# Patient Record
Sex: MALE | Race: WHITE | NOT HISPANIC OR LATINO | Employment: OTHER | URBAN - METROPOLITAN AREA
[De-identification: names, ages, dates, MRNs, and addresses within clinical notes are randomized per-mention and may not be internally consistent; named-entity substitution may affect disease eponyms.]

---

## 2017-01-01 ENCOUNTER — APPOINTMENT (OUTPATIENT)
Dept: RADIOLOGY | Facility: MEDICAL CENTER | Age: 82
DRG: 242 | End: 2017-01-01
Attending: INTERNAL MEDICINE
Payer: MEDICARE

## 2017-01-01 ENCOUNTER — APPOINTMENT (OUTPATIENT)
Dept: RADIOLOGY | Facility: MEDICAL CENTER | Age: 82
DRG: 208 | End: 2017-01-01
Attending: INTERNAL MEDICINE
Payer: MEDICARE

## 2017-01-01 ENCOUNTER — HOSPITAL ENCOUNTER (INPATIENT)
Facility: REHABILITATION | Age: 82
LOS: 13 days | DRG: 054 | End: 2017-08-21
Attending: PHYSICAL MEDICINE & REHABILITATION | Admitting: PHYSICAL MEDICINE & REHABILITATION
Payer: MEDICARE

## 2017-01-01 ENCOUNTER — HOSPITAL ENCOUNTER (OUTPATIENT)
Dept: RADIOLOGY | Facility: MEDICAL CENTER | Age: 82
End: 2017-04-05

## 2017-01-01 ENCOUNTER — APPOINTMENT (OUTPATIENT)
Dept: RADIOLOGY | Facility: MEDICAL CENTER | Age: 82
DRG: 025 | End: 2017-01-01
Attending: NEUROLOGICAL SURGERY
Payer: MEDICARE

## 2017-01-01 ENCOUNTER — RESOLUTE PROFESSIONAL BILLING HOSPITAL PROF FEE (OUTPATIENT)
Dept: PHYSICAL MEDICINE AND REHAB | Facility: REHABILITATION | Age: 82
End: 2017-01-01

## 2017-01-01 ENCOUNTER — APPOINTMENT (OUTPATIENT)
Dept: RADIOLOGY | Facility: MEDICAL CENTER | Age: 82
DRG: 242 | End: 2017-01-01
Attending: EMERGENCY MEDICINE
Payer: MEDICARE

## 2017-01-01 ENCOUNTER — PATIENT OUTREACH (OUTPATIENT)
Dept: OTHER | Facility: MEDICAL CENTER | Age: 82
End: 2017-01-01

## 2017-01-01 ENCOUNTER — APPOINTMENT (OUTPATIENT)
Dept: RADIOLOGY | Facility: REHABILITATION | Age: 82
DRG: 054 | End: 2017-01-01
Attending: PHYSICAL MEDICINE & REHABILITATION
Payer: MEDICARE

## 2017-01-01 ENCOUNTER — HOSPITAL ENCOUNTER (OUTPATIENT)
Dept: RADIOLOGY | Facility: MEDICAL CENTER | Age: 82
End: 2017-08-02

## 2017-01-01 ENCOUNTER — HOSPITAL ENCOUNTER (OUTPATIENT)
Dept: RADIATION ONCOLOGY | Facility: MEDICAL CENTER | Age: 82
End: 2017-05-31
Attending: RADIOLOGY
Payer: COMMERCIAL

## 2017-01-01 ENCOUNTER — HOSPITAL ENCOUNTER (INPATIENT)
Facility: MEDICAL CENTER | Age: 82
LOS: 5 days | DRG: 242 | End: 2017-08-08
Attending: EMERGENCY MEDICINE | Admitting: INTERNAL MEDICINE
Payer: MEDICARE

## 2017-01-01 ENCOUNTER — NON-PROVIDER VISIT (OUTPATIENT)
Dept: CARDIOLOGY | Facility: MEDICAL CENTER | Age: 82
End: 2017-01-01

## 2017-01-01 ENCOUNTER — PATIENT OUTREACH (OUTPATIENT)
Dept: HEALTH INFORMATION MANAGEMENT | Facility: OTHER | Age: 82
End: 2017-01-01

## 2017-01-01 ENCOUNTER — HOSPITAL ENCOUNTER (OUTPATIENT)
Dept: RADIATION ONCOLOGY | Facility: MEDICAL CENTER | Age: 82
End: 2017-05-10

## 2017-01-01 ENCOUNTER — HOSPITAL ENCOUNTER (INPATIENT)
Facility: REHABILITATION | Age: 82
End: 2017-01-01
Attending: PHYSICAL MEDICINE & REHABILITATION | Admitting: PHYSICAL MEDICINE & REHABILITATION
Payer: COMMERCIAL

## 2017-01-01 ENCOUNTER — HOSPITAL ENCOUNTER (INPATIENT)
Facility: MEDICAL CENTER | Age: 82
LOS: 3 days | DRG: 208 | End: 2017-08-24
Attending: EMERGENCY MEDICINE | Admitting: INTERNAL MEDICINE
Payer: MEDICARE

## 2017-01-01 ENCOUNTER — RESOLUTE PROFESSIONAL BILLING HOSPITAL PROF FEE (OUTPATIENT)
Dept: HOSPITALIST | Facility: MEDICAL CENTER | Age: 82
End: 2017-01-01
Payer: COMMERCIAL

## 2017-01-01 ENCOUNTER — APPOINTMENT (OUTPATIENT)
Dept: RADIOLOGY | Facility: MEDICAL CENTER | Age: 82
DRG: 208 | End: 2017-01-01
Attending: EMERGENCY MEDICINE
Payer: MEDICARE

## 2017-01-01 ENCOUNTER — HOSPITAL ENCOUNTER (OUTPATIENT)
Dept: RADIATION ONCOLOGY | Facility: MEDICAL CENTER | Age: 82
End: 2017-06-30
Attending: RADIOLOGY
Payer: COMMERCIAL

## 2017-01-01 ENCOUNTER — TELEPHONE (OUTPATIENT)
Dept: CARDIOLOGY | Facility: MEDICAL CENTER | Age: 82
End: 2017-01-01

## 2017-01-01 ENCOUNTER — TELEPHONE (OUTPATIENT)
Dept: HEMATOLOGY ONCOLOGY | Facility: MEDICAL CENTER | Age: 82
End: 2017-01-01

## 2017-01-01 ENCOUNTER — HOSPITAL ENCOUNTER (INPATIENT)
Facility: MEDICAL CENTER | Age: 82
LOS: 6 days | DRG: 025 | End: 2017-04-11
Attending: EMERGENCY MEDICINE | Admitting: HOSPITALIST
Payer: MEDICARE

## 2017-01-01 VITALS
HEART RATE: 93 BPM | WEIGHT: 147.8 LBS | HEIGHT: 65 IN | TEMPERATURE: 97.8 F | SYSTOLIC BLOOD PRESSURE: 125 MMHG | DIASTOLIC BLOOD PRESSURE: 79 MMHG | BODY MASS INDEX: 24.62 KG/M2 | RESPIRATION RATE: 15 BRPM | OXYGEN SATURATION: 97 %

## 2017-01-01 VITALS
OXYGEN SATURATION: 94 % | BODY MASS INDEX: 26.41 KG/M2 | DIASTOLIC BLOOD PRESSURE: 72 MMHG | WEIGHT: 158.51 LBS | HEART RATE: 66 BPM | RESPIRATION RATE: 18 BRPM | HEIGHT: 65 IN | SYSTOLIC BLOOD PRESSURE: 140 MMHG | TEMPERATURE: 98.4 F

## 2017-01-01 VITALS
TEMPERATURE: 98.3 F | DIASTOLIC BLOOD PRESSURE: 83 MMHG | RESPIRATION RATE: 15 BRPM | BODY MASS INDEX: 25.34 KG/M2 | OXYGEN SATURATION: 95 % | WEIGHT: 152.12 LBS | SYSTOLIC BLOOD PRESSURE: 151 MMHG | HEART RATE: 70 BPM | HEIGHT: 65 IN

## 2017-01-01 VITALS
OXYGEN SATURATION: 81 % | DIASTOLIC BLOOD PRESSURE: 1 MMHG | WEIGHT: 156.31 LBS | HEIGHT: 63 IN | SYSTOLIC BLOOD PRESSURE: 15 MMHG | TEMPERATURE: 97.5 F | BODY MASS INDEX: 27.7 KG/M2 | RESPIRATION RATE: 30 BRPM

## 2017-01-01 VITALS
WEIGHT: 151.01 LBS | SYSTOLIC BLOOD PRESSURE: 140 MMHG | RESPIRATION RATE: 26 BRPM | BODY MASS INDEX: 25.16 KG/M2 | OXYGEN SATURATION: 90 % | HEART RATE: 105 BPM | TEMPERATURE: 99.6 F | DIASTOLIC BLOOD PRESSURE: 56 MMHG | HEIGHT: 65 IN

## 2017-01-01 DIAGNOSIS — I44.2 AV BLOCK, 3RD DEGREE (HCC): ICD-10-CM

## 2017-01-01 DIAGNOSIS — G93.89 BRAIN MASS: ICD-10-CM

## 2017-01-01 DIAGNOSIS — G93.6 VASOGENIC CEREBRAL EDEMA (HCC): ICD-10-CM

## 2017-01-01 DIAGNOSIS — R07.9 CHEST PAIN, UNSPECIFIED TYPE: ICD-10-CM

## 2017-01-01 DIAGNOSIS — Z95.0 CARDIAC PACEMAKER IN SITU: ICD-10-CM

## 2017-01-01 DIAGNOSIS — I26.99 PULMONARY EMBOLISM, OTHER: ICD-10-CM

## 2017-01-01 DIAGNOSIS — R00.1 BRADYCARDIA: ICD-10-CM

## 2017-01-01 DIAGNOSIS — I44.30 HEART BLOCK, AV: ICD-10-CM

## 2017-01-01 DIAGNOSIS — G93.6 VASOGENIC BRAIN EDEMA (HCC): ICD-10-CM

## 2017-01-01 DIAGNOSIS — R42 DIZZY: ICD-10-CM

## 2017-01-01 DIAGNOSIS — J18.9 PNEUMONIA DUE TO INFECTIOUS ORGANISM, UNSPECIFIED LATERALITY, UNSPECIFIED PART OF LUNG: ICD-10-CM

## 2017-01-01 LAB
ABO GROUP BLD: NORMAL
ALBUMIN SERPL BCP-MCNC: 2 G/DL (ref 3.2–4.9)
ALBUMIN SERPL BCP-MCNC: 2.1 G/DL (ref 3.2–4.9)
ALBUMIN SERPL BCP-MCNC: 2.3 G/DL (ref 3.2–4.9)
ALBUMIN SERPL BCP-MCNC: 2.3 G/DL (ref 3.2–4.9)
ALBUMIN SERPL BCP-MCNC: 2.4 G/DL (ref 3.2–4.9)
ALBUMIN SERPL BCP-MCNC: 2.7 G/DL (ref 3.2–4.9)
ALBUMIN SERPL BCP-MCNC: 3.3 G/DL (ref 3.2–4.9)
ALBUMIN SERPL BCP-MCNC: 3.6 G/DL (ref 3.2–4.9)
ALBUMIN SERPL BCP-MCNC: 4.7 G/DL (ref 3.2–4.9)
ALBUMIN/GLOB SERPL: 0.7 G/DL
ALBUMIN/GLOB SERPL: 0.7 G/DL
ALBUMIN/GLOB SERPL: 0.8 G/DL
ALBUMIN/GLOB SERPL: 0.9 G/DL
ALBUMIN/GLOB SERPL: 1.1 G/DL
ALBUMIN/GLOB SERPL: 1.3 G/DL
ALBUMIN/GLOB SERPL: 1.7 G/DL
ALP SERPL-CCNC: 117 U/L (ref 30–99)
ALP SERPL-CCNC: 128 U/L (ref 30–99)
ALP SERPL-CCNC: 59 U/L (ref 30–99)
ALP SERPL-CCNC: 65 U/L (ref 30–99)
ALP SERPL-CCNC: 71 U/L (ref 30–99)
ALP SERPL-CCNC: 73 U/L (ref 30–99)
ALP SERPL-CCNC: 88 U/L (ref 30–99)
ALP SERPL-CCNC: 90 U/L (ref 30–99)
ALP SERPL-CCNC: 91 U/L (ref 30–99)
ALT SERPL-CCNC: 159 U/L (ref 2–50)
ALT SERPL-CCNC: 233 U/L (ref 2–50)
ALT SERPL-CCNC: 27 U/L (ref 2–50)
ALT SERPL-CCNC: 29 U/L (ref 2–50)
ALT SERPL-CCNC: 30 U/L (ref 2–50)
ALT SERPL-CCNC: 326 U/L (ref 2–50)
ALT SERPL-CCNC: 333 U/L (ref 2–50)
ALT SERPL-CCNC: 38 U/L (ref 2–50)
ALT SERPL-CCNC: 9 U/L (ref 2–50)
AMORPH CRY #/AREA URNS HPF: PRESENT /HPF
AMORPH CRY #/AREA URNS HPF: PRESENT /HPF
ANION GAP SERPL CALC-SCNC: 10 MMOL/L (ref 0–11.9)
ANION GAP SERPL CALC-SCNC: 10 MMOL/L (ref 0–11.9)
ANION GAP SERPL CALC-SCNC: 12 MMOL/L (ref 0–11.9)
ANION GAP SERPL CALC-SCNC: 12 MMOL/L (ref 0–11.9)
ANION GAP SERPL CALC-SCNC: 13 MMOL/L (ref 0–11.9)
ANION GAP SERPL CALC-SCNC: 13 MMOL/L (ref 0–11.9)
ANION GAP SERPL CALC-SCNC: 15 MMOL/L (ref 0–11.9)
ANION GAP SERPL CALC-SCNC: 22 MMOL/L (ref 0–11.9)
ANION GAP SERPL CALC-SCNC: 6 MMOL/L (ref 0–11.9)
ANION GAP SERPL CALC-SCNC: 7 MMOL/L (ref 0–11.9)
ANION GAP SERPL CALC-SCNC: 8 MMOL/L (ref 0–11.9)
ANION GAP SERPL CALC-SCNC: 9 MMOL/L (ref 0–11.9)
ANISOCYTOSIS BLD QL SMEAR: ABNORMAL
APPEARANCE UR: ABNORMAL
APPEARANCE UR: CLEAR
APPEARANCE UR: CLEAR
APTT PPP: 136.6 SEC (ref 24.7–36)
APTT PPP: 144.1 SEC (ref 24.7–36)
APTT PPP: 49.6 SEC (ref 24.7–36)
APTT PPP: 51.2 SEC (ref 24.7–36)
APTT PPP: 72.9 SEC (ref 24.7–36)
APTT PPP: >240 SEC (ref 24.7–36)
AST SERPL-CCNC: 16 U/L (ref 12–45)
AST SERPL-CCNC: 17 U/L (ref 12–45)
AST SERPL-CCNC: 17 U/L (ref 12–45)
AST SERPL-CCNC: 18 U/L (ref 12–45)
AST SERPL-CCNC: 21 U/L (ref 12–45)
AST SERPL-CCNC: 240 U/L (ref 12–45)
AST SERPL-CCNC: 373 U/L (ref 12–45)
AST SERPL-CCNC: 74 U/L (ref 12–45)
AST SERPL-CCNC: 89 U/L (ref 12–45)
BACTERIA #/AREA URNS HPF: ABNORMAL /HPF
BACTERIA BLD CULT: NORMAL
BACTERIA SPEC RESP CULT: NORMAL
BACTERIA SPEC RESP CULT: NORMAL
BACTERIA UR CULT: NORMAL
BACTERIA UR CULT: NORMAL
BARCODED ABORH UBTYP: 5100
BARCODED ABORH UBTYP: 6200
BARCODED ABORH UBTYP: 9500
BARCODED PRD CODE UBPRD: NORMAL
BARCODED UNIT NUM UBUNT: NORMAL
BASE EXCESS BLDA CALC-SCNC: -10 MMOL/L (ref -4–3)
BASE EXCESS BLDA CALC-SCNC: -8 MMOL/L (ref -4–3)
BASE EXCESS BLDA CALC-SCNC: -8 MMOL/L (ref -4–3)
BASE EXCESS BLDA CALC-SCNC: -9 MMOL/L (ref -4–3)
BASOPHILS # BLD AUTO: 0 % (ref 0–1.8)
BASOPHILS # BLD AUTO: 0.2 % (ref 0–1.8)
BASOPHILS # BLD AUTO: 0.2 % (ref 0–1.8)
BASOPHILS # BLD AUTO: 0.9 % (ref 0–1.8)
BASOPHILS # BLD AUTO: 2.6 % (ref 0–1.8)
BASOPHILS # BLD: 0 K/UL (ref 0–0.12)
BASOPHILS # BLD: 0.01 K/UL (ref 0–0.12)
BASOPHILS # BLD: 0.02 K/UL (ref 0–0.12)
BASOPHILS # BLD: 0.02 K/UL (ref 0–0.12)
BASOPHILS # BLD: 0.18 K/UL (ref 0–0.12)
BILIRUB SERPL-MCNC: 0.6 MG/DL (ref 0.1–1.5)
BILIRUB SERPL-MCNC: 0.6 MG/DL (ref 0.1–1.5)
BILIRUB SERPL-MCNC: 0.8 MG/DL (ref 0.1–1.5)
BILIRUB SERPL-MCNC: 0.8 MG/DL (ref 0.1–1.5)
BILIRUB SERPL-MCNC: 1 MG/DL (ref 0.1–1.5)
BILIRUB SERPL-MCNC: 1 MG/DL (ref 0.1–1.5)
BILIRUB SERPL-MCNC: 1.2 MG/DL (ref 0.1–1.5)
BILIRUB SERPL-MCNC: 3.4 MG/DL (ref 0.1–1.5)
BILIRUB SERPL-MCNC: 4.2 MG/DL (ref 0.1–1.5)
BILIRUB UR QL STRIP.AUTO: NEGATIVE
BLD GP AB SCN SERPL QL: NORMAL
BLOOD CULTURE HOLD CXBCH: NORMAL
BLOOD CULTURE HOLD CXBCH: NORMAL
BNP SERPL-MCNC: 110 PG/ML (ref 0–100)
BODY TEMPERATURE: ABNORMAL DEGREES
BUN SERPL-MCNC: 15 MG/DL (ref 8–22)
BUN SERPL-MCNC: 17 MG/DL (ref 8–22)
BUN SERPL-MCNC: 18 MG/DL (ref 8–22)
BUN SERPL-MCNC: 20 MG/DL (ref 8–22)
BUN SERPL-MCNC: 21 MG/DL (ref 8–22)
BUN SERPL-MCNC: 21 MG/DL (ref 8–22)
BUN SERPL-MCNC: 26 MG/DL (ref 8–22)
BUN SERPL-MCNC: 31 MG/DL (ref 8–22)
BUN SERPL-MCNC: 35 MG/DL (ref 8–22)
BUN SERPL-MCNC: 39 MG/DL (ref 8–22)
BUN SERPL-MCNC: 43 MG/DL (ref 8–22)
BUN SERPL-MCNC: 44 MG/DL (ref 8–22)
BUN SERPL-MCNC: 47 MG/DL (ref 8–22)
BUN SERPL-MCNC: 53 MG/DL (ref 8–22)
BURR CELLS BLD QL SMEAR: NORMAL
CALCIUM SERPL-MCNC: 6.8 MG/DL (ref 8.5–10.5)
CALCIUM SERPL-MCNC: 7.2 MG/DL (ref 8.5–10.5)
CALCIUM SERPL-MCNC: 7.8 MG/DL (ref 8.5–10.5)
CALCIUM SERPL-MCNC: 7.8 MG/DL (ref 8.5–10.5)
CALCIUM SERPL-MCNC: 8 MG/DL (ref 8.5–10.5)
CALCIUM SERPL-MCNC: 8 MG/DL (ref 8.5–10.5)
CALCIUM SERPL-MCNC: 8.1 MG/DL (ref 8.5–10.5)
CALCIUM SERPL-MCNC: 8.2 MG/DL (ref 8.5–10.5)
CALCIUM SERPL-MCNC: 8.6 MG/DL (ref 8.5–10.5)
CALCIUM SERPL-MCNC: 8.9 MG/DL (ref 8.5–10.5)
CALCIUM SERPL-MCNC: 8.9 MG/DL (ref 8.5–10.5)
CALCIUM SERPL-MCNC: 9.4 MG/DL (ref 8.5–10.5)
CFT BLD TEG: 12.6 MIN (ref 5–10)
CFT P HPASE BLD TEG: 7.2 MIN (ref 5–10)
CHLORIDE SERPL-SCNC: 102 MMOL/L (ref 96–112)
CHLORIDE SERPL-SCNC: 104 MMOL/L (ref 96–112)
CHLORIDE SERPL-SCNC: 105 MMOL/L (ref 96–112)
CHLORIDE SERPL-SCNC: 107 MMOL/L (ref 96–112)
CHLORIDE SERPL-SCNC: 107 MMOL/L (ref 96–112)
CHLORIDE SERPL-SCNC: 109 MMOL/L (ref 96–112)
CHLORIDE SERPL-SCNC: 110 MMOL/L (ref 96–112)
CHLORIDE SERPL-SCNC: 111 MMOL/L (ref 96–112)
CHLORIDE SERPL-SCNC: 114 MMOL/L (ref 96–112)
CHLORIDE SERPL-SCNC: 99 MMOL/L (ref 96–112)
CHOLEST SERPL-MCNC: 139 MG/DL (ref 100–199)
CLOT ANGLE BLD TEG: 50.5 DEGREES (ref 53–72)
CLOT ANGLE P HPASE BLD TEG: 59.4 DEGREES (ref 53–72)
CLOT INIT P HPASE BLD TEG: 2.2 MIN (ref 1–3)
CLOT LYSIS 30M P MA LENFR BLD TEG: 0 % (ref 0–8)
CLOT LYSIS 30M P MA LENFR BLD TEG: 0 % (ref 0–8)
CO2 BLDA-SCNC: 17 MMOL/L (ref 20–33)
CO2 BLDA-SCNC: 20 MMOL/L (ref 20–33)
CO2 BLDA-SCNC: 21 MMOL/L (ref 20–33)
CO2 BLDA-SCNC: 23 MMOL/L (ref 20–33)
CO2 SERPL-SCNC: 15 MMOL/L (ref 20–33)
CO2 SERPL-SCNC: 16 MMOL/L (ref 20–33)
CO2 SERPL-SCNC: 16 MMOL/L (ref 20–33)
CO2 SERPL-SCNC: 18 MMOL/L (ref 20–33)
CO2 SERPL-SCNC: 20 MMOL/L (ref 20–33)
CO2 SERPL-SCNC: 20 MMOL/L (ref 20–33)
CO2 SERPL-SCNC: 21 MMOL/L (ref 20–33)
CO2 SERPL-SCNC: 22 MMOL/L (ref 20–33)
CO2 SERPL-SCNC: 22 MMOL/L (ref 20–33)
CO2 SERPL-SCNC: 23 MMOL/L (ref 20–33)
CO2 SERPL-SCNC: 27 MMOL/L (ref 20–33)
COLOR UR: ABNORMAL
COLOR UR: YELLOW
COLOR UR: YELLOW
COMPONENT CT 8504CT: NORMAL
COMPONENT FT 8504FT: NORMAL
COMPONENT P 8504P: NORMAL
COMPONENT P 8504P: NORMAL
COMPONENT R 8504R: NORMAL
CREAT SERPL-MCNC: 0.58 MG/DL (ref 0.5–1.4)
CREAT SERPL-MCNC: 0.64 MG/DL (ref 0.5–1.4)
CREAT SERPL-MCNC: 0.66 MG/DL (ref 0.5–1.4)
CREAT SERPL-MCNC: 0.67 MG/DL (ref 0.5–1.4)
CREAT SERPL-MCNC: 0.68 MG/DL (ref 0.5–1.4)
CREAT SERPL-MCNC: 0.79 MG/DL (ref 0.5–1.4)
CREAT SERPL-MCNC: 0.81 MG/DL (ref 0.5–1.4)
CREAT SERPL-MCNC: 1.05 MG/DL (ref 0.5–1.4)
CREAT SERPL-MCNC: 1.14 MG/DL (ref 0.5–1.4)
CREAT SERPL-MCNC: 1.17 MG/DL (ref 0.5–1.4)
CREAT SERPL-MCNC: 1.61 MG/DL (ref 0.5–1.4)
CREAT SERPL-MCNC: 1.96 MG/DL (ref 0.5–1.4)
CREAT SERPL-MCNC: 2.08 MG/DL (ref 0.5–1.4)
CREAT SERPL-MCNC: 2.37 MG/DL (ref 0.5–1.4)
CT.EXTRINSIC BLD ROTEM: 3 MIN (ref 1–3)
DACRYOCYTES BLD QL SMEAR: NORMAL
EKG IMPRESSION: NORMAL
EOSINOPHIL # BLD AUTO: 0 K/UL (ref 0–0.51)
EOSINOPHIL # BLD AUTO: 0.01 K/UL (ref 0–0.51)
EOSINOPHIL # BLD AUTO: 0.03 K/UL (ref 0–0.51)
EOSINOPHIL # BLD AUTO: 0.04 K/UL (ref 0–0.51)
EOSINOPHIL # BLD AUTO: 0.04 K/UL (ref 0–0.51)
EOSINOPHIL # BLD AUTO: 0.05 K/UL (ref 0–0.51)
EOSINOPHIL # BLD AUTO: 0.06 K/UL (ref 0–0.51)
EOSINOPHIL NFR BLD: 0 % (ref 0–6.9)
EOSINOPHIL NFR BLD: 0.9 % (ref 0–6.9)
EOSINOPHIL NFR BLD: 0.9 % (ref 0–6.9)
EOSINOPHIL NFR BLD: 1.8 % (ref 0–6.9)
EOSINOPHIL NFR BLD: 1.8 % (ref 0–6.9)
EOSINOPHIL NFR BLD: 3.8 % (ref 0–6.9)
EOSINOPHIL NFR BLD: 4 % (ref 0–6.9)
EPI CELLS #/AREA URNS HPF: ABNORMAL /HPF
ERYTHROCYTE [DISTWIDTH] IN BLOOD BY AUTOMATED COUNT: 51.3 FL (ref 35.9–50)
ERYTHROCYTE [DISTWIDTH] IN BLOOD BY AUTOMATED COUNT: 52.7 FL (ref 35.9–50)
ERYTHROCYTE [DISTWIDTH] IN BLOOD BY AUTOMATED COUNT: 66.3 FL (ref 35.9–50)
ERYTHROCYTE [DISTWIDTH] IN BLOOD BY AUTOMATED COUNT: 66.9 FL (ref 35.9–50)
ERYTHROCYTE [DISTWIDTH] IN BLOOD BY AUTOMATED COUNT: 67.1 FL (ref 35.9–50)
ERYTHROCYTE [DISTWIDTH] IN BLOOD BY AUTOMATED COUNT: 67.7 FL (ref 35.9–50)
ERYTHROCYTE [DISTWIDTH] IN BLOOD BY AUTOMATED COUNT: 68.6 FL (ref 35.9–50)
ERYTHROCYTE [DISTWIDTH] IN BLOOD BY AUTOMATED COUNT: 69.1 FL (ref 35.9–50)
ERYTHROCYTE [DISTWIDTH] IN BLOOD BY AUTOMATED COUNT: 69.2 FL (ref 35.9–50)
ERYTHROCYTE [DISTWIDTH] IN BLOOD BY AUTOMATED COUNT: 70.1 FL (ref 35.9–50)
ERYTHROCYTE [DISTWIDTH] IN BLOOD BY AUTOMATED COUNT: 70.4 FL (ref 35.9–50)
ERYTHROCYTE [DISTWIDTH] IN BLOOD BY AUTOMATED COUNT: 71 FL (ref 35.9–50)
ERYTHROCYTE [DISTWIDTH] IN BLOOD BY AUTOMATED COUNT: 71.2 FL (ref 35.9–50)
ERYTHROCYTE [DISTWIDTH] IN BLOOD BY AUTOMATED COUNT: 71.4 FL (ref 35.9–50)
ERYTHROCYTE [DISTWIDTH] IN BLOOD BY AUTOMATED COUNT: 71.7 FL (ref 35.9–50)
ERYTHROCYTE [DISTWIDTH] IN BLOOD BY AUTOMATED COUNT: 71.8 FL (ref 35.9–50)
ERYTHROCYTE [DISTWIDTH] IN BLOOD BY AUTOMATED COUNT: 73.4 FL (ref 35.9–50)
ERYTHROCYTE [DISTWIDTH] IN BLOOD BY AUTOMATED COUNT: 75.4 FL (ref 35.9–50)
ERYTHROCYTE [DISTWIDTH] IN BLOOD BY AUTOMATED COUNT: 75.4 FL (ref 35.9–50)
ERYTHROCYTE [DISTWIDTH] IN BLOOD BY AUTOMATED COUNT: 75.6 FL (ref 35.9–50)
ERYTHROCYTE [DISTWIDTH] IN BLOOD BY AUTOMATED COUNT: 78.8 FL (ref 35.9–50)
ERYTHROCYTE [DISTWIDTH] IN BLOOD BY AUTOMATED COUNT: 82.9 FL (ref 35.9–50)
EST. AVERAGE GLUCOSE BLD GHB EST-MCNC: 108 MG/DL
FERRITIN SERPL-MCNC: 874.4 NG/ML (ref 22–322)
GFR SERPL CREATININE-BSD FRML MDRD: 27 ML/MIN/1.73 M 2
GFR SERPL CREATININE-BSD FRML MDRD: 31 ML/MIN/1.73 M 2
GFR SERPL CREATININE-BSD FRML MDRD: 33 ML/MIN/1.73 M 2
GFR SERPL CREATININE-BSD FRML MDRD: 41 ML/MIN/1.73 M 2
GFR SERPL CREATININE-BSD FRML MDRD: >60 ML/MIN/1.73 M 2
GIANT PLATELETS BLD QL SMEAR: NORMAL
GIANT PLATELETS BLD QL SMEAR: NORMAL
GLOBULIN SER CALC-MCNC: 2.4 G/DL (ref 1.9–3.5)
GLOBULIN SER CALC-MCNC: 2.6 G/DL (ref 1.9–3.5)
GLOBULIN SER CALC-MCNC: 2.7 G/DL (ref 1.9–3.5)
GLOBULIN SER CALC-MCNC: 2.7 G/DL (ref 1.9–3.5)
GLOBULIN SER CALC-MCNC: 2.8 G/DL (ref 1.9–3.5)
GLOBULIN SER CALC-MCNC: 2.9 G/DL (ref 1.9–3.5)
GLOBULIN SER CALC-MCNC: 2.9 G/DL (ref 1.9–3.5)
GLOBULIN SER CALC-MCNC: 3.1 G/DL (ref 1.9–3.5)
GLOBULIN SER CALC-MCNC: 3.3 G/DL (ref 1.9–3.5)
GLUCOSE SERPL-MCNC: 109 MG/DL (ref 65–99)
GLUCOSE SERPL-MCNC: 134 MG/DL (ref 65–99)
GLUCOSE SERPL-MCNC: 135 MG/DL (ref 65–99)
GLUCOSE SERPL-MCNC: 140 MG/DL (ref 65–99)
GLUCOSE SERPL-MCNC: 144 MG/DL (ref 65–99)
GLUCOSE SERPL-MCNC: 144 MG/DL (ref 65–99)
GLUCOSE SERPL-MCNC: 148 MG/DL (ref 65–99)
GLUCOSE SERPL-MCNC: 152 MG/DL (ref 65–99)
GLUCOSE SERPL-MCNC: 188 MG/DL (ref 65–99)
GLUCOSE SERPL-MCNC: 219 MG/DL (ref 65–99)
GLUCOSE SERPL-MCNC: 242 MG/DL (ref 65–99)
GLUCOSE SERPL-MCNC: 246 MG/DL (ref 65–99)
GLUCOSE SERPL-MCNC: 265 MG/DL (ref 65–99)
GLUCOSE SERPL-MCNC: 74 MG/DL (ref 65–99)
GLUCOSE UR STRIP.AUTO-MCNC: 500 MG/DL
GLUCOSE UR STRIP.AUTO-MCNC: NEGATIVE MG/DL
GLUCOSE UR STRIP.AUTO-MCNC: NEGATIVE MG/DL
GRAM STN SPEC: NORMAL
GRAN CASTS #/AREA URNS LPF: >10 /LPF
HAV IGM SERPL QL IA: NEGATIVE
HBA1C MFR BLD: 5.4 % (ref 0–5.6)
HBV CORE IGM SER QL: NEGATIVE
HBV SURFACE AG SER QL: NEGATIVE
HCO3 BLDA-SCNC: 16.4 MMOL/L (ref 17–25)
HCO3 BLDA-SCNC: 18 MMOL/L (ref 17–25)
HCO3 BLDA-SCNC: 19.2 MMOL/L (ref 17–25)
HCO3 BLDA-SCNC: 21.3 MMOL/L (ref 17–25)
HCT VFR BLD AUTO: 11.6 % (ref 42–52)
HCT VFR BLD AUTO: 17.7 % (ref 42–52)
HCT VFR BLD AUTO: 22.7 % (ref 42–52)
HCT VFR BLD AUTO: 24.1 % (ref 42–52)
HCT VFR BLD AUTO: 25.2 % (ref 42–52)
HCT VFR BLD AUTO: 25.7 % (ref 42–52)
HCT VFR BLD AUTO: 26.1 % (ref 42–52)
HCT VFR BLD AUTO: 27.5 % (ref 42–52)
HCT VFR BLD AUTO: 27.7 % (ref 42–52)
HCT VFR BLD AUTO: 27.7 % (ref 42–52)
HCT VFR BLD AUTO: 27.9 % (ref 42–52)
HCT VFR BLD AUTO: 28.1 % (ref 42–52)
HCT VFR BLD AUTO: 28.2 % (ref 42–52)
HCT VFR BLD AUTO: 28.3 % (ref 42–52)
HCT VFR BLD AUTO: 29.6 % (ref 42–52)
HCT VFR BLD AUTO: 30.8 % (ref 42–52)
HCT VFR BLD AUTO: 31.7 % (ref 42–52)
HCT VFR BLD AUTO: 33.7 % (ref 42–52)
HCT VFR BLD AUTO: 33.8 % (ref 42–52)
HCT VFR BLD AUTO: 36.6 % (ref 42–52)
HCT VFR BLD AUTO: 39 % (ref 42–52)
HCT VFR BLD AUTO: 40 % (ref 42–52)
HCV AB SER QL: NEGATIVE
HDLC SERPL-MCNC: 24 MG/DL
HEMOCCULT STL QL: NEGATIVE
HGB BLD-MCNC: 10.1 G/DL (ref 14–18)
HGB BLD-MCNC: 11.2 G/DL (ref 14–18)
HGB BLD-MCNC: 11.3 G/DL (ref 14–18)
HGB BLD-MCNC: 12.4 G/DL (ref 14–18)
HGB BLD-MCNC: 12.5 G/DL (ref 14–18)
HGB BLD-MCNC: 13.1 G/DL (ref 14–18)
HGB BLD-MCNC: 3.7 G/DL (ref 14–18)
HGB BLD-MCNC: 5.5 G/DL (ref 14–18)
HGB BLD-MCNC: 6.9 G/DL (ref 14–18)
HGB BLD-MCNC: 7.7 G/DL (ref 14–18)
HGB BLD-MCNC: 8 G/DL (ref 14–18)
HGB BLD-MCNC: 8.2 G/DL (ref 14–18)
HGB BLD-MCNC: 8.2 G/DL (ref 14–18)
HGB BLD-MCNC: 8.5 G/DL (ref 14–18)
HGB BLD-MCNC: 8.7 G/DL (ref 14–18)
HGB BLD-MCNC: 8.9 G/DL (ref 14–18)
HGB BLD-MCNC: 9 G/DL (ref 14–18)
HGB BLD-MCNC: 9.6 G/DL (ref 14–18)
HGB BLD-MCNC: 9.7 G/DL (ref 14–18)
HGB RETIC QN AUTO: 35.6 PG/CELL (ref 29–35)
IMM GRANULOCYTES # BLD AUTO: 0.05 K/UL (ref 0–0.11)
IMM GRANULOCYTES # BLD AUTO: 0.1 K/UL (ref 0–0.11)
IMM GRANULOCYTES # BLD AUTO: 0.23 K/UL (ref 0–0.11)
IMM GRANULOCYTES NFR BLD AUTO: 0.9 % (ref 0–0.9)
IMM GRANULOCYTES NFR BLD AUTO: 1.6 % (ref 0–0.9)
IMM GRANULOCYTES NFR BLD AUTO: 4.7 % (ref 0–0.9)
IMM RETICS NFR: 18.7 % (ref 9.3–17.4)
IRON SATN MFR SERPL: 28 % (ref 15–55)
IRON SATN MFR SERPL: 83 % (ref 15–55)
IRON SERPL-MCNC: 187 UG/DL (ref 50–180)
IRON SERPL-MCNC: 48 UG/DL (ref 50–180)
KETONES UR STRIP.AUTO-MCNC: 20 MG/DL
KETONES UR STRIP.AUTO-MCNC: ABNORMAL MG/DL
KETONES UR STRIP.AUTO-MCNC: NEGATIVE MG/DL
LACTATE BLD-SCNC: 1 MMOL/L (ref 0.5–2)
LACTATE BLD-SCNC: 1.8 MMOL/L (ref 0.5–2)
LACTATE BLD-SCNC: 1.8 MMOL/L (ref 0.5–2)
LACTATE BLD-SCNC: 11.9 MMOL/L (ref 0.5–2)
LACTATE BLD-SCNC: 2.4 MMOL/L (ref 0.5–2)
LACTATE BLD-SCNC: 2.9 MMOL/L (ref 0.5–2)
LACTATE BLD-SCNC: 3 MMOL/L (ref 0.5–2)
LACTATE BLD-SCNC: 4.8 MMOL/L (ref 0.5–2)
LDLC SERPL CALC-MCNC: 94 MG/DL
LEUKOCYTE ESTERASE UR QL STRIP.AUTO: NEGATIVE
LG PLATELETS BLD QL SMEAR: NORMAL
LV EJECT FRACT  99904: 60
LV EJECT FRACT MOD 2C 99903: 64.09
LV EJECT FRACT MOD 4C 99902: 56.34
LV EJECT FRACT MOD BP 99901: 60.21
LYMPHOCYTES # BLD AUTO: 0.05 K/UL (ref 1–4.8)
LYMPHOCYTES # BLD AUTO: 0.07 K/UL (ref 1–4.8)
LYMPHOCYTES # BLD AUTO: 0.08 K/UL (ref 1–4.8)
LYMPHOCYTES # BLD AUTO: 0.1 K/UL (ref 1–4.8)
LYMPHOCYTES # BLD AUTO: 0.12 K/UL (ref 1–4.8)
LYMPHOCYTES # BLD AUTO: 0.16 K/UL (ref 1–4.8)
LYMPHOCYTES # BLD AUTO: 0.17 K/UL (ref 1–4.8)
LYMPHOCYTES # BLD AUTO: 0.17 K/UL (ref 1–4.8)
LYMPHOCYTES # BLD AUTO: 0.57 K/UL (ref 1–4.8)
LYMPHOCYTES # BLD AUTO: 0.58 K/UL (ref 1–4.8)
LYMPHOCYTES # BLD AUTO: 0.59 K/UL (ref 1–4.8)
LYMPHOCYTES # BLD AUTO: 0.88 K/UL (ref 1–4.8)
LYMPHOCYTES # BLD AUTO: 0.93 K/UL (ref 1–4.8)
LYMPHOCYTES # BLD AUTO: 1.16 K/UL (ref 1–4.8)
LYMPHOCYTES NFR BLD: 1.9 % (ref 22–41)
LYMPHOCYTES NFR BLD: 10.5 % (ref 22–41)
LYMPHOCYTES NFR BLD: 11 % (ref 22–41)
LYMPHOCYTES NFR BLD: 11.6 % (ref 22–41)
LYMPHOCYTES NFR BLD: 12 % (ref 22–41)
LYMPHOCYTES NFR BLD: 17.1 % (ref 22–41)
LYMPHOCYTES NFR BLD: 19.4 % (ref 22–41)
LYMPHOCYTES NFR BLD: 22.7 % (ref 22–41)
LYMPHOCYTES NFR BLD: 3.5 % (ref 22–41)
LYMPHOCYTES NFR BLD: 3.5 % (ref 22–41)
LYMPHOCYTES NFR BLD: 3.6 % (ref 22–41)
LYMPHOCYTES NFR BLD: 4 % (ref 22–41)
LYMPHOCYTES NFR BLD: 5.5 % (ref 22–41)
LYMPHOCYTES NFR BLD: 55 % (ref 22–41)
MACROCYTES BLD QL SMEAR: ABNORMAL
MACROCYTES BLD QL SMEAR: ABNORMAL
MAGNESIUM SERPL-MCNC: 1.9 MG/DL (ref 1.5–2.5)
MAGNESIUM SERPL-MCNC: 2 MG/DL (ref 1.5–2.5)
MAGNESIUM SERPL-MCNC: 2 MG/DL (ref 1.5–2.5)
MAGNESIUM SERPL-MCNC: 2.2 MG/DL (ref 1.5–2.5)
MAGNESIUM SERPL-MCNC: 2.4 MG/DL (ref 1.5–2.5)
MANUAL DIFF BLD: ABNORMAL
MANUAL DIFF BLD: NORMAL
MCF BLD TEG: 60.8 MM (ref 50–70)
MCF P HPASE BLD TEG: 53 MM (ref 50–70)
MCH RBC QN AUTO: 25.6 PG (ref 27–33)
MCH RBC QN AUTO: 25.7 PG (ref 27–33)
MCH RBC QN AUTO: 27.2 PG (ref 27–33)
MCH RBC QN AUTO: 27.3 PG (ref 27–33)
MCH RBC QN AUTO: 27.4 PG (ref 27–33)
MCH RBC QN AUTO: 27.4 PG (ref 27–33)
MCH RBC QN AUTO: 28 PG (ref 27–33)
MCH RBC QN AUTO: 29.1 PG (ref 27–33)
MCH RBC QN AUTO: 29.1 PG (ref 27–33)
MCH RBC QN AUTO: 29.2 PG (ref 27–33)
MCH RBC QN AUTO: 29.2 PG (ref 27–33)
MCH RBC QN AUTO: 29.3 PG (ref 27–33)
MCH RBC QN AUTO: 29.8 PG (ref 27–33)
MCH RBC QN AUTO: 30.1 PG (ref 27–33)
MCH RBC QN AUTO: 30.4 PG (ref 27–33)
MCHC RBC AUTO-ENTMCNC: 30.4 G/DL (ref 33.7–35.3)
MCHC RBC AUTO-ENTMCNC: 30.5 G/DL (ref 33.7–35.3)
MCHC RBC AUTO-ENTMCNC: 31.1 G/DL (ref 33.7–35.3)
MCHC RBC AUTO-ENTMCNC: 31.4 G/DL (ref 33.7–35.3)
MCHC RBC AUTO-ENTMCNC: 31.4 G/DL (ref 33.7–35.3)
MCHC RBC AUTO-ENTMCNC: 31.6 G/DL (ref 33.7–35.3)
MCHC RBC AUTO-ENTMCNC: 31.7 G/DL (ref 33.7–35.3)
MCHC RBC AUTO-ENTMCNC: 31.8 G/DL (ref 33.7–35.3)
MCHC RBC AUTO-ENTMCNC: 31.9 G/DL (ref 33.7–35.3)
MCHC RBC AUTO-ENTMCNC: 32 G/DL (ref 33.7–35.3)
MCHC RBC AUTO-ENTMCNC: 32 G/DL (ref 33.7–35.3)
MCHC RBC AUTO-ENTMCNC: 32.1 G/DL (ref 33.7–35.3)
MCHC RBC AUTO-ENTMCNC: 32.5 G/DL (ref 33.7–35.3)
MCHC RBC AUTO-ENTMCNC: 32.7 G/DL (ref 33.7–35.3)
MCHC RBC AUTO-ENTMCNC: 32.8 G/DL (ref 33.7–35.3)
MCHC RBC AUTO-ENTMCNC: 33.1 G/DL (ref 33.7–35.3)
MCHC RBC AUTO-ENTMCNC: 33.5 G/DL (ref 33.7–35.3)
MCHC RBC AUTO-ENTMCNC: 33.9 G/DL (ref 33.7–35.3)
MCV RBC AUTO: 79.8 FL (ref 81.4–97.8)
MCV RBC AUTO: 80.8 FL (ref 81.4–97.8)
MCV RBC AUTO: 80.9 FL (ref 81.4–97.8)
MCV RBC AUTO: 81.6 FL (ref 81.4–97.8)
MCV RBC AUTO: 82.4 FL (ref 81.4–97.8)
MCV RBC AUTO: 85.5 FL (ref 81.4–97.8)
MCV RBC AUTO: 85.5 FL (ref 81.4–97.8)
MCV RBC AUTO: 91.6 FL (ref 81.4–97.8)
MCV RBC AUTO: 92 FL (ref 81.4–97.8)
MCV RBC AUTO: 92.3 FL (ref 81.4–97.8)
MCV RBC AUTO: 92.6 FL (ref 81.4–97.8)
MCV RBC AUTO: 93 FL (ref 81.4–97.8)
MCV RBC AUTO: 93.4 FL (ref 81.4–97.8)
MCV RBC AUTO: 93.5 FL (ref 81.4–97.8)
MCV RBC AUTO: 93.7 FL (ref 81.4–97.8)
MCV RBC AUTO: 94.1 FL (ref 81.4–97.8)
MCV RBC AUTO: 94.3 FL (ref 81.4–97.8)
MCV RBC AUTO: 96.2 FL (ref 81.4–97.8)
METAMYELOCYTES NFR BLD MANUAL: 0.9 %
METAMYELOCYTES NFR BLD MANUAL: 0.9 %
METAMYELOCYTES NFR BLD MANUAL: 1.7 %
METAMYELOCYTES NFR BLD MANUAL: 1.7 %
METAMYELOCYTES NFR BLD MANUAL: 11.8 %
METAMYELOCYTES NFR BLD MANUAL: 12 %
METAMYELOCYTES NFR BLD MANUAL: 12.8 %
METAMYELOCYTES NFR BLD MANUAL: 4 %
MICRO URNS: ABNORMAL
MICROCYTES BLD QL SMEAR: ABNORMAL
MONOCYTES # BLD AUTO: 0 K/UL (ref 0–0.85)
MONOCYTES # BLD AUTO: 0 K/UL (ref 0–0.85)
MONOCYTES # BLD AUTO: 0.02 K/UL (ref 0–0.85)
MONOCYTES # BLD AUTO: 0.02 K/UL (ref 0–0.85)
MONOCYTES # BLD AUTO: 0.04 K/UL (ref 0–0.85)
MONOCYTES # BLD AUTO: 0.04 K/UL (ref 0–0.85)
MONOCYTES # BLD AUTO: 0.05 K/UL (ref 0–0.85)
MONOCYTES # BLD AUTO: 0.09 K/UL (ref 0–0.85)
MONOCYTES # BLD AUTO: 0.12 K/UL (ref 0–0.85)
MONOCYTES # BLD AUTO: 0.12 K/UL (ref 0–0.85)
MONOCYTES # BLD AUTO: 0.13 K/UL (ref 0–0.85)
MONOCYTES # BLD AUTO: 0.19 K/UL (ref 0–0.85)
MONOCYTES # BLD AUTO: 0.28 K/UL (ref 0–0.85)
MONOCYTES # BLD AUTO: 0.35 K/UL (ref 0–0.85)
MONOCYTES NFR BLD AUTO: 0 % (ref 0–13.4)
MONOCYTES NFR BLD AUTO: 0 % (ref 0–13.4)
MONOCYTES NFR BLD AUTO: 0.9 % (ref 0–13.4)
MONOCYTES NFR BLD AUTO: 1.1 % (ref 0–13.4)
MONOCYTES NFR BLD AUTO: 1.8 % (ref 0–13.4)
MONOCYTES NFR BLD AUTO: 17.5 % (ref 0–13.4)
MONOCYTES NFR BLD AUTO: 2.6 % (ref 0–13.4)
MONOCYTES NFR BLD AUTO: 2.8 % (ref 0–13.4)
MONOCYTES NFR BLD AUTO: 3.9 % (ref 0–13.4)
MONOCYTES NFR BLD AUTO: 3.9 % (ref 0–13.4)
MONOCYTES NFR BLD AUTO: 4 % (ref 0–13.4)
MONOCYTES NFR BLD AUTO: 5.1 % (ref 0–13.4)
MORPHOLOGY BLD-IMP: NORMAL
MUCOUS THREADS #/AREA URNS HPF: ABNORMAL /HPF
MYELOCYTES NFR BLD MANUAL: 0.9 %
MYELOCYTES NFR BLD MANUAL: 0.9 %
MYELOCYTES NFR BLD MANUAL: 8.3 %
NEUTROPHILS # BLD AUTO: 0.38 K/UL (ref 1.82–7.42)
NEUTROPHILS # BLD AUTO: 0.94 K/UL (ref 1.82–7.42)
NEUTROPHILS # BLD AUTO: 0.95 K/UL (ref 1.82–7.42)
NEUTROPHILS # BLD AUTO: 0.95 K/UL (ref 1.82–7.42)
NEUTROPHILS # BLD AUTO: 1.06 K/UL (ref 1.82–7.42)
NEUTROPHILS # BLD AUTO: 1.86 K/UL (ref 1.82–7.42)
NEUTROPHILS # BLD AUTO: 2.28 K/UL (ref 1.82–7.42)
NEUTROPHILS # BLD AUTO: 2.29 K/UL (ref 1.82–7.42)
NEUTROPHILS # BLD AUTO: 2.57 K/UL (ref 1.82–7.42)
NEUTROPHILS # BLD AUTO: 3.92 K/UL (ref 1.82–7.42)
NEUTROPHILS # BLD AUTO: 4.52 K/UL (ref 1.82–7.42)
NEUTROPHILS # BLD AUTO: 4.73 K/UL (ref 1.82–7.42)
NEUTROPHILS # BLD AUTO: 5 K/UL (ref 1.82–7.42)
NEUTROPHILS # BLD AUTO: 9.79 K/UL (ref 1.82–7.42)
NEUTROPHILS NFR BLD: 16.2 % (ref 44–72)
NEUTROPHILS NFR BLD: 46.8 % (ref 44–72)
NEUTROPHILS NFR BLD: 48 % (ref 44–72)
NEUTROPHILS NFR BLD: 55.5 % (ref 44–72)
NEUTROPHILS NFR BLD: 71.8 % (ref 44–72)
NEUTROPHILS NFR BLD: 72 % (ref 44–72)
NEUTROPHILS NFR BLD: 75.1 % (ref 44–72)
NEUTROPHILS NFR BLD: 79.6 % (ref 44–72)
NEUTROPHILS NFR BLD: 80.7 % (ref 44–72)
NEUTROPHILS NFR BLD: 84.4 % (ref 44–72)
NEUTROPHILS NFR BLD: 86.6 % (ref 44–72)
NEUTROPHILS NFR BLD: 89 % (ref 44–72)
NEUTROPHILS NFR BLD: 92.3 % (ref 44–72)
NEUTROPHILS NFR BLD: 95.6 % (ref 44–72)
NEUTS BAND NFR BLD MANUAL: 1.7 % (ref 0–10)
NEUTS BAND NFR BLD MANUAL: 16 % (ref 0–10)
NEUTS BAND NFR BLD MANUAL: 16.5 % (ref 0–10)
NEUTS BAND NFR BLD MANUAL: 24 % (ref 0–10)
NEUTS BAND NFR BLD MANUAL: 5.5 % (ref 0–10)
NEUTS BAND NFR BLD MANUAL: 6.1 % (ref 0–10)
NEUTS BAND NFR BLD MANUAL: 6.2 % (ref 0–10)
NEUTS BAND NFR BLD MANUAL: 7.3 % (ref 0–10)
NEUTS BAND NFR BLD MANUAL: 7.5 % (ref 0–10)
NEUTS BAND NFR BLD MANUAL: 7.8 % (ref 0–10)
NEUTS HYPERSEG BLD QL SMEAR: NORMAL
NITRITE UR QL STRIP.AUTO: NEGATIVE
NRBC # BLD AUTO: 0 K/UL
NRBC # BLD AUTO: 0.02 K/UL
NRBC # BLD AUTO: 0.07 K/UL
NRBC # BLD AUTO: 0.08 K/UL
NRBC # BLD AUTO: 0.33 K/UL
NRBC BLD AUTO-RTO: 0 /100 WBC
NRBC BLD AUTO-RTO: 0.4 /100 WBC
NRBC BLD AUTO-RTO: 1.6 /100 WBC
NRBC BLD AUTO-RTO: 1.6 /100 WBC
NRBC BLD AUTO-RTO: 1.7 /100 WBC
NRBC BLD AUTO-RTO: 1.9 /100 WBC
NRBC BLD AUTO-RTO: 20.6 /100 WBC
NRBC BLD AUTO-RTO: 5.5 /100 WBC
O2/TOTAL GAS SETTING VFR VENT: 100 %
O2/TOTAL GAS SETTING VFR VENT: 100 %
O2/TOTAL GAS SETTING VFR VENT: 80 %
O2/TOTAL GAS SETTING VFR VENT: 80 %
OVALOCYTES BLD QL SMEAR: NORMAL
PA AA BLD-ACNC: 90.4 %
PA ADP BLD-ACNC: 0 %
PCO2 BLDA: 29.6 MMHG (ref 26–37)
PCO2 BLDA: 51.6 MMHG (ref 26–37)
PCO2 BLDA: 51.8 MMHG (ref 26–37)
PCO2 BLDA: 62.4 MMHG (ref 26–37)
PCO2 TEMP ADJ BLDA: 29 MMHG (ref 26–37)
PCO2 TEMP ADJ BLDA: 53.6 MMHG (ref 26–37)
PCO2 TEMP ADJ BLDA: 64.6 MMHG (ref 26–37)
PH BLDA: 7.14 [PH] (ref 7.4–7.5)
PH BLDA: 7.15 [PH] (ref 7.4–7.5)
PH BLDA: 7.18 [PH] (ref 7.4–7.5)
PH BLDA: 7.35 [PH] (ref 7.4–7.5)
PH TEMP ADJ BLDA: 7.13 [PH] (ref 7.4–7.5)
PH TEMP ADJ BLDA: 7.17 [PH] (ref 7.4–7.5)
PH TEMP ADJ BLDA: 7.36 [PH] (ref 7.4–7.5)
PH UR STRIP.AUTO: 5 [PH]
PH UR STRIP.AUTO: 6 [PH]
PH UR STRIP.AUTO: 6 [PH]
PHOSPHATE SERPL-MCNC: 4.7 MG/DL (ref 2.5–4.5)
PHOSPHATE SERPL-MCNC: 6.2 MG/DL (ref 2.5–4.5)
PLATELET # BLD AUTO: 106 K/UL (ref 164–446)
PLATELET # BLD AUTO: 110 K/UL (ref 164–446)
PLATELET # BLD AUTO: 120 K/UL (ref 164–446)
PLATELET # BLD AUTO: 121 K/UL (ref 164–446)
PLATELET # BLD AUTO: 123 K/UL (ref 164–446)
PLATELET # BLD AUTO: 123 K/UL (ref 164–446)
PLATELET # BLD AUTO: 13 K/UL (ref 164–446)
PLATELET # BLD AUTO: 144 K/UL (ref 164–446)
PLATELET # BLD AUTO: 166 K/UL (ref 164–446)
PLATELET # BLD AUTO: 172 K/UL (ref 164–446)
PLATELET # BLD AUTO: 18 K/UL (ref 164–446)
PLATELET # BLD AUTO: 203 K/UL (ref 164–446)
PLATELET # BLD AUTO: 25 K/UL (ref 164–446)
PLATELET # BLD AUTO: 27 K/UL (ref 164–446)
PLATELET # BLD AUTO: 33 K/UL (ref 164–446)
PLATELET # BLD AUTO: 36 K/UL (ref 164–446)
PLATELET # BLD AUTO: 39 K/UL (ref 164–446)
PLATELET # BLD AUTO: 42 K/UL (ref 164–446)
PLATELET # BLD AUTO: 47 K/UL (ref 164–446)
PLATELET # BLD AUTO: 51 K/UL (ref 164–446)
PLATELET # BLD AUTO: 6 K/UL (ref 164–446)
PLATELET # BLD AUTO: 61 K/UL (ref 164–446)
PLATELET BLD QL SMEAR: NORMAL
PLATELETS.RETICULATED NFR BLD AUTO: 10.2 K/UL (ref 0.6–13.1)
PLATELETS.RETICULATED NFR BLD AUTO: 10.5 K/UL (ref 0.6–13.1)
PLATELETS.RETICULATED NFR BLD AUTO: 10.8 K/UL (ref 0.6–13.1)
PLATELETS.RETICULATED NFR BLD AUTO: 11.1 K/UL (ref 0.6–13.1)
PLATELETS.RETICULATED NFR BLD AUTO: 12.9 K/UL (ref 0.6–13.1)
PLATELETS.RETICULATED NFR BLD AUTO: 15.3 K/UL (ref 0.6–13.1)
PLATELETS.RETICULATED NFR BLD AUTO: 7.8 K/UL (ref 0.6–13.1)
PLATELETS.RETICULATED NFR BLD AUTO: 8.4 K/UL (ref 0.6–13.1)
PLATELETS.RETICULATED NFR BLD AUTO: 9.8 K/UL (ref 0.6–13.1)
PMV BLD AUTO: 10 FL (ref 9–12.9)
PMV BLD AUTO: 10.2 FL (ref 9–12.9)
PMV BLD AUTO: 10.7 FL (ref 9–12.9)
PMV BLD AUTO: 10.8 FL (ref 9–12.9)
PMV BLD AUTO: 10.9 FL (ref 9–12.9)
PMV BLD AUTO: 11.1 FL (ref 9–12.9)
PMV BLD AUTO: 11.4 FL (ref 9–12.9)
PMV BLD AUTO: 12.1 FL (ref 9–12.9)
PMV BLD AUTO: 12.4 FL (ref 9–12.9)
PMV BLD AUTO: 12.8 FL (ref 9–12.9)
PO2 BLDA: 44 MMHG (ref 64–87)
PO2 BLDA: 50 MMHG (ref 64–87)
PO2 BLDA: 61 MMHG (ref 64–87)
PO2 BLDA: 86 MMHG (ref 64–87)
PO2 TEMP ADJ BLDA: 47 MMHG (ref 64–87)
PO2 TEMP ADJ BLDA: 53 MMHG (ref 64–87)
PO2 TEMP ADJ BLDA: 59 MMHG (ref 64–87)
POIKILOCYTOSIS BLD QL SMEAR: NORMAL
POLYCHROMASIA BLD QL SMEAR: NORMAL
POLYCHROMASIA BLD QL SMEAR: NORMAL
POTASSIUM SERPL-SCNC: 3.9 MMOL/L (ref 3.6–5.5)
POTASSIUM SERPL-SCNC: 4 MMOL/L (ref 3.6–5.5)
POTASSIUM SERPL-SCNC: 4.1 MMOL/L (ref 3.6–5.5)
POTASSIUM SERPL-SCNC: 4.2 MMOL/L (ref 3.6–5.5)
POTASSIUM SERPL-SCNC: 4.3 MMOL/L (ref 3.6–5.5)
POTASSIUM SERPL-SCNC: 4.4 MMOL/L (ref 3.6–5.5)
POTASSIUM SERPL-SCNC: 4.5 MMOL/L (ref 3.6–5.5)
POTASSIUM SERPL-SCNC: 4.6 MMOL/L (ref 3.6–5.5)
POTASSIUM SERPL-SCNC: 6.1 MMOL/L (ref 3.6–5.5)
PREALB SERPL-MCNC: 32 MG/DL (ref 18–38)
PROCALCITONIN SERPL-MCNC: 2.69 NG/ML
PROCALCITONIN SERPL-MCNC: <0.05 NG/ML
PRODUCT TYPE UPROD: NORMAL
PROMYELOCYTES NFR BLD MANUAL: 0.9 %
PROT SERPL-MCNC: 4.5 G/DL (ref 6–8.2)
PROT SERPL-MCNC: 4.7 G/DL (ref 6–8.2)
PROT SERPL-MCNC: 5.1 G/DL (ref 6–8.2)
PROT SERPL-MCNC: 5.2 G/DL (ref 6–8.2)
PROT SERPL-MCNC: 5.4 G/DL (ref 6–8.2)
PROT SERPL-MCNC: 5.6 G/DL (ref 6–8.2)
PROT SERPL-MCNC: 5.9 G/DL (ref 6–8.2)
PROT SERPL-MCNC: 6.9 G/DL (ref 6–8.2)
PROT SERPL-MCNC: 7.5 G/DL (ref 6–8.2)
PROT UR QL STRIP: 100 MG/DL
PROT UR QL STRIP: 30 MG/DL
PROT UR QL STRIP: NEGATIVE MG/DL
RBC # BLD AUTO: 1.23 M/UL (ref 4.7–6.1)
RBC # BLD AUTO: 1.88 M/UL (ref 4.7–6.1)
RBC # BLD AUTO: 2.36 M/UL (ref 4.7–6.1)
RBC # BLD AUTO: 2.58 M/UL (ref 4.7–6.1)
RBC # BLD AUTO: 2.73 M/UL (ref 4.7–6.1)
RBC # BLD AUTO: 2.75 M/UL (ref 4.7–6.1)
RBC # BLD AUTO: 2.82 M/UL (ref 4.7–6.1)
RBC # BLD AUTO: 2.9 M/UL (ref 4.7–6.1)
RBC # BLD AUTO: 2.93 M/UL (ref 4.7–6.1)
RBC # BLD AUTO: 2.99 M/UL (ref 4.7–6.1)
RBC # BLD AUTO: 3.02 M/UL (ref 4.7–6.1)
RBC # BLD AUTO: 3.02 M/UL (ref 4.7–6.1)
RBC # BLD AUTO: 3.38 M/UL (ref 4.7–6.1)
RBC # BLD AUTO: 3.46 M/UL (ref 4.7–6.1)
RBC # BLD AUTO: 3.77 M/UL (ref 4.7–6.1)
RBC # BLD AUTO: 4.1 M/UL (ref 4.7–6.1)
RBC # BLD AUTO: 4.13 M/UL (ref 4.7–6.1)
RBC # BLD AUTO: 4.53 M/UL (ref 4.7–6.1)
RBC # BLD AUTO: 4.68 M/UL (ref 4.7–6.1)
RBC # BLD AUTO: 4.89 M/UL (ref 4.7–6.1)
RBC BLD AUTO: PRESENT
RBC UR QL AUTO: NEGATIVE
RETICS # AUTO: 0.02 M/UL (ref 0.04–0.06)
RETICS/RBC NFR: 0.6 % (ref 0.8–2.1)
RH BLD: NORMAL
SAO2 % BLDA: 68 % (ref 93–99)
SAO2 % BLDA: 72 % (ref 93–99)
SAO2 % BLDA: 90 % (ref 93–99)
SAO2 % BLDA: 93 % (ref 93–99)
SCHISTOCYTES BLD QL SMEAR: NORMAL
SIGNIFICANT IND 70042: NORMAL
SITE SITE: NORMAL
SODIUM SERPL-SCNC: 134 MMOL/L (ref 135–145)
SODIUM SERPL-SCNC: 134 MMOL/L (ref 135–145)
SODIUM SERPL-SCNC: 135 MMOL/L (ref 135–145)
SODIUM SERPL-SCNC: 136 MMOL/L (ref 135–145)
SODIUM SERPL-SCNC: 137 MMOL/L (ref 135–145)
SODIUM SERPL-SCNC: 137 MMOL/L (ref 135–145)
SODIUM SERPL-SCNC: 138 MMOL/L (ref 135–145)
SODIUM SERPL-SCNC: 139 MMOL/L (ref 135–145)
SODIUM SERPL-SCNC: 140 MMOL/L (ref 135–145)
SODIUM SERPL-SCNC: 143 MMOL/L (ref 135–145)
SOURCE SOURCE: NORMAL
SP GR UR STRIP.AUTO: 1.01
SP GR UR STRIP.AUTO: 1.01
SP GR UR STRIP.AUTO: 1.03
SPECIMEN DRAWN FROM PATIENT: ABNORMAL
T4 FREE SERPL-MCNC: 0.66 NG/DL (ref 0.53–1.43)
TEG ALGORITHM TGALG: ABNORMAL
TEST NAME 95000: ABNORMAL
TIBC SERPL-MCNC: 174 UG/DL (ref 250–450)
TIBC SERPL-MCNC: 224 UG/DL (ref 250–450)
TOXIC GRANULES BLD QL SMEAR: NORMAL
TOXIC GRANULES BLD QL SMEAR: SLIGHT
TRIGL SERPL-MCNC: 107 MG/DL (ref 0–149)
TRIGL SERPL-MCNC: 162 MG/DL (ref 0–149)
TROPONIN I SERPL-MCNC: 0.02 NG/ML (ref 0–0.04)
TROPONIN I SERPL-MCNC: 0.02 NG/ML (ref 0–0.04)
TROPONIN I SERPL-MCNC: 0.03 NG/ML (ref 0–0.04)
TROPONIN I SERPL-MCNC: 0.3 NG/ML (ref 0–0.04)
TROPONIN I SERPL-MCNC: 0.44 NG/ML (ref 0–0.04)
TROPONIN I SERPL-MCNC: 0.49 NG/ML (ref 0–0.04)
TROPONIN I SERPL-MCNC: 1.74 NG/ML (ref 0–0.04)
TSH SERPL DL<=0.005 MIU/L-ACNC: 0.09 UIU/ML (ref 0.3–3.7)
TSH SERPL DL<=0.005 MIU/L-ACNC: 0.36 UIU/ML (ref 0.3–3.7)
UNIT STATUS USTAT: NORMAL
URATE CRY #/AREA URNS HPF: POSITIVE /HPF
UROBILINOGEN UR STRIP.AUTO-MCNC: NORMAL MG/DL
UROBILINOGEN UR STRIP.AUTO-MCNC: NORMAL MG/DL
WBC # BLD AUTO: 1.1 K/UL (ref 4.8–10.8)
WBC # BLD AUTO: 1.1 K/UL (ref 4.8–10.8)
WBC # BLD AUTO: 1.2 K/UL (ref 4.8–10.8)
WBC # BLD AUTO: 1.2 K/UL (ref 4.8–10.8)
WBC # BLD AUTO: 1.3 K/UL (ref 4.8–10.8)
WBC # BLD AUTO: 1.5 K/UL (ref 4.8–10.8)
WBC # BLD AUTO: 1.6 K/UL (ref 4.8–10.8)
WBC # BLD AUTO: 10.6 K/UL (ref 4.8–10.8)
WBC # BLD AUTO: 12.9 K/UL (ref 4.8–10.8)
WBC # BLD AUTO: 2 K/UL (ref 4.8–10.8)
WBC # BLD AUTO: 2.4 K/UL (ref 4.8–10.8)
WBC # BLD AUTO: 3 K/UL (ref 4.8–10.8)
WBC # BLD AUTO: 3.2 K/UL (ref 4.8–10.8)
WBC # BLD AUTO: 3.8 K/UL (ref 4.8–10.8)
WBC # BLD AUTO: 3.9 K/UL (ref 4.8–10.8)
WBC # BLD AUTO: 4.1 K/UL (ref 4.8–10.8)
WBC # BLD AUTO: 4.1 K/UL (ref 4.8–10.8)
WBC # BLD AUTO: 4.4 K/UL (ref 4.8–10.8)
WBC # BLD AUTO: 4.9 K/UL (ref 4.8–10.8)
WBC # BLD AUTO: 4.9 K/UL (ref 4.8–10.8)
WBC # BLD AUTO: 5 K/UL (ref 4.8–10.8)
WBC # BLD AUTO: 6.8 K/UL (ref 4.8–10.8)
WBC #/AREA URNS HPF: ABNORMAL /HPF

## 2017-01-01 PROCEDURE — 700101 HCHG RX REV CODE 250

## 2017-01-01 PROCEDURE — 92950 HEART/LUNG RESUSCITATION CPR: CPT

## 2017-01-01 PROCEDURE — 94760 N-INVAS EAR/PLS OXIMETRY 1: CPT

## 2017-01-01 PROCEDURE — 700102 HCHG RX REV CODE 250 W/ 637 OVERRIDE(OP): Performed by: INTERNAL MEDICINE

## 2017-01-01 PROCEDURE — 97110 THERAPEUTIC EXERCISES: CPT

## 2017-01-01 PROCEDURE — 86923 COMPATIBILITY TEST ELECTRIC: CPT

## 2017-01-01 PROCEDURE — 97535 SELF CARE MNGMENT TRAINING: CPT

## 2017-01-01 PROCEDURE — 700102 HCHG RX REV CODE 250 W/ 637 OVERRIDE(OP): Performed by: NURSE PRACTITIONER

## 2017-01-01 PROCEDURE — 93005 ELECTROCARDIOGRAM TRACING: CPT | Performed by: EMERGENCY MEDICINE

## 2017-01-01 PROCEDURE — 770010 HCHG ROOM/CARE - REHAB SEMI PRIVAT*

## 2017-01-01 PROCEDURE — 97530 THERAPEUTIC ACTIVITIES: CPT

## 2017-01-01 PROCEDURE — 90945 DIALYSIS ONE EVALUATION: CPT

## 2017-01-01 PROCEDURE — 83605 ASSAY OF LACTIC ACID: CPT

## 2017-01-01 PROCEDURE — 99233 SBSQ HOSP IP/OBS HIGH 50: CPT | Performed by: INTERNAL MEDICINE

## 2017-01-01 PROCEDURE — 700102 HCHG RX REV CODE 250 W/ 637 OVERRIDE(OP): Performed by: PHYSICAL MEDICINE & REHABILITATION

## 2017-01-01 PROCEDURE — C1785 PMKR, DUAL, RATE-RESP: HCPCS

## 2017-01-01 PROCEDURE — 84484 ASSAY OF TROPONIN QUANT: CPT

## 2017-01-01 PROCEDURE — 93971 EXTREMITY STUDY: CPT

## 2017-01-01 PROCEDURE — A9270 NON-COVERED ITEM OR SERVICE: HCPCS | Performed by: PHYSICAL MEDICINE & REHABILITATION

## 2017-01-01 PROCEDURE — 700102 HCHG RX REV CODE 250 W/ 637 OVERRIDE(OP): Performed by: HOSPITALIST

## 2017-01-01 PROCEDURE — 700111 HCHG RX REV CODE 636 W/ 250 OVERRIDE (IP)

## 2017-01-01 PROCEDURE — 84100 ASSAY OF PHOSPHORUS: CPT

## 2017-01-01 PROCEDURE — 97166 OT EVAL MOD COMPLEX 45 MIN: CPT

## 2017-01-01 PROCEDURE — A9270 NON-COVERED ITEM OR SERVICE: HCPCS | Performed by: INTERNAL MEDICINE

## 2017-01-01 PROCEDURE — 700105 HCHG RX REV CODE 258: Performed by: INTERNAL MEDICINE

## 2017-01-01 PROCEDURE — 304561 HCHG STAT O2

## 2017-01-01 PROCEDURE — 97532 HCHG COGNITIVE SKILL DEV. 15 MIN: CPT

## 2017-01-01 PROCEDURE — 30233K1 TRANSFUSION OF NONAUTOLOGOUS FROZEN PLASMA INTO PERIPHERAL VEIN, PERCUTANEOUS APPROACH: ICD-10-PCS | Performed by: INTERNAL MEDICINE

## 2017-01-01 PROCEDURE — 500122 HCHG BOVIE, BLADE: Performed by: NEUROLOGICAL SURGERY

## 2017-01-01 PROCEDURE — 36415 COLL VENOUS BLD VENIPUNCTURE: CPT

## 2017-01-01 PROCEDURE — 99232 SBSQ HOSP IP/OBS MODERATE 35: CPT | Performed by: INTERNAL MEDICINE

## 2017-01-01 PROCEDURE — 77336 RADIATION PHYSICS CONSULT: CPT | Performed by: RADIOLOGY

## 2017-01-01 PROCEDURE — 700111 HCHG RX REV CODE 636 W/ 250 OVERRIDE (IP): Performed by: NEUROLOGICAL SURGERY

## 2017-01-01 PROCEDURE — 700101 HCHG RX REV CODE 250: Performed by: NEUROLOGICAL SURGERY

## 2017-01-01 PROCEDURE — 700111 HCHG RX REV CODE 636 W/ 250 OVERRIDE (IP): Performed by: INTERNAL MEDICINE

## 2017-01-01 PROCEDURE — 77427 RADIATION TX MANAGEMENT X5: CPT | Performed by: RADIOLOGY

## 2017-01-01 PROCEDURE — 99291 CRITICAL CARE FIRST HOUR: CPT

## 2017-01-01 PROCEDURE — 700112 HCHG RX REV CODE 229: Performed by: PHYSICAL MEDICINE & REHABILITATION

## 2017-01-01 PROCEDURE — 77386 HCHG IMRT DELIVERY COMPLEX: CPT | Performed by: RADIOLOGY

## 2017-01-01 PROCEDURE — 77280 THER RAD SIMULAJ FIELD SMPL: CPT | Performed by: RADIOLOGY

## 2017-01-01 PROCEDURE — 84478 ASSAY OF TRIGLYCERIDES: CPT

## 2017-01-01 PROCEDURE — 770020 HCHG ROOM/CARE - TELE (206)

## 2017-01-01 PROCEDURE — 93010 ELECTROCARDIOGRAM REPORT: CPT | Mod: 76 | Performed by: INTERNAL MEDICINE

## 2017-01-01 PROCEDURE — 70553 MRI BRAIN STEM W/O & W/DYE: CPT

## 2017-01-01 PROCEDURE — 85007 BL SMEAR W/DIFF WBC COUNT: CPT

## 2017-01-01 PROCEDURE — 160035 HCHG PACU - 1ST 60 MINS PHASE I: Performed by: NEUROLOGICAL SURGERY

## 2017-01-01 PROCEDURE — 700111 HCHG RX REV CODE 636 W/ 250 OVERRIDE (IP): Performed by: EMERGENCY MEDICINE

## 2017-01-01 PROCEDURE — 93005 ELECTROCARDIOGRAM TRACING: CPT | Performed by: INTERNAL MEDICINE

## 2017-01-01 PROCEDURE — 93306 TTE W/DOPPLER COMPLETE: CPT | Mod: 26 | Performed by: INTERNAL MEDICINE

## 2017-01-01 PROCEDURE — 85025 COMPLETE CBC W/AUTO DIFF WBC: CPT

## 2017-01-01 PROCEDURE — 80048 BASIC METABOLIC PNL TOTAL CA: CPT

## 2017-01-01 PROCEDURE — 501422 HCHG SPONGE, SURGIFOAM 100: Performed by: NEUROLOGICAL SURGERY

## 2017-01-01 PROCEDURE — 770006 HCHG ROOM/CARE - MED/SURG/GYN SEMI*

## 2017-01-01 PROCEDURE — 86850 RBC ANTIBODY SCREEN: CPT

## 2017-01-01 PROCEDURE — 94003 VENT MGMT INPAT SUBQ DAY: CPT

## 2017-01-01 PROCEDURE — 302136 NUTRITION PUMP: Performed by: INTERNAL MEDICINE

## 2017-01-01 PROCEDURE — 86900 BLOOD TYPING SEROLOGIC ABO: CPT

## 2017-01-01 PROCEDURE — A4606 OXYGEN PROBE USED W OXIMETER: HCPCS | Performed by: NEUROLOGICAL SURGERY

## 2017-01-01 PROCEDURE — 83540 ASSAY OF IRON: CPT

## 2017-01-01 PROCEDURE — 90935 HEMODIALYSIS ONE EVALUATION: CPT

## 2017-01-01 PROCEDURE — 97112 NEUROMUSCULAR REEDUCATION: CPT

## 2017-01-01 PROCEDURE — 700105 HCHG RX REV CODE 258: Performed by: EMERGENCY MEDICINE

## 2017-01-01 PROCEDURE — 71010 DX-CHEST-PORTABLE (1 VIEW): CPT

## 2017-01-01 PROCEDURE — 97116 GAIT TRAINING THERAPY: CPT

## 2017-01-01 PROCEDURE — 5A1D60Z PERFORMANCE OF URINARY FILTRATION, MULTIPLE: ICD-10-PCS | Performed by: INTERNAL MEDICINE

## 2017-01-01 PROCEDURE — 87040 BLOOD CULTURE FOR BACTERIA: CPT

## 2017-01-01 PROCEDURE — 77470 SPECIAL RADIATION TREATMENT: CPT | Performed by: RADIOLOGY

## 2017-01-01 PROCEDURE — 36556 INSERT NON-TUNNEL CV CATH: CPT

## 2017-01-01 PROCEDURE — A9270 NON-COVERED ITEM OR SERVICE: HCPCS | Performed by: NEUROLOGICAL SURGERY

## 2017-01-01 PROCEDURE — 71010 DX-CHEST-LIMITED (1 VIEW): CPT

## 2017-01-01 PROCEDURE — 77280 THER RAD SIMULAJ FIELD SMPL: CPT | Mod: 26 | Performed by: RADIOLOGY

## 2017-01-01 PROCEDURE — 6A551Z2 PHERESIS OF PLATELETS, MULTIPLE: ICD-10-PCS | Performed by: INTERNAL MEDICINE

## 2017-01-01 PROCEDURE — 36430 TRANSFUSION BLD/BLD COMPNT: CPT

## 2017-01-01 PROCEDURE — B548ZZA ULTRASONOGRAPHY OF SUPERIOR VENA CAVA, GUIDANCE: ICD-10-PCS | Performed by: INTERNAL MEDICINE

## 2017-01-01 PROCEDURE — 02HV33Z INSERTION OF INFUSION DEVICE INTO SUPERIOR VENA CAVA, PERCUTANEOUS APPROACH: ICD-10-PCS | Performed by: INTERNAL MEDICINE

## 2017-01-01 PROCEDURE — 85055 RETICULATED PLATELET ASSAY: CPT

## 2017-01-01 PROCEDURE — 77014 PR CT GUIDANCE PLACEMENT RAD THERAPY FIELDS: CPT | Mod: 26 | Performed by: RADIOLOGY

## 2017-01-01 PROCEDURE — 87205 SMEAR GRAM STAIN: CPT

## 2017-01-01 PROCEDURE — P9016 RBC LEUKOCYTES REDUCED: HCPCS

## 2017-01-01 PROCEDURE — 99285 EMERGENCY DEPT VISIT HI MDM: CPT

## 2017-01-01 PROCEDURE — 700102 HCHG RX REV CODE 250 W/ 637 OVERRIDE(OP): Performed by: NEUROLOGICAL SURGERY

## 2017-01-01 PROCEDURE — G8979 MOBILITY GOAL STATUS: HCPCS | Mod: CI

## 2017-01-01 PROCEDURE — G8988 SELF CARE GOAL STATUS: HCPCS | Mod: CI

## 2017-01-01 PROCEDURE — 84443 ASSAY THYROID STIM HORMONE: CPT

## 2017-01-01 PROCEDURE — 77334 RADIATION TREATMENT AID(S): CPT | Performed by: RADIOLOGY

## 2017-01-01 PROCEDURE — 99232 SBSQ HOSP IP/OBS MODERATE 35: CPT | Mod: GC | Performed by: PHYSICAL MEDICINE & REHABILITATION

## 2017-01-01 PROCEDURE — 85027 COMPLETE CBC AUTOMATED: CPT

## 2017-01-01 PROCEDURE — 77263 THER RADIOLOGY TX PLNG CPLX: CPT | Performed by: RADIOLOGY

## 2017-01-01 PROCEDURE — A9270 NON-COVERED ITEM OR SERVICE: HCPCS | Performed by: HOSPITALIST

## 2017-01-01 PROCEDURE — A9270 NON-COVERED ITEM OR SERVICE: HCPCS | Performed by: NURSE PRACTITIONER

## 2017-01-01 PROCEDURE — A6222 GAUZE <=16 IN NO W/SAL W/O B: HCPCS | Performed by: NEUROLOGICAL SURGERY

## 2017-01-01 PROCEDURE — 33208 INSRT HEART PM ATRIAL & VENT: CPT

## 2017-01-01 PROCEDURE — 30233N1 TRANSFUSION OF NONAUTOLOGOUS RED BLOOD CELLS INTO PERIPHERAL VEIN, PERCUTANEOUS APPROACH: ICD-10-PCS | Performed by: INTERNAL MEDICINE

## 2017-01-01 PROCEDURE — 96372 THER/PROPH/DIAG INJ SC/IM: CPT

## 2017-01-01 PROCEDURE — 82272 OCCULT BLD FECES 1-3 TESTS: CPT

## 2017-01-01 PROCEDURE — 85347 COAGULATION TIME ACTIVATED: CPT | Mod: 91

## 2017-01-01 PROCEDURE — 97167 OT EVAL HIGH COMPLEX 60 MIN: CPT

## 2017-01-01 PROCEDURE — 80053 COMPREHEN METABOLIC PANEL: CPT

## 2017-01-01 PROCEDURE — 85730 THROMBOPLASTIN TIME PARTIAL: CPT

## 2017-01-01 PROCEDURE — 97161 PT EVAL LOW COMPLEX 20 MIN: CPT

## 2017-01-01 PROCEDURE — 93010 ELECTROCARDIOGRAM REPORT: CPT | Performed by: INTERNAL MEDICINE

## 2017-01-01 PROCEDURE — 302053 BORDER GAUZE 4 X 4"": Performed by: INTERNAL MEDICINE

## 2017-01-01 PROCEDURE — 30233M1 TRANSFUSION OF NONAUTOLOGOUS PLASMA CRYOPRECIPITATE INTO PERIPHERAL VEIN, PERCUTANEOUS APPROACH: ICD-10-PCS | Performed by: INTERNAL MEDICINE

## 2017-01-01 PROCEDURE — 77334 RADIATION TREATMENT AID(S): CPT | Mod: 26 | Performed by: RADIOLOGY

## 2017-01-01 PROCEDURE — 92610 EVALUATE SWALLOWING FUNCTION: CPT

## 2017-01-01 PROCEDURE — 500889 HCHG PACK, NEURO: Performed by: NEUROLOGICAL SURGERY

## 2017-01-01 PROCEDURE — B543ZZA ULTRASONOGRAPHY OF RIGHT JUGULAR VEINS, GUIDANCE: ICD-10-PCS | Performed by: INTERNAL MEDICINE

## 2017-01-01 PROCEDURE — 700101 HCHG RX REV CODE 250: Performed by: INTERNAL MEDICINE

## 2017-01-01 PROCEDURE — 94640 AIRWAY INHALATION TREATMENT: CPT

## 2017-01-01 PROCEDURE — 83735 ASSAY OF MAGNESIUM: CPT

## 2017-01-01 PROCEDURE — 700111 HCHG RX REV CODE 636 W/ 250 OVERRIDE (IP): Performed by: HOSPITALIST

## 2017-01-01 PROCEDURE — 94002 VENT MGMT INPAT INIT DAY: CPT

## 2017-01-01 PROCEDURE — 500364 HCHG DISSECT TOOL, MIDAS: Performed by: NEUROLOGICAL SURGERY

## 2017-01-01 PROCEDURE — C1713 ANCHOR/SCREW BN/BN,TIS/BN: HCPCS | Performed by: NEUROLOGICAL SURGERY

## 2017-01-01 PROCEDURE — 51798 US URINE CAPACITY MEASURE: CPT

## 2017-01-01 PROCEDURE — 92523 SPEECH SOUND LANG COMPREHEN: CPT

## 2017-01-01 PROCEDURE — 77300 RADIATION THERAPY DOSE PLAN: CPT | Mod: 26 | Performed by: RADIOLOGY

## 2017-01-01 PROCEDURE — 00B00ZZ EXCISION OF BRAIN, OPEN APPROACH: ICD-10-PCS | Performed by: NEUROLOGICAL SURGERY

## 2017-01-01 PROCEDURE — 99239 HOSP IP/OBS DSCHRG MGMT >30: CPT | Performed by: INTERNAL MEDICINE

## 2017-01-01 PROCEDURE — 85576 BLOOD PLATELET AGGREGATION: CPT | Mod: 91

## 2017-01-01 PROCEDURE — 86901 BLOOD TYPING SEROLOGIC RH(D): CPT

## 2017-01-01 PROCEDURE — 500331 HCHG COTTONOID, SURG PATTIE: Performed by: NEUROLOGICAL SURGERY

## 2017-01-01 PROCEDURE — 160048 HCHG OR STATISTICAL LEVEL 1-5: Performed by: NEUROLOGICAL SURGERY

## 2017-01-01 PROCEDURE — 88331 PATH CONSLTJ SURG 1 BLK 1SPC: CPT

## 2017-01-01 PROCEDURE — A9270 NON-COVERED ITEM OR SERVICE: HCPCS

## 2017-01-01 PROCEDURE — 87449 NOS EACH ORGANISM AG IA: CPT

## 2017-01-01 PROCEDURE — 83880 ASSAY OF NATRIURETIC PEPTIDE: CPT

## 2017-01-01 PROCEDURE — 501290 HCHG SCREW, STRYK 1.7: Performed by: NEUROLOGICAL SURGERY

## 2017-01-01 PROCEDURE — 85055 RETICULATED PLATELET ASSAY: CPT | Mod: 91

## 2017-01-01 PROCEDURE — 77290 THER RAD SIMULAJ FIELD CPLX: CPT | Performed by: RADIOLOGY

## 2017-01-01 PROCEDURE — 82803 BLOOD GASES ANY COMBINATION: CPT

## 2017-01-01 PROCEDURE — 30233R1 TRANSFUSION OF NONAUTOLOGOUS PLATELETS INTO PERIPHERAL VEIN, PERCUTANEOUS APPROACH: ICD-10-PCS | Performed by: INTERNAL MEDICINE

## 2017-01-01 PROCEDURE — 81001 URINALYSIS AUTO W/SCOPE: CPT

## 2017-01-01 PROCEDURE — 500912 HCHG PERFORATOR, DISP TIP: Performed by: NEUROLOGICAL SURGERY

## 2017-01-01 PROCEDURE — P9017 PLASMA 1 DONOR FRZ W/IN 8 HR: HCPCS

## 2017-01-01 PROCEDURE — 80053 COMPREHEN METABOLIC PANEL: CPT | Mod: 91

## 2017-01-01 PROCEDURE — 99291 CRITICAL CARE FIRST HOUR: CPT | Performed by: HOSPITALIST

## 2017-01-01 PROCEDURE — 0BH18EZ INSERTION OF ENDOTRACHEAL AIRWAY INTO TRACHEA, VIA NATURAL OR ARTIFICIAL OPENING ENDOSCOPIC: ICD-10-PCS | Performed by: INTERNAL MEDICINE

## 2017-01-01 PROCEDURE — G8978 MOBILITY CURRENT STATUS: HCPCS | Mod: CK

## 2017-01-01 PROCEDURE — 96361 HYDRATE IV INFUSION ADD-ON: CPT

## 2017-01-01 PROCEDURE — 88313 SPECIAL STAINS GROUP 2: CPT

## 2017-01-01 PROCEDURE — 02HK3JZ INSERTION OF PACEMAKER LEAD INTO RIGHT VENTRICLE, PERCUTANEOUS APPROACH: ICD-10-PCS | Performed by: INTERNAL MEDICINE

## 2017-01-01 PROCEDURE — 3E0234Z INTRODUCTION OF SERUM, TOXOID AND VACCINE INTO MUSCLE, PERCUTANEOUS APPROACH: ICD-10-PCS | Performed by: HOSPITALIST

## 2017-01-01 PROCEDURE — C1894 INTRO/SHEATH, NON-LASER: HCPCS

## 2017-01-01 PROCEDURE — 770022 HCHG ROOM/CARE - ICU (200)

## 2017-01-01 PROCEDURE — 88307 TISSUE EXAM BY PATHOLOGIST: CPT

## 2017-01-01 PROCEDURE — 99152 MOD SED SAME PHYS/QHP 5/>YRS: CPT

## 2017-01-01 PROCEDURE — 0JH606Z INSERTION OF PACEMAKER, DUAL CHAMBER INTO CHEST SUBCUTANEOUS TISSUE AND FASCIA, OPEN APPROACH: ICD-10-PCS | Performed by: INTERNAL MEDICINE

## 2017-01-01 PROCEDURE — 71250 CT THORAX DX C-: CPT

## 2017-01-01 PROCEDURE — 160002 HCHG RECOVERY MINUTES (STAT): Performed by: NEUROLOGICAL SURGERY

## 2017-01-01 PROCEDURE — 99232 SBSQ HOSP IP/OBS MODERATE 35: CPT | Performed by: PHYSICAL MEDICINE & REHABILITATION

## 2017-01-01 PROCEDURE — 85027 COMPLETE CBC AUTOMATED: CPT | Mod: 91

## 2017-01-01 PROCEDURE — 99214 OFFICE O/P EST MOD 30 MIN: CPT | Performed by: RADIOLOGY

## 2017-01-01 PROCEDURE — 500440 HCHG DRESSING, STERILE ROLL (KERLIX): Performed by: NEUROLOGICAL SURGERY

## 2017-01-01 PROCEDURE — 96375 TX/PRO/DX INJ NEW DRUG ADDON: CPT

## 2017-01-01 PROCEDURE — 500499: Performed by: NEUROLOGICAL SURGERY

## 2017-01-01 PROCEDURE — 500819 HCHG MARKERS, PASSIVE REFLECTIVE: Performed by: NEUROLOGICAL SURGERY

## 2017-01-01 PROCEDURE — 501838 HCHG SUTURE GENERAL: Performed by: NEUROLOGICAL SURGERY

## 2017-01-01 PROCEDURE — 500075 HCHG BLADE, CLIPPER NEURO: Performed by: NEUROLOGICAL SURGERY

## 2017-01-01 PROCEDURE — 86644 CMV ANTIBODY: CPT | Mod: 91

## 2017-01-01 PROCEDURE — 500445 HCHG HEMOSTAT, SURGICEL 4X8: Performed by: NEUROLOGICAL SURGERY

## 2017-01-01 PROCEDURE — 500448 HCHG DRESSING, TELFA 3X4: Performed by: NEUROLOGICAL SURGERY

## 2017-01-01 PROCEDURE — 84439 ASSAY OF FREE THYROXINE: CPT

## 2017-01-01 PROCEDURE — 77338 DESIGN MLC DEVICE FOR IMRT: CPT | Performed by: RADIOLOGY

## 2017-01-01 PROCEDURE — 99223 1ST HOSP IP/OBS HIGH 75: CPT | Performed by: HOSPITALIST

## 2017-01-01 PROCEDURE — 110382 HCHG SHELL REV 271: Performed by: NEUROLOGICAL SURGERY

## 2017-01-01 PROCEDURE — A6223 GAUZE >16<=48 NO W/SAL W/O B: HCPCS | Performed by: NEUROLOGICAL SURGERY

## 2017-01-01 PROCEDURE — 05HM33Z INSERTION OF INFUSION DEVICE INTO RIGHT INTERNAL JUGULAR VEIN, PERCUTANEOUS APPROACH: ICD-10-PCS | Performed by: INTERNAL MEDICINE

## 2017-01-01 PROCEDURE — G8987 SELF CARE CURRENT STATUS: HCPCS | Mod: CK

## 2017-01-01 PROCEDURE — 93306 TTE W/DOPPLER COMPLETE: CPT

## 2017-01-01 PROCEDURE — 87070 CULTURE OTHR SPECIMN AEROBIC: CPT

## 2017-01-01 PROCEDURE — 0W9900Z DRAINAGE OF RIGHT PLEURAL CAVITY WITH DRAINAGE DEVICE, OPEN APPROACH: ICD-10-PCS | Performed by: INTERNAL MEDICINE

## 2017-01-01 PROCEDURE — 160009 HCHG ANES TIME/MIN: Performed by: NEUROLOGICAL SURGERY

## 2017-01-01 PROCEDURE — 99233 SBSQ HOSP IP/OBS HIGH 50: CPT | Performed by: PHYSICAL MEDICINE & REHABILITATION

## 2017-01-01 PROCEDURE — 77301 RADIOTHERAPY DOSE PLAN IMRT: CPT | Performed by: RADIOLOGY

## 2017-01-01 PROCEDURE — 99205 OFFICE O/P NEW HI 60 MIN: CPT | Performed by: RADIOLOGY

## 2017-01-01 PROCEDURE — 82728 ASSAY OF FERRITIN: CPT

## 2017-01-01 PROCEDURE — 88342 IMHCHEM/IMCYTCHM 1ST ANTB: CPT | Mod: 91

## 2017-01-01 PROCEDURE — 93005 ELECTROCARDIOGRAM TRACING: CPT | Performed by: PHYSICAL MEDICINE & REHABILITATION

## 2017-01-01 PROCEDURE — 02H633Z INSERTION OF INFUSION DEVICE INTO RIGHT ATRIUM, PERCUTANEOUS APPROACH: ICD-10-PCS | Performed by: INTERNAL MEDICINE

## 2017-01-01 PROCEDURE — C1892 INTRO/SHEATH,FIXED,PEEL-AWAY: HCPCS

## 2017-01-01 PROCEDURE — 76705 ECHO EXAM OF ABDOMEN: CPT

## 2017-01-01 PROCEDURE — P9034 PLATELETS, PHERESIS: HCPCS

## 2017-01-01 PROCEDURE — 700117 HCHG RX CONTRAST REV CODE 255: Performed by: INTERNAL MEDICINE

## 2017-01-01 PROCEDURE — 90662 IIV NO PRSV INCREASED AG IM: CPT | Performed by: HOSPITALIST

## 2017-01-01 PROCEDURE — 85046 RETICYTE/HGB CONCENTRATE: CPT

## 2017-01-01 PROCEDURE — 700102 HCHG RX REV CODE 250 W/ 637 OVERRIDE(OP)

## 2017-01-01 PROCEDURE — P9012 CRYOPRECIPITATE EACH UNIT: HCPCS | Mod: 91

## 2017-01-01 PROCEDURE — 501445 HCHG STAPLER, SKIN DISP: Performed by: NEUROLOGICAL SURGERY

## 2017-01-01 PROCEDURE — 99223 1ST HOSP IP/OBS HIGH 75: CPT | Performed by: INTERNAL MEDICINE

## 2017-01-01 PROCEDURE — 87086 URINE CULTURE/COLONY COUNT: CPT

## 2017-01-01 PROCEDURE — 02H63JZ INSERTION OF PACEMAKER LEAD INTO RIGHT ATRIUM, PERCUTANEOUS APPROACH: ICD-10-PCS | Performed by: INTERNAL MEDICINE

## 2017-01-01 PROCEDURE — 77300 RADIATION THERAPY DOSE PLAN: CPT | Performed by: RADIOLOGY

## 2017-01-01 PROCEDURE — C1751 CATH, INF, PER/CENT/MIDLINE: HCPCS

## 2017-01-01 PROCEDURE — 77301 RADIOTHERAPY DOSE PLAN IMRT: CPT | Mod: 26 | Performed by: RADIOLOGY

## 2017-01-01 PROCEDURE — 90471 IMMUNIZATION ADMIN: CPT

## 2017-01-01 PROCEDURE — 502626 HCHG SURGIFLO HEMOSTATIC MATRIX 6ML: Performed by: NEUROLOGICAL SURGERY

## 2017-01-01 PROCEDURE — 77470 SPECIAL RADIATION TREATMENT: CPT | Mod: 26 | Performed by: RADIOLOGY

## 2017-01-01 PROCEDURE — 97162 PT EVAL MOD COMPLEX 30 MIN: CPT

## 2017-01-01 PROCEDURE — 304853 HCHG PPM TEST CABLE

## 2017-01-01 PROCEDURE — 700117 HCHG RX CONTRAST REV CODE 255: Performed by: EMERGENCY MEDICINE

## 2017-01-01 PROCEDURE — 84134 ASSAY OF PREALBUMIN: CPT

## 2017-01-01 PROCEDURE — 03HY32Z INSERTION OF MONITORING DEVICE INTO UPPER ARTERY, PERCUTANEOUS APPROACH: ICD-10-PCS | Performed by: INTERNAL MEDICINE

## 2017-01-01 PROCEDURE — 85007 BL SMEAR W/DIFF WBC COUNT: CPT | Mod: 91

## 2017-01-01 PROCEDURE — 84145 PROCALCITONIN (PCT): CPT

## 2017-01-01 PROCEDURE — 90670 PCV13 VACCINE IM: CPT | Performed by: HOSPITALIST

## 2017-01-01 PROCEDURE — 83550 IRON BINDING TEST: CPT

## 2017-01-01 PROCEDURE — 93280 PM DEVICE PROGR EVAL DUAL: CPT | Performed by: INTERNAL MEDICINE

## 2017-01-01 PROCEDURE — 96374 THER/PROPH/DIAG INJ IV PUSH: CPT

## 2017-01-01 PROCEDURE — C1752 CATH,HEMODIALYSIS,SHORT-TERM: HCPCS | Performed by: INTERNAL MEDICINE

## 2017-01-01 PROCEDURE — 160042 HCHG SURGERY MINUTES - EA ADDL 1 MIN LEVEL 5: Performed by: NEUROLOGICAL SURGERY

## 2017-01-01 PROCEDURE — 36600 WITHDRAWAL OF ARTERIAL BLOOD: CPT

## 2017-01-01 PROCEDURE — 96365 THER/PROPH/DIAG IV INF INIT: CPT

## 2017-01-01 PROCEDURE — 86706 HEP B SURFACE ANTIBODY: CPT

## 2017-01-01 PROCEDURE — C1898 LEAD, PMKR, OTHER THAN TRANS: HCPCS

## 2017-01-01 PROCEDURE — G8978 MOBILITY CURRENT STATUS: HCPCS | Mod: CJ

## 2017-01-01 PROCEDURE — 99223 1ST HOSP IP/OBS HIGH 75: CPT | Performed by: PHYSICAL MEDICINE & REHABILITATION

## 2017-01-01 PROCEDURE — 83605 ASSAY OF LACTIC ACID: CPT | Mod: 91

## 2017-01-01 PROCEDURE — G8987 SELF CARE CURRENT STATUS: HCPCS | Mod: CI

## 2017-01-01 PROCEDURE — 70450 CT HEAD/BRAIN W/O DYE: CPT

## 2017-01-01 PROCEDURE — 86923 COMPATIBILITY TEST ELECTRIC: CPT | Mod: 91

## 2017-01-01 PROCEDURE — 81003 URINALYSIS AUTO W/O SCOPE: CPT

## 2017-01-01 PROCEDURE — 83036 HEMOGLOBIN GLYCOSYLATED A1C: CPT

## 2017-01-01 PROCEDURE — 99153 MOD SED SAME PHYS/QHP EA: CPT

## 2017-01-01 PROCEDURE — 88341 IMHCHEM/IMCYTCHM EA ADD ANTB: CPT | Mod: 91

## 2017-01-01 PROCEDURE — 305387 HCHG SUTURES

## 2017-01-01 PROCEDURE — A6402 STERILE GAUZE <= 16 SQ IN: HCPCS | Performed by: NEUROLOGICAL SURGERY

## 2017-01-01 PROCEDURE — 92526 ORAL FUNCTION THERAPY: CPT

## 2017-01-01 PROCEDURE — 93971 EXTREMITY STUDY: CPT | Mod: 26 | Performed by: SURGERY

## 2017-01-01 PROCEDURE — 304952 HCHG R 2 PADS

## 2017-01-01 PROCEDURE — 31500 INSERT EMERGENCY AIRWAY: CPT

## 2017-01-01 PROCEDURE — 80061 LIPID PANEL: CPT

## 2017-01-01 PROCEDURE — 5A1945Z RESPIRATORY VENTILATION, 24-96 CONSECUTIVE HOURS: ICD-10-PCS | Performed by: INTERNAL MEDICINE

## 2017-01-01 PROCEDURE — 85730 THROMBOPLASTIN TIME PARTIAL: CPT | Mod: 91

## 2017-01-01 PROCEDURE — 160036 HCHG PACU - EA ADDL 30 MINS PHASE I: Performed by: NEUROLOGICAL SURGERY

## 2017-01-01 PROCEDURE — 71275 CT ANGIOGRAPHY CHEST: CPT

## 2017-01-01 PROCEDURE — 302214 INTUBATION BOX: Performed by: INTERNAL MEDICINE

## 2017-01-01 PROCEDURE — 502240 HCHG MISC OR SUPPLY RC 0272: Performed by: NEUROLOGICAL SURGERY

## 2017-01-01 PROCEDURE — A9579 GAD-BASE MR CONTRAST NOS,1ML: HCPCS | Performed by: INTERNAL MEDICINE

## 2017-01-01 PROCEDURE — 80074 ACUTE HEPATITIS PANEL: CPT

## 2017-01-01 PROCEDURE — 5A12012 PERFORMANCE OF CARDIAC OUTPUT, SINGLE, MANUAL: ICD-10-PCS | Performed by: INTERNAL MEDICINE

## 2017-01-01 PROCEDURE — 77338 DESIGN MLC DEVICE FOR IMRT: CPT | Mod: 26 | Performed by: RADIOLOGY

## 2017-01-01 PROCEDURE — 85384 FIBRINOGEN ACTIVITY: CPT

## 2017-01-01 PROCEDURE — 160031 HCHG SURGERY MINUTES - 1ST 30 MINS LEVEL 5: Performed by: NEUROLOGICAL SURGERY

## 2017-01-01 PROCEDURE — 86644 CMV ANTIBODY: CPT

## 2017-01-01 PROCEDURE — 500305: Performed by: NEUROLOGICAL SURGERY

## 2017-01-01 PROCEDURE — 500303 HCHG CLIP, SCALP: Performed by: NEUROLOGICAL SURGERY

## 2017-01-01 DEVICE — SCREW STRYK NC 1.5X4MM (6NCX40=240) CONSIGNED QTY 240 PRE-LOAD 80/PK: Type: IMPLANTABLE DEVICE | Status: FUNCTIONAL

## 2017-01-01 DEVICE — DUREPAIR 2X2: Type: IMPLANTABLE DEVICE | Status: FUNCTIONAL

## 2017-01-01 DEVICE — IMPLANTABLE DEVICE: Type: IMPLANTABLE DEVICE | Status: FUNCTIONAL

## 2017-01-01 DEVICE — PLATE NC BURR HOLE COVER 10MM (6NCX6=36): Type: IMPLANTABLE DEVICE | Status: FUNCTIONAL

## 2017-01-01 RX ORDER — DEXAMETHASONE SODIUM PHOSPHATE 4 MG/ML
INJECTION, SOLUTION INTRA-ARTICULAR; INTRALESIONAL; INTRAMUSCULAR; INTRAVENOUS; SOFT TISSUE
Status: COMPLETED
Start: 2017-01-01 | End: 2017-01-01

## 2017-01-01 RX ORDER — ONDANSETRON 8 MG/1
8 TABLET, ORALLY DISINTEGRATING ORAL EVERY 8 HOURS PRN
Status: ON HOLD | COMMUNITY
End: 2017-01-01

## 2017-01-01 RX ORDER — CHLORHEXIDINE GLUCONATE ORAL RINSE 1.2 MG/ML
15 SOLUTION DENTAL 2 TIMES DAILY
Status: DISCONTINUED | OUTPATIENT
Start: 2017-01-01 | End: 2017-08-25 | Stop reason: HOSPADM

## 2017-01-01 RX ORDER — NOREPINEPHRINE BITARTRATE 1 MG/ML
INJECTION, SOLUTION INTRAVENOUS
Status: COMPLETED
Start: 2017-01-01 | End: 2017-01-01

## 2017-01-01 RX ORDER — CEFAZOLIN SODIUM 1 G/3ML
INJECTION, POWDER, FOR SOLUTION INTRAMUSCULAR; INTRAVENOUS
Status: COMPLETED
Start: 2017-01-01 | End: 2017-01-01

## 2017-01-01 RX ORDER — LISINOPRIL 20 MG/1
40 TABLET ORAL DAILY
Status: DISCONTINUED | OUTPATIENT
Start: 2017-01-01 | End: 2017-01-01 | Stop reason: HOSPADM

## 2017-01-01 RX ORDER — LACTULOSE 20 G/30ML
30 SOLUTION ORAL
Status: DISCONTINUED | OUTPATIENT
Start: 2017-01-01 | End: 2017-01-01 | Stop reason: HOSPADM

## 2017-01-01 RX ORDER — AMOXICILLIN 250 MG
2 CAPSULE ORAL 2 TIMES DAILY
Status: DISCONTINUED | OUTPATIENT
Start: 2017-01-01 | End: 2017-01-01 | Stop reason: HOSPADM

## 2017-01-01 RX ORDER — VECURONIUM BROMIDE 1 MG/ML
0.1 INJECTION, POWDER, LYOPHILIZED, FOR SOLUTION INTRAVENOUS
Status: DISCONTINUED | OUTPATIENT
Start: 2017-01-01 | End: 2017-08-25 | Stop reason: HOSPADM

## 2017-01-01 RX ORDER — PROTAMINE SULFATE 10 MG/ML
50 INJECTION, SOLUTION INTRAVENOUS ONCE
Status: DISCONTINUED | OUTPATIENT
Start: 2017-01-01 | End: 2017-01-01

## 2017-01-01 RX ORDER — CALCIUM CARBONATE 500 MG/1
500 TABLET, CHEWABLE ORAL EVERY 6 HOURS PRN
Status: CANCELLED | OUTPATIENT
Start: 2017-01-01

## 2017-01-01 RX ORDER — IPRATROPIUM BROMIDE AND ALBUTEROL SULFATE 2.5; .5 MG/3ML; MG/3ML
3 SOLUTION RESPIRATORY (INHALATION)
Status: DISCONTINUED | OUTPATIENT
Start: 2017-01-01 | End: 2017-08-25 | Stop reason: HOSPADM

## 2017-01-01 RX ORDER — M-VIT,TX,IRON,MINS/CALC/FOLIC 27MG-0.4MG
1 TABLET ORAL DAILY
COMMUNITY
End: 2017-01-01

## 2017-01-01 RX ORDER — ENEMA 19; 7 G/133ML; G/133ML
1 ENEMA RECTAL
Status: DISCONTINUED | OUTPATIENT
Start: 2017-01-01 | End: 2017-01-01 | Stop reason: HOSPADM

## 2017-01-01 RX ORDER — ONDANSETRON 4 MG/1
4 TABLET, ORALLY DISINTEGRATING ORAL EVERY 4 HOURS PRN
Status: DISCONTINUED | OUTPATIENT
Start: 2017-01-01 | End: 2017-01-01 | Stop reason: HOSPADM

## 2017-01-01 RX ORDER — ONDANSETRON 2 MG/ML
4 INJECTION INTRAMUSCULAR; INTRAVENOUS 4 TIMES DAILY PRN
Status: DISCONTINUED | OUTPATIENT
Start: 2017-01-01 | End: 2017-01-01 | Stop reason: HOSPADM

## 2017-01-01 RX ORDER — ACETAMINOPHEN 500 MG
1000 TABLET ORAL EVERY 6 HOURS
Status: DISCONTINUED | OUTPATIENT
Start: 2017-01-01 | End: 2017-01-01 | Stop reason: HOSPADM

## 2017-01-01 RX ORDER — LIDOCAINE HYDROCHLORIDE 10 MG/ML
1-2 INJECTION, SOLUTION INFILTRATION; PERINEURAL
Status: DISCONTINUED | OUTPATIENT
Start: 2017-01-01 | End: 2017-08-25 | Stop reason: HOSPADM

## 2017-01-01 RX ORDER — AZITHROMYCIN 250 MG/1
250 TABLET, FILM COATED ORAL DAILY
Status: DISCONTINUED | OUTPATIENT
Start: 2017-01-01 | End: 2017-01-01

## 2017-01-01 RX ORDER — SIMVASTATIN 10 MG
10 TABLET ORAL NIGHTLY
Status: ON HOLD | COMMUNITY
End: 2017-01-01

## 2017-01-01 RX ORDER — OMEPRAZOLE 20 MG/1
20 CAPSULE, DELAYED RELEASE ORAL DAILY
Status: DISCONTINUED | OUTPATIENT
Start: 2017-01-01 | End: 2017-01-01

## 2017-01-01 RX ORDER — DOCUSATE SODIUM 100 MG/1
100 CAPSULE, LIQUID FILLED ORAL DAILY
COMMUNITY

## 2017-01-01 RX ORDER — FUROSEMIDE 10 MG/ML
20 INJECTION INTRAMUSCULAR; INTRAVENOUS
Status: DISCONTINUED | OUTPATIENT
Start: 2017-01-01 | End: 2017-01-01

## 2017-01-01 RX ORDER — LORAZEPAM 2 MG/ML
1 INJECTION INTRAMUSCULAR ONCE
Status: COMPLETED | OUTPATIENT
Start: 2017-01-01 | End: 2017-01-01

## 2017-01-01 RX ORDER — AMOXICILLIN 250 MG
1 CAPSULE ORAL EVERY EVENING
Status: DISCONTINUED | OUTPATIENT
Start: 2017-01-01 | End: 2017-01-01 | Stop reason: HOSPADM

## 2017-01-01 RX ORDER — DEXAMETHASONE SODIUM PHOSPHATE 4 MG/ML
4 INJECTION, SOLUTION INTRA-ARTICULAR; INTRALESIONAL; INTRAMUSCULAR; INTRAVENOUS; SOFT TISSUE EVERY 12 HOURS
Status: COMPLETED | OUTPATIENT
Start: 2017-01-01 | End: 2017-01-01

## 2017-01-01 RX ORDER — POLYETHYLENE GLYCOL 3350 17 G/17G
1 POWDER, FOR SOLUTION ORAL
Status: DISCONTINUED | OUTPATIENT
Start: 2017-01-01 | End: 2017-01-01 | Stop reason: HOSPADM

## 2017-01-01 RX ORDER — SODIUM CHLORIDE 9 MG/ML
INJECTION, SOLUTION INTRAVENOUS CONTINUOUS
Status: DISCONTINUED | OUTPATIENT
Start: 2017-01-01 | End: 2017-01-01 | Stop reason: HOSPADM

## 2017-01-01 RX ORDER — ALUMINA, MAGNESIA, AND SIMETHICONE 2400; 2400; 240 MG/30ML; MG/30ML; MG/30ML
20 SUSPENSION ORAL
Status: DISCONTINUED | OUTPATIENT
Start: 2017-01-01 | End: 2017-01-01 | Stop reason: HOSPADM

## 2017-01-01 RX ORDER — POLYETHYLENE GLYCOL 3350 17 G/17G
1 POWDER, FOR SOLUTION ORAL
Status: DISCONTINUED | OUTPATIENT
Start: 2017-01-01 | End: 2017-01-01

## 2017-01-01 RX ORDER — ROCURONIUM BROMIDE 10 MG/ML
50 INJECTION, SOLUTION INTRAVENOUS ONCE
Status: COMPLETED | OUTPATIENT
Start: 2017-01-01 | End: 2017-01-01

## 2017-01-01 RX ORDER — POLYETHYLENE GLYCOL 3350 17 G/17G
1 POWDER, FOR SOLUTION ORAL
Status: DISCONTINUED | OUTPATIENT
Start: 2017-01-01 | End: 2017-08-25 | Stop reason: HOSPADM

## 2017-01-01 RX ORDER — OXYCODONE HYDROCHLORIDE 5 MG/1
5 TABLET ORAL EVERY 4 HOURS PRN
Status: DISCONTINUED | OUTPATIENT
Start: 2017-01-01 | End: 2017-01-01 | Stop reason: HOSPADM

## 2017-01-01 RX ORDER — DEXAMETHASONE SODIUM PHOSPHATE 4 MG/ML
10 INJECTION, SOLUTION INTRA-ARTICULAR; INTRALESIONAL; INTRAMUSCULAR; INTRAVENOUS; SOFT TISSUE EVERY 6 HOURS
Status: COMPLETED | OUTPATIENT
Start: 2017-01-01 | End: 2017-01-01

## 2017-01-01 RX ORDER — CALCIUM CARBONATE 500 MG/1
500 TABLET, CHEWABLE ORAL EVERY 6 HOURS PRN
Status: DISCONTINUED | OUTPATIENT
Start: 2017-01-01 | End: 2017-01-01 | Stop reason: HOSPADM

## 2017-01-01 RX ORDER — OXYCODONE HYDROCHLORIDE 5 MG/1
5 TABLET ORAL EVERY 4 HOURS PRN
Status: CANCELLED | OUTPATIENT
Start: 2017-01-01

## 2017-01-01 RX ORDER — FAMOTIDINE 20 MG/1
20 TABLET, FILM COATED ORAL EVERY 12 HOURS
Status: DISCONTINUED | OUTPATIENT
Start: 2017-01-01 | End: 2017-01-01

## 2017-01-01 RX ORDER — AZITHROMYCIN 250 MG/1
500 TABLET, FILM COATED ORAL ONCE
Status: DISCONTINUED | OUTPATIENT
Start: 2017-01-01 | End: 2017-01-01

## 2017-01-01 RX ORDER — MAGNESIUM SULFATE 1 G/100ML
1 INJECTION INTRAVENOUS ONCE
Status: COMPLETED | OUTPATIENT
Start: 2017-01-01 | End: 2017-01-01

## 2017-01-01 RX ORDER — DEXTROSE MONOHYDRATE, SODIUM CHLORIDE, AND POTASSIUM CHLORIDE 50; 1.49; 9 G/1000ML; G/1000ML; G/1000ML
INJECTION, SOLUTION INTRAVENOUS CONTINUOUS
Status: DISCONTINUED | OUTPATIENT
Start: 2017-01-01 | End: 2017-01-01

## 2017-01-01 RX ORDER — FUROSEMIDE 10 MG/ML
20 INJECTION INTRAMUSCULAR; INTRAVENOUS ONCE
Status: COMPLETED | OUTPATIENT
Start: 2017-01-01 | End: 2017-01-01

## 2017-01-01 RX ORDER — ENALAPRILAT 1.25 MG/ML
1.25 INJECTION INTRAVENOUS EVERY 6 HOURS PRN
Status: DISCONTINUED | OUTPATIENT
Start: 2017-01-01 | End: 2017-08-25 | Stop reason: HOSPADM

## 2017-01-01 RX ORDER — ACETAMINOPHEN 325 MG/1
650 TABLET ORAL EVERY 6 HOURS PRN
Status: DISCONTINUED | OUTPATIENT
Start: 2017-01-01 | End: 2017-01-01 | Stop reason: HOSPADM

## 2017-01-01 RX ORDER — LIDOCAINE HYDROCHLORIDE 20 MG/ML
INJECTION, SOLUTION INFILTRATION; PERINEURAL
Status: COMPLETED
Start: 2017-01-01 | End: 2017-01-01

## 2017-01-01 RX ORDER — ETOMIDATE 2 MG/ML
20 INJECTION INTRAVENOUS ONCE
Status: COMPLETED | OUTPATIENT
Start: 2017-01-01 | End: 2017-01-01

## 2017-01-01 RX ORDER — SIMVASTATIN 20 MG
10 TABLET ORAL NIGHTLY
Status: DISCONTINUED | OUTPATIENT
Start: 2017-01-01 | End: 2017-01-01 | Stop reason: HOSPADM

## 2017-01-01 RX ORDER — PROMETHAZINE HYDROCHLORIDE 25 MG/1
25 TABLET ORAL EVERY 6 HOURS PRN
Status: DISCONTINUED | OUTPATIENT
Start: 2017-01-01 | End: 2017-01-01 | Stop reason: HOSPADM

## 2017-01-01 RX ORDER — VECURONIUM BROMIDE 1 MG/ML
0.1 INJECTION, POWDER, LYOPHILIZED, FOR SOLUTION INTRAVENOUS ONCE
Status: COMPLETED | OUTPATIENT
Start: 2017-01-01 | End: 2017-01-01

## 2017-01-01 RX ORDER — BISACODYL 10 MG
10 SUPPOSITORY, RECTAL RECTAL
Refills: 0
Start: 2017-01-01 | End: 2017-01-01

## 2017-01-01 RX ORDER — DEXAMETHASONE SODIUM PHOSPHATE 4 MG/ML
2 INJECTION, SOLUTION INTRA-ARTICULAR; INTRALESIONAL; INTRAMUSCULAR; INTRAVENOUS; SOFT TISSUE EVERY 12 HOURS
Status: COMPLETED | OUTPATIENT
Start: 2017-01-01 | End: 2017-01-01

## 2017-01-01 RX ORDER — BISACODYL 10 MG
10 SUPPOSITORY, RECTAL RECTAL
Status: DISCONTINUED | OUTPATIENT
Start: 2017-01-01 | End: 2017-01-01 | Stop reason: HOSPADM

## 2017-01-01 RX ORDER — ONDANSETRON 4 MG/1
4 TABLET, ORALLY DISINTEGRATING ORAL EVERY 4 HOURS PRN
Status: DISCONTINUED | OUTPATIENT
Start: 2017-01-01 | End: 2017-08-25 | Stop reason: HOSPADM

## 2017-01-01 RX ORDER — POLYETHYLENE GLYCOL 3350 17 G/17G
17 POWDER, FOR SOLUTION ORAL
Refills: 3
Start: 2017-01-01 | End: 2017-01-01

## 2017-01-01 RX ORDER — DEXAMETHASONE 0.5 MG/1
TABLET ORAL
Qty: 30 TAB | Refills: 0 | Status: SHIPPED | OUTPATIENT
Start: 2017-01-01 | End: 2017-01-01

## 2017-01-01 RX ORDER — DEXAMETHASONE SODIUM PHOSPHATE 4 MG/ML
4 INJECTION, SOLUTION INTRA-ARTICULAR; INTRALESIONAL; INTRAMUSCULAR; INTRAVENOUS; SOFT TISSUE EVERY 6 HOURS
Status: DISCONTINUED | OUTPATIENT
Start: 2017-01-01 | End: 2017-01-01

## 2017-01-01 RX ORDER — ALBUMIN (HUMAN) 12.5 G/50ML
12.5 SOLUTION INTRAVENOUS
Status: DISCONTINUED | OUTPATIENT
Start: 2017-01-01 | End: 2017-08-25 | Stop reason: HOSPADM

## 2017-01-01 RX ORDER — AMOXICILLIN 250 MG
2 CAPSULE ORAL 2 TIMES DAILY
Qty: 30 TAB | Refills: 0
Start: 2017-01-01 | End: 2017-01-01

## 2017-01-01 RX ORDER — MIDAZOLAM HYDROCHLORIDE 1 MG/ML
INJECTION INTRAMUSCULAR; INTRAVENOUS
Status: COMPLETED
Start: 2017-01-01 | End: 2017-01-01

## 2017-01-01 RX ORDER — FUROSEMIDE 10 MG/ML
20 INJECTION INTRAMUSCULAR; INTRAVENOUS ONCE
Status: DISCONTINUED | OUTPATIENT
Start: 2017-01-01 | End: 2017-01-01

## 2017-01-01 RX ORDER — SODIUM CHLORIDE 9 MG/ML
INJECTION, SOLUTION INTRAVENOUS CONTINUOUS
Status: DISCONTINUED | OUTPATIENT
Start: 2017-01-01 | End: 2017-01-01

## 2017-01-01 RX ORDER — ONDANSETRON 2 MG/ML
4 INJECTION INTRAMUSCULAR; INTRAVENOUS EVERY 4 HOURS PRN
Status: DISCONTINUED | OUTPATIENT
Start: 2017-01-01 | End: 2017-01-01 | Stop reason: HOSPADM

## 2017-01-01 RX ORDER — PREDNISONE 20 MG/1
20 TABLET ORAL DAILY
Status: ON HOLD | COMMUNITY
End: 2017-01-01

## 2017-01-01 RX ORDER — MORPHINE SULFATE 4 MG/ML
4 INJECTION, SOLUTION INTRAMUSCULAR; INTRAVENOUS ONCE
Status: COMPLETED | OUTPATIENT
Start: 2017-01-01 | End: 2017-01-01

## 2017-01-01 RX ORDER — ACETAMINOPHEN 325 MG/1
650 TABLET ORAL EVERY 4 HOURS PRN
COMMUNITY

## 2017-01-01 RX ORDER — HEPARIN SODIUM 1000 [USP'U]/ML
2600 INJECTION, SOLUTION INTRAVENOUS; SUBCUTANEOUS PRN
Status: DISCONTINUED | OUTPATIENT
Start: 2017-01-01 | End: 2017-01-01 | Stop reason: SINTOL

## 2017-01-01 RX ORDER — ASPIRIN 81 MG/1
324 TABLET, CHEWABLE ORAL DAILY
Status: DISCONTINUED | OUTPATIENT
Start: 2017-01-01 | End: 2017-01-01

## 2017-01-01 RX ORDER — DEXAMETHASONE 2 MG/1
2 TABLET ORAL 2 TIMES DAILY
COMMUNITY
End: 2017-01-01

## 2017-01-01 RX ORDER — ZOLPIDEM TARTRATE 5 MG/1
5 TABLET ORAL NIGHTLY PRN
Status: DISCONTINUED | OUTPATIENT
Start: 2017-01-01 | End: 2017-01-01 | Stop reason: HOSPADM

## 2017-01-01 RX ORDER — AMOXICILLIN 250 MG
2 CAPSULE ORAL 2 TIMES DAILY
Status: DISCONTINUED | OUTPATIENT
Start: 2017-01-01 | End: 2017-08-25 | Stop reason: HOSPADM

## 2017-01-01 RX ORDER — ACETAMINOPHEN 325 MG/1
650 TABLET ORAL EVERY 6 HOURS PRN
Status: DISCONTINUED | OUTPATIENT
Start: 2017-01-01 | End: 2017-01-01

## 2017-01-01 RX ORDER — SODIUM CHLORIDE 9 MG/ML
1000 INJECTION, SOLUTION INTRAVENOUS ONCE
Status: COMPLETED | OUTPATIENT
Start: 2017-01-01 | End: 2017-01-01

## 2017-01-01 RX ORDER — PROTAMINE SULFATE 10 MG/ML
10 INJECTION, SOLUTION INTRAVENOUS ONCE
Status: COMPLETED | OUTPATIENT
Start: 2017-01-01 | End: 2017-01-01

## 2017-01-01 RX ORDER — DOXYCYCLINE 100 MG/1
100 TABLET ORAL EVERY 12 HOURS
Status: COMPLETED | OUTPATIENT
Start: 2017-01-01 | End: 2017-01-01

## 2017-01-01 RX ORDER — DEXAMETHASONE 4 MG/1
4 TABLET ORAL EVERY 6 HOURS
Status: DISCONTINUED | OUTPATIENT
Start: 2017-01-01 | End: 2017-01-01 | Stop reason: HOSPADM

## 2017-01-01 RX ORDER — DEXAMETHASONE 4 MG/1
4 TABLET ORAL EVERY 6 HOURS
Status: CANCELLED | OUTPATIENT
Start: 2017-01-01

## 2017-01-01 RX ORDER — LISINOPRIL 40 MG/1
40 TABLET ORAL DAILY
COMMUNITY
End: 2017-01-01

## 2017-01-01 RX ORDER — DOCUSATE SODIUM 100 MG/1
100 CAPSULE, LIQUID FILLED ORAL DAILY
Status: DISCONTINUED | OUTPATIENT
Start: 2017-01-01 | End: 2017-01-01

## 2017-01-01 RX ORDER — LORAZEPAM 2 MG/ML
1 INJECTION INTRAMUSCULAR
Status: COMPLETED | OUTPATIENT
Start: 2017-01-01 | End: 2017-01-01

## 2017-01-01 RX ORDER — PROMETHAZINE HYDROCHLORIDE 25 MG/1
12.5 SUPPOSITORY RECTAL EVERY 6 HOURS PRN
Status: DISCONTINUED | OUTPATIENT
Start: 2017-01-01 | End: 2017-01-01 | Stop reason: HOSPADM

## 2017-01-01 RX ORDER — ASPIRIN 300 MG/1
300 SUPPOSITORY RECTAL DAILY
Status: DISCONTINUED | OUTPATIENT
Start: 2017-01-01 | End: 2017-01-01

## 2017-01-01 RX ORDER — SIMVASTATIN 10 MG
10 TABLET ORAL NIGHTLY
COMMUNITY
End: 2017-01-01

## 2017-01-01 RX ORDER — CEFTRIAXONE 2 G/1
2 INJECTION, POWDER, FOR SOLUTION INTRAMUSCULAR; INTRAVENOUS ONCE
Status: COMPLETED | OUTPATIENT
Start: 2017-01-01 | End: 2017-01-01

## 2017-01-01 RX ORDER — PREDNISONE 5 MG/1
5 TABLET ORAL DAILY
Status: ON HOLD | COMMUNITY
End: 2017-01-01

## 2017-01-01 RX ORDER — ONDANSETRON 2 MG/ML
4 INJECTION INTRAMUSCULAR; INTRAVENOUS ONCE
Status: COMPLETED | OUTPATIENT
Start: 2017-01-01 | End: 2017-01-01

## 2017-01-01 RX ORDER — SODIUM CHLORIDE 9 MG/ML
INJECTION, SOLUTION INTRAVENOUS
Status: ACTIVE
Start: 2017-01-01 | End: 2017-01-01

## 2017-01-01 RX ORDER — ACETAMINOPHEN 325 MG/1
650 TABLET ORAL EVERY 6 HOURS PRN
Status: DISCONTINUED | OUTPATIENT
Start: 2017-01-01 | End: 2017-08-25 | Stop reason: HOSPADM

## 2017-01-01 RX ORDER — FAMOTIDINE 20 MG/1
20 TABLET, FILM COATED ORAL DAILY
COMMUNITY
End: 2017-01-01

## 2017-01-01 RX ORDER — BISACODYL 10 MG
10 SUPPOSITORY, RECTAL RECTAL
Status: DISCONTINUED | OUTPATIENT
Start: 2017-01-01 | End: 2017-08-25 | Stop reason: HOSPADM

## 2017-01-01 RX ORDER — DEXAMETHASONE SODIUM PHOSPHATE 4 MG/ML
6 INJECTION, SOLUTION INTRA-ARTICULAR; INTRALESIONAL; INTRAMUSCULAR; INTRAVENOUS; SOFT TISSUE EVERY 12 HOURS
Status: COMPLETED | OUTPATIENT
Start: 2017-01-01 | End: 2017-01-01

## 2017-01-01 RX ORDER — AMOXICILLIN 250 MG
2 CAPSULE ORAL 2 TIMES DAILY
Status: DISCONTINUED | OUTPATIENT
Start: 2017-01-01 | End: 2017-01-01

## 2017-01-01 RX ORDER — AMOXICILLIN 250 MG
1 CAPSULE ORAL EVERY EVENING
COMMUNITY

## 2017-01-01 RX ORDER — HEPARIN SODIUM 1000 [USP'U]/ML
3000 INJECTION, SOLUTION INTRAVENOUS; SUBCUTANEOUS
Status: DISCONTINUED | OUTPATIENT
Start: 2017-01-01 | End: 2017-08-25 | Stop reason: HOSPADM

## 2017-01-01 RX ORDER — ONDANSETRON 2 MG/ML
4 INJECTION INTRAMUSCULAR; INTRAVENOUS EVERY 4 HOURS PRN
Status: DISCONTINUED | OUTPATIENT
Start: 2017-01-01 | End: 2017-08-25 | Stop reason: HOSPADM

## 2017-01-01 RX ORDER — ASPIRIN 325 MG
325 TABLET ORAL DAILY
Status: DISCONTINUED | OUTPATIENT
Start: 2017-01-01 | End: 2017-01-01

## 2017-01-01 RX ORDER — DEXAMETHASONE SODIUM PHOSPHATE 4 MG/ML
10 INJECTION, SOLUTION INTRA-ARTICULAR; INTRALESIONAL; INTRAMUSCULAR; INTRAVENOUS; SOFT TISSUE EVERY 12 HOURS
Status: COMPLETED | OUTPATIENT
Start: 2017-01-01 | End: 2017-01-01

## 2017-01-01 RX ORDER — TRAZODONE HYDROCHLORIDE 50 MG/1
50 TABLET ORAL
Status: DISCONTINUED | OUTPATIENT
Start: 2017-01-01 | End: 2017-01-01 | Stop reason: HOSPADM

## 2017-01-01 RX ORDER — OMEPRAZOLE 20 MG/1
20 CAPSULE, DELAYED RELEASE ORAL DAILY
COMMUNITY

## 2017-01-01 RX ORDER — OMEPRAZOLE 20 MG/1
20 CAPSULE, DELAYED RELEASE ORAL DAILY
Status: DISCONTINUED | OUTPATIENT
Start: 2017-01-01 | End: 2017-01-01 | Stop reason: HOSPADM

## 2017-01-01 RX ORDER — OXYCODONE HYDROCHLORIDE 5 MG/1
5 TABLET ORAL EVERY 4 HOURS PRN
Qty: 12 TAB | Refills: 0
Start: 2017-01-01 | End: 2017-01-01

## 2017-01-01 RX ORDER — BISACODYL 10 MG
10 SUPPOSITORY, RECTAL RECTAL
Status: DISCONTINUED | OUTPATIENT
Start: 2017-01-01 | End: 2017-01-01

## 2017-01-01 RX ORDER — BUPIVACAINE HYDROCHLORIDE 2.5 MG/ML
INJECTION, SOLUTION EPIDURAL; INFILTRATION; INTRACAUDAL
Status: COMPLETED
Start: 2017-01-01 | End: 2017-01-01

## 2017-01-01 RX ORDER — DOCUSATE SODIUM 100 MG/1
100 CAPSULE, LIQUID FILLED ORAL DAILY
Status: DISCONTINUED | OUTPATIENT
Start: 2017-01-01 | End: 2017-01-01 | Stop reason: HOSPADM

## 2017-01-01 RX ORDER — LIDOCAINE HYDROCHLORIDE AND EPINEPHRINE 5; 5 MG/ML; UG/ML
INJECTION, SOLUTION INFILTRATION; PERINEURAL
Status: DISCONTINUED | OUTPATIENT
Start: 2017-01-01 | End: 2017-01-01 | Stop reason: HOSPADM

## 2017-01-01 RX ORDER — SODIUM CHLORIDE 9 MG/ML
30 INJECTION, SOLUTION INTRAVENOUS
Status: COMPLETED | OUTPATIENT
Start: 2017-01-01 | End: 2017-01-01

## 2017-01-01 RX ORDER — DEXAMETHASONE 4 MG/1
4 TABLET ORAL EVERY 6 HOURS
Qty: 30 TAB | Refills: 0
Start: 2017-01-01

## 2017-01-01 RX ORDER — ONDANSETRON 4 MG/1
4 TABLET, ORALLY DISINTEGRATING ORAL 4 TIMES DAILY PRN
Status: DISCONTINUED | OUTPATIENT
Start: 2017-01-01 | End: 2017-01-01 | Stop reason: HOSPADM

## 2017-01-01 RX ORDER — ACETAMINOPHEN 325 MG/1
650 TABLET ORAL EVERY 4 HOURS PRN
Status: DISCONTINUED | OUTPATIENT
Start: 2017-01-01 | End: 2017-01-01 | Stop reason: HOSPADM

## 2017-01-01 RX ORDER — AMOXICILLIN 250 MG
1 CAPSULE ORAL EVERY EVENING
Status: DISCONTINUED | OUTPATIENT
Start: 2017-01-01 | End: 2017-01-01

## 2017-01-01 RX ORDER — DEXAMETHASONE SODIUM PHOSPHATE 4 MG/ML
2 INJECTION, SOLUTION INTRA-ARTICULAR; INTRALESIONAL; INTRAMUSCULAR; INTRAVENOUS; SOFT TISSUE DAILY
Status: DISCONTINUED | OUTPATIENT
Start: 2017-01-01 | End: 2017-01-01 | Stop reason: HOSPADM

## 2017-01-01 RX ORDER — DEXAMETHASONE SODIUM PHOSPHATE 4 MG/ML
4 INJECTION, SOLUTION INTRA-ARTICULAR; INTRALESIONAL; INTRAMUSCULAR; INTRAVENOUS; SOFT TISSUE EVERY 6 HOURS
Status: DISCONTINUED | OUTPATIENT
Start: 2017-01-01 | End: 2017-08-25 | Stop reason: HOSPADM

## 2017-01-01 RX ORDER — DEXTROSE MONOHYDRATE 25 G/50ML
25 INJECTION, SOLUTION INTRAVENOUS
Status: DISCONTINUED | OUTPATIENT
Start: 2017-01-01 | End: 2017-01-01 | Stop reason: HOSPADM

## 2017-01-01 RX ORDER — EPINEPHRINE 0.1 MG/ML
SYRINGE (ML) INJECTION
Status: COMPLETED | OUTPATIENT
Start: 2017-01-01 | End: 2017-01-01

## 2017-01-01 RX ORDER — DEXAMETHASONE 4 MG/1
4 TABLET ORAL EVERY 6 HOURS
Status: DISCONTINUED | OUTPATIENT
Start: 2017-01-01 | End: 2017-01-01

## 2017-01-01 RX ADMIN — SODIUM ACETATE: 3.28 INJECTION, SOLUTION, CONCENTRATE INTRAVENOUS at 10:56

## 2017-01-01 RX ADMIN — MIDAZOLAM 2 MG: 1 INJECTION INTRAMUSCULAR; INTRAVENOUS at 12:06

## 2017-01-01 RX ADMIN — ACETAMINOPHEN 1000 MG: 500 TABLET, FILM COATED ORAL at 08:41

## 2017-01-01 RX ADMIN — SODIUM CHLORIDE: 9 INJECTION, SOLUTION INTRAVENOUS at 09:00

## 2017-01-01 RX ADMIN — DEXAMETHASONE SODIUM PHOSPHATE 4 MG: 4 INJECTION, SOLUTION INTRAMUSCULAR; INTRAVENOUS at 05:11

## 2017-01-01 RX ADMIN — STANDARDIZED SENNA CONCENTRATE AND DOCUSATE SODIUM 2 TABLET: 8.6; 5 TABLET, FILM COATED ORAL at 08:50

## 2017-01-01 RX ADMIN — ACETAMINOPHEN 1000 MG: 500 TABLET, FILM COATED ORAL at 18:00

## 2017-01-01 RX ADMIN — DEXAMETHASONE 4 MG: 4 TABLET ORAL at 00:07

## 2017-01-01 RX ADMIN — DEXAMETHASONE SODIUM PHOSPHATE 4 MG: 4 INJECTION, SOLUTION INTRAMUSCULAR; INTRAVENOUS at 12:26

## 2017-01-01 RX ADMIN — DEXAMETHASONE 4 MG: 4 TABLET ORAL at 00:00

## 2017-01-01 RX ADMIN — HEPARIN SODIUM 1050 UNITS/HR: 5000 INJECTION, SOLUTION INTRAVENOUS at 23:48

## 2017-01-01 RX ADMIN — DEXAMETHASONE SODIUM PHOSPHATE 4 MG: 4 INJECTION, SOLUTION INTRAMUSCULAR; INTRAVENOUS at 23:47

## 2017-01-01 RX ADMIN — DEXAMETHASONE SODIUM PHOSPHATE 4 MG: 4 INJECTION, SOLUTION INTRAMUSCULAR; INTRAVENOUS at 18:32

## 2017-01-01 RX ADMIN — CEFAZOLIN 1000 MG: 1 INJECTION, POWDER, FOR SOLUTION INTRAVENOUS at 11:53

## 2017-01-01 RX ADMIN — SODIUM ACETATE: 3.28 INJECTION, SOLUTION, CONCENTRATE INTRAVENOUS at 13:45

## 2017-01-01 RX ADMIN — DEXAMETHASONE SODIUM PHOSPHATE 4 MG: 4 INJECTION, SOLUTION INTRAMUSCULAR; INTRAVENOUS at 13:34

## 2017-01-01 RX ADMIN — STANDARDIZED SENNA CONCENTRATE AND DOCUSATE SODIUM 2 TABLET: 8.6; 5 TABLET, FILM COATED ORAL at 09:51

## 2017-01-01 RX ADMIN — DEXAMETHASONE 4 MG: 4 TABLET ORAL at 17:40

## 2017-01-01 RX ADMIN — DEXAMETHASONE 4 MG: 4 TABLET ORAL at 05:16

## 2017-01-01 RX ADMIN — DOXYCYCLINE 100 MG: 100 TABLET ORAL at 22:26

## 2017-01-01 RX ADMIN — SODIUM CHLORIDE: 9 INJECTION, SOLUTION INTRAVENOUS at 17:18

## 2017-01-01 RX ADMIN — DEXAMETHASONE 4 MG: 4 TABLET ORAL at 05:37

## 2017-01-01 RX ADMIN — SODIUM ACETATE: 3.28 INJECTION, SOLUTION, CONCENTRATE INTRAVENOUS at 21:01

## 2017-01-01 RX ADMIN — POLYETHYLENE GLYCOL 3350 1 PACKET: 17 POWDER, FOR SOLUTION ORAL at 13:25

## 2017-01-01 RX ADMIN — DEXAMETHASONE 4 MG: 4 TABLET ORAL at 23:06

## 2017-01-01 RX ADMIN — CHLORHEXIDINE GLUCONATE 15 ML: 1.2 RINSE ORAL at 08:52

## 2017-01-01 RX ADMIN — DOXYCYCLINE 100 MG: 100 TABLET ORAL at 09:47

## 2017-01-01 RX ADMIN — STANDARDIZED SENNA CONCENTRATE AND DOCUSATE SODIUM 2 TABLET: 8.6; 5 TABLET, FILM COATED ORAL at 08:54

## 2017-01-01 RX ADMIN — DEXAMETHASONE SODIUM PHOSPHATE 4 MG: 4 INJECTION, SOLUTION INTRAMUSCULAR; INTRAVENOUS at 17:45

## 2017-01-01 RX ADMIN — NOREPINEPHRINE BITARTRATE 8 MG: 1 INJECTION INTRAVENOUS at 21:49

## 2017-01-01 RX ADMIN — STANDARDIZED SENNA CONCENTRATE AND DOCUSATE SODIUM 1 TABLET: 8.6; 5 TABLET, FILM COATED ORAL at 20:59

## 2017-01-01 RX ADMIN — ZOLPIDEM TARTRATE 5 MG: 5 TABLET, FILM COATED ORAL at 20:55

## 2017-01-01 RX ADMIN — PIPERACILLIN SODIUM AND TAZOBACTAM SODIUM 3.38 G: 3; .375 INJECTION, POWDER, FOR SOLUTION INTRAVENOUS at 03:32

## 2017-01-01 RX ADMIN — AMPICILLIN SODIUM AND SULBACTAM SODIUM 3 G: 2; 1 INJECTION, POWDER, FOR SOLUTION INTRAMUSCULAR; INTRAVENOUS at 17:19

## 2017-01-01 RX ADMIN — SENNOSIDES AND DOCUSATE SODIUM 1 TABLET: 8.6; 5 TABLET ORAL at 20:00

## 2017-01-01 RX ADMIN — SENNOSIDES AND DOCUSATE SODIUM 1 TABLET: 8.6; 5 TABLET ORAL at 20:11

## 2017-01-01 RX ADMIN — DEXAMETHASONE 4 MG: 4 TABLET ORAL at 05:22

## 2017-01-01 RX ADMIN — PIPERACILLIN SODIUM AND TAZOBACTAM SODIUM 3.38 G: 3; .375 INJECTION, POWDER, FOR SOLUTION INTRAVENOUS at 22:08

## 2017-01-01 RX ADMIN — STANDARDIZED SENNA CONCENTRATE AND DOCUSATE SODIUM 2 TABLET: 8.6; 5 TABLET, FILM COATED ORAL at 09:15

## 2017-01-01 RX ADMIN — MINERAL OIL AND WHITE PETROLATUM 1 APPLICATION: 150; 830 OINTMENT OPHTHALMIC at 05:50

## 2017-01-01 RX ADMIN — DOCUSATE SODIUM 100 MG: 100 CAPSULE, LIQUID FILLED ORAL at 08:14

## 2017-01-01 RX ADMIN — TBO-FILGRASTIM 480 MCG: 480 INJECTION, SOLUTION SUBCUTANEOUS at 12:24

## 2017-01-01 RX ADMIN — ACETAMINOPHEN 650 MG: 325 TABLET, FILM COATED ORAL at 10:42

## 2017-01-01 RX ADMIN — LISINOPRIL 40 MG: 20 TABLET ORAL at 08:50

## 2017-01-01 RX ADMIN — DOCUSATE SODIUM 100 MG: 100 CAPSULE, LIQUID FILLED ORAL at 08:43

## 2017-01-01 RX ADMIN — STANDARDIZED SENNA CONCENTRATE AND DOCUSATE SODIUM 2 TABLET: 8.6; 5 TABLET, FILM COATED ORAL at 08:52

## 2017-01-01 RX ADMIN — STANDARDIZED SENNA CONCENTRATE AND DOCUSATE SODIUM 2 TABLET: 8.6; 5 TABLET, FILM COATED ORAL at 22:08

## 2017-01-01 RX ADMIN — SIMVASTATIN 10 MG: 20 TABLET, FILM COATED ORAL at 21:30

## 2017-01-01 RX ADMIN — DEXAMETHASONE 4 MG: 4 TABLET ORAL at 22:48

## 2017-01-01 RX ADMIN — OMEPRAZOLE 20 MG: 20 CAPSULE, DELAYED RELEASE ORAL at 08:27

## 2017-01-01 RX ADMIN — AMPICILLIN SODIUM AND SULBACTAM SODIUM 3 G: 2; 1 INJECTION, POWDER, FOR SOLUTION INTRAMUSCULAR; INTRAVENOUS at 05:04

## 2017-01-01 RX ADMIN — WHITE PETROLATUM MINERAL OIL 1 APPLICATION: 150; 830 OINTMENT OPHTHALMIC at 15:01

## 2017-01-01 RX ADMIN — OMEPRAZOLE 20 MG: 20 CAPSULE, DELAYED RELEASE ORAL at 08:21

## 2017-01-01 RX ADMIN — SODIUM CHLORIDE: 9 INJECTION, SOLUTION INTRAVENOUS at 06:19

## 2017-01-01 RX ADMIN — ACETAMINOPHEN 1000 MG: 500 TABLET, FILM COATED ORAL at 05:17

## 2017-01-01 RX ADMIN — ACETAMINOPHEN 650 MG: 325 TABLET, FILM COATED ORAL at 21:02

## 2017-01-01 RX ADMIN — SENNOSIDES AND DOCUSATE SODIUM 1 TABLET: 8.6; 5 TABLET ORAL at 20:31

## 2017-01-01 RX ADMIN — FAMOTIDINE 20 MG: 10 INJECTION, SOLUTION INTRAVENOUS at 22:08

## 2017-01-01 RX ADMIN — AMPICILLIN SODIUM AND SULBACTAM SODIUM 3 G: 2; 1 INJECTION, POWDER, FOR SOLUTION INTRAMUSCULAR; INTRAVENOUS at 05:01

## 2017-01-01 RX ADMIN — ONDANSETRON 4 MG: 4 TABLET, ORALLY DISINTEGRATING ORAL at 22:39

## 2017-01-01 RX ADMIN — ACETAMINOPHEN 1000 MG: 500 TABLET, FILM COATED ORAL at 01:44

## 2017-01-01 RX ADMIN — EPINEPHRINE 1 MG: 0.1 INJECTION INTRACARDIAC; INTRAVENOUS at 21:23

## 2017-01-01 RX ADMIN — DEXAMETHASONE 4 MG: 4 TABLET ORAL at 17:28

## 2017-01-01 RX ADMIN — PIPERACILLIN SODIUM AND TAZOBACTAM SODIUM 3.38 G: 3; .375 INJECTION, POWDER, FOR SOLUTION INTRAVENOUS at 10:30

## 2017-01-01 RX ADMIN — SODIUM ACETATE: 3.28 INJECTION, SOLUTION, CONCENTRATE INTRAVENOUS at 03:27

## 2017-01-01 RX ADMIN — FAMOTIDINE 20 MG: 10 INJECTION, SOLUTION INTRAVENOUS at 21:15

## 2017-01-01 RX ADMIN — IPRATROPIUM BROMIDE AND ALBUTEROL SULFATE 3 ML: .5; 3 SOLUTION RESPIRATORY (INHALATION) at 13:46

## 2017-01-01 RX ADMIN — DEXAMETHASONE SODIUM PHOSPHATE 4 MG: 4 INJECTION, SOLUTION INTRAMUSCULAR; INTRAVENOUS at 05:03

## 2017-01-01 RX ADMIN — ACETAMINOPHEN 650 MG: 325 TABLET, FILM COATED ORAL at 22:07

## 2017-01-01 RX ADMIN — DEXAMETHASONE SODIUM PHOSPHATE 4 MG: 4 INJECTION, SOLUTION INTRAMUSCULAR; INTRAVENOUS at 20:55

## 2017-01-01 RX ADMIN — IOHEXOL 75 ML: 350 INJECTION, SOLUTION INTRAVENOUS at 12:45

## 2017-01-01 RX ADMIN — DEXAMETHASONE SODIUM PHOSPHATE 4 MG: 4 INJECTION, SOLUTION INTRAMUSCULAR; INTRAVENOUS at 18:12

## 2017-01-01 RX ADMIN — STANDARDIZED SENNA CONCENTRATE AND DOCUSATE SODIUM 2 TABLET: 8.6; 5 TABLET, FILM COATED ORAL at 20:41

## 2017-01-01 RX ADMIN — OXYCODONE HYDROCHLORIDE 5 MG: 5 TABLET ORAL at 15:10

## 2017-01-01 RX ADMIN — OMEPRAZOLE 20 MG: 20 CAPSULE, DELAYED RELEASE ORAL at 08:43

## 2017-01-01 RX ADMIN — AMPICILLIN SODIUM AND SULBACTAM SODIUM 3 G: 2; 1 INJECTION, POWDER, FOR SOLUTION INTRAMUSCULAR; INTRAVENOUS at 09:01

## 2017-01-01 RX ADMIN — DEXAMETHASONE SODIUM PHOSPHATE 4 MG: 4 INJECTION, SOLUTION INTRAMUSCULAR; INTRAVENOUS at 12:04

## 2017-01-01 RX ADMIN — ACETAMINOPHEN 650 MG: 325 TABLET, FILM COATED ORAL at 12:28

## 2017-01-01 RX ADMIN — DEXAMETHASONE SODIUM PHOSPHATE 4 MG: 4 INJECTION, SOLUTION INTRAMUSCULAR; INTRAVENOUS at 23:18

## 2017-01-01 RX ADMIN — PIPERACILLIN SODIUM AND TAZOBACTAM SODIUM 3.38 G: 3; .375 INJECTION, POWDER, FOR SOLUTION INTRAVENOUS at 03:42

## 2017-01-01 RX ADMIN — FAMOTIDINE 20 MG: 10 INJECTION, SOLUTION INTRAVENOUS at 08:52

## 2017-01-01 RX ADMIN — OMEPRAZOLE 20 MG: 20 CAPSULE, DELAYED RELEASE ORAL at 08:18

## 2017-01-01 RX ADMIN — DEXAMETHASONE 4 MG: 4 TABLET ORAL at 11:46

## 2017-01-01 RX ADMIN — EPINEPHRINE 1 MG: 0.1 INJECTION INTRACARDIAC; INTRAVENOUS at 20:53

## 2017-01-01 RX ADMIN — MAGNESIUM SULFATE IN DEXTROSE 1 G: 10 INJECTION, SOLUTION INTRAVENOUS at 01:30

## 2017-01-01 RX ADMIN — STANDARDIZED SENNA CONCENTRATE AND DOCUSATE SODIUM 2 TABLET: 8.6; 5 TABLET, FILM COATED ORAL at 21:15

## 2017-01-01 RX ADMIN — DEXAMETHASONE 4 MG: 4 TABLET ORAL at 00:01

## 2017-01-01 RX ADMIN — STANDARDIZED SENNA CONCENTRATE AND DOCUSATE SODIUM 2 TABLET: 8.6; 5 TABLET, FILM COATED ORAL at 09:47

## 2017-01-01 RX ADMIN — DEXAMETHASONE 4 MG: 4 TABLET ORAL at 23:58

## 2017-01-01 RX ADMIN — ETOMIDATE 20 MG: 2 INJECTION INTRAVENOUS at 12:05

## 2017-01-01 RX ADMIN — ACETAMINOPHEN 650 MG: 325 TABLET, FILM COATED ORAL at 17:09

## 2017-01-01 RX ADMIN — STANDARDIZED SENNA CONCENTRATE AND DOCUSATE SODIUM 2 TABLET: 8.6; 5 TABLET, FILM COATED ORAL at 20:46

## 2017-01-01 RX ADMIN — ACETAMINOPHEN 1000 MG: 500 TABLET, FILM COATED ORAL at 23:33

## 2017-01-01 RX ADMIN — DEXAMETHASONE SODIUM PHOSPHATE 4 MG: 4 INJECTION, SOLUTION INTRAMUSCULAR; INTRAVENOUS at 05:04

## 2017-01-01 RX ADMIN — DEXAMETHASONE 4 MG: 4 TABLET ORAL at 17:51

## 2017-01-01 RX ADMIN — DOXYCYCLINE 100 MG: 100 TABLET ORAL at 20:08

## 2017-01-01 RX ADMIN — PIPERACILLIN SODIUM AND TAZOBACTAM SODIUM 3.38 G: 3; .375 INJECTION, POWDER, FOR SOLUTION INTRAVENOUS at 16:07

## 2017-01-01 RX ADMIN — DEXAMETHASONE SODIUM PHOSPHATE 10 MG: 4 INJECTION, SOLUTION INTRAMUSCULAR; INTRAVENOUS at 21:29

## 2017-01-01 RX ADMIN — DEXAMETHASONE 4 MG: 4 TABLET ORAL at 05:13

## 2017-01-01 RX ADMIN — SODIUM CHLORIDE: 9 INJECTION, SOLUTION INTRAVENOUS at 16:01

## 2017-01-01 RX ADMIN — SODIUM CHLORIDE: 9 INJECTION, SOLUTION INTRAVENOUS at 22:47

## 2017-01-01 RX ADMIN — DEXAMETHASONE 4 MG: 4 TABLET ORAL at 06:24

## 2017-01-01 RX ADMIN — LISINOPRIL 40 MG: 20 TABLET ORAL at 09:50

## 2017-01-01 RX ADMIN — DEXAMETHASONE 4 MG: 4 TABLET ORAL at 17:47

## 2017-01-01 RX ADMIN — SODIUM CHLORIDE: 9 INJECTION, SOLUTION INTRAVENOUS at 20:56

## 2017-01-01 RX ADMIN — OMEPRAZOLE 20 MG: 20 CAPSULE, DELAYED RELEASE ORAL at 13:36

## 2017-01-01 RX ADMIN — DEXAMETHASONE 4 MG: 4 TABLET ORAL at 05:31

## 2017-01-01 RX ADMIN — DEXAMETHASONE 4 MG: 4 TABLET ORAL at 01:11

## 2017-01-01 RX ADMIN — DEXAMETHASONE 4 MG: 4 TABLET ORAL at 00:05

## 2017-01-01 RX ADMIN — DEXAMETHASONE 4 MG: 4 TABLET ORAL at 00:59

## 2017-01-01 RX ADMIN — DEXAMETHASONE SODIUM PHOSPHATE 4 MG: 4 INJECTION, SOLUTION INTRAMUSCULAR; INTRAVENOUS at 02:46

## 2017-01-01 RX ADMIN — SIMVASTATIN 10 MG: 20 TABLET, FILM COATED ORAL at 20:59

## 2017-01-01 RX ADMIN — DEXAMETHASONE SODIUM PHOSPHATE 4 MG: 4 INJECTION, SOLUTION INTRAMUSCULAR; INTRAVENOUS at 18:44

## 2017-01-01 RX ADMIN — DEXAMETHASONE SODIUM PHOSPHATE 4 MG: 4 INJECTION, SOLUTION INTRAMUSCULAR; INTRAVENOUS at 12:38

## 2017-01-01 RX ADMIN — POTASSIUM CHLORIDE, DEXTROSE MONOHYDRATE AND SODIUM CHLORIDE: 150; 5; 900 INJECTION, SOLUTION INTRAVENOUS at 00:42

## 2017-01-01 RX ADMIN — DEXAMETHASONE SODIUM PHOSPHATE 6 MG: 4 INJECTION, SOLUTION INTRAMUSCULAR; INTRAVENOUS at 08:49

## 2017-01-01 RX ADMIN — DOCUSATE SODIUM 100 MG: 100 CAPSULE, LIQUID FILLED ORAL at 09:04

## 2017-01-01 RX ADMIN — DEXAMETHASONE SODIUM PHOSPHATE 4 MG: 4 INJECTION, SOLUTION INTRAMUSCULAR; INTRAVENOUS at 23:35

## 2017-01-01 RX ADMIN — PROTAMINE SULFATE 10 MG: 10 INJECTION, SOLUTION INTRAVENOUS at 22:09

## 2017-01-01 RX ADMIN — NOREPINEPHRINE BITARTRATE 30 MCG/MIN: 1 INJECTION INTRAVENOUS at 19:28

## 2017-01-01 RX ADMIN — CHLORHEXIDINE GLUCONATE 15 ML: 1.2 RINSE ORAL at 22:08

## 2017-01-01 RX ADMIN — DOXYCYCLINE 100 MG: 100 TABLET ORAL at 08:54

## 2017-01-01 RX ADMIN — STANDARDIZED SENNA CONCENTRATE AND DOCUSATE SODIUM 2 TABLET: 8.6; 5 TABLET, FILM COATED ORAL at 08:40

## 2017-01-01 RX ADMIN — EPOPROSTENOL SODIUM 0.03 MCG/KG/MIN: 1.5 INJECTION, POWDER, LYOPHILIZED, FOR SOLUTION INTRAVENOUS at 19:15

## 2017-01-01 RX ADMIN — AMPICILLIN SODIUM AND SULBACTAM SODIUM 3 G: 2; 1 INJECTION, POWDER, FOR SOLUTION INTRAMUSCULAR; INTRAVENOUS at 04:57

## 2017-01-01 RX ADMIN — DEXAMETHASONE 4 MG: 4 TABLET ORAL at 11:18

## 2017-01-01 RX ADMIN — DEXAMETHASONE SODIUM PHOSPHATE 4 MG: 4 INJECTION, SOLUTION INTRAMUSCULAR; INTRAVENOUS at 14:21

## 2017-01-01 RX ADMIN — DEXAMETHASONE 4 MG: 4 TABLET ORAL at 13:36

## 2017-01-01 RX ADMIN — PROPOFOL 25 MCG/KG/MIN: 10 INJECTION, EMULSION INTRAVENOUS at 16:08

## 2017-01-01 RX ADMIN — DEXAMETHASONE 4 MG: 4 TABLET ORAL at 11:34

## 2017-01-01 RX ADMIN — DEXAMETHASONE SODIUM PHOSPHATE 4 MG: 4 INJECTION, SOLUTION INTRAMUSCULAR; INTRAVENOUS at 18:13

## 2017-01-01 RX ADMIN — DEXAMETHASONE SODIUM PHOSPHATE 2 MG: 4 INJECTION, SOLUTION INTRAMUSCULAR; INTRAVENOUS at 20:42

## 2017-01-01 RX ADMIN — CEFAZOLIN SODIUM 1 G: 1 INJECTION, SOLUTION INTRAVENOUS at 21:34

## 2017-01-01 RX ADMIN — SENNOSIDES AND DOCUSATE SODIUM 1 TABLET: 8.6; 5 TABLET ORAL at 20:15

## 2017-01-01 RX ADMIN — ACETAMINOPHEN 650 MG: 325 TABLET, FILM COATED ORAL at 17:26

## 2017-01-01 RX ADMIN — DEXAMETHASONE 4 MG: 4 TABLET ORAL at 11:52

## 2017-01-01 RX ADMIN — AMPICILLIN SODIUM AND SULBACTAM SODIUM 3 G: 2; 1 INJECTION, POWDER, FOR SOLUTION INTRAMUSCULAR; INTRAVENOUS at 11:33

## 2017-01-01 RX ADMIN — DEXAMETHASONE 4 MG: 4 TABLET ORAL at 00:11

## 2017-01-01 RX ADMIN — DOCUSATE SODIUM 100 MG: 100 CAPSULE, LIQUID FILLED ORAL at 08:18

## 2017-01-01 RX ADMIN — DOXYCYCLINE 100 MG: 100 TABLET ORAL at 08:01

## 2017-01-01 RX ADMIN — DOXYCYCLINE 100 MG: 100 TABLET ORAL at 21:01

## 2017-01-01 RX ADMIN — DEXAMETHASONE 4 MG: 4 TABLET ORAL at 17:53

## 2017-01-01 RX ADMIN — SODIUM CHLORIDE: 9 INJECTION, SOLUTION INTRAVENOUS at 16:16

## 2017-01-01 RX ADMIN — STANDARDIZED SENNA CONCENTRATE AND DOCUSATE SODIUM 2 TABLET: 8.6; 5 TABLET, FILM COATED ORAL at 09:29

## 2017-01-01 RX ADMIN — VECURONIUM BROMIDE 7.09 MG: 1 INJECTION, POWDER, LYOPHILIZED, FOR SOLUTION INTRAVENOUS at 04:03

## 2017-01-01 RX ADMIN — PIPERACILLIN SODIUM AND TAZOBACTAM SODIUM 3.38 G: 3; .375 INJECTION, POWDER, FOR SOLUTION INTRAVENOUS at 17:12

## 2017-01-01 RX ADMIN — AMPICILLIN SODIUM AND SULBACTAM SODIUM 3 G: 2; 1 INJECTION, POWDER, FOR SOLUTION INTRAMUSCULAR; INTRAVENOUS at 05:06

## 2017-01-01 RX ADMIN — AMPICILLIN SODIUM AND SULBACTAM SODIUM 3 G: 2; 1 INJECTION, POWDER, FOR SOLUTION INTRAMUSCULAR; INTRAVENOUS at 23:38

## 2017-01-01 RX ADMIN — SIMVASTATIN 10 MG: 20 TABLET, FILM COATED ORAL at 21:09

## 2017-01-01 RX ADMIN — DEXAMETHASONE SODIUM PHOSPHATE 4 MG: 4 INJECTION, SOLUTION INTRAMUSCULAR; INTRAVENOUS at 09:20

## 2017-01-01 RX ADMIN — BUPIVACAINE HYDROCHLORIDE: 2.5 INJECTION, SOLUTION EPIDURAL; INFILTRATION; INTRACAUDAL; PERINEURAL at 11:54

## 2017-01-01 RX ADMIN — DEXAMETHASONE SODIUM PHOSPHATE 4 MG: 4 INJECTION, SOLUTION INTRAMUSCULAR; INTRAVENOUS at 20:47

## 2017-01-01 RX ADMIN — CEFAZOLIN SODIUM 1 G: 1 INJECTION, SOLUTION INTRAVENOUS at 16:40

## 2017-01-01 RX ADMIN — DEXAMETHASONE SODIUM PHOSPHATE 10 MG: 4 INJECTION, SOLUTION INTRA-ARTICULAR; INTRALESIONAL; INTRAMUSCULAR; INTRAVENOUS; SOFT TISSUE at 14:42

## 2017-01-01 RX ADMIN — AMPICILLIN SODIUM AND SULBACTAM SODIUM 3 G: 2; 1 INJECTION, POWDER, FOR SOLUTION INTRAMUSCULAR; INTRAVENOUS at 22:41

## 2017-01-01 RX ADMIN — DEXAMETHASONE SODIUM PHOSPHATE 4 MG: 4 INJECTION, SOLUTION INTRAMUSCULAR; INTRAVENOUS at 23:14

## 2017-01-01 RX ADMIN — DEXAMETHASONE SODIUM PHOSPHATE 10 MG: 4 INJECTION, SOLUTION INTRAMUSCULAR; INTRAVENOUS at 09:59

## 2017-01-01 RX ADMIN — DOCUSATE SODIUM 100 MG: 100 CAPSULE, LIQUID FILLED ORAL at 08:46

## 2017-01-01 RX ADMIN — DOCUSATE SODIUM 100 MG: 100 CAPSULE, LIQUID FILLED ORAL at 10:01

## 2017-01-01 RX ADMIN — PROPOFOL 18 MCG/KG/MIN: 10 INJECTION, EMULSION INTRAVENOUS at 04:14

## 2017-01-01 RX ADMIN — FAMOTIDINE 20 MG: 10 INJECTION, SOLUTION INTRAVENOUS at 08:41

## 2017-01-01 RX ADMIN — TBO-FILGRASTIM 480 MCG: 480 INJECTION, SOLUTION SUBCUTANEOUS at 08:51

## 2017-01-01 RX ADMIN — DEXAMETHASONE 4 MG: 4 TABLET ORAL at 05:29

## 2017-01-01 RX ADMIN — DEXAMETHASONE 4 MG: 4 TABLET ORAL at 11:40

## 2017-01-01 RX ADMIN — EPOPROSTENOL SODIUM 0.03 MCG/KG/MIN: 1.5 INJECTION, POWDER, LYOPHILIZED, FOR SOLUTION INTRAVENOUS at 01:54

## 2017-01-01 RX ADMIN — STANDARDIZED SENNA CONCENTRATE AND DOCUSATE SODIUM 2 TABLET: 8.6; 5 TABLET, FILM COATED ORAL at 21:01

## 2017-01-01 RX ADMIN — VECURONIUM BROMIDE 1 MCG/KG/MIN: 1 INJECTION, POWDER, LYOPHILIZED, FOR SOLUTION INTRAVENOUS at 04:17

## 2017-01-01 RX ADMIN — FAMOTIDINE 20 MG: 10 INJECTION, SOLUTION INTRAVENOUS at 20:27

## 2017-01-01 RX ADMIN — DEXAMETHASONE 4 MG: 4 TABLET ORAL at 17:20

## 2017-01-01 RX ADMIN — FUROSEMIDE 20 MG: 10 INJECTION, SOLUTION INTRAMUSCULAR; INTRAVENOUS at 22:22

## 2017-01-01 RX ADMIN — DEXAMETHASONE 4 MG: 4 TABLET ORAL at 17:48

## 2017-01-01 RX ADMIN — DEXAMETHASONE SODIUM PHOSPHATE 4 MG: 4 INJECTION, SOLUTION INTRAMUSCULAR; INTRAVENOUS at 12:02

## 2017-01-01 RX ADMIN — ONDANSETRON 4 MG: 2 INJECTION INTRAMUSCULAR; INTRAVENOUS at 13:28

## 2017-01-01 RX ADMIN — SODIUM CHLORIDE: 9 INJECTION, SOLUTION INTRAVENOUS at 03:45

## 2017-01-01 RX ADMIN — ACETAMINOPHEN 1000 MG: 500 TABLET, FILM COATED ORAL at 13:48

## 2017-01-01 RX ADMIN — FENTANYL CITRATE 50 MCG/HR: 50 INJECTION, SOLUTION INTRAMUSCULAR; INTRAVENOUS at 21:58

## 2017-01-01 RX ADMIN — DEXAMETHASONE SODIUM PHOSPHATE 4 MG: 4 INJECTION, SOLUTION INTRAMUSCULAR; INTRAVENOUS at 04:00

## 2017-01-01 RX ADMIN — DEXAMETHASONE 4 MG: 4 TABLET ORAL at 11:30

## 2017-01-01 RX ADMIN — OMEPRAZOLE 20 MG: 20 CAPSULE, DELAYED RELEASE ORAL at 08:12

## 2017-01-01 RX ADMIN — DEXAMETHASONE SODIUM PHOSPHATE 4 MG: 4 INJECTION, SOLUTION INTRAMUSCULAR; INTRAVENOUS at 00:04

## 2017-01-01 RX ADMIN — DEXAMETHASONE 4 MG: 4 TABLET ORAL at 17:49

## 2017-01-01 RX ADMIN — WHITE PETROLATUM MINERAL OIL 1 APPLICATION: 150; 830 OINTMENT OPHTHALMIC at 06:38

## 2017-01-01 RX ADMIN — DEXAMETHASONE 4 MG: 4 TABLET ORAL at 11:33

## 2017-01-01 RX ADMIN — DEXAMETHASONE SODIUM PHOSPHATE 4 MG: 4 INJECTION, SOLUTION INTRAMUSCULAR; INTRAVENOUS at 17:18

## 2017-01-01 RX ADMIN — ENOXAPARIN SODIUM 80 MG: 100 INJECTION SUBCUTANEOUS at 13:50

## 2017-01-01 RX ADMIN — OMEPRAZOLE 20 MG: 20 CAPSULE, DELAYED RELEASE ORAL at 08:15

## 2017-01-01 RX ADMIN — DEXAMETHASONE SODIUM PHOSPHATE 4 MG: 4 INJECTION, SOLUTION INTRAMUSCULAR; INTRAVENOUS at 09:50

## 2017-01-01 RX ADMIN — DOCUSATE SODIUM 100 MG: 100 CAPSULE, LIQUID FILLED ORAL at 08:15

## 2017-01-01 RX ADMIN — DEXAMETHASONE 4 MG: 4 TABLET ORAL at 06:03

## 2017-01-01 RX ADMIN — INFLUENZA A VIRUS A/CALIFORNIA/7/2009 X-179A (H1N1) ANTIGEN (FORMALDEHYDE INACTIVATED), INFLUENZA A VIRUS A/HONG KONG/4801/2014 X-263B (H3N2) ANTIGEN (FORMALDEHYDE INACTIVATED), AND INFLUENZA B VIRUS B/BRISBANE/60/2008 ANTIGEN (FORMALDEHYDE INACTIVATED) 0.5 ML: 60; 60; 60 INJECTION, SUSPENSION INTRAMUSCULAR at 10:04

## 2017-01-01 RX ADMIN — DEXAMETHASONE 4 MG: 4 TABLET ORAL at 05:24

## 2017-01-01 RX ADMIN — ACETAMINOPHEN 1000 MG: 500 TABLET, FILM COATED ORAL at 01:00

## 2017-01-01 RX ADMIN — OMEPRAZOLE 20 MG: 20 CAPSULE, DELAYED RELEASE ORAL at 08:06

## 2017-01-01 RX ADMIN — EPINEPHRINE 1 MG: 0.1 INJECTION INTRACARDIAC; INTRAVENOUS at 20:50

## 2017-01-01 RX ADMIN — LISINOPRIL 40 MG: 20 TABLET ORAL at 10:00

## 2017-01-01 RX ADMIN — DOXYCYCLINE 100 MG: 100 TABLET ORAL at 22:08

## 2017-01-01 RX ADMIN — AMPICILLIN SODIUM AND SULBACTAM SODIUM 3 G: 2; 1 INJECTION, POWDER, FOR SOLUTION INTRAMUSCULAR; INTRAVENOUS at 17:18

## 2017-01-01 RX ADMIN — AMPICILLIN SODIUM AND SULBACTAM SODIUM 3 G: 2; 1 INJECTION, POWDER, FOR SOLUTION INTRAMUSCULAR; INTRAVENOUS at 09:45

## 2017-01-01 RX ADMIN — DEXAMETHASONE 4 MG: 4 TABLET ORAL at 11:25

## 2017-01-01 RX ADMIN — LORAZEPAM 1 MG: 2 INJECTION INTRAMUSCULAR at 19:42

## 2017-01-01 RX ADMIN — SODIUM CHLORIDE: 9 INJECTION, SOLUTION INTRAVENOUS at 04:56

## 2017-01-01 RX ADMIN — DOCUSATE SODIUM 100 MG: 100 CAPSULE, LIQUID FILLED ORAL at 08:22

## 2017-01-01 RX ADMIN — STANDARDIZED SENNA CONCENTRATE AND DOCUSATE SODIUM 2 TABLET: 8.6; 5 TABLET, FILM COATED ORAL at 08:03

## 2017-01-01 RX ADMIN — DEXAMETHASONE 4 MG: 4 TABLET ORAL at 11:14

## 2017-01-01 RX ADMIN — OMEPRAZOLE 20 MG: 20 CAPSULE, DELAYED RELEASE ORAL at 08:46

## 2017-01-01 RX ADMIN — DEXAMETHASONE 4 MG: 4 TABLET ORAL at 23:31

## 2017-01-01 RX ADMIN — PIPERACILLIN SODIUM AND TAZOBACTAM SODIUM 3.38 G: 3; .375 INJECTION, POWDER, FOR SOLUTION INTRAVENOUS at 21:40

## 2017-01-01 RX ADMIN — SENNOSIDES AND DOCUSATE SODIUM 1 TABLET: 8.6; 5 TABLET ORAL at 21:00

## 2017-01-01 RX ADMIN — DEXAMETHASONE SODIUM PHOSPHATE 4 MG: 4 INJECTION, SOLUTION INTRAMUSCULAR; INTRAVENOUS at 00:08

## 2017-01-01 RX ADMIN — WHITE PETROLATUM MINERAL OIL 1 APPLICATION: 150; 830 OINTMENT OPHTHALMIC at 21:30

## 2017-01-01 RX ADMIN — MIDAZOLAM 2 MG: 1 INJECTION INTRAMUSCULAR; INTRAVENOUS at 11:53

## 2017-01-01 RX ADMIN — STANDARDIZED SENNA CONCENTRATE AND DOCUSATE SODIUM 2 TABLET: 8.6; 5 TABLET, FILM COATED ORAL at 08:01

## 2017-01-01 RX ADMIN — DEXAMETHASONE 4 MG: 4 TABLET ORAL at 05:21

## 2017-01-01 RX ADMIN — AMPICILLIN SODIUM AND SULBACTAM SODIUM 3 G: 2; 1 INJECTION, POWDER, FOR SOLUTION INTRAMUSCULAR; INTRAVENOUS at 10:30

## 2017-01-01 RX ADMIN — OMEPRAZOLE 20 MG: 20 CAPSULE, DELAYED RELEASE ORAL at 10:01

## 2017-01-01 RX ADMIN — SODIUM CHLORIDE 1000 ML: 9 INJECTION, SOLUTION INTRAVENOUS at 10:30

## 2017-01-01 RX ADMIN — SENNOSIDES AND DOCUSATE SODIUM 1 TABLET: 8.6; 5 TABLET ORAL at 20:23

## 2017-01-01 RX ADMIN — DEXAMETHASONE 4 MG: 4 TABLET ORAL at 12:19

## 2017-01-01 RX ADMIN — DOCUSATE SODIUM 100 MG: 100 CAPSULE, LIQUID FILLED ORAL at 08:21

## 2017-01-01 RX ADMIN — AMPICILLIN SODIUM AND SULBACTAM SODIUM 3 G: 2; 1 INJECTION, POWDER, FOR SOLUTION INTRAMUSCULAR; INTRAVENOUS at 17:42

## 2017-01-01 RX ADMIN — EPOPROSTENOL SODIUM 0.03 MCG/KG/MIN: 1.5 INJECTION, POWDER, LYOPHILIZED, FOR SOLUTION INTRAVENOUS at 09:12

## 2017-01-01 RX ADMIN — Medication: at 14:28

## 2017-01-01 RX ADMIN — PIPERACILLIN SODIUM AND TAZOBACTAM SODIUM 3.38 G: 3; .375 INJECTION, POWDER, FOR SOLUTION INTRAVENOUS at 04:30

## 2017-01-01 RX ADMIN — WHITE PETROLATUM MINERAL OIL 1 APPLICATION: 150; 830 OINTMENT OPHTHALMIC at 22:00

## 2017-01-01 RX ADMIN — SODIUM CHLORIDE: 9 INJECTION, SOLUTION INTRAVENOUS at 03:05

## 2017-01-01 RX ADMIN — DEXAMETHASONE 4 MG: 4 TABLET ORAL at 11:45

## 2017-01-01 RX ADMIN — ANTACID TABLETS 500 MG: 500 TABLET, CHEWABLE ORAL at 20:11

## 2017-01-01 RX ADMIN — FUROSEMIDE 10 MG/HR: 10 INJECTION, SOLUTION INTRAMUSCULAR; INTRAVENOUS at 14:03

## 2017-01-01 RX ADMIN — DEXAMETHASONE SODIUM PHOSPHATE 4 MG: 4 INJECTION, SOLUTION INTRAMUSCULAR; INTRAVENOUS at 20:59

## 2017-01-01 RX ADMIN — SIMVASTATIN 10 MG: 20 TABLET, FILM COATED ORAL at 20:41

## 2017-01-01 RX ADMIN — DEXAMETHASONE SODIUM PHOSPHATE 4 MG: 4 INJECTION, SOLUTION INTRAMUSCULAR; INTRAVENOUS at 14:52

## 2017-01-01 RX ADMIN — SENNOSIDES AND DOCUSATE SODIUM 1 TABLET: 8.6; 5 TABLET ORAL at 20:09

## 2017-01-01 RX ADMIN — OMEPRAZOLE 20 MG: 20 CAPSULE, DELAYED RELEASE ORAL at 08:14

## 2017-01-01 RX ADMIN — POTASSIUM CHLORIDE, DEXTROSE MONOHYDRATE AND SODIUM CHLORIDE: 150; 5; 900 INJECTION, SOLUTION INTRAVENOUS at 16:40

## 2017-01-01 RX ADMIN — DOCUSATE SODIUM 100 MG: 100 CAPSULE, LIQUID FILLED ORAL at 08:27

## 2017-01-01 RX ADMIN — DEXAMETHASONE 4 MG: 4 TABLET ORAL at 17:37

## 2017-01-01 RX ADMIN — EPINEPHRINE 1 MG: 0.1 INJECTION INTRACARDIAC; INTRAVENOUS at 20:56

## 2017-01-01 RX ADMIN — DEXAMETHASONE SODIUM PHOSPHATE 6 MG: 4 INJECTION, SOLUTION INTRAMUSCULAR; INTRAVENOUS at 21:09

## 2017-01-01 RX ADMIN — DEXAMETHASONE SODIUM PHOSPHATE 4 MG: 4 INJECTION, SOLUTION INTRAMUSCULAR; INTRAVENOUS at 04:57

## 2017-01-01 RX ADMIN — DEXAMETHASONE SODIUM PHOSPHATE 10 MG: 4 INJECTION, SOLUTION INTRAMUSCULAR; INTRAVENOUS at 14:42

## 2017-01-01 RX ADMIN — LIDOCAINE HYDROCHLORIDE: 20 INJECTION, SOLUTION INFILTRATION; PERINEURAL at 11:53

## 2017-01-01 RX ADMIN — PROPOFOL 14 MCG/KG/MIN: 10 INJECTION, EMULSION INTRAVENOUS at 03:22

## 2017-01-01 RX ADMIN — DEXAMETHASONE SODIUM PHOSPHATE 4 MG: 4 INJECTION, SOLUTION INTRAMUSCULAR; INTRAVENOUS at 05:50

## 2017-01-01 RX ADMIN — WHITE PETROLATUM MINERAL OIL 1 APPLICATION: 150; 830 OINTMENT OPHTHALMIC at 06:00

## 2017-01-01 RX ADMIN — AMPICILLIN SODIUM AND SULBACTAM SODIUM 3 G: 2; 1 INJECTION, POWDER, FOR SOLUTION INTRAMUSCULAR; INTRAVENOUS at 22:26

## 2017-01-01 RX ADMIN — DEXAMETHASONE SODIUM PHOSPHATE 4 MG: 4 INJECTION, SOLUTION INTRAMUSCULAR; INTRAVENOUS at 13:20

## 2017-01-01 RX ADMIN — SENNOSIDES AND DOCUSATE SODIUM 1 TABLET: 8.6; 5 TABLET ORAL at 21:07

## 2017-01-01 RX ADMIN — DEXAMETHASONE 4 MG: 4 TABLET ORAL at 17:34

## 2017-01-01 RX ADMIN — LORAZEPAM 1 MG: 2 INJECTION INTRAMUSCULAR at 05:28

## 2017-01-01 RX ADMIN — SODIUM CHLORIDE: 9 INJECTION, SOLUTION INTRAVENOUS at 09:48

## 2017-01-01 RX ADMIN — OMEPRAZOLE 20 MG: 20 CAPSULE, DELAYED RELEASE ORAL at 08:09

## 2017-01-01 RX ADMIN — ZOLPIDEM TARTRATE 5 MG: 5 TABLET, FILM COATED ORAL at 21:11

## 2017-01-01 RX ADMIN — LIDOCAINE HYDROCHLORIDE 30 ML: 20 SOLUTION OROPHARYNGEAL at 15:10

## 2017-01-01 RX ADMIN — SIMVASTATIN 10 MG: 20 TABLET, FILM COATED ORAL at 20:45

## 2017-01-01 RX ADMIN — STANDARDIZED SENNA CONCENTRATE AND DOCUSATE SODIUM 2 TABLET: 8.6; 5 TABLET, FILM COATED ORAL at 20:08

## 2017-01-01 RX ADMIN — DEXAMETHASONE 4 MG: 4 TABLET ORAL at 05:32

## 2017-01-01 RX ADMIN — GADODIAMIDE 15 ML: 287 INJECTION INTRAVENOUS at 20:04

## 2017-01-01 RX ADMIN — DEXAMETHASONE 4 MG: 4 TABLET ORAL at 00:08

## 2017-01-01 RX ADMIN — ROCURONIUM BROMIDE 50 MG: 10 INJECTION, SOLUTION INTRAVENOUS at 21:40

## 2017-01-01 RX ADMIN — DEXAMETHASONE 4 MG: 4 TABLET ORAL at 17:09

## 2017-01-01 RX ADMIN — FUROSEMIDE 20 MG: 10 INJECTION, SOLUTION INTRAMUSCULAR; INTRAVENOUS at 12:08

## 2017-01-01 RX ADMIN — ACETAMINOPHEN 1000 MG: 500 TABLET, FILM COATED ORAL at 09:50

## 2017-01-01 RX ADMIN — VASOPRESSIN 0.03 UNITS/MIN: 20 INJECTION INTRAVENOUS at 17:40

## 2017-01-01 RX ADMIN — DOCUSATE SODIUM 100 MG: 100 CAPSULE, LIQUID FILLED ORAL at 13:36

## 2017-01-01 RX ADMIN — DEXAMETHASONE SODIUM PHOSPHATE 2 MG: 4 INJECTION, SOLUTION INTRAMUSCULAR; INTRAVENOUS at 08:41

## 2017-01-01 RX ADMIN — STANDARDIZED SENNA CONCENTRATE AND DOCUSATE SODIUM 2 TABLET: 8.6; 5 TABLET, FILM COATED ORAL at 21:30

## 2017-01-01 RX ADMIN — SENNOSIDES AND DOCUSATE SODIUM 1 TABLET: 8.6; 5 TABLET ORAL at 22:11

## 2017-01-01 RX ADMIN — ACETAMINOPHEN 650 MG: 325 TABLET, FILM COATED ORAL at 13:31

## 2017-01-01 RX ADMIN — DOCUSATE SODIUM 100 MG: 100 CAPSULE, LIQUID FILLED ORAL at 08:12

## 2017-01-01 RX ADMIN — ACETAMINOPHEN 1000 MG: 500 TABLET, FILM COATED ORAL at 20:58

## 2017-01-01 RX ADMIN — DEXAMETHASONE 4 MG: 4 TABLET ORAL at 19:21

## 2017-01-01 RX ADMIN — DOXYCYCLINE 100 MG: 100 TABLET ORAL at 09:29

## 2017-01-01 RX ADMIN — MORPHINE SULFATE 4 MG: 4 INJECTION INTRAVENOUS at 17:23

## 2017-01-01 RX ADMIN — DEXAMETHASONE 4 MG: 4 TABLET ORAL at 05:02

## 2017-01-01 RX ADMIN — OMEPRAZOLE 20 MG: 20 CAPSULE, DELAYED RELEASE ORAL at 08:22

## 2017-01-01 RX ADMIN — DEXAMETHASONE 4 MG: 4 TABLET ORAL at 18:30

## 2017-01-01 RX ADMIN — AMPICILLIN SODIUM AND SULBACTAM SODIUM 3 G: 2; 1 INJECTION, POWDER, FOR SOLUTION INTRAMUSCULAR; INTRAVENOUS at 22:07

## 2017-01-01 RX ADMIN — DOCUSATE SODIUM 100 MG: 100 CAPSULE, LIQUID FILLED ORAL at 08:09

## 2017-01-01 RX ADMIN — SENNOSIDES AND DOCUSATE SODIUM 1 TABLET: 8.6; 5 TABLET ORAL at 19:52

## 2017-01-01 RX ADMIN — FENTANYL CITRATE 75 MCG/HR: 50 INJECTION, SOLUTION INTRAMUSCULAR; INTRAVENOUS at 22:54

## 2017-01-01 RX ADMIN — PNEUMOCOCCAL 13-VALENT CONJUGATE VACCINE 0.5 ML: 2.2; 2.2; 2.2; 2.2; 2.2; 4.4; 2.2; 2.2; 2.2; 2.2; 2.2; 2.2; 2.2 INJECTION, SUSPENSION INTRAMUSCULAR at 10:05

## 2017-01-01 RX ADMIN — MINERAL OIL AND WHITE PETROLATUM 1 APPLICATION: 150; 830 OINTMENT OPHTHALMIC at 13:49

## 2017-01-01 RX ADMIN — DEXAMETHASONE 4 MG: 4 TABLET ORAL at 23:44

## 2017-01-01 RX ADMIN — SODIUM CHLORIDE 2151 ML: 9 INJECTION, SOLUTION INTRAVENOUS at 11:33

## 2017-01-01 RX ADMIN — LISINOPRIL 40 MG: 20 TABLET ORAL at 08:41

## 2017-01-01 RX ADMIN — DEXAMETHASONE 4 MG: 4 TABLET ORAL at 17:29

## 2017-01-01 RX ADMIN — CHLORHEXIDINE GLUCONATE 15 ML: 1.2 RINSE ORAL at 11:04

## 2017-01-01 RX ADMIN — DEXAMETHASONE SODIUM PHOSPHATE 4 MG: 4 INJECTION, SOLUTION INTRAMUSCULAR; INTRAVENOUS at 05:06

## 2017-01-01 RX ADMIN — SODIUM ACETATE 50 MEQ: 3.28 INJECTION, SOLUTION, CONCENTRATE INTRAVENOUS at 03:50

## 2017-01-01 RX ADMIN — DEXAMETHASONE 4 MG: 4 TABLET ORAL at 05:43

## 2017-01-01 RX ADMIN — ACETAMINOPHEN 1000 MG: 500 TABLET, FILM COATED ORAL at 06:38

## 2017-01-01 RX ADMIN — HEPARIN SODIUM 850 UNITS/HR: 5000 INJECTION, SOLUTION INTRAVENOUS at 04:15

## 2017-01-01 RX ADMIN — CEFTRIAXONE SODIUM 2 G: 2 INJECTION, POWDER, FOR SOLUTION INTRAMUSCULAR; INTRAVENOUS at 13:21

## 2017-01-01 RX ADMIN — OMEPRAZOLE 20 MG: 20 CAPSULE, DELAYED RELEASE ORAL at 09:04

## 2017-01-01 RX ADMIN — PIPERACILLIN SODIUM AND TAZOBACTAM SODIUM 3.38 G: 3; .375 INJECTION, POWDER, FOR SOLUTION INTRAVENOUS at 10:56

## 2017-01-01 RX ADMIN — DEXAMETHASONE SODIUM PHOSPHATE 4 MG: 4 INJECTION, SOLUTION INTRAMUSCULAR; INTRAVENOUS at 23:39

## 2017-01-01 RX ADMIN — PROPOFOL 15 MCG/KG/MIN: 10 INJECTION, EMULSION INTRAVENOUS at 12:36

## 2017-01-01 RX ADMIN — PIPERACILLIN SODIUM AND TAZOBACTAM SODIUM 3.38 G: 3; .375 INJECTION, POWDER, FOR SOLUTION INTRAVENOUS at 10:37

## 2017-01-01 RX ADMIN — DEXAMETHASONE SODIUM PHOSPHATE 4 MG: 4 INJECTION, SOLUTION INTRAMUSCULAR; INTRAVENOUS at 05:02

## 2017-01-01 RX ADMIN — AMPICILLIN SODIUM AND SULBACTAM SODIUM 3 G: 2; 1 INJECTION, POWDER, FOR SOLUTION INTRAMUSCULAR; INTRAVENOUS at 09:47

## 2017-01-01 RX ADMIN — POTASSIUM CHLORIDE, DEXTROSE MONOHYDRATE AND SODIUM CHLORIDE: 150; 5; 900 INJECTION, SOLUTION INTRAVENOUS at 16:19

## 2017-01-01 RX ADMIN — PHENYLEPHRINE HYDROCHLORIDE 50 MCG/MIN: 10 INJECTION INTRAVENOUS at 19:28

## 2017-01-01 RX ADMIN — FENTANYL CITRATE 50 MCG/HR: 50 INJECTION, SOLUTION INTRAMUSCULAR; INTRAVENOUS at 20:34

## 2017-01-01 RX ADMIN — SENNOSIDES AND DOCUSATE SODIUM 1 TABLET: 8.6; 5 TABLET ORAL at 20:38

## 2017-01-01 RX ADMIN — SENNOSIDES AND DOCUSATE SODIUM 1 TABLET: 8.6; 5 TABLET ORAL at 19:56

## 2017-01-01 RX ADMIN — DEXAMETHASONE SODIUM PHOSPHATE 4 MG: 4 INJECTION, SOLUTION INTRAMUSCULAR; INTRAVENOUS at 05:36

## 2017-01-01 RX ADMIN — ACETAMINOPHEN 1000 MG: 500 TABLET, FILM COATED ORAL at 00:31

## 2017-01-01 RX ADMIN — PIPERACILLIN SODIUM AND TAZOBACTAM SODIUM 3.38 G: 3; .375 INJECTION, POWDER, FOR SOLUTION INTRAVENOUS at 15:50

## 2017-01-01 RX ADMIN — SENNOSIDES AND DOCUSATE SODIUM 1 TABLET: 8.6; 5 TABLET ORAL at 20:49

## 2017-01-01 RX ADMIN — FAMOTIDINE 20 MG: 10 INJECTION, SOLUTION INTRAVENOUS at 12:02

## 2017-01-01 RX ADMIN — SIMVASTATIN 10 MG: 20 TABLET, FILM COATED ORAL at 20:55

## 2017-01-01 RX ADMIN — AMPICILLIN SODIUM AND SULBACTAM SODIUM 3 G: 2; 1 INJECTION, POWDER, FOR SOLUTION INTRAMUSCULAR; INTRAVENOUS at 16:12

## 2017-01-01 RX ADMIN — FENTANYL CITRATE 25 MCG: 50 INJECTION, SOLUTION INTRAMUSCULAR; INTRAVENOUS at 14:21

## 2017-01-01 RX ADMIN — ROCURONIUM BROMIDE 50 MG: 10 INJECTION, SOLUTION INTRAVENOUS at 12:05

## 2017-01-01 RX ADMIN — ONDANSETRON 4 MG: 2 INJECTION, SOLUTION INTRAMUSCULAR; INTRAVENOUS at 17:23

## 2017-01-01 RX ADMIN — DEXAMETHASONE SODIUM PHOSPHATE 4 MG: 4 INJECTION, SOLUTION INTRAMUSCULAR; INTRAVENOUS at 18:05

## 2017-01-01 RX ADMIN — CHLORHEXIDINE GLUCONATE 15 ML: 1.2 RINSE ORAL at 20:27

## 2017-01-01 RX ADMIN — DOCUSATE SODIUM 100 MG: 100 CAPSULE, LIQUID FILLED ORAL at 08:06

## 2017-01-01 RX ADMIN — FUROSEMIDE 5 MG/HR: 10 INJECTION, SOLUTION INTRAMUSCULAR; INTRAVENOUS at 04:18

## 2017-01-01 RX ADMIN — ACETAMINOPHEN 1000 MG: 500 TABLET, FILM COATED ORAL at 17:33

## 2017-01-01 RX ADMIN — SODIUM CHLORIDE: 9 INJECTION, SOLUTION INTRAVENOUS at 15:32

## 2017-01-01 RX ADMIN — DOXYCYCLINE 100 MG: 100 TABLET ORAL at 20:11

## 2017-01-01 RX ADMIN — DEXAMETHASONE SODIUM PHOSPHATE 4 MG: 4 INJECTION, SOLUTION INTRAMUSCULAR; INTRAVENOUS at 23:50

## 2017-01-01 RX ADMIN — CHLORHEXIDINE GLUCONATE 15 ML: 1.2 RINSE ORAL at 14:36

## 2017-01-01 RX ADMIN — ONDANSETRON 4 MG: 4 TABLET, ORALLY DISINTEGRATING ORAL at 09:37

## 2017-01-01 RX ADMIN — SODIUM CHLORIDE: 9 INJECTION, SOLUTION INTRAVENOUS at 06:53

## 2017-01-01 RX ADMIN — LISINOPRIL 40 MG: 20 TABLET ORAL at 09:16

## 2017-01-01 RX ADMIN — DEXAMETHASONE SODIUM PHOSPHATE 4 MG: 4 INJECTION, SOLUTION INTRAMUSCULAR; INTRAVENOUS at 17:24

## 2017-01-01 RX ADMIN — PROPOFOL 30 MCG/KG/MIN: 10 INJECTION, EMULSION INTRAVENOUS at 20:55

## 2017-01-01 RX ADMIN — AMPICILLIN SODIUM AND SULBACTAM SODIUM 3 G: 2; 1 INJECTION, POWDER, FOR SOLUTION INTRAMUSCULAR; INTRAVENOUS at 20:55

## 2017-01-01 RX ADMIN — FENTANYL CITRATE 100 MCG: 50 INJECTION, SOLUTION INTRAMUSCULAR; INTRAVENOUS at 11:53

## 2017-01-01 RX ADMIN — DEXAMETHASONE 4 MG: 4 TABLET ORAL at 05:53

## 2017-01-01 RX ADMIN — PROMETHAZINE HYDROCHLORIDE 25 MG: 25 TABLET ORAL at 00:36

## 2017-01-01 RX ADMIN — AMPICILLIN SODIUM AND SULBACTAM SODIUM 3 G: 2; 1 INJECTION, POWDER, FOR SOLUTION INTRAMUSCULAR; INTRAVENOUS at 18:32

## 2017-01-01 RX ADMIN — HEPARIN SODIUM 2600 UNITS: 1000 INJECTION, SOLUTION INTRAVENOUS; SUBCUTANEOUS at 15:25

## 2017-01-01 ASSESSMENT — PAIN SCALES - GENERAL
PAINLEVEL_OUTOF10: 3
PAINLEVEL_OUTOF10: 8
PAINLEVEL_OUTOF10: 2
PAINLEVEL_OUTOF10: 0
PAINLEVEL_OUTOF10: 0
PAINLEVEL_OUTOF10: 3
PAINLEVEL_OUTOF10: 0
PAINLEVEL_OUTOF10: 2
PAINLEVEL_OUTOF10: 0
PAINLEVEL_OUTOF10: 0
PAINLEVEL_OUTOF10: 3
PAINLEVEL_OUTOF10: 0
PAINLEVEL_OUTOF10: 6
PAINLEVEL_OUTOF10: 0
PAINLEVEL_OUTOF10: 2
PAINLEVEL_OUTOF10: 6
PAINLEVEL_OUTOF10: 4
PAINLEVEL_OUTOF10: 1
PAINLEVEL_OUTOF10: 2
PAINLEVEL_OUTOF10: 0
PAINLEVEL_OUTOF10: 3
PAINLEVEL_OUTOF10: 0
PAINLEVEL_OUTOF10: 0
PAINLEVEL_OUTOF10: 6
PAINLEVEL_OUTOF10: 0
PAINLEVEL_OUTOF10: 2
PAINLEVEL_OUTOF10: 0
PAINLEVEL_OUTOF10: 0
PAINLEVEL_OUTOF10: 5
PAINLEVEL_OUTOF10: 6
PAINLEVEL_OUTOF10: 0
PAINLEVEL_OUTOF10: 4
PAINLEVEL_OUTOF10: 0
PAINLEVEL_OUTOF10: 4
PAINLEVEL_OUTOF10: 4
PAINLEVEL_OUTOF10: 0
PAINLEVEL_OUTOF10: 0
PAINLEVEL_OUTOF10: 5
PAINLEVEL_OUTOF10: 0
PAINLEVEL_OUTOF10: 2
PAINLEVEL_OUTOF10: 0
PAINLEVEL_OUTOF10: 0
PAINLEVEL_OUTOF10: 1
PAINLEVEL_OUTOF10: 0
PAINLEVEL_OUTOF10: 4
PAINLEVEL_OUTOF10: 6
PAINLEVEL_OUTOF10: 0
PAINLEVEL_OUTOF10: 3
PAINLEVEL_OUTOF10: 0
PAINLEVEL_OUTOF10: 0
PAINLEVEL_OUTOF10: 3
PAINLEVEL_OUTOF10: 0
PAINLEVEL_OUTOF10: 3
PAINLEVEL_OUTOF10: 0
PAINLEVEL_OUTOF10: 2

## 2017-01-01 ASSESSMENT — ENCOUNTER SYMPTOMS
BLURRED VISION: 0
STRIDOR: 0
FEVER: 0
FOCAL WEAKNESS: 0
INSOMNIA: 0
DIZZINESS: 0
SENSORY CHANGE: 0
BLOOD IN STOOL: 0
NAUSEA: 0
HEADACHES: 0
NAUSEA: 1
HEADACHES: 0
SEIZURES: 0
FALLS: 0
HEADACHES: 0
ABDOMINAL PAIN: 0
NAUSEA: 0
LOSS OF CONSCIOUSNESS: 0
DEPRESSION: 0
PHOTOPHOBIA: 0
FEVER: 1
CHILLS: 1
SPEECH CHANGE: 0
MYALGIAS: 1
MYALGIAS: 0
VOMITING: 0
SEIZURES: 0
TINGLING: 0
COUGH: 0
CHILLS: 0
MYALGIAS: 0
FEVER: 1
WEAKNESS: 1
FOCAL WEAKNESS: 0
CHILLS: 0
EYE PAIN: 0
COUGH: 0
CONSTITUTIONAL NEGATIVE: 1
HEADACHES: 0
FEVER: 0
DOUBLE VISION: 0
DIARRHEA: 0
HEARTBURN: 0
DIZZINESS: 0
HEARTBURN: 0
FEVER: 0
LOSS OF CONSCIOUSNESS: 0
EYE REDNESS: 0
SPUTUM PRODUCTION: 0
HEADACHES: 0
LOSS OF CONSCIOUSNESS: 0
SENSORY CHANGE: 0
SHORTNESS OF BREATH: 1
DIZZINESS: 0
FEVER: 0
DIZZINESS: 1
ABDOMINAL PAIN: 0
FALLS: 0
EYES NEGATIVE: 1
VOMITING: 0
PALPITATIONS: 0
SHORTNESS OF BREATH: 1
SENSORY CHANGE: 0
DIARRHEA: 0
FEVER: 0
CONSTIPATION: 0
ORTHOPNEA: 0
CONSTIPATION: 0
COUGH: 0
SPUTUM PRODUCTION: 1
VOMITING: 0
BLURRED VISION: 0
CLAUDICATION: 0
DEPRESSION: 0
DIZZINESS: 0
DOUBLE VISION: 0
LOSS OF CONSCIOUSNESS: 0
FOCAL WEAKNESS: 0
TINGLING: 0
DIZZINESS: 0
CONSTITUTIONAL NEGATIVE: 1
HEADACHES: 1
SPEECH CHANGE: 0
HEADACHES: 0
TINGLING: 0
PHOTOPHOBIA: 0
VOMITING: 0
BLURRED VISION: 0
MYALGIAS: 0
SENSORY CHANGE: 0
FOCAL WEAKNESS: 0
MYALGIAS: 0
TREMORS: 0
VOMITING: 1
WHEEZING: 0
DIZZINESS: 1
COUGH: 0
NAUSEA: 0
COUGH: 1
BLURRED VISION: 0
DIARRHEA: 0
HEMOPTYSIS: 0
VOMITING: 0
DOUBLE VISION: 0
SEIZURES: 0
SPEECH CHANGE: 0
HEARTBURN: 0
FEVER: 0
NAUSEA: 0
BLURRED VISION: 0
FOCAL WEAKNESS: 0
NERVOUS/ANXIOUS: 0
WEAKNESS: 1
COUGH: 0
NAUSEA: 0
HEMOPTYSIS: 0
PALPITATIONS: 0
SHORTNESS OF BREATH: 0
TREMORS: 0
SPEECH CHANGE: 0
NECK PAIN: 0
SHORTNESS OF BREATH: 0
TREMORS: 0
BLOOD IN STOOL: 0
CHILLS: 1
ABDOMINAL PAIN: 0
CONSTIPATION: 0
NAUSEA: 0
HEARTBURN: 0
WEAKNESS: 1
PALPITATIONS: 0
HEADACHES: 0
HEARTBURN: 0
SHORTNESS OF BREATH: 0
DOUBLE VISION: 0
CHILLS: 0
TINGLING: 0
SHORTNESS OF BREATH: 1

## 2017-01-01 ASSESSMENT — COPD QUESTIONNAIRES
COPD SCREENING SCORE: 2
HAVE YOU SMOKED AT LEAST 100 CIGARETTES IN YOUR ENTIRE LIFE: NO/DON'T KNOW
DURING THE PAST 4 WEEKS HOW MUCH DID YOU FEEL SHORT OF BREATH: NONE/LITTLE OF THE TIME
DO YOU EVER COUGH UP ANY MUCUS OR PHLEGM?: NO/ONLY WITH OCCASIONAL COLDS OR INFECTIONS
HAVE YOU SMOKED AT LEAST 100 CIGARETTES IN YOUR ENTIRE LIFE: NO/DON'T KNOW
COPD SCREENING SCORE: 3
DURING THE PAST 4 WEEKS HOW MUCH DID YOU FEEL SHORT OF BREATH: NONE/LITTLE OF THE TIME
DO YOU EVER COUGH UP ANY MUCUS OR PHLEGM?: NO/ONLY WITH OCCASIONAL COLDS OR INFECTIONS
DURING THE PAST 4 WEEKS HOW MUCH DID YOU FEEL SHORT OF BREATH: SOME OF THE TIME
COPD SCREENING SCORE: 2
HAVE YOU SMOKED AT LEAST 100 CIGARETTES IN YOUR ENTIRE LIFE: NO/DON'T KNOW
DO YOU EVER COUGH UP ANY MUCUS OR PHLEGM?: NO/ONLY WITH OCCASIONAL COLDS OR INFECTIONS

## 2017-01-01 ASSESSMENT — LIFESTYLE VARIABLES
EVER_SMOKED: NEVER
ALCOHOL_USE: NO
ALCOHOL_USE: NO
EVER_SMOKED: NEVER
EVER_SMOKED: NEVER
DO YOU DRINK ALCOHOL: NO
DO YOU DRINK ALCOHOL: NO
EVER_SMOKED: NEVER
DO YOU DRINK ALCOHOL: NO
EVER_SMOKED: NEVER
EVER_SMOKED: NEVER
DO YOU DRINK ALCOHOL: NO
EVER_SMOKED: NEVER
SUBSTANCE_ABUSE: 0
EVER_SMOKED: NEVER
DO YOU DRINK ALCOHOL: NO
EVER_SMOKED: NEVER
ALCOHOL_USE: NO
DO YOU DRINK ALCOHOL: NO

## 2017-01-01 ASSESSMENT — PATIENT HEALTH QUESTIONNAIRE - PHQ9
2. FEELING DOWN, DEPRESSED, IRRITABLE, OR HOPELESS: NOT AT ALL
1. LITTLE INTEREST OR PLEASURE IN DOING THINGS: NOT AT ALL
2. FEELING DOWN, DEPRESSED, IRRITABLE, OR HOPELESS: NOT AT ALL
SUM OF ALL RESPONSES TO PHQ9 QUESTIONS 1 AND 2: 0
SUM OF ALL RESPONSES TO PHQ QUESTIONS 1-9: 0
1. LITTLE INTEREST OR PLEASURE IN DOING THINGS: NOT AT ALL
SUM OF ALL RESPONSES TO PHQ QUESTIONS 1-9: 0
SUM OF ALL RESPONSES TO PHQ QUESTIONS 1-9: 0
2. FEELING DOWN, DEPRESSED, IRRITABLE, OR HOPELESS: NOT AT ALL
1. LITTLE INTEREST OR PLEASURE IN DOING THINGS: NOT AT ALL
SUM OF ALL RESPONSES TO PHQ9 QUESTIONS 1 AND 2: 0
SUM OF ALL RESPONSES TO PHQ9 QUESTIONS 1 AND 2: 0

## 2017-01-01 ASSESSMENT — GAIT ASSESSMENTS
DISTANCE (FEET): 200
ASSISTIVE DEVICE: FRONT WHEEL WALKER
GAIT LEVEL OF ASSIST: CONTACT GUARD ASSIST
DEVIATION: DECREASED BASE OF SUPPORT
GAIT LEVEL OF ASSIST: CONTACT GUARD ASSIST
DEVIATION: DECREASED BASE OF SUPPORT;BRADYKINETIC;DECREASED HEEL STRIKE;DECREASED TOE OFF;OTHER (COMMENT)
GAIT LEVEL OF ASSIST: CONTACT GUARD ASSIST
DEVIATION: DECREASED BASE OF SUPPORT;SHUFFLED GAIT;BRADYKINETIC;DECREASED HEEL STRIKE;DECREASED TOE OFF
GAIT LEVEL OF ASSIST: CONTACT GUARD ASSIST
DEVIATION: DECREASED BASE OF SUPPORT;STEP TO;DECREASED HEEL STRIKE;DECREASED TOE OFF
DEVIATION: BRADYKINETIC;DECREASED TOE OFF;DECREASED HEEL STRIKE;DECREASED BASE OF SUPPORT
DEVIATION: DECREASED BASE OF SUPPORT;BRADYKINETIC;OTHER (COMMENT)
ASSISTIVE DEVICE: 4 WHEEL WALKER
GAIT LEVEL OF ASSIST: STAND BY ASSIST
DISTANCE (FEET): 5
ASSISTIVE DEVICE: FRONT WHEEL WALKER
GAIT LEVEL OF ASSIST: CONTACT GUARD ASSIST
DEVIATION: BRADYKINETIC;DECREASED HEEL STRIKE;DECREASED TOE OFF
GAIT LEVEL OF ASSIST: CONTACT GUARD ASSIST
ASSISTIVE DEVICE: SINGLE POINT CANE
DISTANCE (FEET): 125
DISTANCE (FEET): 125
DISTANCE (FEET): 110
ASSISTIVE DEVICE: SINGLE POINT CANE
ASSISTIVE DEVICE: FRONT WHEEL WALKER
GAIT LEVEL OF ASSIST: STAND BY ASSIST
ASSISTIVE DEVICE: 4 WHEEL WALKER
DISTANCE (FEET): 80
DEVIATION: DECREASED BASE OF SUPPORT;BRADYKINETIC;DECREASED HEEL STRIKE;DECREASED TOE OFF;OTHER (COMMENT)
ASSISTIVE DEVICE: 4 WHEEL WALKER
DISTANCE (FEET): 75

## 2017-01-01 ASSESSMENT — COGNITIVE AND FUNCTIONAL STATUS - GENERAL
CLIMB 3 TO 5 STEPS WITH RAILING: A LOT
SUGGESTED CMS G CODE MODIFIER DAILY ACTIVITY: CK
TURNING FROM BACK TO SIDE WHILE IN FLAT BAD: A LOT
MOVING FROM LYING ON BACK TO SITTING ON SIDE OF FLAT BED: A LOT
DRESSING REGULAR LOWER BODY CLOTHING: A LOT
HELP NEEDED FOR BATHING: A LOT
DRESSING REGULAR UPPER BODY CLOTHING: A LITTLE
PERSONAL GROOMING: A LITTLE
WALKING IN HOSPITAL ROOM: A LOT
MOVING TO AND FROM BED TO CHAIR: A LOT
TOILETING: A LITTLE
MOBILITY SCORE: 13
EATING MEALS: A LITTLE
SUGGESTED CMS G CODE MODIFIER MOBILITY: CL
DAILY ACTIVITIY SCORE: 16
STANDING UP FROM CHAIR USING ARMS: A LITTLE

## 2017-01-01 ASSESSMENT — BRIEF INTERVIEW FOR MENTAL STATUS (BIMS)
WHAT MONTH IS IT: MISSED BY MORE THAN 1 MONTH
ASKED TO RECALL SOCK: NO, COULD NOT RECALL
WHAT YEAR IS IT: CORRECT
BIMS SUMMARY SCORE: 6
ASKED TO RECALL BLUE: NO, COULD NOT RECALL
ASKED TO RECALL BED: YES, NO CUE REQUIRED
INITIAL REPETITION OF BED BLUE SOCK - FIRST ATTEMPT: 1
WHAT DAY OF THE WEEK IS IT: INCORRECT

## 2017-01-01 ASSESSMENT — ACTIVITIES OF DAILY LIVING (ADL)
TOILETING: INDEPENDENT
TOILETING_LEVEL_OF_ASSIST: REQUIRES SUPERVISION WITH TOILETING
TOILET_TRANSFER_LEVEL_OF_ASSIST: REQUIRES SUPERVISION WITH TOILET TRANSFER
TOILETING: INDEPENDENT
SHOWER_TRANSFER_LEVEL_OF_ASSIST: REQUIRES SUPERVISION WITH SHOWER TRANSFER
TOILETING: INDEPENDENT

## 2017-04-05 PROBLEM — G93.89 BRAIN MASS: Status: ACTIVE | Noted: 2017-01-01

## 2017-04-05 NOTE — IP AVS SNAPSHOT
" Home Care Instructions                                                                                                                  Name:Alvin Grinnell  Medical Record Number:[id number]  CSN: [id number]    YOB: 1935   Age: 81 y.o.  Sex: male  HT:1.651 m (5' 5\") WT: 69 kg (152 lb 1.9 oz)          Admit Date: 4/5/2017     Discharge Date:   Today's Date: 4/11/2017  Attending Doctor:  Shalom Alcantar M.D.                  Allergies:  Review of patient's allergies indicates no known allergies.            Discharge Instructions       Discharge Instructions    Discharged to home by car with relative. Discharged via wheelchair, hospital escort: Yes.  Special equipment needed: Not Applicable    Be sure to schedule a follow-up appointment with your primary care doctor or any specialists as instructed.     Discharge Plan:   Diet Plan: Discussed  Activity Level: Discussed  Confirmed Follow up Appointment: Appointment Scheduled  Confirmed Symptoms Management: Discussed  Medication Reconciliation Updated: Yes  Pneumococcal Vaccine Given - only chart on this line when given: Given (See MAR)  Influenza Vaccine Indication: Indicated: 65 years and older  Influenza Vaccine Given - only chart on this line when given: Influenza Vaccine Given (See MAR)    I understand that a diet low in cholesterol, fat, and sodium is recommended for good health. Unless I have been given specific instructions below for another diet, I accept this instruction as my diet prescription.   Other diet: Regular    Special Instructions: None    · Is patient discharged on Warfarin / Coumadin?   No     · Is patient Post Blood Transfusion?  No    Depression / Suicide Risk    As you are discharged from this [facility] facility, it is important to learn how to keep safe from harming yourself.    Recognize the warning signs:  · Abrupt changes in personality, positive or negative- including increase in energy   · Giving away possessions  · Change in " "eating patterns- significant weight changes-  positive or negative  · Change in sleeping patterns- unable to sleep or sleeping all the time   · Unwillingness or inability to communicate  · Depression  · Unusual sadness, discouragement and loneliness  · Talk of wanting to die  · Neglect of personal appearance   · Rebelliousness- reckless behavior  · Withdrawal from people/activities they love  · Confusion- inability to concentrate     If you or a loved one observes any of these behaviors or has concerns about self-harm, here's what you can do:  · Talk about it- your feelings and reasons for harming yourself  · Remove any means that you might use to hurt yourself (examples: pills, rope, extension cords, firearm)  · Get professional help from the community (Mental Health, Substance Abuse, psychological counseling)  · Do not be alone:Call your Safe Contact- someone whom you trust who will be there for you.  · Call your local CRISIS HOTLINE 135-2813 or 683-432-2775  · Call your local Children's Mobile Crisis Response Team [place] (518) 423-8322 or wwwToobla  · Call the toll free National Suicide Prevention Hotlines   · National Suicide Prevention Lifeline 591-644-QYHG (6672)  · National Hope Line Network 800-SUICIDE (660-2327)  Brain Tumor  Brain tumors are diseases in which cancer (malignant) cells begin to grow in the tissues of the brain. Brain tumors account for 85% to 90% of all primary central nervous system (CNS) tumors. A \"primary\" brain tumor is one that starts in the brain, rather than spreading to the brain from a cancer in a different part of the body.  The brain controls memory, learning, senses (hearing, sight, smell, taste, touch), and emotion. It also controls other parts of the body, including muscles, organs, and blood vessels.   Most brain tumors occur in people age 45 or above. However, a couple rare tumors occur almost only in children.   Primary brain tumors are much less common " than secondary brain tumors. A secondary brain tumor starts in a different part of the body and spreads to the brain.  CAUSES   Most primary brain tumors begin when normal cells somehow develop errors in their genetics. In most cases, it is not known how this process starts. These genetic errors allow cells to grow and divide at increased rates and to continue living when normal, healthy cells would die. The result is a clump of abnormal cells, which forms a tumor.   There are many different types of brain cells. In many cases, one type of brain cell develops the error that leads to a tumor being formed. The tumor is named according to the type of cell that developed the problem.  Environmental factors and infection, which may cause brain cancer, include:  Exposure to chemicals and solvents. This could happen on the job or through hobbies. Examples include:   Vinyl chloride.   Pesticides.   A variety of industrial chemicals.   Research is ongoing, because it is not known whether there is a true relationship between these chemicals and the development of primary brain tumors.  Exposure of the head to radiation, such as during a nuclear accident.   Leticia-Barr virus infection. This can cause a rare brain tumor.   Being a transplant recipient or patient with AIDS (acquired immunodeficiency syndrome).   SYMPTOMS   Symptoms of brain tumors are related to the part of the brain that is affected. For example, a brain tumor that affects the part of the brain controlling vision will affect that sense.  General signs and symptoms include:   Headache. Changes in pattern, higher frequency, or more severe.   Stomach or intestinal problems, such as:   Nausea.   Loss of appetite.   Vomiting.   Changes in:   Personality.   Mood.   Mental capacity.   Concentration.   Balance problems.   Confusion with everyday activities.   Speech problems.   Seizure in someone who has never had a seizure before. These may occur in up to 20% of  patients with a tumor in the upper part of the brain. Seizures may be present for months before a clear diagnosis is made.   DIAGNOSIS   The diagnosis is based on:  Patient history.   A nervous system function test (neurological exam).   Diagnostic procedures, including:   CT scan (computed tomography) and MRI scan (magnetic resonance imaging).   After therapy, SPECT (single photon emission computed tomography) and PET (positron emission tomography) may be useful to see if there is new tumor growth. Both of these tests are ways of making images of the brain. These tests are usually only available at large hospitals.   Once a brain tumor is found, more tests may be done to determine the type of tumor. Your caregiver will also need to know how different the tumor cells are from the cells that are near it. This is called the histologic grade of the tumor. To plan treatment, it is important to know the type and grade of brain tumor.  TREATMENT   Four kinds of direct treatment are available. The types selected depend on the type and location of the tumor.   Surgery is the most common treatment. To take out the cancer from the brain, a surgeon will cut a part of bone from the skull to get to the brain. This procedure is called a craniotomy. After the cancer is removed, the bone will be put back in place or a piece of metal or fabric will be used to cover the opening in the skull.   Radiation therapy and radiosurgery use X-rays to kill cancer cells from the outside and to shrink tumors. Radiation therapy may also be used by putting materials that produce radiation (radioisotopes) through thin plastic tubes, into the tumor, to kill cancer cells from the inside.   Chemotherapy uses drugs to kill cancer cells. Chemotherapy is called a systemic treatment because the drug enters the bloodstream, travels through the body, and can kill cancer cells throughout the body. Chemotherapy may be:   Taken by pill.   Put into the body by  a needle in a vein or muscle.   Biological therapy uses the body's immune system to fight cancer. It is being studied in clinical trials for use in the treatment of brain cancer. It uses materials made by the body, or made in a lab, to help the body's natural defenses against disease.   Rehabilitation After Treatment   Because brain tumors can develop in parts of the brain that control motor skills, speech, vision, and thinking, rehabilitation may be a necessary part of recovery. The brain can sometimes heal itself after treatment or trauma from a brain tumor, but this can take time and patience.   Cognitive rehabilitation can help you cope with problems of thinking and awareness.   Physical therapy can help you regain lost motor skills or muscle strength.   Vocational therapy can help you get back to work after a brain tumor.   Specialists in speech difficulties (speech pathologists) are just one of many types of therapists who can help you recover.   School-age children with brain tumors may especially benefit from tutoring as a part of their overall treatment plan. A brain tumor can cause changes in the brain that affect thinking and learning. The earlier these problems are identified, the earlier they can be addressed with strategies that provide the most benefits to the child.   SEEK MEDICAL CARE IF:   You feel there are side effects from medicines prescribed.   You feel that medicines for pain are not being effective.   You develop any new symptoms that you did not have previously.   SEEK IMMEDIATE MEDICAL CARE IF:   You develop a high fever, neck stiffness, or confusion.   You have a headache that becomes severe or does not respond to pain medicine.   You have a fainting episode.   You have a seizure.   You develop a rash.   You develop severe vomiting or are unable to tolerate food or fluids.   Document Released: 12/19/2005 Document Revised: 03/11/2013 Document Reviewed: 11/06/2008  ExitCare® Patient  Information ©2013 [employer].    Follow-up Information     1. Follow up with Lavelle Ugarte M.D. In 2 weeks.    Specialty:  Neurosurgery    Why:  Admission for brain mass    Contact information    [address] [place] [address]  [phone]           Discharge Medication Instructions:    Below are the medications your physician expects you to take upon discharge:    Review all your home medications and newly ordered medications with your doctor and/or pharmacist. Follow medication instructions as directed by your doctor and/or pharmacist.    Please keep your medication list with you and share with your physician.               Medication List      START taking these medications        Instructions    dexamethasone 0.5 MG Tabs   Commonly known as:  DECADRON    Take 4 mg twice daily for 2 days, then 2 mg twice daily for 2 days, the 1 mg twice daily for 2 days, then 0.5 mg tablets twice daily for 2 days then stop.         CONTINUE taking these medications        Instructions    lisinopril 40 MG tablet   Last time this was given:  40 mg on 4/11/2017  8:41 AM   Commonly known as:  PRINIVIL, ZESTRIL    Take 40 mg by mouth every day.   Dose:  40 mg       PRESERVISION AREDS 2 PO    Take 1 Tab by mouth every day.   Dose:  1 Tab       simvastatin 10 MG Tabs   Last time this was given:  10 mg on 4/10/2017  8:41 PM   Commonly known as:  ZOCOR    Take 10 mg by mouth every evening.   Dose:  10 mg         STOP taking these medications     aspirin EC 81 MG Tbec   Commonly known as:  ECOTRIN               Instructions           Diet / Nutrition:    Follow any diet instructions given to you by your doctor or the dietician, including how much salt (sodium) you are allowed each day.    If you are overweight, talk to your doctor about a weight reduction plan.    Activity:    Remain physically active following your doctor's instructions about exercise and activity.    Rest often.     Any time you become even a little tired or  short of breath, SIT DOWN and rest.    Worsening Symptoms:    Report any of the following signs and symptoms to the doctor's office immediately:    *Pain of jaw, arm, or neck  *Chest pain not relieved by medication                               *Dizziness or loss of consciousness  *Difficulty breathing even when at rest   *More tired than usual                                       *Bleeding drainage or swelling of surgical site  *Swelling of feet, ankles, legs or stomach                 *Fever (>100ºF)  *Pink or blood tinged sputum  *Weight gain (3lbs/day or 5lbs /week)           *Shock from internal defibrillator (if applicable)  *Palpitations or irregular heartbeats                *Cool and/or numb extremities    Stroke Awareness    Common Risk Factors for Stroke include:    Age  Atrial Fibrillation  Carotid Artery Stenosis  Diabetes Mellitus  Excessive alcohol consumption  High blood pressure  Overweight   Physical inactivity  Smoking    Warning signs and symptoms of a stroke include:    *Sudden numbness or weakness of the face, arm or leg (especially on one side of the body).  *Sudden confusion, trouble speaking or understanding.  *Sudden trouble seeing in one or both eyes.  *Sudden trouble walking, dizziness, loss of balance or coordination.Sudden severe headache with no known cause.    It is very important to get treatment quickly when a stroke occurs. If you experience any of the above warning signs, call 911 immediately.                   Disclaimer         Quit Smoking / Tobacco Use:    I understand the use of any tobacco products increases my chance of suffering from future heart disease or stroke and could cause other illnesses which may shorten my life. Quitting the use of tobacco products is the single most important thing I can do to improve my health. For further information on smoking / tobacco cessation call a Toll Free Quit Line at 1-456.624.3109 (*National [employer]) or 1-844.443.7979  (American Lung Association) or you can access the web based program at www.lungusa.org.    [place] Tobacco Users Help Line:  (772) 843-5124       Toll Free: 1-851.688.5447  Quit Tobacco Program [facility] (546)375-7818    Crisis Hotline:    [place] Crisis Hotline:  4-498-HIUWGIS or 1-211.108.6235    [place] Crisis Hotline:    1-875.704.2853 or 395-710-0254    Discharge Survey:   [facility] you for [facility]. We hope we did everything we could to make your hospital stay a pleasant one. You may be receiving a phone survey and we would appreciate your time and participation in answering the questions. Your input is very valuable to us in our efforts to improve our service to our patients and their families.        My signature on this form indicates that:    1. I have reviewed and understand the above information.  2. My questions regarding this information have been answered to my satisfaction.  3. I have formulated a plan with my discharge nurse to obtain my prescribed medications for home.                  Disclaimer         __________________________________                     __________       ________                       Patient Signature                                                 Date                    Time

## 2017-04-05 NOTE — IP AVS SNAPSHOT
" <p align=\"LEFT\"><IMG SRC=\"[email or web address]\" alt=\"Image\" WIDTH=\"50%\" HEIGHT=\"200\" BORDER=\"\"></p>                   Name:Alvin Grinnell  Medical Record Number:[id number]  CSN: [id number]    YOB: 1935   Age: 81 y.o.  Sex: male  HT:1.651 m (5' 5\") WT: 69 kg (152 lb 1.9 oz)          Admit Date: 4/5/2017     Discharge Date:   Today's Date: 4/11/2017  Attending Doctor:  Shalom Alcantar M.D.                  Allergies:  Review of patient's allergies indicates no known allergies.          Follow-up Information     1. Follow up with Lavelle Ugarte M.D. In 2 weeks.    Specialty:  Neurosurgery    Why:  Admission for brain mass    Contact information    [address] [facility] [address]  [phone]           Medication List      Take these Medications        Instructions    dexamethasone 0.5 MG Tabs   Commonly known as:  DECADRON    Take 4 mg twice daily for 2 days, then 2 mg twice daily for 2 days, the 1 mg twice daily for 2 days, then 0.5 mg tablets twice daily for 2 days then stop.       lisinopril 40 MG tablet   Commonly known as:  PRINIVIL, ZESTRIL    Take 40 mg by mouth every day.   Dose:  40 mg       PRESERVISION AREDS 2 PO    Take 1 Tab by mouth every day.   Dose:  1 Tab       simvastatin 10 MG Tabs   Commonly known as:  ZOCOR    Take 10 mg by mouth every evening.   Dose:  10 mg         "

## 2017-04-05 NOTE — IP AVS SNAPSHOT
4/11/2017          Alvin Grinnell  [address] [address]    Dear Hank:    [facility] wants to ensure your discharge home is safe and you or your loved ones have had all your questions answered regarding your care after you leave the hospital.    You may receive a telephone call within two days of your discharge.  This call is to make certain you understand your discharge instructions as well as ensure we provided you with the best care possible during your stay with us.     The call will only last approximately 3-5 minutes and will be done by a nurse.    Once again, we want to ensure your discharge home is safe and that you have a clear understanding of any next steps in your care.  If you have any questions or concerns, please do not hesitate to contact us, we are here for you.  Thank you for choosing [facility] for your healthcare needs.    Sincerely,    Lenny Ricardo    [facility]

## 2017-04-05 NOTE — ED NOTES
".  Chief Complaint   Patient presents with   • Dizziness     Transfer from [place] with brain mass   ../82 mmHg  Pulse 88  Temp(Src) 36.5 °C (97.7 °F)  Resp 16  Ht 1.651 m (5' 5\")  Wt 71.668 kg (158 lb)  BMI 26.29 kg/m2  SpO2 96%    BIB EMS from [place]  "

## 2017-04-05 NOTE — ED PROVIDER NOTES
ED Provider Note    Scribed for Eduardo Zhang M.D. by Jacob Pulliam. 4/5/2017, 3:48 PM.    Primary care provider: No primary care provider on file.  Means of arrival: Med Flight  History obtained from: Patient  History limited by: None    CHIEF COMPLAINT  Chief Complaint   Patient presents with   • Dizziness     Transfer from [place] with brain mass       HPI  Alvin Grinnell is a 81 y.o. male who presents to the Emergency Department complaining of dizziness onset 9 hours ago when he woke up. The patient was getting out of bed when he was unable to stand and fell and hit the floor. He experienced left elbow pain and left sided chest pain while on the ground. He denies nausea, abdominal pain, blurred vision, double vision, hearing changes, leg pain or swelling, numbness, or tingling. Patient notes that he's usually very active and likes to take morning walks.    Transfer Data:   Chest X-Ray No acute disease   MRI of brain mass at the parietal occipital aspect of the right cerebral hemisphere with marked vasogenic edema   BNP is normal   Complete metabolic panel   Glucose 124 Chloride 110 Otherwise normal   Normal lipase  Normal troponin  Chest pain given Nitro, aspirin  Given Decadron    REVIEW OF SYSTEMS  Review of Systems   HENT: Negative for hearing loss.    Eyes: Negative for blurred vision and double vision.   Cardiovascular: Positive for chest pain (left-sided). Negative for leg swelling.   Musculoskeletal:        + Elbow pain (left-sided)   Neurological: Positive for dizziness.        + Ground-Level Fall   All other systems reviewed and are negative.  C.    PAST MEDICAL HISTORY       SURGICAL HISTORY  patient denies any surgical history    SOCIAL HISTORY  Social History   Substance Use Topics   • Smoking status: Not on file   • Smokeless tobacco: Not on file   • Alcohol Use: Not on file      History   Drug Use Not on file       FAMILY HISTORY  No family history on file.    CURRENT MEDICATIONS  Home  "Medications     Reviewed by Tereso Murguia (Pharmacy Tech) on 04/05/17 at 1638  Med List Status: Complete    Medication Last Dose Status    aspirin EC (ECOTRIN) 81 MG Tablet Delayed Response 4/3/2017 Active    lisinopril (PRINIVIL, ZESTRIL) 40 MG tablet 4/3/2017 Active    simvastatin (ZOCOR) 10 MG Tab 4/3/2017 Active                ALLERGIES  No Known Allergies    PHYSICAL EXAM  VITAL SIGNS: /82 mmHg  Pulse 88  Temp(Src) 36.5 °C (97.7 °F)  Resp 16  Ht 1.651 m (5' 5\")  Wt 71.668 kg (158 lb)  BMI 26.29 kg/m2  SpO2 96%    Constitutional:  No acute distress. Slightly confused.  HENT: Moist mucous membranes. Cranial nerves intact.  Eyes: No conjunctivitis or icterus. Normal extraocular movements.  Neck: trachea is midline, no palpable thyroid  Lymphatic: No cervical lymphadenopathy  Cardiovascular: Regular rate and rhythm, no murmurs. No chest wall tenderness.   Thorax & Lungs: Normal breath sounds, no rhonchi  Abdomen: Soft, Non-tender  Skin: no rash  Back: Non-tender, no CVA tenderness  Extremities:  no edema  Vascular: symmetric radial pulse  Neurologic: Normal gross motor  Psychiatric: Alert and Oriented x3 but seems confused.    LABS  Labs Reviewed - No data to display  All labs reviewed by me.    EKG Interpretation  Interpreted by me  Normal Sinus Rhythm. Rate 95  Normal QTc  Widened QRS with an incomplete nonspecific conduction delay  Normal Axis    RADIOLOGY  No orders to display     The radiologist's interpretation of all radiological studies have been reviewed by me.    COURSE & MEDICAL DECISION MAKING  Pertinent Labs & Imaging studies reviewed. (See chart for details)    3:48 PM - Patient seen and examined at bedside. The differential diagnoses include but are not limited to: Brain Tumour, Chest pain rule out MI.     4:40 PM - Consulted with Dr. Branch, Hospitalist, who is aware of the patient and agrees to consult.    Medical Decision Making:  I did review the transfer information. " The patient has unremarkable lab work with a distinctly abnormal MRI with a mass with vasogenic edema. He did receive 10 mg of Decadron in transfer [place] stable no further intervention was done.    DISPOSITION:  Patient will be admitted to Dr. Branch, Hospitalist, in guarded condition.    FINAL IMPRESSION  1. Brain mass    2. Dizzy    3. Chest pain, unspecified type          I, Jacob Pulliam (Scribe), am scribing for, and in the presence of, Eduardo Zhang M.D..    Electronically signed by: Jacob Pulliam (Scribe), 4/5/2017    I, Eduardo Zhang M.D. personally performed the services described in this documentation, as scribed by Jacob Pulliam in my presence, and it is both accurate and complete.    The note accurately reflects work and decisions made by me.  Eduardo Zhang  4/5/2017  5:12 PM

## 2017-04-05 NOTE — IP AVS SNAPSHOT
Nexterra Access Code: [email or web address]  Expires: 5/11/2017 11:30 AM    Your email address is not on file at [facility].  Email Addresses are required for you to sign up for Nexterra, please contact [phone] to verify your personal information and to provide your email address prior to attempting to register for Nexterra.    Alvin Grinnell  [address] [place] [id number]    Nexterra  A secure, online tool to manage your health information     [facility]’s Nexterra® is a secure, online tool that connects you to your personalized health information from the privacy of your home -- day or night - making it very easy for you to manage your healthcare. Once the activation process is completed, you can even access your medical information using the Nexterra jina, which is available for free in the Apple Jina store or Google Play store.     To learn more about Nexterra, visit [email or web address]    There are two levels of access available (as shown below):   My Chart Features  [facility] Primary Care Doctor [facility]  Specialists [facility]  Urgent  Care Non-[facility] Primary Care Doctor   Email your healthcare team securely and privately 24/7 X X X    Manage appointments: schedule your next appointment; view details of past/upcoming appointments X      Request prescription refills. X      View recent personal medical records, including lab and immunizations X X X X   View health record, including health history, allergies, medications X X X X   Read reports about your outpatient visits, procedures, consult and ER notes X X X X   See your discharge summary, which is a recap of your hospital and/or ER visit that includes your diagnosis, lab results, and care plan X X  X     How to register for Social IQ (Social Influence Quotient)t:  Once your e-mail address has been verified, follow the following steps to sign up for Nexterra.     1. Go to  [email or web address]  2. Click on the Sign Up Now box, which takes you to the New Member Sign Up page. You will  need to provide the following information:  a. Enter your [facility] Access Code exactly as it appears at the top of this page. (You will not need to use this code after you’ve completed the sign-up process. If you do not sign up before the expiration date, you must request a new code.)   b. Enter your date of birth.   c. Enter your home email address.   d. Click Submit, and follow the next screen’s instructions.  3. Create a [employer] ID. This will be your [facility] login ID and cannot be changed, so think of one that is secure and easy to remember.  4. Create a [facility] password. You can change your password at any time.  5. Enter your Password Reset Question and Answer. This can be used at a later time if you forget your password.   6. Enter your e-mail address. This allows you to receive e-mail notifications when new information is available in [facility].  7. Click Sign Up. You can now view your health information.    For assistance activating your [facility] account, call [phone]

## 2017-04-06 PROBLEM — I10 HTN (HYPERTENSION): Status: ACTIVE | Noted: 2017-01-01

## 2017-04-06 NOTE — H&P
"HOSPITAL MEDICINE HISTORY/ PHYSICAL    Date & Time note created:    4/5/2017   7:59 PM       Patient ID:   Name: Grinnell, Alvin  . YOB: 1935. Age: 81 y.o. male. MRN: [id number]    PCP : No primary care provider on file.        Chief Complaint:       Dizziness x 9 hours    History of Present Illness:    Grinnell is a very pleasant 81 y.o. male with a past medical history of  Hypertension, hyperlipidemia, presented to an outlying facility for apparent dizziness which started about 9 hours prior to presentation as he got out of bed, she stated that was unable to stand and fell to the floor. He says he has a headache, but denies vision changes, denies weakness or numbness or tingling.    From outside MRI imaging, shows brain mass at the parietal occipital aspect of the right cerebral hemisphere with marked vasogenic  Edema.     Review of Systems:    Please see HPI, all other systems were reviewed and are negative (AMA/CMS criteria)              Allergies to Medications  Review of patient's allergies indicates no known allergies.    Past Medical History  Hypertension  Hyperlipidemia    Family History:  Reviewed and Non Contributory    Social History:  Patient denies using Alcohol, tobacco or Illicit drugs.    Current Outpatient Medications:   lisinopril 40 MG tablet       simvastatin 10 MG Tabs          Physical Exam:   Blood pressure 130/89, pulse 50, temperature 36.6 °C (97.8 °F), resp. rate 18, height 1.651 m (5' 5\"), weight 66.9 kg (147 lb 7.8 oz), SpO2 95 %.  Constitutional:  well developed, well nourished nontoxic appearance.  HENMT: Normocephalic, atraumatic, b/l ears normal, nose normal  Eyes:  EOMI, conjunctiva normal, no discharge  Neck: no tracheal deviation, supple, no stridor appreciated   Cardiovascular: normal heart rate, normal rhythm, no murmurs, no rubs or gallops; no cyanosis, clubbing or edema  Lungs: Respiratory effort is normal,  breath sounds clear to auscultation bilaterally, no " wheezes,No rales no rhonchi appreciated  Abdomen: soft, non-tender, non-distended, no guarding or rebound tenderness, no pulsatile masses noted.   Skin: warm, dry, no erythema and no rash  Extremities:  Distal pulses intact +2.  No cyanosis or clubbing. No edema Noted  No joint deformities, Range of motion appears optimal   Neurologic:  Alert and oriented x3.  Cranial nerves II-XII grossly intact.  No   focal deficits.  Psychiatric: No anxiety or depression      Lab Data Review:    Cbc, cmp pending.     Imaging/Procedures Review:    MRI outside images   Brain Mass 40x50 mm, parietal occipital aspect of the right cerebral hemisphere with marked vasogenic edema         EKG:   per my independant read:  QTc:493, HR: 95, Normal Sinus Rhythm, no ST/T changes      Assessment and Plan:      1.  New brain mass noted parietal occipital lobe, with noted vasogenic edema,  was consulted, we will start decadron 4mg IV q6hrs. Pending neurosurgery recommendations in terms of resection.     2. Hypertension, continue lisinopril.     3. Hyperlipidemia, continue with simvastatin.       Prophylaxis: SCDs    Code: Full code    Dispo: I anticipate hospital length of stay for medical management to be expected to take greater than than 2 midnights

## 2017-04-06 NOTE — DISCHARGE PLANNING
Current documentation reveals a potential for acute inpatient rehabilitation, pending surgery, POC & TX needs.  Please consider a rehab consult to assist with discharge planning.

## 2017-04-06 NOTE — PROGRESS NOTES
Hospital Medicine Progress Note, Adult, Complex               Author: Frank Garcias Date & Time created: 2017  4:16 PM     Interval History:  Doing better; alert/appropriate; denies focal weakness/signif HA.    Review of Systems:  Review of Systems   Constitutional: Negative for fever.   Eyes: Negative for blurred vision.   Respiratory: Negative for cough.    Cardiovascular: Negative for chest pain.   Gastrointestinal: Negative for heartburn.   Genitourinary: Negative for dysuria.   Musculoskeletal: Negative for myalgias.   Skin: Negative for rash.   Neurological: Negative for dizziness and headaches.       Physical Exam:  Physical Exam   Constitutional: He is oriented to person, place, and time. No distress.   HENT:   Head: Normocephalic.   Eyes: EOM are normal.   Neck: Neck supple.   Cardiovascular: Normal rate and regular rhythm.    Pulmonary/Chest: Effort normal and breath sounds normal.   Abdominal: Soft. Bowel sounds are normal. He exhibits no distension. There is no tenderness.   Musculoskeletal: He exhibits no edema.   Neurological: He is alert and oriented to person, place, and time.   5/5 strength bilat   Skin: Skin is warm.   Psychiatric: His behavior is normal.       Labs:        Invalid input(s): [id number]      Recent Labs      17   0401   SODIUM   --   138   POTASSIUM   --   4.3   CHLORIDE   --   105   CO2   --   18*   BUN   --   26*   CREATININE   --   1.17   MAGNESIUM  2.0   --    CALCIUM   --   9.4     Recent Labs      17   0401   ALTSGPT  9   ASTSGOT  16   ALKPHOSPHAT  65   TBILIRUBIN  1.0   GLUCOSE  135*     Recent Labs      17   0401   RBC  4.89   HEMOGLOBIN  12.5*   HEMATOCRIT  39.0*   PLATELETCT  203     Recent Labs      17   0401   WBC  10.6   NEUTSPOLYS  92.30*   LYMPHOCYTES  5.50*   MONOCYTES  1.10   EOSINOPHILS  0.00   BASOPHILS  0.20   ASTSGOT  16   ALTSGPT  9   ALKPHOSPHAT  65   TBILIRUBIN  1.0           Hemodynamics:  Temp (24hrs), Av.7  °C (98 °F), Min:36.3 °C (97.4 °F), Max:37.1 °C (98.7 °F)  Temperature: 37.1 °C (98.7 °F)  Pulse  Av.8  Min: 50  Max: 100Heart Rate (Monitored): 96  Blood Pressure : 125/67 mmHg, NIBP: 140/81 mmHg     Respiratory:    Respiration: 18, Pulse Oximetry: 95 %           Fluids:    Intake/Output Summary (Last 24 hours) at 17 1616  Last data filed at 17 0600   Gross per 24 hour   Intake    200 ml   Output      0 ml   Net    200 ml     Weight: 66.9 kg (147 lb 7.8 oz)  GI/Nutrition:  Orders Placed This Encounter   Procedures   • Diet Order     Standing Status: Standing      Number of Occurrences: 1      Standing Expiration Date:      Order Specific Question:  Diet:     Answer:  Cardiac [6]   • DIET NPO     Standing Status: Standing      Number of Occurrences: 8      Standing Expiration Date:      Order Specific Question:  Restrict to:     Answer:  Sips with Medications [3]     Medical Decision Making, by Problem:  Active Hospital Problems    Diagnosis   • HTN (hypertension) [I10] control with meds.   • Brain mass [G93.9], R occipital with edema: clinically better on steroid; planned for surgery tomorrow with Dr. Ugarte.  Will follow post op.  Patient wishes full code at this time but does not want to be prolonged on life support.       Core Measures

## 2017-04-06 NOTE — PROGRESS NOTES
Pt aaox3, OOB to chair, difficulty sleeping/restlessness over night per night shift RN. Fall precautions in place, plan for surgery tomorrow discussed

## 2017-04-06 NOTE — CONSULTS
HPI  Alvin Grinnell is a 81 y.o. male who presents to the Emergency Department complaining of dizziness onset 9 hours ago when he woke up. The patient was getting out of bed when he was unable to stand and fell and hit the floor. He experienced left elbow pain and left sided chest pain while on the ground. He denies nausea, abdominal pain, blurred vision, double vision, hearing changes, leg pain or swelling, numbness, or tingling. Patient notes that he's usually very active and likes to take morning walks.    Transfer Data:   Chest X-Ray No acute disease    MRI of brain mass at the parietal occipital aspect of the right cerebral hemisphere with marked vasogenic edema    BNP is normal    Complete metabolic panel    Glucose 124 Chloride 110 Otherwise normal    Normal lipase  Normal troponin  Chest pain given Nitro, aspirin  Given Decadron    REVIEW OF SYSTEMS  Review of Systems   HENT: Negative for hearing loss.    Eyes: Negative for blurred vision and double vision.   Cardiovascular: Positive for chest pain (left-sided). Negative for leg swelling.   Musculoskeletal:         + Elbow pain (left-sided)   Neurological: Positive for dizziness.         + Ground-Level Fall   All other systems reviewed and are negative.  C.    PAST MEDICAL HISTORY       SURGICAL HISTORY  patient denies any surgical history    SOCIAL HISTORY  Social History    Substance Use Topics    •  Smoking status:  Not on file    •  Smokeless tobacco:  Not on file    •  Alcohol Use:  Not on file        History    Drug Use  Not on file        FAMILY HISTORY   Family History     No family history on file.       CURRENT MEDICATIONS  Home Medications       Reviewed by Tereso Murguia (Pharmacy Tech) on 04/05/17 at 1638   Med List Status: Complete      Medication  Last Dose  Status      aspirin EC (ECOTRIN) 81 MG Tablet Delayed Response  4/3/2017  Active      lisinopril (PRINIVIL, ZESTRIL) 40 MG tablet  4/3/2017  Active      simvastatin (ZOCOR) 10 MG  "Tab  4/3/2017  Active                    ALLERGIES  No Known Allergies    PHYSICAL EXAM  VITAL SIGNS: /82 mmHg  Pulse 88  Temp(Src) 36.5 °C (97.7 °F)  Resp 16  Ht 1.651 m (5' 5\")  Wt 71.668 kg (158 lb)  BMI 26.29 kg/m2  SpO2 96%    Constitutional:  No acute distress. Slightly confused.  Oriented to year, month, [facility]: Moist mucous membranes. .  Eyes: No conjunctivitis or icterus. Normal extraocular movements.  Neck: trachea is midline, no palpable thyroid  Lymphatic: No cervical lymphadenopathy  Cardiovascular: Regular rate and rhythm,     Extremities:  no edema, extremities with full range of motion, no major joint crepitus/subluxation.  Muscle tone unremarkable.  Neurologic:   Alert interactive  Speech fluent appropriate  Pupils 3mm midline reactive, extraocular muscles grossly intact.  Visual field with left homonymous hemianopsia  No facial droop.  Tongue midline without fasciculations.  Uvula midline.  Bilateral V1, V2, V3 sensation intact to touch  Motor:  Bilateral sternocleidomastoid, shoulder shrug 5/5  Left bicep, tricep,  4+/5 right 5/5; mild left pronator drift  Bilateral IP, quadricep, hamstring, dorsiflexion, plantarflexion 5/5  Sensation intact to touch x 4 extremities/trunk  Reflexes:  No Swenson's, no clonus.  Toes down going on plantar stroke.  Finger-nose somewhat slow but unremarkable    Psychiatric: Affect, mood appropriate    LABS      EKG Interpretation  Interpreted by me  Normal Sinus Rhythm. Rate 95  Normal QTc  Widened QRS with an incomplete nonspecific conduction delay  Normal Axis    RADIOLOGY  No orders to display      MRI brain +/- contrast [facility]:  40x50 mm heterogenously enhancing lesion right parietal-occipital lobes with surrounding edema.        Assessment/Plan  81 year old male with 40x50 cm right parietal-occipital enhancing mass; the differential includes GBM, as well as infection (but no restricted diffusion), or metastasis.  He has " been started on Decadron for the surrounding edema.  Discussion of stereotactic biopsy vs surgical resection was undertaken.  Given his apparent good performance status prior to todays admission, he may benefit from resection.  I will discuss this further with him, and hopefully his wife, tomorrow.  I recommend at least 48 hours of Decadron prior to surgery to reduce the brain edema.

## 2017-04-06 NOTE — PROGRESS NOTES
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  Lengthy discussion held with family about treatment options:  Observation, stereotactic biopsy, surgical resection.  Patient and family in agreeance with surgical resection.  Risks including, but no limited to infection, bleeding requiring surgical intervention, worsening symptoms, seizure, were discussed.  Patient and family expressed understanding of these issues and wishes to proceed.  All questions were answered.  Informed consent was obtained.  [facility]  OR scheduled for 0730 tomorrow.

## 2017-04-06 NOTE — PROGRESS NOTES
Pt arrived to S1 at 6:50pm by willi from ED. Pt ambulated from gurney to bed with two assist. PIV in L FA S.L., Pt denies pain, aaox4, thought process is noted to be slower at times, gait is unsteady and patient appears to have trouble navigating with a walker - running into corners or trash cans or walls, needs help with steering the walker. Denies pain, but refuses to lay down in bed, instead, sitting on the side of the bed,

## 2017-04-07 NOTE — OP REPORT
"2016 1240 pm    Grinnell, Alvin   1935  [facility] [id number]    Procedure:    1.  Right parietal occipital craniotomy  2.  Inraparenchymal tumor resection  3.  STEALTH intraoperative guidance    Anesthesia:  ASHLEY    Surgeon:  Lavelle Ugarte MD PhD  Assistant:  Dr. Omar MD    Indication:  81 year old male with 3x5 cm heterogenously enhancing right parietal occipital intraparenchymal mass noted on MRI obtained during workup of falls.  Biopsy vs surgical resection were discussed.  He and his family wish to pursue surgical resection.  The risks and benefits were discussed at length.  All questions were answered.  Informed consent was obtained.    Procedure:  The patient was identified in the holding area and brought back to the operating room.  He was intubated, and an arterial line placed.  His head was secured in a Reid pin headholder, a large gel roll placed under his right shoulder, and his head positioned turned to the left.  All pressure points were well padded.  His head was registered to the [place] system.  The tumor was localized with STEALTH.  His scalp was prepped with hair clipping, chlorhexidine, and Chloroprep.  Sterile drapes were applied.  An inverted \"U\" shaped scalp flap was planned.  The skin was infiltrated with .5% Lidocaine with epinephrine.  The skin was incised with the Plasma blade, and dissection carried deeply to the skull with the same.  Scalp clips were applied.  The scalp was elevated off the skull with the plasma blade,and retracted caudally with a traction suture.  The tumor was again localized with STEALTH.  Four bur holes were placed using the skull .  The dura was dissected free from the overlying skull using a Penfield #3 dissector.  A skull flap was created with the high speed drill fit with the B1 footplate attachment.  The skull flap was stored in Bacitracin/NS irrigation.  This strips of Surgicel were placed along the bone edge, and circumferrential 4-0 " Neurolon tack up sutures placed.  The dura was somewhat tense.  50 g mannitol had been administered following the scalp opening by Anesthesia service.  The dura was opened sharply in an curved manner based caudally.  The brain was under some increased pressure, and swelled out of the opening for 5-7 mm.  The brain was unremarkable in appearance.  The tumor was localized with STEALTH, and the sophie around the periphery of the tumor cauterized with bipolar cautery.  The sophie was opened sharply with micro scissors.  Dissection was carried deeply, using STEALTH guidance. Along the peripheral border of the abnormality.  The brain became somewhat grey/gelatinous less than 1cm deep.  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  Dissection was continued with bipolar cautery/suction, and the SonoPet until grossly normal brain was evident circumferentially.  Interrogation with the STEALTH demonstrated complete resection of the mass.  Hemostasis was obtained with bipolar cautery.  The operative site was copiously irrigated with NS irrigation.  The surgery site was lined with SurgiFlow and woven Surgicel.  Good hemostasis was again noted.  The dural was closed with 4-0 Neurolon in an inturrupted and running manner, incorporating a DuraRepair graft, as the dura had shrunk.  Gelfoam was placed over the dura, and a central tack up suture placed (4-0 Neurolon).  The skull flap was replaced and secured with the M-SIX Neuro Implant III system.  The operative site was copiously irrigated with Bacitracin/NS irrigation.  The dermis was reapproximated with 3-0 Vicryl suture in an inverted, inturrupted manner.  The skin was reapproximated with staples.  Bacitracin ointment and Xeroform was placed over the incision.  A sterile dressing was placed.  All counts were correct.      Findings:  Right parietal occipital brain mass, complete resection obtained    Specimen:  Right parietal occipital  brain mass.  Frozen section consistent with glial neoplasm.  Permanent pathology pending.    Complications:  None apparent at end of procedure    EBL <150 cc

## 2017-04-07 NOTE — CARE PLAN
Problem: Bowel/Gastric:  Goal: Normal bowel function is maintained or improved  Outcome: PROGRESSING AS EXPECTED    Problem: Mobility  Goal: Risk for activity intolerance will decrease  Outcome: PROGRESSING AS EXPECTED  Frequently ambulating, minimal rest periods needed. Able to tolerate distances > 250 feet.

## 2017-04-07 NOTE — PROGRESS NOTES
Patient arrived to unit via hospital bed, alert and oriented, 2L NC, VSS. Juan RN at bedside for transfer. Morphine PCA in use for headache 5/10 per patient. Patient neuro check performed with Juan RN for baseline assessment; no changes. Patient is keenly responsive, CHARLOTTE BERMAN.     VS on arrival:  132/58 (85)  84bpm sinus  14rr  98.4f  5/10 pain (crani incision site)

## 2017-04-07 NOTE — OR NURSING
Report called to Osvaldo WHELAN. Plan of care discussed. Patient continues to deny pain at this time. Tolerated spoonful of ice chips without difficulty. Q1H neuro checks in place.

## 2017-04-08 PROBLEM — R73.9 HYPERGLYCEMIA: Status: ACTIVE | Noted: 2017-01-01

## 2017-04-08 PROBLEM — E87.29 HIGH ANION GAP METABOLIC ACIDOSIS: Status: ACTIVE | Noted: 2017-01-01

## 2017-04-08 NOTE — CARE PLAN
Problem: Discharge Barriers/Planning  Goal: Patient’s continuum of care needs will be met  Outcome: PROGRESSING AS EXPECTED  Patient has stable neurological assessments, hemodynamically stable, and tolerating PO intake. Patient set to be transferred to Neurology for further treatment and observation until hospital discharge.     Problem: Pain Management  Goal: Pain level will decrease to patient’s comfort goal  Outcome: PROGRESSING AS EXPECTED  Patient has Morphine PCA for pain control, but not using less than once per hour. Possible transition to PRN IV doses for pain control. Continued pain assessment Q 2 hours using 0-10 scale. Patient reports localized head pain on craniotomy site at 2-4/10.

## 2017-04-08 NOTE — PROGRESS NOTES
Hospital Medicine Interval Note  Date of Service:  4/8/2017    Chief Complaint  81 y.o.-year-old male admitted 4/5/2017 with dizziness.    Interval Problem Update  Noted new brain mass with vasogenic edema.  Now s/p surgery.  Pt seen in ICU, ICU care given.  Discussed patient condition and plan with RN, RT and charge nurse / hot rounds.      Consultants/Specialty  Neurosx - Dr Ugarte    Disposition  Pt improved, ok to neuro     Review of Systems   Constitutional: Positive for malaise/fatigue. Negative for fever and chills.   HENT: Negative for congestion.    Respiratory: Negative for cough, sputum production, shortness of breath and stridor.    Cardiovascular: Negative for chest pain, palpitations and leg swelling.   Gastrointestinal: Negative for nausea, vomiting, abdominal pain, diarrhea and constipation.   Genitourinary: Negative for dysuria and urgency.   Musculoskeletal: Negative for myalgias and falls.   Neurological: Positive for weakness. Negative for dizziness, tingling, loss of consciousness and headaches.   Psychiatric/Behavioral: Negative for depression and suicidal ideas.      Physical Exam Laboratory/Imaging   Filed Vitals:    04/08/17 1300 04/08/17 1400 04/08/17 1500 04/08/17 1523   BP:    142/82   Pulse: 85 86 88 83   Temp:  36.9 °C (98.4 °F)  36.8 °C (98.2 °F)   Resp: 17 14 13 18   Height:       Weight:       SpO2: 95% 95% 95% 93%     Physical Exam   Constitutional: He is oriented to person, place, and time.  Non-toxic appearance. No distress.   HENT:   Head: Normocephalic and atraumatic.   Mouth/Throat: No oropharyngeal exudate.   Eyes: Right eye exhibits no discharge. Left eye exhibits no discharge.   Neck: Neck supple. No tracheal deviation, no edema and no erythema present.   Cardiovascular: Normal rate and regular rhythm.  Exam reveals no gallop and no friction rub.    No murmur heard.  Pulmonary/Chest: Effort normal and breath sounds normal. No stridor. No respiratory distress. He has no  wheezes. He has no rales. He exhibits no tenderness.   Abdominal: Soft. Bowel sounds are normal. He exhibits no distension. There is no tenderness.   Musculoskeletal: Normal range of motion. He exhibits no edema or tenderness.   Lymphadenopathy:     He has no cervical adenopathy.   Neurological: He is alert and oriented to person, place, and time. No cranial nerve deficit.   Skin: Skin is warm and dry. No rash noted. He is not diaphoretic. No erythema.   Psychiatric: He has a normal mood and affect. His behavior is normal. Judgment and thought content normal.   Nursing note and vitals reviewed.   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 and additional involvement of the left thalamus   - now s/p right parietal occipital craniotomy and intraparenchymal tumor resection by Dr Ugarte  - was improved with steroids, continue  - additional recommendations per neurosx    HTN (hypertension)  Assessment & Plan  - controlled with lisinopril 40    Hyperglycemia  Assessment & Plan  - not high enough to need coverage  - will repeat bmp in am in pt on steroids     High anion gap metabolic acidosis  Assessment & Plan  - will repeat bmp in am       Labs reviewed, Medications reviewed and Radiology images reviewed        DVT Prophylaxis: Contraindicated - High bleeding risk  DVT prophylaxis - mechanical: SCDs

## 2017-04-08 NOTE — PROGRESS NOTES
Progress Note               Author: Mohan Beauchamp Date & Time created: 2017  12:25 PM     Interval History:No HA.  No n/v    Review of Systems:  ROS    Physical Exam:  Physical Exam   GCS 15  Non focal    Labs:        Invalid input(s): [id number]      Recent Labs      17   0401   SODIUM   --   138   POTASSIUM   --   4.3   CHLORIDE   --   105   CO2   --   18*   BUN   --   26*   CREATININE   --   1.17   MAGNESIUM  2.0   --    CALCIUM   --   9.4     Recent Labs      17   0401   ALTSGPT  9   ASTSGOT  16   ALKPHOSPHAT  65   TBILIRUBIN  1.0   GLUCOSE  135*     Recent Labs      17   040   RBC  4.89   HEMOGLOBIN  12.5*   HEMATOCRIT  39.0*   PLATELETCT  203     Recent Labs      17   040   WBC  10.6   NEUTSPOLYS  92.30*   LYMPHOCYTES  5.50*   MONOCYTES  1.10   EOSINOPHILS  0.00   BASOPHILS  0.20   ASTSGOT  16   ALTSGPT  9   ALKPHOSPHAT  65   TBILIRUBIN  1.0     Hemodynamics:  Temp (24hrs), Av.8 °C (98.3 °F), Min:36.7 °C (98 °F), Max:37 °C (98.6 °F)  Temperature: 36.9 °C (98.4 °F)  Pulse  Av.2  Min: 50  Max: 100Heart Rate (Monitored): 65  Arterial BP: 136/62 mmHg, NIBP: 142/70 mmHg     Respiratory:    Respiration: 16, Pulse Oximetry: 96 %           Fluids:    Intake/Output Summary (Last 24 hours) at 17 1225  Last data filed at 17 0600   Gross per 24 hour   Intake   2161 ml   Output   2725 ml   Net   -564 ml     Weight: 69 kg (152 lb 1.9 oz)  GI/Nutrition:  Orders Placed This Encounter   Procedures   • DIET ORDER     Standing Status: Standing      Number of Occurrences: 1      Standing Expiration Date:      Order Specific Question:  Diet:     Answer:  Regular [1]     Medical Decision Making, by Problem:  Active Hospital Problems    Diagnosis   • HTN (hypertension) [I10]   • Brain mass [G93.9]       Plan:Doing well.  To floor.    Core Measures

## 2017-04-08 NOTE — PROGRESS NOTES
Page to [facility] Neurosurgery for order clarification. Patient has been on Q1 hour neuro checks since surgery yesterday; no cognitive, motor, or sensory deficits or changes. No nausea, tolerating diet, ambulating, blood pressure well controlled. Patient does have 2-4/10 pain on craniotomy site, but no headache stated.     1152- Call from Dr. Beauchamp ([facility] Neuro Surgery). Updated on patient status. MD gave new orders to remove Martins, Arterial line; saline lock peripheral IV and transfer to Neuorlogy floor. Hospitalist Dr. Rico notified.

## 2017-04-08 NOTE — PROGRESS NOTES
12 hour chart check       Night Shift Summary:    - No overnight events  - Neuro Intact  - Ambulated pt in hallway, pt walked aprox. 1200 feet with wheelchair push. Pt eager to go home.

## 2017-04-09 NOTE — PROGRESS NOTES
Pt sitting on bedside. A&O x4 but seams pleasantly confused, continually readjusting blankets and stating call light doesn't work after demonstration of use. Pt mildly anxious about positioning of head without bandage to cover incision. meds swallowed whole with water. hesitancy noted when patient urinating. Family inquiring about discharge planning and when to be here to review with MD in AM. Bed in low position, call light in place. Will continue to round routinely.

## 2017-04-09 NOTE — THERAPY
"Occupational Therapy Evaluation completed.   Functional Status:  Pt up in chair at start of session.  Pt donned socks supervised.  Pt stood and walked unit x2 with FWW and SBA.  Pt does appear to have L visual field cut and did run into objects on his L in hallway x2.  Pt prefers to DC home in [place] with his wife at this time.  Plan of Care: Will benefit from Occupational Therapy 4 times per week  Discharge Recommendations:  Equipment: Shower Chair. Post-acute therapy Discharge to a transitional care facility for continued skilled therapy services.    See \"Rehab Therapy-Acute\" Patient Summary Report for complete documentation.    "

## 2017-04-09 NOTE — PROGRESS NOTES
Progress Note               Author: Lavelle Fullerbert Date & Time created: 2017  10:44 AM     Interval History:  POD2 right parietal occipital craniotomy tumor resection.    No complaints.  Transferred out of ICU yesterday.  Ambulating.    Review of Systems:  Review of Systems   Constitutional: Negative.    HENT: Negative for ear discharge, ear pain, hearing loss and tinnitus.    Eyes: Negative.  Negative for blurred vision, double vision and photophobia.   Gastrointestinal: Negative for nausea and vomiting.   Skin: Negative.    Neurological: Negative for dizziness, tingling, tremors, sensory change, speech change, focal weakness, seizures, loss of consciousness and headaches.       Physical Exam:  Physical Exam   Constitutional: He appears well-developed and well-nourished. No distress.   HENT:   Head: Normocephalic.   Eyes: Conjunctivae and EOM are normal. Pupils are equal, round, and reactive to light.   Neurological: He is alert. He has normal strength. He displays normal reflexes. No cranial nerve deficit or sensory deficit. Coordination normal.   Mild left pronator drift  Left homonomous hemianopsia   Skin: He is not diaphoretic.       Labs:        Invalid input(s): [id number]      Recent Labs      17   0256   SODIUM  143   POTASSIUM  4.3   CHLORIDE  114*   CO2  23   BUN  21   CREATININE  0.79   CALCIUM  8.1*     Recent Labs      17   0256   GLUCOSE  144*     Recent Labs      17   0256   RBC  3.77*   HEMOGLOBIN  9.7*   HEMATOCRIT  30.8*   PLATELETCT  166     Recent Labs      17   0256   WBC  12.9*     Hemodynamics:  Temp (24hrs), Av.7 °C (98 °F), Min:36.2 °C (97.1 °F), Max:37 °C (98.6 °F)  Temperature: 37 °C (98.6 °F)  Pulse  Av.7  Min: 50  Max: 100Heart Rate (Monitored): 88  Blood Pressure : 158/74 mmHg, Arterial BP: 115/53 mmHg, NIBP: 126/77 mmHg     Respiratory:    Respiration: 14, Pulse Oximetry: 94 %           Fluids:    Intake/Output Summary (Last 24 hours) at  04/09/17 1044  Last data filed at 04/09/17 0400   Gross per 24 hour   Intake   1212 ml   Output    575 ml   Net    637 ml        GI/Nutrition:  Orders Placed This Encounter   Procedures   • DIET ORDER     Standing Status: Standing      Number of Occurrences: 1      Standing Expiration Date:      Order Specific Question:  Diet:     Answer:  Regular [1]     Medical Decision Making, by Problem:  Active Hospital Problems    Diagnosis   • Brain mass [G93.9]   • Hyperglycemia [R73.9]   • High anion gap metabolic acidosis [E87.2]   • HTN (hypertension) [I10]       Plan:  Brain tumor:  Doing well from surgery.  Will obtain MRI +/- contrast today as baseline.  Pathology pending.  Decadron taper.  Hyponatremia:  Improved.  Managed by Hospitalist.  DVT prophylaxis:  Ambulating  Disposition:  Home soon with stable Na    Radiology images reviewed and Labs reviewed  Martins catheter: No Martins

## 2017-04-09 NOTE — PROGRESS NOTES
AOX4. Cognitive delay. VSS. Pt. Had a nose bleed (which family states that he has a history of). Birgit (RN ) notified. Post-op MRI ordered. Pt. Work with PT and OT. Balance unsteady. Pt. Needs a walker per PT. Pt. Wants to speak with Social work tomorrow

## 2017-04-09 NOTE — THERAPY
"82 y/o male adm for dizziness with GLF dx:right parietal oociptal mass S/P craniotomy tumor resection. Pt with left visual field cut wherein he runs into obstacles on his left side. Given a FWW and instructed on it's safe utilzation. Gait pattern of pt  presents him as a high risk for falls at this time. Acute PT to address aforementioned problems for pt to achieve safe and higher level of function.    Physical Therapy Evaluation completed.   Bed Mobility:  Supine to Sit: Supervised  Transfers: Sit to Stand: Stand by Assist  Gait: Level Of Assist: Contact Guard Assist with Front-Wheel Walker       Plan of Care: Will benefit from Physical Therapy 4 times per week  Discharge Recommendations: Equipment: Front-Wheel Walker. Post-acute therapy Discharge to a transitional care facility for continued skilled therapy services.    See \"Rehab Therapy-Acute\" Patient Summary Report for complete documentation.     "

## 2017-04-09 NOTE — PROGRESS NOTES
Hospital Medicine Progress Note, Adult, Complex               Author: Frank Garcias Date & Time created: 2017  2:07 PM     Interval History:  Doing well; mild pressure at surgical site, relieved with Tylenol.  Has ambulated with PT.  Denies focal weakness/numbness.  Eating ok.    Review of Systems:  Review of Systems   Constitutional: Negative for fever.   Eyes: Negative for blurred vision.   Respiratory: Negative for cough.    Cardiovascular: Negative for chest pain.   Gastrointestinal: Negative for heartburn.   Genitourinary: Negative for dysuria.   Musculoskeletal: Negative for myalgias.   Skin: Negative for rash.   Neurological: Negative for dizziness and headaches.       Physical Exam:  Physical Exam   Constitutional: He is oriented to person, place, and time. No distress.   HENT:   Head: Normocephalic.   Eyes: EOM are normal.   Neck: Neck supple.   Cardiovascular: Normal rate and regular rhythm.    Pulmonary/Chest: Effort normal and breath sounds normal. No respiratory distress.   Abdominal: Soft. Bowel sounds are normal. He exhibits no distension. There is no tenderness.   Musculoskeletal: He exhibits no edema.   Neurological: He is alert and oriented to person, place, and time. No cranial nerve deficit. He exhibits normal muscle tone.   Skin: Skin is warm.   Psychiatric: His behavior is normal.       Labs:        Invalid input(s): [id number]      Recent Labs      17   0256   SODIUM  143   POTASSIUM  4.3   CHLORIDE  114*   CO2  23   BUN  21   CREATININE  0.79   CALCIUM  8.1*     Recent Labs      17   0256   GLUCOSE  144*     Recent Labs      17   0256   RBC  3.77*   HEMOGLOBIN  9.7*   HEMATOCRIT  30.8*   PLATELETCT  166     Recent Labs      17   0256   WBC  12.9*           Hemodynamics:  Temp (24hrs), Av.6 °C (97.8 °F), Min:36.2 °C (97.1 °F), Max:37 °C (98.6 °F)  Temperature: 37 °C (98.6 °F)  Pulse  Av.7  Min: 50  Max: 100Heart Rate (Monitored): 88  Blood Pressure :  158/74 mmHg, NIBP: 126/77 mmHg     Respiratory:    Respiration: 14, Pulse Oximetry: 94 %           Fluids:    Intake/Output Summary (Last 24 hours) at 04/09/17 1407  Last data filed at 04/09/17 1000   Gross per 24 hour   Intake      0 ml   Output    525 ml   Net   -525 ml        GI/Nutrition:  Orders Placed This Encounter   Procedures   • DIET ORDER     Standing Status: Standing      Number of Occurrences: 1      Standing Expiration Date:      Order Specific Question:  Diet:     Answer:  Regular [1]     Medical Decision Making, by Problem:  Active Hospital Problems    Diagnosis   • Brain mass [G93.9] s/p craniotomy and resection: await path; recovering well; tapering steroid; check with PT re progress and rec for home with PT vs rehab.  Home soon.   • Hyperglycemia [R73.9], mild on steroid; monitor   •    • HTN (hypertension) [I10] fair control; cont rx and adjust meds as needed  Spoke with family at bedside.       Core Measures

## 2017-04-09 NOTE — PROGRESS NOTES
Transfer report given to Raymond WHELAN on Neuroscience floor. Nurse acknowledges report and accepts patient assignment.     1440-Patient Transferred to Neurosciences S186-2 via wheel chair. VSS. Room air. Not in distress. Raymond WHELAN at bedside with CNA to accept patient transfer.

## 2017-04-09 NOTE — ASSESSMENT & PLAN NOTE
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

## 2017-04-10 NOTE — DISCHARGE PLANNING
Transitional Care Navigator:    TCN met with patient and family to discuss transitional care services for discharge planning.  Rehab consult pending.  Currently PT/OT recommending therapy prior to d/c home.  TCN spoke with PT who will re-assess this afternoon.  Pt lives in [place] with wife.  Pt's daughter is able to stay with patient/wife for a few weeks after d/c as needed.  TCN explained acute rehab, SNF, home health and OP rehab levels of care.  Pt/family prefer home with home health versus IRF/SNF.  [facility] [place], Dr. Tillman Via.  If pt is able to d/c home choice is [facility].  TCN will follow-up with patient/family after physiatry recommendations are received.  RN CM aware.

## 2017-04-10 NOTE — DISCHARGE PLANNING
Thank you for the opportunity to assist with this patient as they transition to post acute services.  We are aware of the Rehab referral from Dr. Garcias.  Dr. Lamb to consult this referral. Our Transitional Case Coordinator will follow. At this time patient is showing to have Medicare as their coverage.   Please do not hesitate to call us if you require additional assistance my phone number is [phone] Mark.

## 2017-04-10 NOTE — PROGRESS NOTES
Hospital Medicine Progress Note, Adult, Complex               Author: Frank Garcias Date & Time created: 4/10/2017  4:06 PM     Interval History:  Doing well; walked with PT; some L visual field defect, otherwise no focal weakness; pain controlled; ready to go home soon.    Review of Systems:  Review of Systems   Constitutional: Negative for fever.   Eyes: Negative for blurred vision.   Respiratory: Negative for cough.    Cardiovascular: Negative for chest pain.   Gastrointestinal: Negative for heartburn.   Genitourinary: Negative for dysuria.   Musculoskeletal: Negative for myalgias.   Skin: Negative for rash.   Neurological: Negative for dizziness and headaches.       Physical Exam:  Physical Exam   Constitutional: He is oriented to person, place, and time. No distress.   HENT:   Head: Normocephalic.   Eyes: EOM are normal.   Neck: Neck supple.   Cardiovascular: Normal rate and regular rhythm.    Pulmonary/Chest: Effort normal and breath sounds normal.   Abdominal: Soft. Bowel sounds are normal. He exhibits no distension. There is no tenderness.   Musculoskeletal: He exhibits no edema.   Neurological: He is alert and oriented to person, place, and time.   Skin: Skin is warm.   Psychiatric: His behavior is normal.       Labs:        Invalid input(s): [id number]      Recent Labs      17   025   SODIUM  143   POTASSIUM  4.3   CHLORIDE  114*   CO2  23   BUN  21   CREATININE  0.79   CALCIUM  8.1*     Recent Labs      17   0256   GLUCOSE  144*     Recent Labs      17   025   RBC  3.77*   HEMOGLOBIN  9.7*   HEMATOCRIT  30.8*   PLATELETCT  166     Recent Labs      17   025   WBC  12.9*           Hemodynamics:  Temp (24hrs), Av.8 °C (98.3 °F), Min:36.7 °C (98 °F), Max:37 °C (98.6 °F)  Temperature: 36.9 °C (98.4 °F)  Pulse  Av.1  Min: 50  Max: 100   Blood Pressure : 129/67 mmHg     Respiratory:    Respiration: 16, Pulse Oximetry: 97 %           Fluids:    Intake/Output Summary (Last 24  hours) at 04/10/17 1606  Last data filed at 04/10/17 0600   Gross per 24 hour   Intake      0 ml   Output   2450 ml   Net  -2450 ml        GI/Nutrition:  Orders Placed This Encounter   Procedures   • DIET ORDER     Standing Status: Standing      Number of Occurrences: 1      Standing Expiration Date:      Order Specific Question:  Diet:     Answer:  Regular [1]     Medical Decision Making, by Problem:  Active Hospital Problems    Diagnosis   • Brain mass [G93.9] s/p craniotomy/resection; path pending; repeat MRI noted; clinically doing well; await Dr. Ugarte's clearance for discharge.  Patient would like to go home with home PT, rather than SNF/Rehab transfer.   • Hyperglycemia [R73.9] on steroid; mild; no rx warranted at this time       • HTN (hypertension) [I10] resumed home meds; stable       Core Measures

## 2017-04-10 NOTE — THERAPY
"Physical Therapy Treatment completed.   Bed Mobility:  Supine to Sit: Supervised  Transfers: Sit to Stand: Supervised (from chair)  Gait: Level Of Assist: Stand by Assist with Front-Wheel Walker       Plan of Care: Patient with no further skilled PT needs in the acute care setting at this time  Discharge Recommendations: Equipment: Shower Chair. Post-acute therapy Discharge to home with outpatient or home health for additional skilled therapy services.     See \"Rehab Therapy-Acute\" Patient Summary Report for complete documentation.       "

## 2017-04-10 NOTE — DISCHARGE PLANNING
Transitional Care Navigator:    Requested Physiatry consult to assist in discharge planning.  RN CM aware.  TCN will continue to follow.

## 2017-04-10 NOTE — CONSULTS
Rehab case reviewed, patient wants to go home with home health and as long as he has 24 hour supervision and assist the patient can receive therapy with home health.

## 2017-04-10 NOTE — DISCHARGE PLANNING
PMR referral from Katerine Muñoz     Right parietal oociptal mass S/P craniotomy tumor resection ongoing medical management as well as therapy needs anticipate post acute services. Dr. Lamb to consult.   Thank you for the opportunity to participate in this patients care as he prepares to transition to post acute services.

## 2017-04-11 PROBLEM — E87.29 HIGH ANION GAP METABOLIC ACIDOSIS: Status: RESOLVED | Noted: 2017-01-01 | Resolved: 2017-01-01

## 2017-04-11 PROBLEM — R73.9 HYPERGLYCEMIA: Status: RESOLVED | Noted: 2017-01-01 | Resolved: 2017-01-01

## 2017-04-11 NOTE — PROGRESS NOTES
Pt A&Ox4, pt is in chair with family at bedside, pt denies any pain or discomfort, educated pt on POC, pt is able to ambulate with cane, chair alarm is on, call light and personal belongings within reach.

## 2017-04-11 NOTE — PROGRESS NOTES
Progress Note               Author: Lavelle Ugarte Date & Time created: 2017  11:49 AM     POD3 right occipital parietal craniotomy tumor resection    Interval History:  No complaints.      Review of Systems:  Review of Systems   Constitutional: Negative.    Eyes: Negative for blurred vision and double vision.   Gastrointestinal: Negative for nausea and vomiting.   Neurological: Negative for dizziness, tingling, sensory change, speech change, focal weakness and headaches.       Physical Exam:  Physical Exam   Constitutional: He is oriented to person, place, and time. He appears well-developed and well-nourished.   HENT:   Head: Normocephalic.   Eyes: Conjunctivae and EOM are normal. Pupils are equal, round, and reactive to light.   Neurological: He is alert and oriented to person, place, and time. No cranial nerve deficit. Coordination normal.   Left homonymous hemianopsia       Review of Systems:  Review of Systems   Constitutional: Negative for fever and chills.   Eyes: Negative for blurred vision, double vision and photophobia.   Gastrointestinal: Negative for nausea and vomiting.   Neurological: Negative for dizziness, tingling, tremors, sensory change, speech change, focal weakness, seizures, loss of consciousness and headaches.       Physical Exam:  Physical Exam    Labs:        Invalid input(s): [id number]      Recent Labs      17   0256   SODIUM  143   POTASSIUM  4.3   CHLORIDE  114*   CO2  23   BUN  21   CREATININE  0.79   CALCIUM  8.1*     Recent Labs      17   0256   GLUCOSE  144*     Recent Labs      17   0256   RBC  3.77*   HEMOGLOBIN  9.7*   HEMATOCRIT  30.8*   PLATELETCT  166     Recent Labs      17   0256   WBC  12.9*     Hemodynamics:  Temp (24hrs), Av.8 °C (98.2 °F), Min:36.5 °C (97.7 °F), Max:36.9 °C (98.5 °F)  Temperature: 36.8 °C (98.3 °F)  Pulse  Av.5  Min: 50  Max: 100   Blood Pressure : 151/83 mmHg     Respiratory:    Respiration: 15, Pulse  Oximetry: 95 %           Fluids:    Intake/Output Summary (Last 24 hours) at 04/11/17 1149  Last data filed at 04/11/17 0400   Gross per 24 hour   Intake      0 ml   Output   1350 ml   Net  -1350 ml        GI/Nutrition:  Orders Placed This Encounter   Procedures   • DIET ORDER     Standing Status: Standing      Number of Occurrences: 1      Standing Expiration Date:      Order Specific Question:  Diet:     Answer:  Regular [1]   • DISCONTINUE DIET TRAY     Standing Status: Standing      Number of Occurrences: 1      Standing Expiration Date:      Medical Decision Making, by Problem:  Active Hospital Problems    Diagnosis   • Brain mass [G93.9]   • HTN (hypertension) [I10]       Plan:  Doing well.  Left homonymous hemanopsia improving  Ok to discharge home today  Removed staples in 10 days from today  Follow up in my office ~4 weeks  Wean steriods      Martins catheter: No Martins

## 2017-04-11 NOTE — PROGRESS NOTES
Progress Note               Author: Lavelle Fullerbert Date & Time created: 4/10/2017  7:30 PM       POD2 right occipital parietal craniotomy tumor resection    Interval History:  No complaints.  Playing Keith with family    Review of Systems:  Review of Systems   Constitutional: Negative.    Eyes: Negative for blurred vision and double vision.   Gastrointestinal: Negative for nausea and vomiting.   Neurological: Negative for dizziness, tingling, sensory change, speech change, focal weakness and headaches.       Physical Exam:  Physical Exam   Constitutional: He is oriented to person, place, and time. He appears well-developed and well-nourished.   HENT:   Head: Normocephalic.   Eyes: Conjunctivae and EOM are normal. Pupils are equal, round, and reactive to light.   Neurological: He is alert and oriented to person, place, and time. No cranial nerve deficit. Coordination normal.   Left homonymous hemianopsia       Labs:        Invalid input(s): [id number]      Recent Labs      17   0256   SODIUM  143   POTASSIUM  4.3   CHLORIDE  114*   CO2  23   BUN  21   CREATININE  0.79   CALCIUM  8.1*     Recent Labs      17   0256   GLUCOSE  144*     Recent Labs      17   0256   RBC  3.77*   HEMOGLOBIN  9.7*   HEMATOCRIT  30.8*   PLATELETCT  166     Recent Labs      17   0256   WBC  12.9*     Hemodynamics:  Temp (24hrs), Av.9 °C (98.4 °F), Min:36.7 °C (98 °F), Max:37 °C (98.6 °F)  Temperature: 36.9 °C (98.5 °F)  Pulse  Av  Min: 50  Max: 100   Blood Pressure : 147/62 mmHg     Respiratory:    Respiration: 16, Pulse Oximetry: 95 %           Fluids:    Intake/Output Summary (Last 24 hours) at 04/10/17 1930  Last data filed at 04/10/17 0600   Gross per 24 hour   Intake      0 ml   Output   2075 ml   Net  -2075 ml        GI/Nutrition:  Orders Placed This Encounter   Procedures   • DIET ORDER     Standing Status: Standing      Number of Occurrences: 1      Standing Expiration Date:      Order Specific  Question:  Diet:     Answer:  Regular [1]     Medical Decision Making, by Problem:  Active Hospital Problems    Diagnosis   • Brain mass [G93.9]   • Hyperglycemia [R73.9]   • High anion gap metabolic acidosis [E87.2]   • HTN (hypertension) [I10]       Plan:  Doing well  Ok to discharge home  Remove staples in 10-14 days  Wean off steroids  MRI good resection    Labs reviewed and Radiology images reviewed  Martins catheter: No Martins

## 2017-04-11 NOTE — DISCHARGE PLANNING
Care Transition Team Assessment    IHD met with patient and family bedside. He was previously independent and plans to go home with home health through [facility] in [place]. [facility] No major needs or concerns identified.     Information Source  Orientation : Oriented x 4  Information Given By: Patient  Informant's Name: Alvin Grinnell  Who is responsible for making decisions for patient? : Patient         Elopement Risk  Legal Hold: No  Ambulatory or Self Mobile in Wheelchair: No-Not an Elopement Risk  Disoriented: No  Psychiatric Symptoms: None  History of Wandering: No  Elopement this Admit: No  Vocalizing Wanting to Leave: No  Displays Behaviors, Body Language Wanting to Leave: No-Not at Risk for Elopement  Elopement Risk: Not at Risk for Elopement    Interdisciplinary Discharge Planning  Does Admitting Nurse Feel This Could be a Complex Discharge?: Yes  Primary Care Physician: Igor @ [facility]  Lives with - Patient's Self Care Capacity: Spouse  Patient or legal guardian wants to designate a caregiver (see row info): No  Support Systems: Spouse / Significant Other, Children, Family Member(s)  Housing / Facility: Mobile Home  Do You Take your Prescribed Medications Regularly: Yes  Able to Return to Previous ADL's: Future Time w/Therapy  Mobility Issues: Yes  Prior Services: Home-Independent  Patient Expects to be Discharged to:: Home  Assistance Needed: No  Durable Medical Equipment: Walker    Discharge Preparedness  What is your plan after discharge?: Home health care  What are your discharge supports?: Spouse, Child  Prior Functional Level: Ambulatory, Drives Self, Independent with Activities of Daily Living, Independent with Medication Management  Difficulity with ADLs: Walking  Difficulty with ADLs Comment: uses cane or walker  Difficulity with IADLs: None    Functional Assesment  Prior Functional Level: Ambulatory, Drives Self, Independent with Activities of Daily  Living, Independent with Medication Management    Finances  Financial Barriers to Discharge: No  Prescription [facility]age: Yes ([facility] [place])    Vision / Hearing Impairment  Vision Impairment : No  Right Eye Vision: Impaired, Wears Glasses  Left Eye Vision: Impaired, Wears Glasses  Hearing Impairment : No    Values / Beliefs / Concerns  Values / Beliefs Concerns : No         Domestic Abuse  Have you ever been the victim of abuse or violence?: No  Physical Abuse or Sexual Abuse: No  Verbal Abuse or Emotional Abuse: No  Possible Abuse Reported to:: Not Applicable         Discharge Risks or Barriers  Discharge risks or barriers?: No    Anticipated Discharge Information  Anticipated discharge disposition: [facility]  Discharge Address: [address] [place] [id number]  Discharge Contact Phone Number: [phone]

## 2017-04-11 NOTE — PROGRESS NOTES
Pt educated on discharge instructions, gave prescriptions, removed IV, pt and family had no further needs, pt transported in wheelchair with family and all belongings.

## 2017-04-11 NOTE — DISCHARGE INSTRUCTIONS
Discharge Instructions    Discharged to home by car with relative. Discharged via wheelchair, hospital escort: Yes.  Special equipment needed: Not Applicable    Be sure to schedule a follow-up appointment with your primary care doctor or any specialists as instructed.     Discharge Plan:   Diet Plan: Discussed  Activity Level: Discussed  Confirmed Follow up Appointment: Appointment Scheduled  Confirmed Symptoms Management: Discussed  Medication Reconciliation Updated: Yes  Pneumococcal Vaccine Given - only chart on this line when given: Given (See MAR)  Influenza Vaccine Indication: Indicated: 65 years and older  Influenza Vaccine Given - only chart on this line when given: Influenza Vaccine Given (See MAR)    I understand that a diet low in cholesterol, fat, and sodium is recommended for good health. Unless I have been given specific instructions below for another diet, I accept this instruction as my diet prescription.   Other diet: Regular    Special Instructions: None    · Is patient discharged on Warfarin / Coumadin?   No     · Is patient Post Blood Transfusion?  No    Depression / Suicide Risk    As you are discharged from this [facility] facility, it is important to learn how to keep safe from harming yourself.    Recognize the warning signs:  · Abrupt changes in personality, positive or negative- including increase in energy   · Giving away possessions  · Change in eating patterns- significant weight changes-  positive or negative  · Change in sleeping patterns- unable to sleep or sleeping all the time   · Unwillingness or inability to communicate  · Depression  · Unusual sadness, discouragement and loneliness  · Talk of wanting to die  · Neglect of personal appearance   · Rebelliousness- reckless behavior  · Withdrawal from people/activities they love  · Confusion- inability to concentrate     If you or a loved one observes any of these behaviors or has concerns about self-harm, here's what you can  "do:  · Talk about it- your feelings and reasons for harming yourself  · Remove any means that you might use to hurt yourself (examples: pills, rope, extension cords, firearm)  · Get professional help from the community (Mental Health, Substance Abuse, psychological counseling)  · Do not be alone:Call your Safe Contact- someone whom you trust who will be there for you.  · Call your local CRISIS HOTLINE 193-3675 or 414-152-7571  · Call your local Children's Mobile Crisis Response Team [place] (438) 921-1794 or wwwKuaidi Dache  · Call the toll free National Suicide Prevention Hotlines   · National Suicide Prevention Lifeline 702-229-KTPZ (3334)  · National Hope Line Network 800-SUICIDE (308-6121)  Brain Tumor  Brain tumors are diseases in which cancer (malignant) cells begin to grow in the tissues of the brain. Brain tumors account for 85% to 90% of all primary central nervous system (CNS) tumors. A \"primary\" brain tumor is one that starts in the brain, rather than spreading to the brain from a cancer in a different part of the body.  The brain controls memory, learning, senses (hearing, sight, smell, taste, touch), and emotion. It also controls other parts of the body, including muscles, organs, and blood vessels.   Most brain tumors occur in people age 45 or above. However, a couple rare tumors occur almost only in children.   Primary brain tumors are much less common than secondary brain tumors. A secondary brain tumor starts in a different part of the body and spreads to the brain.  CAUSES   Most primary brain tumors begin when normal cells somehow develop errors in their genetics. In most cases, it is not known how this process starts. These genetic errors allow cells to grow and divide at increased rates and to continue living when normal, healthy cells would die. The result is a clump of abnormal cells, which forms a tumor.   There are many different types of brain cells. In many cases, one type of " brain cell develops the error that leads to a tumor being formed. The tumor is named according to the type of cell that developed the problem.  Environmental factors and infection, which may cause brain cancer, include:  Exposure to chemicals and solvents. This could happen on the job or through hobbies. Examples include:   Vinyl chloride.   Pesticides.   A variety of industrial chemicals.   Research is ongoing, because it is not known whether there is a true relationship between these chemicals and the development of primary brain tumors.  Exposure of the head to radiation, such as during a nuclear accident.   Leticia-Barr virus infection. This can cause a rare brain tumor.   Being a transplant recipient or patient with AIDS (acquired immunodeficiency syndrome).   SYMPTOMS   Symptoms of brain tumors are related to the part of the brain that is affected. For example, a brain tumor that affects the part of the brain controlling vision will affect that sense.  General signs and symptoms include:   Headache. Changes in pattern, higher frequency, or more severe.   Stomach or intestinal problems, such as:   Nausea.   Loss of appetite.   Vomiting.   Changes in:   Personality.   Mood.   Mental capacity.   Concentration.   Balance problems.   Confusion with everyday activities.   Speech problems.   Seizure in someone who has never had a seizure before. These may occur in up to 20% of patients with a tumor in the upper part of the brain. Seizures may be present for months before a clear diagnosis is made.   DIAGNOSIS   The diagnosis is based on:  Patient history.   A nervous system function test (neurological exam).   Diagnostic procedures, including:   CT scan (computed tomography) and MRI scan (magnetic resonance imaging).   After therapy, SPECT (single photon emission computed tomography) and PET (positron emission tomography) may be useful to see if there is new tumor growth. Both of these tests are ways of making images  of the brain. These tests are usually only available at large hospitals.   Once a brain tumor is found, more tests may be done to determine the type of tumor. Your caregiver will also need to know how different the tumor cells are from the cells that are near it. This is called the histologic grade of the tumor. To plan treatment, it is important to know the type and grade of brain tumor.  TREATMENT   Four kinds of direct treatment are available. The types selected depend on the type and location of the tumor.   Surgery is the most common treatment. To take out the cancer from the brain, a surgeon will cut a part of bone from the skull to get to the brain. This procedure is called a craniotomy. After the cancer is removed, the bone will be put back in place or a piece of metal or fabric will be used to cover the opening in the skull.   Radiation therapy and radiosurgery use X-rays to kill cancer cells from the outside and to shrink tumors. Radiation therapy may also be used by putting materials that produce radiation (radioisotopes) through thin plastic tubes, into the tumor, to kill cancer cells from the inside.   Chemotherapy uses drugs to kill cancer cells. Chemotherapy is called a systemic treatment because the drug enters the bloodstream, travels through the body, and can kill cancer cells throughout the body. Chemotherapy may be:   Taken by pill.   Put into the body by a needle in a vein or muscle.   Biological therapy uses the body's immune system to fight cancer. It is being studied in clinical trials for use in the treatment of brain cancer. It uses materials made by the body, or made in a lab, to help the body's natural defenses against disease.   Rehabilitation After Treatment   Because brain tumors can develop in parts of the brain that control motor skills, speech, vision, and thinking, rehabilitation may be a necessary part of recovery. The brain can sometimes heal itself after treatment or trauma from  a brain tumor, but this can take time and patience.   Cognitive rehabilitation can help you cope with problems of thinking and awareness.   Physical therapy can help you regain lost motor skills or muscle strength.   Vocational therapy can help you get back to work after a brain tumor.   Specialists in speech difficulties (speech pathologists) are just one of many types of therapists who can help you recover.   School-age children with brain tumors may especially benefit from tutoring as a part of their overall treatment plan. A brain tumor can cause changes in the brain that affect thinking and learning. The earlier these problems are identified, the earlier they can be addressed with strategies that provide the most benefits to the child.   SEEK MEDICAL CARE IF:   You feel there are side effects from medicines prescribed.   You feel that medicines for pain are not being effective.   You develop any new symptoms that you did not have previously.   SEEK IMMEDIATE MEDICAL CARE IF:   You develop a high fever, neck stiffness, or confusion.   You have a headache that becomes severe or does not respond to pain medicine.   You have a fainting episode.   You have a seizure.   You develop a rash.   You develop severe vomiting or are unable to tolerate food or fluids.   Document Released: 12/19/2005 Document Revised: 03/11/2013 Document Reviewed: 11/06/2008  [employer]® Patient Information ©2013 [employer].

## 2017-04-11 NOTE — DISCHARGE PLANNING
Transitional Care Navigator:    TCN met with patient and family at bedside to set-up home health as patient is leaving today. Home health level of care discussed yesterday with pt/family.  Choice is [facility].  Choice form completed and faxed to [facility].  Sw aware.  TCN will follow-up as needed.

## 2017-04-11 NOTE — PROGRESS NOTES
Hoping to go home tomorrow. Ambulated in hallway several times has FWW at home. Denied pain all day given tylenol every 6 hours.

## 2017-04-12 NOTE — DISCHARGE SUMMARY
CHIEF COMPLAINT ON ADMISSION  Chief Complaint   Patient presents with   • Dizziness     Transfer from [place] with brain mass       CODE STATUS  Prior    HPI & HOSPITAL COURSE  This is a 81 y.o. year old male here with brain mass from [place] after he presented there with dizziness for several hours. He underwent CT head at OSH which showed new brain mass in the parietal occipital lobe and associated vasogenic edema. Neurosurgery was consulted and patient was placed on decadron IV. Patient underwent right parietal occipital craniotomy with intra-parenchymal tumor resection with STEALTH guidance intraoperatively. 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 Patient is aware of the diagnosis. His blurry vision improved during the hospital stay here. Patient will be discharged back to [place] and will get his staples removed in 10 days and follow up with Dr Ugarte in 4 weeks with appropriate decadron taper. Likely, he will need to establish care with an oncologist as well.     Therefore, he is discharged in fair and stable condition with close outpatient follow-up.    SPECIFIC OUTPATIENT FOLLOW-UP  -F/U with urgent care in [place] for staple removal  -F/U with Dr Ugarte for Brain mass follow up in 4 weeks    DISCHARGE PROBLEM LIST  Active Problems:    Brain mass POA: Yes    HTN (hypertension) POA: Yes  Resolved Problems:    Hyperglycemia POA: Yes    High anion gap metabolic acidosis POA: Yes      FOLLOW UP  No future appointments.  Lavelle Ugarte M.D.  [address] [facility]  [place] [address]  [phone]    In 2 weeks  Admission for brain mass      MEDICATIONS ON DISCHARGE   Grinnell, Alvin   Home Medication Instructions RADHA:[id number]    Printed on:04/11/17 5785   Medication Information                      dexamethasone (DECADRON) 0.5 MG Tab  Take 4 mg twice daily for 2 days, then 2 mg twice daily for 2 days, the 1 mg twice daily for 2 days, then 0.5 mg tablets twice daily for 2 days then stop.              lisinopril (PRINIVIL, ZESTRIL) 40 MG tablet  Take 40 mg by mouth every day.             Multiple Vitamins-Minerals (PRESERVISION AREDS 2 PO)  Take 1 Tab by mouth every day.             simvastatin (ZOCOR) 10 MG Tab  Take 10 mg by mouth every evening.                 DIET  No orders of the defined types were placed in this encounter.       ACTIVITY  As tolerated.  Class 1 - no symptoms of any kind, and for whom ordinary physical activity does not cause fatigue, palpitations, dyspnea, or anginal pain.    CONSULTATIONS  -Neurosurgery    PROCEDURES  Brain biopsy-  1.  Right parietal occipital craniotomy  2.  Inraparenchymal tumor resection  3.  STEALTH intraoperative guidance    MRI brain-    1.  Postoperative right parietal craniotomy for tumor resection. Hemorrhagic postoperative cavity. Persisting marked vasogenic edema. Streaky and curvilinear enhancement superior and medial to the operative cavity which could represent residual tumor or   transient disruption of the blood brain barrier. Follow-up surveillance imaging as clinically indicated.  2.  Nonenhancing mass centered in the left thalamus consistent with multicentric glioma. No change from the preoperative exam.      MRI brain (initial)-        1.  Multicentric glioblastoma with largest mass in the right parietal-occipital white matter and additional involvement of the left thalamus.    2.  Mild periventricular and subcortical white matter changes consistent with chronic microvascular ischemic gliosis.      LABORATORY  Lab Results   Component Value Date/Time    SODIUM 143 04/09/2017 02:56 AM    POTASSIUM 4.3 04/09/2017 02:56 AM    CHLORIDE 114* 04/09/2017 02:56 AM    CO2 23 04/09/2017 02:56 AM    GLUCOSE 144* 04/09/2017 02:56 AM    BUN 21 04/09/2017 02:56 AM    CREATININE 0.79 04/09/2017 02:56 AM        Lab Results   Component Value Date/Time    WBC 12.9* 04/09/2017 02:56 AM    HEMOGLOBIN 9.7* 04/09/2017 02:56 AM    HEMATOCRIT 30.8* 04/09/2017 02:56 AM     PLATELET COUNT 166 04/09/2017 02:56 AM        Total time of the discharge process exceeds 38 minutes.

## 2017-04-25 NOTE — PROGRESS NOTES
Attempt to contact patient to verify he has appropriate f/u for new cancer diagnosis.  Line busy and no additional numbers provided to contact patient.

## 2017-04-25 NOTE — PROGRESS NOTES
"2nd attempt to contact patient, spoke with wife Silva.  When asked about follow up, she reported they haven't seen oncologist yet and thought that \"you were working on the referral\".  When provided education that cancer nurse navigator was not part of a physician's office but an advocate for cancer patients to assist with resources, education and barriers to care, she reported a \"Karen\" was working on medical oncology referral.  Questioned her regarding who patient normally sees.  She states he normally goes to [place], where family is, but that it is only outpatient facility.  [facility] [place].  She said other option would be [place] and they don't want to go there.  She reported that [place] is 200 mile drive and she doesn't drive long distance when asked about transportation.  Wife did report that \"[employer] [facility]\" does provide transportation a few days during the week.  They would have ability to stay at [facility] if needed to stay in [place].  She reported both being a little overwhelmed by current diagnosis.  Stated they were originally told it was \"normal brain tissue\" and \"benign\" and now they are being told it is the worst type of brain tumor.  She did report had requested both preliminary path report as well as final path report and that tissue was being sent to [facility] for second opinion per their request.  Navigator also discussed \"myChart\" through [facility].  She would like information mailed regarding how to sign up for this.  She reports patient is doing well with only continued issue of peripheral vision lose.  [facility] with no concerns at this time.  Provided contact information and will follow up with Dr Ugarte's office.    Call placed to Dr Ugarte's RN, Arabella and informed her of above.  Arabella reports there is no \"Karen\" that works at their office.  She will follow up regarding medical oncology referral.  She was grateful for " the assistance.

## 2017-04-25 NOTE — PROGRESS NOTES
"Call placed to Dr Ugarte's office for information.  Spoke with his nurse, Arabella.  She reports patient and family are not believing current diagnosis.  That they are saying physician told them it was \"benign\" and they would not need further treatment.  Even after discussion and explanation they requested tissue be sent to [facility] for review.  She reports was going to contact navigator today for assistance with family and communication.  Dr Ugarte would like to place oncology referral, but family wants to confirm diagnosis.  Nurse navigator will attempt to contact patient again later today to see if can be of assistance and support.  "

## 2017-04-26 NOTE — PROGRESS NOTES
Call received from, Dr Torito Bell's nurse.  She reported patient have increasing weakness and wife concerned about ability to care for patient.  Arabella with questions about resources.  Pt does not have pcp and patient may need long term care.  She also reported patient may end up going to ER tonight if symptoms persist.  Discussed that patient currently does have home health and that [facility] may have additional resources to assist with information regarding possible skilled placement.  Pt may need to be evaluated first if symptoms are worse and ER  may be able to assist with resources for care as well.  Arabella will reach out to [facility] tomorrow if patient does not end up needing to go to ER.  Dr Ugarte is prescribing decadron to see if this assists with current symptoms as well as MRI.

## 2017-04-28 NOTE — PROGRESS NOTES
Arabella, left message reporting needing name and tax ID of provider for [facility] to make referral.  Returned call.  She reports [facility] is requiring specific information before they will make referral.  She requested tax ID# for Dr Mckeon for [facility].  Provided information.  She will provide to home health agency to see if they are able to assist, since she has not been able to move forward with [facility] at this time.

## 2017-04-28 NOTE — PROGRESS NOTES
Received message from CLEOPATRA Bell that continues to have barriers with referring [facility] to [facility].  Request for first and last name of [facility]'s medical oncologist as well as Tax ID #.  Information for Dr Jesus provided and left on confidential voice mail of Arabella.

## 2017-05-01 NOTE — PROGRESS NOTES
Call placed to [facility] in attempt to assist with patient being able to see oncologist.  Was transferred to Tasha in oncology infusion room.  She took information and will look into case.  She reports easiest way, normally is for patient to present to [facility].  Discussed that patient lives in [place].  Tasha provided information that normally patient has to been seen then specialty referral made by primary physician.  She will follow up with nurse navigator tomorrow to see how patient can get established with oncology at [facility].

## 2017-05-03 NOTE — PROGRESS NOTES
Received information from Lianne, that they do have authorization for patient.  Best available appointment is May 9th at 8 am with check in time of 7:30.  Request for navigator to contact patient/wife to discuss if this is possible for them.  Navigator will follow up with patient.    Call placed to patient's wife, Silva.  Provided her with information regarding consult on the 9th.  She reports they will be able to do this.  They plan on staying at [facility] in [place].  Navigator will have Lianne, , f/u with them regarding specific information for consult.  She reports that they do want to transfer care to [facility].  Will need to clarify regarding who will need to follow patient from medical oncology standpoint.    Message back from Raquel, from [facility].  Pt can see outside [facility] oncologist because of distance he lives.  Spoke with Dr Torito Bell's nurse who reported that family is focused on establishing with [facility].  Pt may need to wait until established before getting medical oncologist.  Other option may be to get consult with [facility] medical oncologist and transfer care to [facility] once established as to not delay care any further.

## 2017-05-03 NOTE — PROGRESS NOTES
"Received voice message at 9:03 Tuesday from Tasha Dunham with [facility].  She reports patient has to call [facility] at [phone] and request care for oncology.  She provided contact of Raquel Pedroza [phone] at [facility] for additional information regarding insurance and referrals.    Received call from Raquel Pedroza at the [facility].  She reported that she did see referrals for Dr Mckeon and oncology with status \"in process\".  She discussed that since patient was [facility] patient in [place] and that he lived outside the 40 mile radius of [place] that he could request care from specialty providers with [facility].  They then would need to get \"secondary authorization\".  "

## 2017-05-03 NOTE — PROGRESS NOTES
Spoke with wife, Tuesday morning.  Discussed information that had been provided by [facility].  She reports they have been talking to [facility].  She reports they have a letter stating approval and is waiting for office to call to set up appointment.  They have appointment on May 9th with Dr Ugarte for follow up.  She states they were told they need to go to [facility] to establish with primary care physician.  There plan is go at 8:30 on the 15th and wait to be seen and hopefully will see medical oncologist same day.  Different messages have been provided to patient, nurse navigator of specialty office from [facility].  Pt also will need to do MRI which was ordered when patient was having increased symptoms.  Wife reporting patient doing better since increase of steroids.  She reports they have plans wee of 8th and can not do appointments that Thursday or Friday.  She would like it if navigator can arrange MRI and radiation oncology appointment on same day as Dr Ugarte's appointment on May 9th.  [facility] does not have order for MRI in computer system.  Will follow up with radiation oncology.

## 2017-05-03 NOTE — PROGRESS NOTES
Spoke with Lianne, radiation oncology  on Tuesday afternoon.  Provided updated information regarding patient.  They did receive information on patient.  She will to follow up with [facility] regarding information and authorization.  Received call back from Lianne reporting had contacted insurance and oncology with chemo is the only thing that has been authorized at this time.  Authorized for 180 days #[id number].  If patient has approval letter for radiation therapy, request to fax if possible.  Navigator will need to follow up on Wednesday.

## 2017-05-04 NOTE — PROGRESS NOTES
Wife left message reporting they will be leaving tomorrow for [place] and will not be available at home number.  Provided cell number of [phone]

## 2017-05-09 NOTE — PROGRESS NOTES
RADIATION ONCOLOGY CONSULT    DATE OF SERVICE: 5/9/2017    IDENTIFICATION: A 81 y.o. 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  He is here at the kind request of Dr. Ugarte for consideration of adjuvant therapy.      HISTORY OF PRESENT ILLNESS: Patient is a  who lives in [facility]. He states that he usually in excellent health. Approximately 2 days prior to craniotomy he had sudden onset of chest discomfort and weakness involving his left upper extremity. Patient presented to local hospital thinking he was having a heart attack or stroke. CT head obtained showing a mass right parietal brain. Patient was transferred to [facility]. MRI brain 4/7/2017 showed a multicentric glioblastoma largest mass in the right parieto-occipital white matter with additional involvement of the left thalamus. Lesions measured 4.5 x 3 cm bulge in the right parieto-occipital region and 3.7 x 1.5 cm involving the left thalamus.    4/7/2017 he underwent right parietal craniotomy and gross total resection of the parieto-occipital mass. The left thalamic mass was left intact. Pathology demonstrated glioblastoma. This was reviewed at [place] and they concurred with the diagnosis.    Postop imaging 4/10/2017 showed hemorrhagic postoperative cavity in the right parietal region streaky curvilinear enhancement superior and medial to the operative cavity could represent residual tumor or transient disruption of blood brain barrier. Nonenhancing mass centered left thalamus consistent with multicentric glioma no change from previous exam.    Clinically, he at this point reports only some loss of peripheral vision on the left. He denies headaches nausea or vomiting. He has no weakness in his upper or lower extremities. He denies confusion.      PAST MEDICAL HISTORY:   Past Medical History   Diagnosis Date   • Hyperlipidemia    • Hypertension    • Glioblastoma multiforme (CMS-HCC) 4/7/17   •  History of [place] fever    • Mitral valve prolapse        PAST SURGICAL HISTORY:  Past Surgical History   Procedure Laterality Date   • Craniotomy stealth Right 4/7/2017     Procedure: CRANIOTOMY STEALTH - PARIETAL OCCIPITAL;  Surgeon: Lavelle Ugarte M.D.;  Location: SURGERY [facility];  Service:    • Lung needle biopsy       [place] Fever   • Tonsillectomy         CURRENT MEDICATIONS:  Current Outpatient Prescriptions   Medication Sig Dispense Refill   • dexamethasone (DECADRON) 2 MG tablet Take 2 mg by mouth 2 times a day. Indications: tapering dose currently BID     • Calcium Carb-Cholecalciferol (CALCIUM + D3 PO) Take  by mouth.     • IRON PO Take  by mouth.     • Acetaminophen (TYLENOL PO) Take  by mouth.     • Multiple Vitamins-Minerals (PRESERVISION AREDS 2 PO) Take 1 Tab by mouth every day.     • simvastatin (ZOCOR) 10 MG Tab Take 10 mg by mouth every evening.     • lisinopril (PRINIVIL, ZESTRIL) 40 MG tablet Take 40 mg by mouth every day.       No current facility-administered medications for this encounter.       ALLERGIES:    Review of patient's allergies indicates no known allergies.    FAMILY HISTORY:    No family cancer history    SOCIAL HISTORY:     reports that he has never smoked. He does not have any smokeless tobacco history on file. He reports that he does not drink alcohol or use illicit drugs.   Patient is , Lives in [place], [place] and has 3 children. Patient is retired from [employer]. Patient served in the  for 3 years (21/2 in [facility] [facility]).    PAIN SCALE: 0-10  Pain Assessement: 0  Pain Location, Orientation and Scale: No complaints of pain.      REVIEW OF SYSTEMS:  Review of systems for today's date of service was reviewed in the electronic medical record.      PHYSICAL EXAM:    ECOG PERFORMANCE STATUS:  1= Restricted in physically strenuous activity, but ambulatory and able to carry out work of a light sedentary nature, e.g., light housework, office work.  /79  "mmHg  Pulse 93  Temp(Src) 36.6 °C (97.8 °F)  Resp 15  Ht 1.651 m (5' 5\")  Wt 67.042 kg (147 lb 12.8 oz)  BMI 24.60 kg/m2  SpO2 97%  GENERAL: Alert and oriented no acute distress  HEENT:  Well-healed right parietal craniotomy scar. Pupils are equal, round, and reactive to light.  Extraocular muscles   are intact. Sclerae nonicteric.  Conjunctivae pink.  Oral cavity, tongue   protrudes midline. Decreased peripheral vision left to direct visual confrontation testing.  NECK:  Supple without evidence of thyromegaly.  NODES:  No peripheral adenopathy of the neck, supraclavicular fossa or axillae   bilaterally.  LUNGS:  Clear to ascultation and resonant to percussion.  HEART:  Regular rate and rhythm.  No murmur appreciated  ABDOMEN:  Soft. No evidence of hepatosplenomegaly.  Positive bowel sounds.  EXTREMITIES:  Without Edema.  NEUROLOGIC:  Cranial nerves II through XII were intact except for left peripheral vision. Strength is 5/5 in   lower extremities bilaterally.  DTRs were symmetrical.  There was no focal   sensory deficit appreciated. He has difficulty performing tandem gait    LABORATORY DATA:   Lab Results   Component Value Date/Time    SODIUM 143 04/09/2017 02:56 AM    POTASSIUM 4.3 04/09/2017 02:56 AM    CHLORIDE 114* 04/09/2017 02:56 AM    CO2 23 04/09/2017 02:56 AM    GLUCOSE 144* 04/09/2017 02:56 AM    BUN 21 04/09/2017 02:56 AM    CREATININE 0.79 04/09/2017 02:56 AM     Lab Results   Component Value Date/Time    ALKALINE PHOSPHATASE 65 04/06/2017 04:01 AM    AST(SGOT) 16 04/06/2017 04:01 AM    ALT(SGPT) 9 04/06/2017 04:01 AM    TOTAL BILIRUBIN 1.0 04/06/2017 04:01 AM      Lab Results   Component Value Date/Time    WBC 12.9* 04/09/2017 02:56 AM    RBC 3.77* 04/09/2017 02:56 AM    HEMOGLOBIN 9.7* 04/09/2017 02:56 AM    HEMATOCRIT 30.8* 04/09/2017 02:56 AM    MCV 80.9* 04/09/2017 02:56 AM    MCH 25.7* 04/09/2017 02:56 AM    MCHC 31.8* 04/09/2017 02:56 AM    MPV 10.7 04/09/2017 02:56 AM    " NEUTROPHILS-POLYS 92.30* 04/06/2017 04:01 AM    LYMPHOCYTES 5.50* 04/06/2017 04:01 AM    MONOCYTES 1.10 04/06/2017 04:01 AM    EOSINOPHILS 0.00 04/06/2017 04:01 AM    BASOPHILS 0.20 04/06/2017 04:01 AM        RADIOLOGY DATA:    Results for orders placed during the hospital encounter of 04/05/17   MR-BRAIN-WITH & W/O    Impression 1.  Postoperative right parietal craniotomy for tumor resection. Hemorrhagic postoperative cavity. Persisting marked vasogenic edema. Streaky and curvilinear enhancement superior and medial to the operative cavity which could represent residual tumor or   transient disruption of the blood brain barrier. Follow-up surveillance imaging as clinically indicated.  2.  Nonenhancing mass centered in the left thalamus consistent with multicentric glioma. No change from the preoperative exam.   MR-BRAIN-WITH & W/O    Impression 1.  Multicentric glioblastoma with largest mass in the right parietal-occipital white matter and additional involvement of the left thalamus.    2.  Mild periventricular and subcortical white matter changes consistent with chronic microvascular ischemic gliosis.       IMPRESSION:    A 81 y.o. with multicentric glioblastoma. He is status post gross total resection parietal-occipital lesion and intact nonenhancing left thalamic lesion.    RECOMMENDATIONS:   Reviewed pathology findings the patient. Considering his age, I did recommend concurrent Temodar and radiotherapy. Radiotherapy would involve a hypo-fractionated course delivering 40 Gy 15 fractions over 3 weeks. Post completion of chemoradiotherapy I also recommended he consider Optune, tumor treating field therapy along with adjuvant Temodar.    We'll evaluate IDH and MGMT status.    He at the present time does not have a medical oncology  appointment in the [facility]. I've encouraged the family to go to the [facility] and obtain one urgently considering that he is now more than 4 weeks postop. He'll return tomorrow May 10 for  simulation with treatment anticipate this get started by making 22nd. Otherwise we'll delay until patient rated start concurrent chemotherapy.    Karen Patel nurse navigator has become involved in attempts to coordinate his care.    One hour was spent face-to-face with patient in the office and more than half of that time was spent counseling patient or coordinating care as described above.    Thank you for the opportunity to participate in his care.  If any questions or comments, please do not hesitate in calling.    Nii MONTERROSO M.D.  Electronically signed by: Nii Cruz V, 5/9/2017 9:51 AM  [phone]

## 2017-05-09 NOTE — PROGRESS NOTES
Met with patient, wife and daughter, Cydney after radiation consult.  Dr Mckeon with concern regarding getting medical oncology consult with [facility].  Spoke with wife who reports they have been trying to talk to [facility] to but are told they need to establish with PCP by walking in and going through intake process.  She asked if he should go this morning.  Encouraged them to work on getting established.  Discussed navigator and Dr Ugarte's nurse have been trying to assist but have met challenges r/t pt not being established in [place].  They do want to establish with [facility] PCP as well as [facility] medical oncology.  Wife concerned regarding authorizations and approval if not going to [facility].  Navigator will work on contacting [facility] oncology regarding patient as well as let Dr Ugarte's office know they may not be able to make the 1 pm appointment today.     Call placed to [facility] and spoke with Theresa with medical oncology.  She reports to notify patient to work with the Patient Experience Team on 1st floor to assist patient.  Request to fax information to [facility] oncology if possible FAX #[phone]    Left message with Raquel Pedroza, [facility] as well regarding patient going to [facility] this am to establish care.    Call placed to Dr Ugarte's office.  Spoke with Olimpia who will fax over records from their office.    Message from Raquel reporting patient as 12:30 appointment to see PCP at [facility]    Call placed to wife Silva to verify cancelling 1 pm appointment with Dr Ugarte.  She verified would need it cancelled.  Navigator will contact office to cancel appointment r/t conflict with establishing with [facility].

## 2017-05-15 NOTE — TELEPHONE ENCOUNTER
Called patient to see if our office could schedule him for an oncology new patient appointment with Dr. Jesus. I spoke with the patient's wife and she stated he is already established with [facility] Boucher [facility] the [facility] for medical oncology.

## 2017-05-17 NOTE — PROGRESS NOTES
Wife, Jade called with update.  She reports they have appointment with Dr Ugarte tomorrow at 9:30.  Pt was able to establish with PCP with [facility] as well as oncologist, Dr Louis with [facility].  Pt started oral chemo, temazolimide, last night and to start radiation today for total of 15 radiation treatments.  She reports they were only given 20 pills of chemotherapy but will run out prior to completion of radiation.  Encouraged her to contact medical oncologist regarding this.  She reports patient tolerated temazolimide last night with no nausea.  Pt reporting slight headache this am which resolved with tylenol.  Wife asking if can update Dr Ugarte's office as well.  No other questions or concerns at this time.

## 2017-08-02 NOTE — IP AVS SNAPSHOT
8/8/2017    Alvin Grinnell  [address] [facility] [address]    Dear Hank:    [facility] wants to ensure your discharge home is safe and you or your loved ones have had all of your questions answered regarding your care after you leave the hospital.    Below is a list of resources and contact information should you have any questions regarding your hospital stay, follow-up instructions, or active medical symptoms.    Questions or Concerns Regarding… Contact   Medical Questions Related to Your Discharge  (7 days a week, 8am-5pm) Contact a Nurse Care Coordinator   [phone]   Medical Questions Not Related to Your Discharge  (24 hours a day / 7 days a week)  Contact the Nurse Health Line   [phone]    Medications or Discharge Instructions Refer to your discharge packet   or contact your [facility] Primary Care Provider   [phone]   Follow-up Appointment(s) Schedule your appointment via [employer]   or contact Scheduling [phone]   Billing Review your statement via [employer]  or contact Billing [phone]   Medical Records Review your records via Skipola   or contact Medical Records [phone]     You may receive a telephone call within two days of discharge. This call is to make certain you understand your discharge instructions and have the opportunity to have any questions answered. You can also easily access your medical information, test results and upcoming appointments via the Skipola free online health management tool. You can learn more and sign up at [employer]/Skipola. For assistance setting up your [employer] account, please call [phone].    Once again, we want to ensure your discharge home is safe and that you have a clear understanding of any next steps in your care. If you have any questions or concerns, please do not hesitate to contact us, we are here for you. Thank you for choosing [facility] for your healthcare needs.    Sincerely,    Your [facility] Healthcare Team

## 2017-08-02 NOTE — IP AVS SNAPSHOT
Cenify Access Code: Activation code not generated  Current Cenify Status: Active    Fashioholichart  A secure, online tool to manage your health information     [employer]’s Cenify® is a secure, online tool that connects you to your personalized health information from the privacy of your home -- day or night - making it very easy for you to manage your healthcare. Once the activation process is completed, you can even access your medical information using the Cenify jina, which is available for free in the Apple Jina store or Google Play store.     Cenify provides the following levels of access (as shown below):   My Chart Features   [facility] Primary Care Doctor [facility]  Specialists [facility]  Urgent  Care Non-[facility]  Primary Care  Doctor   Email your healthcare team securely and privately 24/7 X X X X   Manage appointments: schedule your next appointment; view details of past/upcoming appointments X      Request prescription refills. X      View recent personal medical records, including lab and immunizations X X X X   View health record, including health history, allergies, medications X X X X   Read reports about your outpatient visits, procedures, consult and ER notes X X X X   See your discharge summary, which is a recap of your hospital and/or ER visit that includes your diagnosis, lab results, and care plan. X X       How to register for Cenify:  1. Go to  [email or web address].  2. Click on the Sign Up Now box, which takes you to the New Member Sign Up page. You will need to provide the following information:  a. Enter your Cenify Access Code exactly as it appears at the top of this page. (You will not need to use this code after you’ve completed the sign-up process. If you do not sign up before the expiration date, you must request a new code.)   b. Enter your date of birth.   c. Enter your home email address.   d. Click Submit, and follow the next screen’s instructions.  3. Create a Cenify ID. This will  be your [facility] login ID and cannot be changed, so think of one that is secure and easy to remember.  4. Create a [facility] password. You can change your password at any time.  5. Enter your Password Reset Question and Answer. This can be used at a later time if you forget your password.   6. Enter your e-mail address. This allows you to receive e-mail notifications when new information is available in [facility].  7. Click Sign Up. You can now view your health information.    For assistance activating your [facility] account, call [phone]

## 2017-08-02 NOTE — IP AVS SNAPSHOT
" <p align=\"LEFT\"><IMG SRC=\"[email or web address]\" alt=\"Image\" WIDTH=\"50%\" HEIGHT=\"200\" BORDER=\"\"></p>                   Name:Alvin Grinnell  Medical Record Number:[id number]  CSN: [id number]    YOB: 1935   Age: 81 y.o.  Sex: male  HT:1.651 m (5' 5\") WT: 71.9 kg (158 lb 8.2 oz)          Admit Date: 8/2/2017     Discharge Date:   Today's Date: 8/8/2017  Attending Doctor:  Dorie Lombardi M.D.                  Allergies:  Bactrim          Follow-up Information     1. Follow up with Nii MONTERROSO M.D. In 2 weeks.    Specialty:  Radiation Oncology    Contact information    [address] [place] [id number] [phone]           Medication List      Take these Medications        Instructions    acetaminophen 325 MG Tabs   Commonly known as:  TYLENOL    Take 650 mg by mouth 2 Times a Day.   Dose:  650 mg       bisacodyl 10 MG Supp   Commonly known as:  DULCOLAX    Insert 1 Suppository in rectum 1 time daily as needed (if magnesium hydroxide ineffective after 24 hours).   Dose:  10 mg       CALCIUM 600 PO    Take 600 mg by mouth every day.   Dose:  600 mg       dexamethasone 4 MG Tabs   Commonly known as:  DECADRON    Doctor's comments:  Discuss with Dr. pierre or Dr. Ugarte for course.   Take 1 Tab by mouth every 6 hours.   Dose:  4 mg       famotidine 20 MG Tabs   Commonly known as:  PEPCID    Take 20 mg by mouth every day.   Dose:  20 mg       magnesium hydroxide 400 MG/5ML Susp   Commonly known as:  MILK OF MAGNESIA    Take 30 mL by mouth 1 time daily as needed (if polyethylene glycol ineffective after 24 hours).   Dose:  30 mL       oxycodone immediate-release 5 MG Tabs   Commonly known as:  ROXICODONE    Take 1 Tab by mouth every four hours as needed.   Dose:  5 mg       polyethylene glycol/lytes Pack   Commonly known as:  MIRALAX    Take 1 Packet by mouth 1 time daily as needed (if sennosides and docusate ineffective after 24 hours).   Dose:  17 g       * PRESERVISION AREDS 2 PO   " Take 1 Tab by mouth every day.   Dose:  1 Tab       * therapeutic multivitamin-minerals Tabs    Take 1 Tab by mouth every day.   Dose:  1 Tab       PX IRON 27 MG Tabs   Generic drug:  Ferrous Sulfate    Take 27 mg by mouth every day.   Dose:  27 mg       senna-docusate 8.6-50 MG Tabs   Commonly known as:  PERICOLACE or SENOKOT S    Take 2 Tabs by mouth 2 Times a Day.   Dose:  2 Tab       vitamin D 1000 UNIT Tabs   Commonly known as:  cholecalciferol    Take 1,000 Units by mouth 3 times a day.   Dose:  1000 Units       * Notice:  This list has 2 medication(s) that are the same as other medications prescribed for you. Read the directions carefully, and ask your doctor or other care provider to review them with you.

## 2017-08-02 NOTE — IP AVS SNAPSHOT
" Home Care Instructions                                                                                                                  Name:Alvin Grinnell  Medical Record Number:[id number]  CSN: [id number]    YOB: 1935   Age: 81 y.o.  Sex: male  HT:1.651 m (5' 5\") WT: 71.9 kg (158 lb 8.2 oz)          Admit Date: 8/2/2017     Discharge Date:   Today's Date: 8/8/2017  Attending Doctor:  Dorie Lombardi M.D.                  Allergies:  Bactrim            Discharge Instructions       Discharge Instructions    Discharged to rehab by [facility] van with escort. Discharged via wheelchair, hospital escort: Yes.  Special equipment needed: Not Applicable    Be sure to schedule a follow-up appointment with your primary care doctor or any specialists as instructed.   Follow up attending phsyician at rehab or Dr. Villatoro upon receipt   Follow up with Dr. Middleton in 1-2 weeks after rehab to evaluate for chemotherapy   Follow up with Dr. Coto [facility] in 1-2 weeks    Discharge Plan:   Influenza Vaccine Indication: Not indicated: Previously immunized this influenza season and > 8 years of age    I understand that a diet low in cholesterol, fat, and sodium is recommended for good health. Unless I have been given specific instructions below for another diet, I accept this instruction as my diet prescription.   Other diet: regular diet    Special Instructions: None    · Is patient discharged on Warfarin / Coumadin?   No     · Is patient Post Blood Transfusion?  No    Depression / Suicide Risk    As you are discharged from this [facility], it is important to learn how to keep safe from harming yourself.    Recognize the warning signs:  · Abrupt changes in personality, positive or negative- including increase in energy   · Giving away possessions  · Change in eating patterns- significant weight changes-  positive or negative  · Change in sleeping patterns- unable to sleep or sleeping all the " time   · Unwillingness or inability to communicate  · Depression  · Unusual sadness, discouragement and loneliness  · Talk of wanting to die  · Neglect of personal appearance   · Rebelliousness- reckless behavior  · Withdrawal from people/activities they love  · Confusion- inability to concentrate     If you or a loved one observes any of these behaviors or has concerns about self-harm, here's what you can do:  · Talk about it- your feelings and reasons for harming yourself  · Remove any means that you might use to hurt yourself (examples: pills, rope, extension cords, firearm)  · Get professional help from the community (Mental Health, Substance Abuse, psychological counseling)  · Do not be alone:Call your Safe Contact- someone whom you trust who will be there for you.  · Call your local CRISIS HOTLINE 092-7401 or 482-054-2824  · Call your local Children's Mobile Crisis Response Team [place] (437) 897-3176 or www.BarBird  · Call the toll free National Suicide Prevention Hotlines   · National Suicide Prevention Lifeline 045-374-YMQG (3253)  · [employer] Hope Line Network 800-SUICIDE (661-2205)    Cerebral Edema  Cerebral edema is another term for swelling of the brain. Cells in the brain that have been injured or infected can swell, and blood vessels may leak fluid into the brain tissue. The extra fluid in the brain affects pressure in the skull and the flow of blood in the brain.   Cerebral edema can be mild to severe, depending on the cause and whether the swelling is in many areas of the brain. It is important to identify the cause and stop the swelling from getting worse as severe cases can cause permanent damage. Treatments are available to help bring down the swelling and restore brain function.   CAUSES   · Injury to your head, such as in a car accident or contact sport.  · Infection from a virus or bacteria.  · Stroke.  · High altitude sickness.  · A buildup of acid in your blood.  · Very high  sugar levels caused by diabetes (diabetic ketoacidosis).  · Very high blood pressure (malignant hypertension).  · Liver disease.  · Poisoning, such as carbon monoxide or lead.  · Conditions in which your body retains too much water.  · Illegal drug use.  · Alcohol abuse.  · Reptile or marine animal bites.  · Brain tumor.  SIGNS AND SYMPTOMS  Symptoms may include:  · Headache.  · Nausea or vomiting.  · Dizziness.  · Neck pain.  · Trouble breathing.  · Blurred vision or other vision changes.  · Balance and motor problems, such as trouble walking.  · Trouble speaking.  · Feeling confused.  · Passing out.  · Numbness or weakness in any body part.  DIAGNOSIS   Diagnosis may include:  · Medical history and physical exam.  · Imaging tests, including CT scans or MRI.  · Blood tests.  TREATMENT   For mild edema, several days of rest and limiting activities may be needed. More severe cases require treatment in a hospital. Treatment may include:  · Proper head and neck positioning to increase blood flow.  · Fluids given through your vein (intravenously or IV).  · Regulating your blood pressure and body temperature.  · Medicines to reduce inflammation and increase urination.  · Oxygen.  · Using a ventilator, which is a device to help you breathe.  · Shunting, which is a tube placed in the brain to help drain fluid.  · Surgery.  If the edema is caused by a condition, treatment will also focus on managing the condition.   HOME CARE INSTRUCTIONS   · Rest and limit your activities as directed by your health care provider.  · Take medicines only as directed by your health care provider.  · Manage any other health care conditions you may have, if applicable.  · Keep all follow-up visits as directed by your health care provider. This is important.  SEEK MEDICAL CARE IF:   Your symptoms return, do not get better, or get worse, including:  · Headache.  · Nausea or vomiting.  · Dizziness.  · Neck pain.  · Blurred vision or other vision  changes.  SEEK IMMEDIATE MEDICAL CARE IF:  · You pass out.    · You have numbness or weakness in any body part.  · You feel confused.  · You have trouble breathing.  · You have balance and motor problems, such as trouble walking.  · You have trouble speaking.  MAKE SURE YOU:  · Understand these instructions.    · Will watch your condition.  · Will get help right away if you are not doing well or get worse.       This information is not intended to replace advice given to you by your health care provider. Make sure you discuss any questions you have with your health care provider.     Document Released: 07/15/2015 Document Reviewed: 07/15/2015  [employer] Interactive Patient Education ©2016 [employer]      Follow-up Information     1. Follow up with Nii MONTERROSO M.D. In 2 weeks.    Specialty:  Radiation Oncology    Contact information    [address] [address]  [place] [id number] [phone]           Discharge Medication Instructions:    Below are the medications your physician expects you to take upon discharge:    Review all your home medications and newly ordered medications with your doctor and/or pharmacist. Follow medication instructions as directed by your doctor and/or pharmacist.    Please keep your medication list with you and share with your physician.               Medication List      START taking these medications        Instructions    Morning Afternoon Evening Bedtime    bisacodyl 10 MG Supp   Commonly known as:  DULCOLAX        Insert 1 Suppository in rectum 1 time daily as needed (if magnesium hydroxide ineffective after 24 hours).   Dose:  10 mg                        dexamethasone 4 MG Tabs   Last time this was given:  4 mg on 8/8/2017  5:02 AM   Commonly known as:  DECADRON        Doctor's comments:  Discuss with Dr. pierre or Dr. Ugarte for course.   Take 1 Tab by mouth every 6 hours.   Dose:  4 mg                        magnesium hydroxide 400 MG/5ML Susp   Commonly known as:  MILK OF  MAGNESIA        Take 30 mL by mouth 1 time daily as needed (if polyethylene glycol ineffective after 24 hours).   Dose:  30 mL                        oxycodone immediate-release 5 MG Tabs   Last time this was given:  5 mg on 8/4/2017  3:10 PM   Commonly known as:  ROXICODONE        Take 1 Tab by mouth every four hours as needed.   Dose:  5 mg                        polyethylene glycol/lytes Pack   Last time this was given:  1 Packet on 8/4/2017  1:25 PM   Commonly known as:  MIRALAX        Take 1 Packet by mouth 1 time daily as needed (if sennosides and docusate ineffective after 24 hours).   Dose:  17 g                        senna-docusate 8.6-50 MG Tabs   Last time this was given:  2 Tabs on 8/8/2017  8:03 AM   Commonly known as:  PERICOLACE or SENOKOT S        Take 2 Tabs by mouth 2 Times a Day.   Dose:  2 Tab                          CONTINUE taking these medications        Instructions    Morning Afternoon Evening Bedtime    acetaminophen 325 MG Tabs   Last time this was given:  650 mg on 8/6/2017 10:07 PM   Commonly known as:  TYLENOL        Take 650 mg by mouth 2 Times a Day.   Dose:  650 mg                        CALCIUM 600 PO        Take 600 mg by mouth every day.   Dose:  600 mg                        famotidine 20 MG Tabs   Commonly known as:  PEPCID        Take 20 mg by mouth every day.   Dose:  20 mg                        * PRESERVISION AREDS 2 PO        Take 1 Tab by mouth every day.   Dose:  1 Tab                        * therapeutic multivitamin-minerals Tabs        Take 1 Tab by mouth every day.   Dose:  1 Tab                        PX IRON 27 MG Tabs   Generic drug:  Ferrous Sulfate        Take 27 mg by mouth every day.   Dose:  27 mg                        vitamin D 1000 UNIT Tabs   Commonly known as:  cholecalciferol        Take 1,000 Units by mouth 3 times a day.   Dose:  1000 Units                        * Notice:  This list has 2 medication(s) that are the same as other medications  prescribed for you. Read the directions carefully, and ask your doctor or other care provider to review them with you.         Where to Get Your Medications      Information about where to get these medications is not yet available     ! Ask your nurse or doctor about these medications    - bisacodyl 10 MG Supp  - dexamethasone 4 MG Tabs  - magnesium hydroxide 400 MG/5ML Susp  - oxycodone immediate-release 5 MG Tabs  - polyethylene glycol/lytes Pack  - senna-docusate 8.6-50 MG Tabs            Orders for after discharge     REFERRAL TO PHYSIATRY (PMR)    Complete by:  As directed        REFERRAL TO SKILLED NURSING FACILITY    Complete by:  As directed              Instructions           Diet / Nutrition:    Follow any diet instructions given to you by your doctor or the dietician, including how much salt (sodium) you are allowed each day.    If you are overweight, talk to your doctor about a weight reduction plan.    Activity:    Remain physically active following your doctor's instructions about exercise and activity.    Rest often.     Any time you become even a little tired or short of breath, SIT DOWN and rest.    Worsening Symptoms:    Report any of the following signs and symptoms to the doctor's office immediately:    *Pain of jaw, arm, or neck  *Chest pain not relieved by medication                               *Dizziness or loss of consciousness  *Difficulty breathing even when at rest   *More tired than usual                                       *Bleeding drainage or swelling of surgical site  *Swelling of feet, ankles, legs or stomach                 *Fever (>100ºF)  *Pink or blood tinged sputum  *Weight gain (3lbs/day or 5lbs /week)           *Shock from internal defibrillator (if applicable)  *Palpitations or irregular heartbeats                *Cool and/or numb extremities    Stroke Awareness    Common Risk Factors for Stroke include:    Age  Atrial Fibrillation  Carotid Artery Stenosis  Diabetes  Mellitus  Excessive alcohol consumption  High blood pressure  Overweight   Physical inactivity  Smoking    Warning signs and symptoms of a stroke include:    *Sudden numbness or weakness of the face, arm or leg (especially on one side of the body).  *Sudden confusion, trouble speaking or understanding.  *Sudden trouble seeing in one or both eyes.  *Sudden trouble walking, dizziness, loss of balance or coordination.Sudden severe headache with no known cause.    It is very important to get treatment quickly when a stroke occurs. If you experience any of the above warning signs, call 911 immediately.                   Disclaimer         Quit Smoking / Tobacco Use:    I understand the use of any tobacco products increases my chance of suffering from future heart disease or stroke and could cause other illnesses which may shorten my life. Quitting the use of tobacco products is the single most important thing I can do to improve my health. For further information on smoking / tobacco cessation call a Toll Free Quit Line at 1-253.343.2410 (*National Cancer [employer]) or 1-858.394.8742 (American Lung Association) or you can access the web based program at www.lungSHIMAUMA Print System.org.    [place] Tobacco Users Help Line:  (359) 303-4709       Toll Free: 1-461.996.3930  Quit Tobacco Program [facility] Management Services (232)856-8749    Crisis Hotline:    [place] Crisis Hotline:  0-147-ECHLWNC or 1-202.250.3051    [place] Crisis Hotline:    1-993.793.6753 or 592-430-8046    Discharge Survey:   Thank you for choosing [facility]. We hope we did everything we could to make your hospital stay a pleasant one. You may be receiving a phone survey and we would appreciate your time and participation in answering the questions. Your input is very valuable to us in our efforts to improve our service to our patients and their families.        My signature on this form indicates that:    1. I have reviewed and understand the above  information.  2. My questions regarding this information have been answered to my satisfaction.  3. I have formulated a plan with my discharge nurse to obtain my prescribed medications for home.                  Disclaimer         __________________________________                     __________       ________                       Patient Signature                                                 Date                    Time

## 2017-08-03 PROBLEM — I44.30 HEART BLOCK, AV: Status: ACTIVE | Noted: 2017-01-01

## 2017-08-03 PROBLEM — R00.1 BRADYCARDIA: Status: ACTIVE | Noted: 2017-01-01

## 2017-08-03 PROBLEM — J18.9 CAP (COMMUNITY ACQUIRED PNEUMONIA): Status: ACTIVE | Noted: 2017-01-01

## 2017-08-03 PROBLEM — G93.6 VASOGENIC CEREBRAL EDEMA (HCC): Status: ACTIVE | Noted: 2017-01-01

## 2017-08-03 NOTE — OR SURGEON
Immediate Post-Operative Note      PreOp Diagnosis: heart block    PostOp Diagnosis: same    Procedure(s) :  Dual chamber ppm    Surgeon(s):  Telly Chung M.D.    Type of Anesthesia: Moderate Sedation    Specimen: None    Estimated Blood Loss: 10 cc's    Contrast Media:  0 cc's    Fluoro Time: see scanned report        Findings: boston mri compatible ppm  Passive rv actrive ra  Axillary access    Complications: none      Telly Chung M.D.  8/3/2017 1:40 PM

## 2017-08-03 NOTE — ED NOTES
Phillip from [facility] states that the room is ready. Cardiology has not called back. [facility] comfortable with transfer of patient given stable BP and unchanged symptoms. Plan is to transfer patient at this time.

## 2017-08-03 NOTE — PROGRESS NOTES
Patient's heart rate sustaining at 35, with possible 3rd degree block. Notified Dr. Roa and paged Dr. Martini.  Dr. Martini, Dr. Roa and Dr. Chung in to see patient.  Per Dr. Chung patient to be scheduled for pacer in next hour.  New orders received

## 2017-08-03 NOTE — ASSESSMENT & PLAN NOTE
He has been started on IV Decadron  Neurosurgery has been consulted  Continue neuro checks  Dr. Ugarte mentions continue Decadron and monitoring  Rehab will accept if further radiochemotherapy can be delayed or postponed in a few weeks.   Ultimately defer to Dr. Torito Ugarte recommended switching to PO decadron, no further surgical intervention in the next few weeks and not recommended.  I called Dr. Coto and he has completed radiotherapy and resommended that he cannot have chemotherapy in 2-3 weeks anyway until he is more conditioned. He was ok with him going to inpatient rehab.  [facility] Littlejohn.  Discussed with Dr. Villatoro. He will be discharged to inpatient rehabilitation.

## 2017-08-03 NOTE — ED NOTES
Pt is brought back from triage via wheelchair. On arrival patient is alert, and oriented. Patient feels weak. Pt immediately placed on monitor and defibrillation pads applied to patient. Pt is monitored by both bedside tele, and zoll monitor. Pt BP WNL. Patient other VSS. This RN contacted ERP on patients arrival. ERP states he is coming to bedside. Two large bore IVs initiated immediately and labs sent appropriately. Pt continues to be monitored by this RN. Per ERP we will not pace at this time.

## 2017-08-03 NOTE — ED NOTES
This RN verified that patient's outside images are being pushed from outside facility. Film room technician confirmed that images are being pushed.

## 2017-08-03 NOTE — PROGRESS NOTES
Reviewed consult by my partner Dr. Chavarria.  I do not think there is active systemic infection precluding placement of ppm  Hr dropping to 30's.  Unclear if truly pna.  I think risk of ppm now outweigh risk and would proceed.  Patient understands the risk and benefits and agrees to proceed.

## 2017-08-03 NOTE — ED NOTES
ERP at bedside. Patient is on zoll monitor at bedside. Pt BP stable. HR in 40s. ERP aware. Pts wife at bedside. Pt is alert, oriented and conversing. Pt requesting ice chips. ERP gave permission for pt to have icechips. Pt given few icechips by this RN.

## 2017-08-03 NOTE — H&P
Hospital Medicine History and Physical    Date of Service  8/3/2017    Chief Complaint  Chief Complaint   Patient presents with   • Bradycardia     transfer from [place]       History of Presenting Illness  81 y.o. male past medical history of GBM status post resection, Temodar and radiation therapy who was transferred from [facility] in [place] for pneumonia, complete heart block, brain mass with vasogenic edema on 8/2/2017. Since states that he had a ground-level fall after he tripped while getting out of the shower. He hit his head but did not lose consciousness. He states that he has been feeling lightheaded for the past 1 week. At the outlying facility chest x-ray revealed bibasilar opacities, CT had revealed extensive vasogenic edema and right parietal, frontal, occipital, temporal lobes and attention to right cisterns with 2 mm midline shift appears to be increased from previous study. [facility] revealed a possible 3rd degree heart block. At this time the patient reports low grade fevers and a productive cough. He denies any chest pain or shortness of breath. Cardiology and neurosurgery have been consulted by the ER physician.    Primary Care Physician  No primary care provider on file.    Consultants  Cardiology  To  Neurosurgery Dr. Smith     Code Status  Full code    Review of Systems  Review of Systems   Constitutional: Positive for fever and malaise/fatigue.   Respiratory: Positive for cough and sputum production. Negative for shortness of breath and wheezing.    Cardiovascular: Negative for chest pain, palpitations and leg swelling.   Gastrointestinal: Negative for heartburn, nausea, vomiting and abdominal pain.   Genitourinary: Negative for dysuria.   Neurological: Positive for dizziness and weakness. Negative for loss of consciousness and headaches.   All other systems reviewed and are negative.         Past Medical History  Past Medical History   Diagnosis Date    • Hyperlipidemia    • Hypertension    • Glioblastoma multiforme (CMS-HCC) 17   • History of [facility] fever    • Mitral valve prolapse    • Heart block, AV 8/3/2017       Surgical History  Past Surgical History   Procedure Laterality Date   • Craniotomy stealth Right 2017     Procedure: CRANIOTOMY STEALTH - PARIETAL OCCIPITAL;  Surgeon: Lavelle Ugarte M.D.;  Location: SURGERY [facility];  Service:    • Lung needle biopsy       [place] Fever   • Tonsillectomy         Medications  No current facility-administered medications on file prior to encounter.     Current Outpatient Prescriptions on File Prior to Encounter   Medication Sig Dispense Refill   • predniSONE (DELTASONE) 5 MG Tab Take 5 mg by mouth every day. Indications: on a taper through 17 per pt's wife     • ondansetron (ZOFRAN ODT) 8 MG TABLET DISPERSIBLE Take 8 mg by mouth every 8 hours as needed for Nausea/Vomiting.     • Temozolomide (TEMODAR PO) Take  by mouth.     • Calcium Carb-Cholecalciferol (CALCIUM + D3 PO) Take  by mouth.     • IRON PO Take  by mouth.     • Acetaminophen (TYLENOL PO) Take  by mouth.     • Multiple Vitamins-Minerals (PRESERVISION AREDS 2 PO) Take 1 Tab by mouth every day.     • simvastatin (ZOCOR) 10 MG Tab Take 10 mg by mouth every evening.         Family History  No pertinent family history    Social History  Social History   Substance Use Topics   • Smoking status: Never Smoker    • Smokeless tobacco: Not on file   • Alcohol Use: No       Allergies  No Known Allergies     Physical Exam  Laboratory   Hemodynamics  Temp (24hrs), Av.2 °C (99 °F), Min:37.2 °C (99 °F), Max:37.2 °C (99 °F)   Temperature: 37.2 °C (99 °F)  Pulse  Av  Min: 43  Max: 51 Heart Rate (Monitored): (!) 42  Blood Pressure : 118/87 mmHg, NIBP: 108/45 mmHg      Respiratory      Respiration: 20, Pulse Oximetry: 90 %             Physical Exam   Constitutional: He is oriented to person, place, and time. He appears  well-developed and well-nourished.   HENT:   Head: Normocephalic and atraumatic.   Mouth/Throat: Oropharynx is clear and moist.   Eyes: Conjunctivae are normal.   Neck: Neck supple.   Cardiovascular: Regular rhythm.    Bradycardic   Pulmonary/Chest: Effort normal. No respiratory distress. He has no wheezes.   Coarse rhonchi   Abdominal: Soft. Bowel sounds are normal. He exhibits no distension. There is no tenderness.   Musculoskeletal: Normal range of motion. He exhibits no edema.   Neurological: He is alert and oriented to person, place, and time. No cranial nerve deficit.   Skin: Skin is warm and dry.   Psychiatric: He has a normal mood and affect. His behavior is normal.   Nursing note and vitals reviewed.      Recent Labs      08/02/17   2213   WBC  6.8   RBC  2.90*   HEMOGLOBIN  8.5*   HEMATOCRIT  27.9*   MCV  96.2   MCH  29.3   MCHC  30.5*   RDW  75.4*   PLATELETCT  172   MPV  11.1     Recent Labs      08/02/17   2213   SODIUM  134*   POTASSIUM  4.6   CHLORIDE  105   CO2  22   GLUCOSE  109*   BUN  17   CREATININE  1.14   CALCIUM  8.6     Recent Labs      08/02/17   2213   ALTSGPT  27   ASTSGOT  21   ALKPHOSPHAT  88   TBILIRUBIN  0.8   GLUCOSE  109*                 Lab Results   Component Value Date    TROPONINI 0.02 08/02/2017       Imaging  DX-CHEST-PORTABLE (1 VIEW)   Final Result      1.  Unchanged atelectasis with possible superimposed pulmonary edema or other airspace disease   2.  RIGHT upper lobe pulmonary nodule, unchanged.      OUTSIDE IMAGES-CT HEAD   Preliminary Result      OUTSIDE IMAGES-DX CHEST   Preliminary Result           Assessment/Plan     I anticipate this patient will require at least two midnights for appropriate medical management, necessitating inpatient admission.    Heart block, AV (present on admission)  Assessment & Plan  Patient will be admitted to the telemetry unit and placed on cardiac monitoring  Cardiology has been consulted  If patient has bradycardia he can be started on IV  dopamine infusion and temporary pacing    Vasogenic cerebral edema (CMS-HCC) (present on admission)  Assessment & Plan  He has been started on IV Decadron  Neurosurgery has been consulted  Continue neuro checks      CAP (community acquired pneumonia) (present on admission)  Assessment & Plan  Appears to be mild, patient is started on Unasyn  Hold off on Zithromax given patient's prolonged QTC  Follow Cultures  Continue RT protocol        VTE prophylaxis: Lovenox .

## 2017-08-03 NOTE — PROGRESS NOTES
[facility] Hospitalist Progress Note    Date of Service: 8/3/2017    Chief Complaint  81 y.o. male admitted 2017 history of GBM status post resection, temodar and radiation therapy who was transferred from [facility] in [place] for pneumonia, complete heart block, brain mass with vasogenic edema.    Interval Problem Update  Currently seems to be more comfortable. He doies have sinus pauses hwoever.    Consultants/Specialty  Neurosurgery  Cardiology    Disposition  PT eval        Review of Systems   Constitutional: Positive for fever and chills.   HENT: Negative for congestion, hearing loss and nosebleeds.    Eyes: Negative for pain and redness.   Respiratory: Positive for cough. Negative for hemoptysis, shortness of breath and wheezing.    Cardiovascular: Negative for chest pain and palpitations.   Gastrointestinal: Negative for nausea, vomiting, abdominal pain, diarrhea, constipation and blood in stool.   Genitourinary: Negative for dysuria, frequency and hematuria.   Musculoskeletal: Positive for myalgias. Negative for joint pain and falls.   Skin: Negative for rash.   Neurological: Positive for dizziness and weakness. Negative for tremors, focal weakness, seizures, loss of consciousness and headaches.   Psychiatric/Behavioral: The patient is not nervous/anxious and does not have insomnia.    All other systems reviewed and are negative.     Physical Exam  Laboratory/Imaging   Hemodynamics  Temp (24hrs), Av.9 °C (98.4 °F), Min:36.2 °C (97.1 °F), Max:37.2 °C (99 °F)   Temperature: 36.2 °C (97.1 °F)  Pulse  Av.6  Min: 41  Max: 69 Heart Rate (Monitored): (!) 44  Blood Pressure : 111/62 mmHg, NIBP: 130/45 mmHg      Respiratory      Respiration: 16, Pulse Oximetry: 98 %, O2 Daily Delivery Respiratory : Silicone Nasal Cannula     Work Of Breathing / Effort: Mild;Shallow  RUL Breath Sounds: Clear;Diminished, RML Breath Sounds: Clear;Diminished, RLL Breath Sounds: Diminished, RIANA Breath Sounds:  Clear;Diminished, LLL Breath Sounds: Diminished    Fluids    Intake/Output Summary (Last 24 hours) at 08/03/17 1619  Last data filed at 08/03/17 1400   Gross per 24 hour   Intake    313 ml   Output    475 ml   Net   -162 ml       Nutrition  Orders Placed This Encounter   Procedures   • Diet Order     Standing Status: Standing      Number of Occurrences: 1      Standing Expiration Date:      Order Specific Question:  Diet:     Answer:  Cardiac [6]     Physical Exam   Constitutional: He appears well-developed and well-nourished.   HENT:   Head: Normocephalic and atraumatic.   Eyes: Conjunctivae and EOM are normal. No scleral icterus.   Neck: Normal range of motion. Neck supple.   Cardiovascular: Regular rhythm.  Bradycardia present.  Exam reveals no gallop and no friction rub.    Murmur heard.  Pulmonary/Chest: Effort normal and breath sounds normal. No respiratory distress. He has no wheezes. He has no rales.   Abdominal: Soft. Bowel sounds are normal. He exhibits no distension. There is no tenderness. There is no rebound and no guarding.   Musculoskeletal: He exhibits no edema or tenderness.   Neurological: He is alert.   Skin: Skin is warm.   Psychiatric: He has a normal mood and affect. His behavior is normal.       Recent Labs      08/02/17   2213   WBC  6.8   RBC  2.90*   HEMOGLOBIN  8.5*   HEMATOCRIT  27.9*   MCV  96.2   MCH  29.3   MCHC  30.5*   RDW  75.4*   PLATELETCT  172   MPV  11.1     Recent Labs      08/02/17   2213   SODIUM  134*   POTASSIUM  4.6   CHLORIDE  105   CO2  22   GLUCOSE  109*   BUN  17   CREATININE  1.14   CALCIUM  8.6                      Assessment/Plan     * Heart block, AV (present on admission)  Assessment & Plan  Patient will be admitted to the telemetry unit and placed on cardiac monitoring  Cardiology has been consulted  If patient has bradycardia he can be started on IV dopamine infusion and temporary pacing  PRN atropine as well  Anticipate pacemaker placement 8/3 because his HRs  in the 30s.    Vasogenic cerebral edema (CMS-HCC) (present on admission)  Assessment & Plan  He has been started on IV Decadron  Neurosurgery has been consulted  Continue neuro checks  Await Neurosurgery recommendations      CAP (community acquired pneumonia) (present on admission)  Assessment & Plan  Appears to be mild, patient is started on Unasyn  Hold off on Zithromax given patient's prolonged QTC  Give doxycycline instead.  Follow Cultures  Continue RT protocol  I spent 39 minutes, reviewing the chart, notes, vitals, labs, imaging, ordering labs, evaluating Alvin Grinnell for assessment, enacting the plan above. 50% of the time was spent in counseling Alvin Grinnell and answering questions. Time was devoted to counseling and coordinating care including review of records, pertinent lab data and studies, as well as discussing diagnostic evaluation and work up, planned therapeutic interventions and future disposition of care. Where indicated, the assessment and plan reflect discussion of patient with consultants, other healthcare providers, family members, and additional research needed to obtain further information in formulating the plan of care for Alvin Grinnell.     Core Measures

## 2017-08-03 NOTE — ED NOTES
Patients wife Dariana has gone home for the evening. This RN assured that patients wife's cell phone number is on the chart. Registration is at bedside to assure that info is up to date.

## 2017-08-03 NOTE — ED PROVIDER NOTES
ED Provider Note    Scribed for Mikey Llamas II, MD by Honey Edwards. 8/2/2017  10:22 PM    Means of Arrival: Walk-in   History obtained by: Patient  Limitations: None     CHIEF COMPLAINT  Chief Complaint   Patient presents with   • Bradycardia     transfer from [facility]  Alvin Grinnell is a 81 y.o. male who presents transferred from [facility] in [place] for pneumonia, new complete heart block, brain mass with vasovagal edema. Hank had a ground level fall yesterday when he tripped getting out of the shower. He hit his head on the ground but did not lose consciousness. The patient has a history of neuroblastoma and finished chemotherapy 3 days ago. He reports generalized weakness, nausea, vomiting, and fever onset yesterday. Patient denies diarrhea, constipation, headache, neck pain, difficulty speaking, numbness, chest pain, shortness of breath, abdominal pain, rash. Hank left [facility] rather than transport via [facility].     REVIEW OF SYSTEMS  Review of Systems   Constitutional: Positive for fever and chills.   Respiratory: Negative for shortness of breath.    Cardiovascular: Negative for chest pain.   Gastrointestinal: Positive for nausea and vomiting. Negative for abdominal pain, diarrhea, constipation, blood in stool and melena.   Musculoskeletal: Negative for neck pain.   Skin: Negative for rash.   Neurological: Positive for weakness. Negative for sensory change, speech change, focal weakness and headaches.   All other systems reviewed and are negative.    See [facility] for further details. C    PAST MEDICAL HISTORY   has a past medical history of Hyperlipidemia; Hypertension; Glioblastoma multiforme (CMS-HCC) (4/7/17); History of [place] fever; Mitral valve prolapse; and Heart block, AV (8/3/2017).    SOCIAL HISTORY  Social History     Social History Main Topics   • Smoking status: Never Smoker    • Alcohol Use: No   • Drug Use: No       SURGICAL HISTORY   has past  surgical history that includes craniotomy stealth (Right, 4/7/2017); lung needle biopsy; and tonsillectomy.    CURRENT MEDICATIONS  Home Medications     No current facility-administered medications on file prior to encounter.     Current Outpatient Prescriptions on File Prior to Encounter   Medication Sig Dispense Refill   • predniSONE (DELTASONE) 5 MG Tab Take 5 mg by mouth every day. Indications: on a taper through 6/23/17 per pt's wife     • ondansetron (ZOFRAN ODT) 8 MG TABLET DISPERSIBLE Take 8 mg by mouth every 8 hours as needed for Nausea/Vomiting.     • Temozolomide (TEMODAR PO) Take  by mouth.     • Calcium Carb-Cholecalciferol (CALCIUM + D3 PO) Take  by mouth.     • IRON PO Take  by mouth.     • Acetaminophen (TYLENOL PO) Take  by mouth.     • Multiple Vitamins-Minerals (PRESERVISION AREDS 2 PO) Take 1 Tab by mouth every day.     • simvastatin (ZOCOR) 10 MG Tab Take 10 mg by mouth every evening.                ALLERGIES  No Known Allergies    PHYSICAL EXAM  VITAL SIGNS: /87 mmHg  Pulse 51  Temp(Src) 37.2 °C (99 °F)  Resp 18  SpO2 96%       Pulse ox interpretation: I interpret this pulse ox as normal on 2L nasal canula since June 2017.   Constitutional: Alert in no apparent distress.  HENT: 4 cm superficial skin tear with surrounding eccymosis, Bilateral external ears normal, Nose normal.   Eyes: Pupils are equal and reactive, Conjunctiva normal, Non-icteric.   Neck: Normal range of motion, No tenderness, Supple, No stridor.   Lymphatic: No lymphadenopathy noted.   Cardiovascular: Regular rate and rhythm, no murmurs.   Thorax & Lungs: Normal breath sounds, No respiratory distress, No wheezing, No chest tenderness.   Abdomen: Bowel sounds normal, Soft, No tenderness, No masses, No pulsatile masses. No peritoneal signs.  Skin: Warm, Dry, No erythema, No rash.   Back: No bony tenderness, No CVA tenderness.   Extremities: Intact distal pulses, No edema, No tenderness, No cyanosis.  Musculoskeletal:  Good range of motion in all major joints. No tenderness to palpation or major deformities noted.   Neurologic: Alert, Normal motor function, Normal sensory function, No focal deficits noted. 4/5 strength in the left lower extremity.   Psychiatric: Affect normal, Judgment normal, Mood normal.     DIAGNOSTIC STUDIES / PROCEDURES    EKG  Interpreted by me    Rhythm: Sinus bradycardia  Rate: 46  Second degree AV block 2:1  RBBB  No ST elevations  Clinical Impression: Sinus bradycardia with a second degree AV block and RBBB.     Repeat EKG  Interpreted by me    Rhythm: Sinus bradycardia   Rate: 42  Second degree AV block 2:1  RBBB  No ST elevations  Clinical Impression: Unchanged from prior.     LABS  Pertinent Labs & Imaging studies reviewed. (See chart for details)    RADIOLOGY  DX-CHEST-PORTABLE (1 VIEW)   Final Result      1.  Unchanged atelectasis with possible superimposed pulmonary edema or other airspace disease   2.  RIGHT upper lobe pulmonary nodule, unchanged.      OUTSIDE IMAGES-CT HEAD   Preliminary Result      OUTSIDE IMAGES-DX CHEST   Preliminary Result      Pertinent Labs & Imaging studies reviewed. (See chart for details)    COURSE & MEDICAL DECISION MAKING  Pertinent Labs & Imaging studies reviewed. (See chart for details)    I reviewed the patient's medical records from the transferring facility. Patient has been treated with vancomycin and cefepime.  CT head did not reveal hemorrhage. He has postoperative changes with vasogenic edema. Antibiotics cefepime and vancomycin given earlier today.     This is an emergent evaluation of a 81 year old man with glioblastoma multiforme presenting from [facility] with bradycardia with heart block, pneumonia, cerebral vasogenic edema. He is awake and alert here. Only neurologic finding is left leg weakness. He has normal blood pressure. EKG on my review appears to be second degree block. Pacer pads in place. Atropine at bedside. Will repeat labs, cxr, lactic  acid, trop here. Uploaded outside imaging.    10:22 PM Ordered for a chest XR, EKG, and labs to evaluate. The plan of care was discussed with the patient and I answered all of his questions at this time. The patient understands and is agreeable with this plan of care.      10:37 PM Consulted with Dr. Mcknight (hospitalist) to further discuss the details of the patient's case at this time. Dr. Mcknight will admit the patient. He requests cardiology and neurosurgery consults.       10:43 PM Paged cardiology.     10:51 PM Consulted with Dr. Chavarria (cardiology) to further discuss the details of the patient's case at this time. Dr. Chavarria will consult.     11:01 PM Paged neurosurgery.       11:39 PM Consulted with Dr. Smith (neurosurgery) to further discuss the details of the patient's case at this time. Dr. Smith recommends starting the patient on 4 mg Decadron every 6 hours. Updated the hospitalist on the plan of care.        DISPOSITION:  Patient will be admitted to Dr. Mcknight (hospitalist) in guarded condition.     FINAL IMPRESSION  1. Bradycardia    2. Pneumonia due to infectious organism, unspecified laterality, unspecified part of lung    3. Vasogenic brain edema (CMS-HCC)      Honey ADAM (Scribe), am scribing for, and in the presence of, Mikey Llamas II, MD.    Electronically signed by: Honey Edwards (Scribe), 8/2/2017    Mikey ADAM II, MD personally performed the services described in this documentation, as scribed by Honey Edwards in my presence, and it is both accurate and complete.    The note accurately reflects work and decisions made by me.  Mikey Llamas II  8/3/2017  2:52 AM

## 2017-08-03 NOTE — PROGRESS NOTES
12 hour chart check    Patient has been in Sinus Bradycardia with 2:1 AV block since admission to [facility] 7 at 0400 with rates in the 40/min  Patient has been asymptomatic since admission to this floor.   WV 0.16  QRS 0.12  QT 0.6

## 2017-08-03 NOTE — ED NOTES
Pts BP remain stable. Cardiologist at bedside. Cardiologist given patients paperwork by this RN. This RN confirmed with cardiologist that we are not pacing at this time. Cardiologist confirmed. Cardiologist states that there will be no intervention at this time unless patient becomes symptomatic or hypotensive. Continue to monitor

## 2017-08-03 NOTE — ED NOTES
Pt to triage for symptomatic bradycardia, transfer from [facility], arrived via POV w/ wife. Pt fell and hit his head yesterday due to generalized weakness, no syncope, has dressing on head. Pt HR in 50's, roomed immediately at request of ERP. Wife states he was transferred for a heart block. Report to Leonardo WHELAN.     Blood pressure 118/87, pulse 50, temperature 37.2 °C (99 °F), resp. rate 20, SpO2 98 %.

## 2017-08-03 NOTE — PROGRESS NOTES
Patient back to tele floor with cath lab RN.  Tele box on, monitor room notified.  Q15 vitals in place

## 2017-08-03 NOTE — ASSESSMENT & PLAN NOTE
Appears to be mild, patient is started on Unasyn  Hold off on Zithromax given patient's prolonged QTC  Give doxycycline instead.  Follow Cultures  Continue RT protocol  Improved.  Completed a 5 day course. No further antibiotics. Discussed with Pharmacy.

## 2017-08-03 NOTE — CONSULTS
Cardiology Consult Note:    Arlene To  Date & Time note created:    8/3/2017   12:19 AM     Referring MD:  Dr. Stiles    Patient ID:   Name:             Grinnell , Alvin     YOB: 1935  Age:                 81 y.o.  male   MRN:               [id number]                                                             Chief Complaint / Reason for consult:  Lightheadedness    History of Present Illness:    This is a 81 years old man with recent diagnosis of brain cancer status post surgical resection and now with ongoing chemotherapy and radiation therapy, presented to the hospital because she was not feeling well. He presented to the ER in [place] and was diagnosed with pneumonia. Patient also has been having fevers. Sputum production and cough. In the ER in [place], patient was noted to be bradycardic. There was no history of syncopal episodes. But patient has been reporting of having presyncope. He was then transferred to [place] for further care. Patient is sleeping at this time. His wife is to want that I am taking history from. Patient does not have any prior history of cardiac problems. No prior cardiac procedure or surgeries.    History EKG shows evidence of 2-1 AV block with infranodal conduction disease.    Review of Systems:      Sleeping in bed.  Blood pressure is stable.          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     Active Hospital Problems    Diagnosis   • Heart block, AV [I44.30]       Past Surgical History:  Past Surgical History   Procedure Laterality Date   • Craniotomy stealth Right 4/7/2017     Procedure: CRANIOTOMY STEALTH - PARIETAL OCCIPITAL;  Surgeon: Lavelle Ugarte M.D.;  Location: SURGERY [facility];  Service:    • Lung needle biopsy       [place] Fever   •  Tonsillectomy         Hospital Medications:    Current facility-administered medications:   •  dexamethasone (DECADRON) injection 4 mg, 4 mg, Intravenous, Q6HRS, Mikey Llamas II, M.D., 4 mg at 08/03/17 0004    Current outpatient prescriptions:   •  predniSONE (DELTASONE) 5 MG Tab, Take 5 mg by mouth every day. Indications: on a taper through 6/23/17 per pt's wife, Disp: , Rfl:   •  ondansetron (ZOFRAN ODT) 8 MG TABLET DISPERSIBLE, Take 8 mg by mouth every 8 hours as needed for Nausea/Vomiting., Disp: , Rfl:   •  Temozolomide (TEMODAR PO), Take  by mouth., Disp: , Rfl:   •  Calcium Carb-Cholecalciferol (CALCIUM + D3 PO), Take  by mouth., Disp: , Rfl:   •  IRON PO, Take  by mouth., Disp: , Rfl:   •  Acetaminophen (TYLENOL PO), Take  by mouth., Disp: , Rfl:   •  Multiple Vitamins-Minerals (PRESERVISION AREDS 2 PO), Take 1 Tab by mouth every day., Disp: , Rfl:   •  simvastatin (ZOCOR) 10 MG Tab, Take 10 mg by mouth every evening., Disp: , Rfl:     Current Outpatient Medications:    (Not in a hospital admission)    Medication Allergy:  No Known Allergies    Family History:  No family history on file.    Social History:  Social History     Social History   • Marital Status:      Spouse Name: N/A   • Number of Children: N/A   • Years of Education: N/A     Occupational History   • Not on file.     Social History Main Topics   • Smoking status: Never Smoker    • Smokeless tobacco: Not on file   • Alcohol Use: No   • Drug Use: No   • Sexual Activity: Not on file     Other Topics Concern   • Not on file     Social History Narrative         Physical Exam:  Vitals/ General Appearance:   Weight/BMI: There is no weight on file to calculate BMI.  Blood pressure 118/87, pulse 51, temperature 37.2 °C (99 °F), resp. rate 20, SpO2 90 %.  Filed Vitals:    08/02/17 2315 08/02/17 2330 08/02/17 2345 08/03/17 0000   BP:       Pulse:       Temp:       Resp: 18 21 17 20   SpO2:         Oxygen Therapy:  Pulse Oximetry: 90  %    Constitutional:   no acute distress  Eyes: No discharge.  Neck:  No JVD  Cardiovascular: regularly irregular rhythm, No murmurs, No rubs, No gallops.   Lungs:  CTAB   Abdomen: no distention, no tenderness      MDM (Data Review):     Records reviewed and summarized in current documentation    Lab Data Review:  Recent Results (from the past 24 hour(s))   EKG (ER)    Collection Time: 17  9:41 PM   Result Value Ref Range    Report       [facility] Emergency Dept.    Test Date:  2017  Pt Name:    ALVIN GRINNELL               Department: ER  MRN:        [id number]                      Room:  Gender:     M                            Technician: [id number]  :        [id number]                   Requested By:LUIS LINDSAY  Order #:    [id number]                    Reading MD:    Measurements  Intervals                                Axis  Rate:       46                           P:          30  NM:         136                          QRS:        56  QRSD:       122                          T:          4  QT:         584  QTc:        511    Interpretive Statements  PREDOMINANT 2:1 AV BLOCK  NONSPECIFIC INTRAVENTRICULAR CONDUCTION DELAY  Compared to ECG 2017 16:54:57  2:1 AV block now present  Sinus rhythm no longer present  Myocardial infarct finding no longer present     Lactic acid (lactate)    Collection Time: 17 10:13 PM   Result Value Ref Range    Lactic Acid 1.8 0.5 - 2.0 mmol/L   CBC WITH DIFFERENTIAL    Collection Time: 17 10:13 PM   Result Value Ref Range    WBC 6.8 4.8 - 10.8 K/uL    RBC 2.90 (L) 4.70 - 6.10 M/uL    Hemoglobin 8.5 (L) 14.0 - 18.0 g/dL    Hematocrit 27.9 (L) 42.0 - 52.0 %    MCV 96.2 81.4 - 97.8 fL    MCH 29.3 27.0 - 33.0 pg    MCHC 30.5 (L) 33.7 - 35.3 g/dL    RDW 75.4 (H) 35.9 - 50.0 fL    Platelet Count 172 164 - 446 K/uL    MPV 11.1 9.0 - 12.9 fL    Nucleated RBC 0.00 /100 WBC    NRBC (Absolute) 0.00 K/uL    Neutrophils-Polys 71.80 44.00 - 72.00  %    Lymphocytes 17.10 (L) 22.00 - 41.00 %    Monocytes 5.10 0.00 - 13.40 %    Eosinophils 0.00 0.00 - 6.90 %    Basophils 2.60 (H) 0.00 - 1.80 %    Neutrophils (Absolute) 5.00 1.82 - 7.42 K/uL    Lymphs (Absolute) 1.16 1.00 - 4.80 K/uL    Monos (Absolute) 0.35 0.00 - 0.85 K/uL    Eos (Absolute) 0.00 0.00 - 0.51 K/uL    Baso (Absolute) 0.18 (H) 0.00 - 0.12 K/uL    Anisocytosis 1+     Microcytosis 1+    COMP METABOLIC PANEL    Collection Time: 08/02/17 10:13 PM   Result Value Ref Range    Sodium 134 (L) 135 - 145 mmol/L    Potassium 4.6 3.6 - 5.5 mmol/L    Chloride 105 96 - 112 mmol/L    Co2 22 20 - 33 mmol/L    Anion Gap 7.0 0.0 - 11.9    Glucose 109 (H) 65 - 99 mg/dL    Bun 17 8 - 22 mg/dL    Creatinine 1.14 0.50 - 1.40 mg/dL    Calcium 8.6 8.5 - 10.5 mg/dL    AST(SGOT) 21 12 - 45 U/L    ALT(SGPT) 27 2 - 50 U/L    Alkaline Phosphatase 88 30 - 99 U/L    Total Bilirubin 0.8 0.1 - 1.5 mg/dL    Albumin 3.6 3.2 - 4.9 g/dL    Total Protein 6.9 6.0 - 8.2 g/dL    Globulin 3.3 1.9 - 3.5 g/dL    A-G Ratio 1.1 g/dL   TROPONIN    Collection Time: 08/02/17 10:13 PM   Result Value Ref Range    Troponin I 0.02 0.00 - 0.04 ng/mL   ESTIMATED GFR    Collection Time: 08/02/17 10:13 PM   Result Value Ref Range    GFR If African American >60 >60 mL/min/1.73 m 2    GFR If Non African American >60 >60 mL/min/1.73 m 2   DIFFERENTIAL MANUAL    Collection Time: 08/02/17 10:13 PM   Result Value Ref Range    Bands-Stabs 1.70 0.00 - 10.00 %    Metamyelocytes 1.70 %    Manual Diff Status PERFORMED    PERIPHERAL SMEAR REVIEW    Collection Time: 08/02/17 10:13 PM   Result Value Ref Range    Peripheral Smear Review see below    PLATELET ESTIMATE    Collection Time: 08/02/17 10:13 PM   Result Value Ref Range    Plt Estimation Normal    MORPHOLOGY    Collection Time: 08/02/17 10:13 PM   Result Value Ref Range    RBC Morphology Present     Large Platelets 1+     Polychromia 1+     Poikilocytosis 1+     Hypersegmented Poly Few    EKG - STAT Once     Collection Time: 17 10:18 PM   Result Value Ref Range    Report       [facility] Emergency Dept.    Test Date:  2017  Pt Name:    ALVIN GRINNELL               Department: ER  MRN:        [id number]                      Room:       [facility]  Gender:     M                            Technician: DILLAN  :        1935                   Requested By:JAYNE ADKINS II  Order #:    [id number]                    Reading MD:    Measurements  Intervals                                Axis  Rate:       42                           P:          30  PA:         136                          QRS:        -2  QRSD:       124                          T:          11  QT:         608  QTc:        509    Interpretive Statements  SINUS BRADYCARDIA  PROBABLE LEFT ATRIAL ABNORMALITY  RIGHT BUNDLE BRANCH BLOCK  BASELINE WANDER IN LEAD(S) V2  Compared to ECG 2017 21:41:31  Right bundle-branch block now present  2:1 AV block no longer present  Intraventricular conduction delay no longer present         Imaging/Procedures Review:    Chest Xray:  Reviewed    EKG:   As in HPI.     MDM (Assessment and Plan):     Active Hospital Problems    Diagnosis   • Heart block, AV [I44.30]       At this time, patient does have a collection of complex current medical issues.  With his current history of lightheadedness and EKG finding of 2-1 AV block with infra suzette conduction disease, certainly, there is indication for permanent pacemaker placement. However, due to his current ongoing chemotherapy treatment along with unknown prognosis overall for his brain cancer, I am not sure that this is to best time for PPM. In addition to this, patient also is infected with possible pneumonia. At least, we should wait for his pneumonia to resolve before putting in a PPM. Certainly at this time, patient is not indicated for temporary wire pacer. He is hemodynamically stable.    In the meantime, overnight, if patient develops  decompensation, we will consider dopamine infusion or temporary wire pacer.    Certainly we will discuss his case with our EP colleagues for further inputs.    Thank you for referring this patient to our cardiology service.  We will follow patient with you.    Arlene Chavarria MD.  [facility].

## 2017-08-03 NOTE — ED NOTES
Pt HR went to 38. Repeat EKG obtained. Question of 3rd degree block per ERP who signed EKG. Pts BP remains stable. Pts symptoms remain unchanged since arrival. Hospitalist notified. This RN paged hospitalist.

## 2017-08-04 PROBLEM — D64.9 ANEMIA: Status: ACTIVE | Noted: 2017-01-01

## 2017-08-04 NOTE — PROCEDURES
DATE OF SERVICE:  08/03/2017    PROCEDURE:  Dual-chamber pacemaker implantation.    INDICATION FOR PROCEDURE:  High-grade heart block.    PROCEDURE IN DETAIL:  After obtaining informed consent, patient was brought to   cardiac catheterization laboratory in a fasted state.  He was prepped and   draped in usual sterile fashion.  He received IV antibiotic prior to any   incision.  He received conscious sedation with midazolam and fentanyl.  We   began by anesthetizing the left deltopectoral area with a mix of lidocaine and   bupivacaine and made a 4 cm incision, dissected down to the prepectoral   fascia.  On the prepectoral fascia, I used micropuncture x2 to access the   medial left axillary vein and 2 wires were placed down the IVC.  These 2 wires   were used for 2 sheaths; the first sheath was used for a passive right   ventricular lead made by [employer] serial #[id number].  This was placed   near the RV apex where there were R waves of 4.9, a threshold of 0.3 volts at   0.4 milliseconds and a pacing impedance of 770 ohms.  I did try few places and   got great thresholds and adequate sensing, so I kept this location.  The   second wire was used for a sheath for right atrial lead, also made by [employer] serial #[id number].  This was placed in the right atrial appendage   where there were P waves of 2.0 millivolts, a threshold of 1 volt at 0.4   milliseconds and a pacing impedance of 433 ohms with good sensing and pacing   parameters on both leads.  They were sutured down to the prepectoral fascia.    A pocket was made and rinsed with copious amount of antibiotic solution.  Two   wires were then connected to a [employer] MRI compatible generator,   serial number is [id number].  This generator was placed in the pocket with the   leads underneath.  The pocket was closed in 3 layers.  Device was programmed   DDD .    CONCLUSION:  Successful implantation of a dual-chamber pacemaker  system.    DISCUSSION:  Patient did very well.  I am hopeful that if he recovers quickly   and that his pulmonary congestion will resolve, I am suspicious that the   pulmonary congestion is actually due to bradycardia with some failure of   forward output and not actually pneumonia as he does not have a white count   and that is why we proceeded to stay in addition to his worsening clinical   scenario.       ____________________________________     MD KEON Retana / NAYELY    DD:  08/03/2017 22:14:28  DT:  08/03/2017 22:28:31    D#:  [id number]  Job#:  [id number]

## 2017-08-04 NOTE — DIETARY
Nutrition Services - Poor PO/Wt loss     80 YO M admitted w/ Hx  GBM s/p resection, chemotherapy and radiation. Transferred to [facility] w/ heart block, PNA, brain mass and vasogenic edema.   Hx craniotomy. S/P pacemaker placement 8/3.     Medication reviewed and notable for unasyn, adoxa, senna-docusate and decadron. Labs reviewed and notable for glucose 144. No pressure areas documented at this time. Pt w/ 1+ pitting edema to L LE and 1+ dependent edema to R LE. Pt c/o constipation.     Weight is 68.6kg and BMI is 25.17kg/m2, which is overweight. Pt reports losing 7lbs x 1 month (r/t poor appetite w/ chemotherapy), which is not significant at 4.4% loss. Appearance is well-nourished, well-hydrated and lethargic. Pt is eating 50-75% of a cardiac diet and is agreeable to drinking boost plus.     Plan/Recommendation    Encourage PO intake. Will provide prune juice w/ meals per Pt request r/t constipation.    Recommend boost plus TID between meals for additional Kcal/protein - MD aware.      Nutrition Rep to see patient daily for meal preferences. Please document PO intake as percentage of meals consumed.     RD following

## 2017-08-04 NOTE — PROGRESS NOTES
After 15 minutes, no reaction, stable VS, increased rate to 120ml/hr. Will continue to closely monitor for another 15 minutes.

## 2017-08-04 NOTE — PROGRESS NOTES
Pt laying in bed, no signs of distress, VSS, denies needs, bed low position, cardiac monitor in place, alarming appropriately, call light within reach, family at bedside. IV fluids infusing as ordered.

## 2017-08-04 NOTE — PROGRESS NOTES
IV site infiltrated times two sites, new site placed on first attempt as charted, seems to be working well for IV antibiotics and fluids, other IV sites removed.

## 2017-08-04 NOTE — PROGRESS NOTES
Pt complaining of pain at IV site where blood is running. Stopped blood for 5 minutes, CLEOPATRA Pihpps was able to put in new IV. Restarted blood at 75ml/hr.  Pt no longer complaining of discomfort. VSS, no other signs of a reaction.

## 2017-08-04 NOTE — PROGRESS NOTES
Bedside report received, pt laying in bed, IV site with drainage, dressing was changed, IV site flushes well, no leaking with saline flush 10ml, IV site left CDI, blood infusing at 100/hr as the pt c/o some discomfort from IV site, no signs of infiltration however. Pt denies needs, no signs of distress. VSS.

## 2017-08-04 NOTE — PROGRESS NOTES
Notified by bedside RN of pt c/o chest pressure. Dr. Roa aware, EKG reviewed. Orders for troponin X1, and CT of chest/abdomen to rule out blood pooling, d/t pt's unexplained anemia this am.

## 2017-08-04 NOTE — PROGRESS NOTES
Assumed care of PT. Told Pt was A&O 3, confused to time. Pt sleeping at the time, will reassess.  Pt resting in bed with no signs of labored breathing. On 2L O2. Tele monitor in place, cardiac rhythm being monitored. Call light within reach, bed in lowest position, upper bed rails up. Pt was updated on plan of care for the night . Will continue to monitor.

## 2017-08-04 NOTE — PROGRESS NOTES
MD paged to notify of pressure in mid chest region 4/10 level, VSS, EKG done, given zofran for c/o nausea.

## 2017-08-04 NOTE — PROGRESS NOTES
MS  [id number]  2nd degree type 2 block 39-40  1000 3rd degree block 35-42  1400 ongoing paced in 60's

## 2017-08-04 NOTE — PROGRESS NOTES
Blood transfusion completed, no reactions noted, Tamica CHRISTINE from [employer] ([email or web address]) was notified that the pts wife is here at bedside per request for pacemaker education.

## 2017-08-04 NOTE — CARE PLAN
Problem: Safety  Goal: Will remain free from injury  PT bed in lowest position. Call light within reach. Pt educated about using call light.    Problem: Skin Integrity  Goal: Risk for impaired skin integrity will decrease  Q2 turns. Moisture and barrier cream applied. Heels floated on pillows.

## 2017-08-04 NOTE — PROGRESS NOTES
Pt with new onset chest pain, ordered stat EKG, VSS, pt describes the pain as a pressure in center of chest. EKG tech in room performing EKG, pt doesn't appear in distress at this time. Will notify MD.

## 2017-08-04 NOTE — PROGRESS NOTES
Still no order for blood, re-paged GREER Darby to remind to put in order.  GREER Darby said she would put in right now.

## 2017-08-04 NOTE — CARE PLAN
Problem: Safety  Goal: Will remain free from injury  Outcome: PROGRESSING AS EXPECTED  Fall precautions in place. Treaded socks on pt. Appropriate signs on doorway. IV pole on same side as bathroom. Bedrails up. Bed in lowest position and locked.  Call light and phone within reach. Patient educated on importance of calling nurses before getting out of bed, verbalizes understanding. Bed alarm active    Problem: Knowledge Deficit  Goal: Knowledge of disease process/condition, treatment plan, diagnostic tests, and medications will improve  Outcome: PROGRESSING AS EXPECTED  Patient and spouse educated about POC.  All questions answered in regards to disease process, treatment, and diet.  Patient and spouse verbalized understanding and voiced no further questions at this time.

## 2017-08-04 NOTE — CONSULTS
Date of Service  8/3/2017    Chief Complaint  Chief Complaint    Patient presents with    •  Bradycardia        transfer from [place]        History of Presenting Illness  81 y.o. male past medical history of GBM status post resection, Temodar and radiation therapy who was transferred from [facility] in [place] for pneumonia, complete heart block, brain mass with vasogenic edema on 8/2/2017. Since states that he had a ground-level fall after he tripped while getting out of the shower. He hit his head but did not lose consciousness. He states that he has been feeling lightheaded for the past 1 week. At the outlying facility chest x-ray revealed bibasilar opacities, CT had revealed extensive vasogenic edema and right parietal, frontal, occipital, temporal lobes and attention to right cisterns with 2 mm midline shift appears to be increased from previous study. [facility] also [facility] a possible 3rd degree heart block. At this time the patient reports low grade fevers and a productive cough. He denies any chest pain or shortness of breath.     He has undergone pacemaker placement for heart block, and is being treated for pneumonia.        Primary Care Physician  No primary care provider on file.  Code Status  Full code    Review of Systems  Review of Systems   Constitutional: Positive for fever and malaise/fatigue.   Respiratory: Positive for cough and sputum production. Negative for shortness of breath and wheezing.    Cardiovascular: Negative for chest pain, palpitations and leg swelling.   Gastrointestinal: Negative for heartburn, nausea, vomiting and abdominal pain.   Genitourinary: Negative for dysuria.   Neurological: Positive for dizziness and weakness. Negative for loss of consciousness and headaches.   All other systems reviewed and are negative.          Past Medical History  Past Medical History    Diagnosis  Date    •  Hyperlipidemia      •  Hypertension      •  Glioblastoma  multiforme (CMS-HCC)  17    •  History of [place] fever      •  Mitral valve prolapse      •  Heart block, AV  8/3/2017        Surgical History  Past Surgical History    Procedure  Laterality  Date    •  Craniotomy stealth  Right  2017        Procedure: CRANIOTOMY STEALTH - PARIETAL OCCIPITAL;  Surgeon: Lavelle Ugarte M.D.;  Location: SURGERY [facility];  Service:     •  Lung needle biopsy            [place] Fever    •  Tonsillectomy            Medications  No current facility-administered medications on file prior to encounter.        Current Outpatient Prescriptions on File Prior to Encounter    Medication  Sig  Dispense  Refill    •  predniSONE (DELTASONE) 5 MG Tab  Take 5 mg by mouth every day. Indications: on a taper through 17 per pt's wife        •  ondansetron (ZOFRAN ODT) 8 MG TABLET DISPERSIBLE  Take 8 mg by mouth every 8 hours as needed for Nausea/Vomiting.        •  Temozolomide (TEMODAR PO)  Take  by mouth.        •  Calcium Carb-Cholecalciferol (CALCIUM + D3 PO)  Take  by mouth.        •  IRON PO  Take  by mouth.        •  Acetaminophen (TYLENOL PO)  Take  by mouth.        •  Multiple Vitamins-Minerals (PRESERVISION AREDS 2 PO)  Take 1 Tab by mouth every day.        •  simvastatin (ZOCOR) 10 MG Tab  Take 10 mg by mouth every evening.              Family History  No pertinent family history    Social History  Social History    Substance Use Topics    •  Smoking status:  Never Smoker     •  Smokeless tobacco:  Not on file    •  Alcohol Use:  No        Allergies  No Known Allergies      Physical Exam    Laboratory    Hemodynamics  Temp (24hrs), Av.2 °C (99 °F), Min:37.2 °C (99 °F), Max:37.2 °C (99 °F)   Temperature: 37.2 °C (99 °F)  Pulse  Av  Min: 43  Max: 51 Heart Rate (Monitored): (!) 42  Blood Pressure : 118/87 mmHg, NIBP: 108/45 mmHg      Respiratory      Respiration: 20, Pulse Oximetry: 90 %             Physical Exam   Constitutional: He is oriented to  person, place, and time. He appears well-developed and well-nourished.   HENT:    Head: Normocephalic and atraumatic.    Mouth/Throat: Oropharynx is clear and moist.   Eyes: Conjunctivae are normal.   Neurological: He is alert and oriented to person, place, and time. No cranial nerve deficit.   Speech fluent appropriate  Pupils 3 mm midline conjugate gaze  Visual fields grossly full to confrontation  Motor:  Bilateral  bicep IP DF PF 5/5 no pronator drift  Sensation intact touch x 4 extremities torso face  Reflex:  Left > Right Swenson's  Incision well healed.  Abrasion on superior medial aspect of flap.  Skin: Skin is warm and dry.   Psychiatric: He has a normal mood and affect. His behavior is normal.           Recent Labs       08/02/17   2213    WBC   6.8    RBC   2.90*    HEMOGLOBIN   8.5*    HEMATOCRIT   27.9*    MCV   96.2    MCH   29.3    MCHC   30.5*    RDW   75.4*    PLATELETCT   172    MPV   11.1      Recent Labs       08/02/17   2213    SODIUM   134*    POTASSIUM   4.6    CHLORIDE   105    CO2   22    GLUCOSE   109*    BUN   17    CREATININE   1.14    CALCIUM   8.6      Recent Labs       08/02/17   2213    ALTSGPT   27    ASTSGOT   21    ALKPHOSPHAT   88    TBILIRUBIN   0.8    GLUCOSE   109*                  Lab Results    Component  Value  Date      TROPONINI  0.02  08/02/2017        Imaging  DX-CHEST-PORTABLE (1 VIEW)    Final Result        1.  Unchanged atelectasis with possible superimposed pulmonary edema or other airspace disease    2.  RIGHT upper lobe pulmonary nodule, unchanged.        OUTSIDE IMAGES-CT HEAD    Preliminary Result        OUTSIDE IMAGES-DX CHEST    Preliminary Result              Assessment/Plan              81 year old male with right parietal/occipital GBM admitted for pneumonia, complete heart block.  He has undergone pacemaker; placement.  The right parietal occipital edema noted on recent head CT is not unexpected given recent surgery, chemo and radiation therapy.   Recommend Decadron.  Repeat head imaging scheduled for the 11th; if pacemaker is not MRI compatible, then CT +/- contrast.

## 2017-08-04 NOTE — PROGRESS NOTES
Pt laying in bed, no signs of distress, pain scale as charted, spouse at bedside, VSS, cardiac monitor in place, alarms set appropriately, denies needs. Call light within reach, bed low position, bed alarm on and intact.

## 2017-08-04 NOTE — PROGRESS NOTES
MD stated to give GI cocktail PRN for stomach upset and can do oxycodone 5mg every 4 hrs for pain. Will put these orders in.

## 2017-08-04 NOTE — PROGRESS NOTES
hospitalist RN notified that pt is still c/o chest pressure and left arm pain, feeling like he cannot breathe, however oxygen saturations and RR is WNL on 2L, pt may benefit from pain meds and gi cocktail. RN to follow up with MD for further orders. Awaiting radiology procedures, troponin is back at 0.03.

## 2017-08-05 NOTE — ASSESSMENT & PLAN NOTE
Hb at 6 postoperatively. No evidence of acute bleed. Transfuse and get CT noncontrast r/o hematoma.  CT turned out no hematoma.  Hb stable now at 9. Outpatient follow up for further owrkup.

## 2017-08-05 NOTE — PROGRESS NOTES
Assumed care of PT A&O 4. Pt resting in bed with no signs of labored breathing. On 2L. Tele monitor in place, cardiac rhythm being monitored. Call light within reach, bed in lowest position, upper bed rails up. Pt was updated on plan of care for the night . Will continue to monitor.

## 2017-08-05 NOTE — PROGRESS NOTES
[facility] Hospitalist Progress Note    Date of Service: 2017    Chief Complaint  81 y.o. male admitted 2017 with Bradycardia        Interval Problem Update  Now s/p pacer. However Hb at 6. No BRBPR, epistaxis or hematemesis.    Consultants/Specialty  Cardiology  Neurosurgery    Disposition  Likely SNF        Review of Systems   Unable to perform ROS: mental acuity      Physical Exam  Laboratory/Imaging   Hemodynamics  Temp (24hrs), Av.7 °C (98.1 °F), Min:36.2 °C (97.2 °F), Max:37.1 °C (98.8 °F)   Temperature: 36.6 °C (97.8 °F)  Pulse  Av.9  Min: 41  Max: 84   Blood Pressure : 139/61 mmHg      Respiratory      Respiration: 18, Pulse Oximetry: 99 %     Work Of Breathing / Effort: Mild  RUL Breath Sounds: Clear, RML Breath Sounds: Clear, RLL Breath Sounds: Diminished, RIANA Breath Sounds: Clear, LLL Breath Sounds: Diminished    Fluids    Intake/Output Summary (Last 24 hours) at 17 1856  Last data filed at 17 1800   Gross per 24 hour   Intake   3001 ml   Output    875 ml   Net   2126 ml       Nutrition  Orders Placed This Encounter   Procedures   • Diet Order     Standing Status: Standing      Number of Occurrences: 1      Standing Expiration Date:      Order Specific Question:  Diet:     Answer:  Cardiac [6]     Physical Exam   Constitutional: He appears well-developed and well-nourished.   HENT:   Head: Normocephalic and atraumatic.   Recent brain surgery, scars CDI   Eyes: Conjunctivae and EOM are normal. No scleral icterus.   Neck: Normal range of motion. Neck supple.   Cardiovascular: Normal rate and regular rhythm.  Exam reveals no gallop and no friction rub.    Murmur heard.  Pacer site CDI, minimal soreness   Pulmonary/Chest: Effort normal and breath sounds normal. No respiratory distress. He has no wheezes. He has no rales.   Abdominal: Soft. Bowel sounds are normal. He exhibits no distension. There is no tenderness. There is no rebound and no guarding.   Musculoskeletal: He exhibits no  edema or tenderness.   Neurological: He is alert.   Skin: Skin is warm.   Psychiatric: He has a normal mood and affect. His behavior is normal.       Recent Labs      08/02/17 2213  08/04/17   0326  08/04/17   1110   WBC  6.8  3.0*  4.1*   RBC  2.90*  2.36*  2.99*   HEMOGLOBIN  8.5*  6.9*  8.7*   HEMATOCRIT  27.9*  22.7*  27.7*   MCV  96.2  96.2  92.6   MCH  29.3  29.2  29.1   MCHC  30.5*  30.4*  31.4*   RDW  75.4*  71.7*  69.1*   PLATELETCT  172  123*  144*   MPV  11.1  11.1  11.1     Recent Labs      08/02/17 2213 08/04/17   0145   SODIUM  134*  136   POTASSIUM  4.6  4.2   CHLORIDE  105  109   CO2  22  18*   GLUCOSE  109*  144*   BUN  17  20   CREATININE  1.14  0.68   CALCIUM  8.6  7.8*             Recent Labs      08/04/17   0145   TRIGLYCERIDE  107   HDL  24*   LDL  94          Assessment/Plan     * Heart block, 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  Because of sustained bradycardia, pacemaker placed by Dr. Chung 8/3    Vasogenic cerebral edema (CMS-HCC) (present on admission)  Assessment & Plan  He has been started on IV Decadron  Neurosurgery has been consulted  Continue neuro checks  Dr. Ugarte mentions continue Decadron and monitoring      CAP (community acquired pneumonia) (present on admission)  Assessment & Plan  Appears to be mild, patient is started on Unasyn  Hold off on Zithromax given patient's prolonged QTC  Give doxycycline instead.  Follow Cultures  Continue RT protocol  Improved.    Anemia  Assessment & Plan  Hb at 6 postoperatively. No evidence of acute bleed. Transfuse and get CT noncontrast r/o hematoma.    I spent 39 minutes, reviewing the chart, notes, vitals, labs, imaging, ordering labs,  evaluating Alvin Grinnell for assessment, enacting the plan above. 50% of the time was spent in counseling Alvin Grinnell and familyanswering questions. Medical decision making is therefore complex. Time was devoted to counseling and coordinating care including review of records, pertinent lab data and studies, as well as discussing diagnostic evaluation and work up, planned therapeutic interventions and future disposition of care. Where indicated, the assessment and plan reflect discussion of patient with consultants, other healthcare providers, family members, and additional research needed to obtain further information in formulating the plan of care for Alvin Grinnell.             DVT Prophylaxis: Contraindicated - High bleeding risk

## 2017-08-05 NOTE — PROGRESS NOTES
Pt resting in bed at this time. Even visible chest rise. No signs of distress. Bed alarm on. Call light in place. Q2 turn. Bed in low and locked position.

## 2017-08-05 NOTE — PROGRESS NOTES
[facility] Hospitalist Progress Note    Date of Service: 2017    Chief Complaint  81 y.o. male admitted 2017 with Bradycardia        Interval Problem Update  CT showed no bleeding or hematoma. Fatigued. Hb still at 7.     Consultants/Specialty  Cardiology  Neurosurgery    Disposition  Likely SNF        Review of Systems   Unable to perform ROS: mental acuity      Physical Exam  Laboratory/Imaging   Hemodynamics  Temp (24hrs), Av.4 °C (97.6 °F), Min:36.2 °C (97.2 °F), Max:36.8 °C (98.3 °F)   Temperature: 36.4 °C (97.5 °F)  Pulse  Av.1  Min: 41  Max: 84   Blood Pressure : 121/78 mmHg      Respiratory      Respiration: 16, Pulse Oximetry: 98 %     Work Of Breathing / Effort: Mild  RUL Breath Sounds: Clear, RML Breath Sounds: Clear, RLL Breath Sounds: Diminished, RIANA Breath Sounds: Clear, LLL Breath Sounds: Diminished    Fluids    Intake/Output Summary (Last 24 hours) at 17 1538  Last data filed at 17 1200   Gross per 24 hour   Intake   3151 ml   Output   1500 ml   Net   1651 ml       Nutrition  Orders Placed This Encounter   Procedures   • Diet Order     Standing Status: Standing      Number of Occurrences: 1      Standing Expiration Date:      Order Specific Question:  Diet:     Answer:  Cardiac [6]     Physical Exam   Constitutional: He appears well-developed and well-nourished.   HENT:   Head: Normocephalic and atraumatic.   Recent brain surgery, scars CDI   Eyes: Conjunctivae and EOM are normal. No scleral icterus.   Neck: Normal range of motion. Neck supple.   Cardiovascular: Normal rate and regular rhythm.  Exam reveals no gallop and no friction rub.    Murmur heard.  Pacer site CDI, minimal soreness   Pulmonary/Chest: Effort normal and breath sounds normal. No respiratory distress. He has no wheezes. He has no rales.   Abdominal: Soft. Bowel sounds are normal. He exhibits no distension. There is no tenderness. There is no rebound and no guarding.   Musculoskeletal: He exhibits no edema  or tenderness.   Neurological: He is alert.   Skin: Skin is warm.   Psychiatric: He has a normal mood and affect. His behavior is normal.       Recent Labs      08/04/17   0326  08/04/17   1110  08/05/17   0407   WBC  3.0*  4.1*  3.8*   RBC  2.36*  2.99*  2.75*   HEMOGLOBIN  6.9*  8.7*  8.2*   HEMATOCRIT  22.7*  27.7*  25.7*   MCV  96.2  92.6  93.5   MCH  29.2  29.1  29.8   MCHC  30.4*  31.4*  31.9*   RDW  71.7*  69.1*  71.2*   PLATELETCT  123*  144*  123*   MPV  11.1  11.1  10.9     Recent Labs      08/02/17   2213  08/04/17   0145  08/05/17   0407   SODIUM  134*  136  139   POTASSIUM  4.6  4.2  4.3   CHLORIDE  105  109  111   CO2  22  18*  21   GLUCOSE  109*  144*  148*   BUN  17  20  18   CREATININE  1.14  0.68  0.66   CALCIUM  8.6  7.8*  8.0*             Recent Labs      08/04/17   0145   TRIGLYCERIDE  107   HDL  24*   LDL  94          Assessment/Plan     * Heart block, 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  Because of sustained bradycardia, pacemaker placed by Dr. Chung 8/3  Pacer working, Cardiology signed off.    Vasogenic cerebral edema (CMS-HCC) (present on admission)  Assessment & Plan  He has been started on IV Decadron  Neurosurgery has been consulted  Continue neuro checks  Dr. Ugarte mentions continue Decadron and monitoring      CAP (community acquired pneumonia) (present on admission)  Assessment & Plan  Appears to be mild, patient is started on Unasyn  Hold off on Zithromax given patient's prolonged QTC  Give doxycycline instead.  Follow Cultures  Continue RT protocol  Improved.    Anemia  Assessment & Plan  Hb at 6 postoperatively. No evidence of acute bleed. Transfuse and get CT noncontrast r/o  hematoma.    I spent 35 minutes, reviewing the chart, notes, vitals, labs, imaging, ordering labs, evaluating Alvin Grinnell for assessment, enacting the plan above. 50% of the time was spent in counseling Alvin Grinnell and wife, answering questions. Medical decision making is therefore complex. Time was devoted to counseling and coordinating care including review of records, pertinent lab data and studies, as well as discussing diagnostic evaluation and work up, planned therapeutic interventions and future disposition of care. Where indicated, the assessment and plan reflect discussion of patient with consultants, other healthcare providers, family members, and additional research needed to obtain further information in formulating the plan of care for Alvin Grinnell.             DVT Prophylaxis: Contraindicated - High bleeding risk

## 2017-08-05 NOTE — DIETARY
Nutrition Services:   Consult received for supplements (pt prefers ensure over boost). RD already following pt. Nutrition rep obtained pt's supplement preferences. We do not carry ensure. Pt willing to try magic cups.     PLAN/RECOMMEND -   1) Nutrition rep to see patient daily for meal and snack preferences.  2) Encourage PO  3) Weekly weights to monitor fluid and nutrition status  4) Please document PO intake for each meal as percentage of meals consumed    RD will continue to follow

## 2017-08-05 NOTE — PROGRESS NOTES
Bedside report received, IV fluids infusing as ordered, cardiac monitor in place and bed alarm on and alarms appropriately set. Pt denies needs, no signs of distress, VSS.

## 2017-08-05 NOTE — CARE PLAN
Problem: Bowel/Gastric:  Goal: Normal bowel function is maintained or improved  Outcome: PROGRESSING AS EXPECTED  Pt given meds to help with bowel function. Toileting offered at frequent intervals.     Problem: Skin Integrity  Goal: Risk for impaired skin integrity will decrease  Intervention: Implement precautions to protect skin integrity in collaboration with the interdisciplinary team  Pt on Q2 turns. Pillows in place to float heels. Moisturizer available.

## 2017-08-05 NOTE — PROGRESS NOTES
Device implanted, EP service following, no other cardiology recommendations, and we will sign off.

## 2017-08-05 NOTE — PROGRESS NOTES
Pt assisted back to bed, tolerated well, cardiac monitor removed per MD order for transfer to neurology. VSS, IV fluids infusing as ordered, bed alarm on, call light within reach, pt denies needs, no signs of distress, pain scale as charted.

## 2017-08-06 NOTE — PROGRESS NOTES
2 RN skin check completed. Skin tear to posterior head from fall PTA, covered in dressing/ CDI. Skin tear already charted under wounds tab. Redness to sacrum but blanching. No other skin issues noted at this time.

## 2017-08-06 NOTE — CONSULTS
Medical chart review completed. Patient is a 81 y.o. 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 He is s/p resection of his brain tumor (4/2017 by Dr. Ugarte) with ongoing chemotherapy/radiation and had been having lightheadedness for 1 week. He is s/p pacemaker placement on 8/3 by Dr. Chung. He is being treated with IV decadron for the brain edema and is on antibiotics for the pneumonia.    Patient with multiple co-morbidities(including but not limited to GBM, anemia, leukopenia, thrombocytopenia, pneumonia); with cognitive deficits and functional deficits in mobility/self-care/swallowing/speech, and Severe de-conditioning. Pre-morbidly, this patient lived in a single level home with ramp to enter, with spouse. The patient was evaluated by acute care Physical Therapy; currently requiring contact-guard to maximum assistance for mobility, also with ongoing cognitive deficits.    The patient may be an excellent candidate for an acute inpatient rehabilitation program with a coordinated program of care at an intensity and frequency not available at a lower level of care.     First, please confirm he requires assistance for his ADLs via OT and/or nursing evaluation.    In addition, please determine when he will receive his next treatments (chemotherapy/radiation) for his brain cancer, as we will not be able to transfer him out for these treatments while on inpatient rehab. If his treatment can be delayed for 2-3 weeks, than I think he certainly would benefit from an inpatient rehab stay to get him stronger and prepared for the next round of treatments.    This recommendation is substantiated by the patient's current medical condition with intervention and assessment of medical issues requiring an acute level of care for patient's safety and maximum outcome. A coordinated program of care will  be provided by an interdisciplinary team including physical therapy, occupational therapy, speech language pathology, physiatry, rehab nursing and rehab psychology. Rehab goals include improved cognition, mobility, self-care management, strength and conditioning/endurance, pain management, bowel and bladder management, mood and affect, and safety with independent home management including caregiver training. Estimated length of stay is approximately 14-21 days. Rehab potential: Fair. Disposition: to pre-morbid independent living setting with supportive care of patient's spouse. We will continue to follow with you in anticipation of discharge to acute inpatient rehabilitation when medically stable to do so at the discretion of his  attending physician. Thank you for allowing us to participate in his   care. Please call with any questions regarding this recommendation.    Jael Villatoro M.D.  Physical Medicine and Rehabilitation

## 2017-08-06 NOTE — PROGRESS NOTES
Report called to [facility] on neuro for transfer to room 194 bed 1. Pt transferred with belongings and meds, chart, no signs of distress at time of transfer.

## 2017-08-06 NOTE — PROGRESS NOTES
Assumed care of patient at 0700.  Patient greeted and assessed.  A&O x 4 with dysarthria.  Patient must not raise left shoulder due to placement of pacemaker.  Q2 assisted turns.  Will continue to monitor.

## 2017-08-06 NOTE — THERAPY
"80 y/o male adm for bradycardia, s/p pacemaker placement, recent GLF, hx of GBM. Pt education regarding restrictions on LUE use due to new pacemaker. Requiring  max assist for bed mobility due to curtailment of LUE use,. Acute PT to address these aforemntioned problems for pt to achieve higher level of function for pt to return home safely.    Physical Therapy Evaluation completed.   Bed Mobility:  Supine to Sit: Maximal Assist  Transfers: Sit to Stand: Minimal Assist  Gait: Level Of Assist: Contact Guard Assist with Front-Wheel Walker       Plan of Care: Will benefit from Physical Therapy 3 times per week  Discharge Recommendations: Equipment: Will Continue to Assess for Equipment Needs. Post-acute therapy Discharge to a transitional care facility for continued skilled therapy services.    See \"Rehab Therapy-Acute\" Patient Summary Report for complete documentation.     "

## 2017-08-06 NOTE — DISCHARGE PLANNING
Aware of PMR referral from Dr. Roa. Current chart documentation indicates potential for inpatient rehab, pending OT evaluation. Will forward to Physiatry for consult per protocol. Dr. Jael Villatoro to review. [facility] to follow for Physiatry recommendation. Chart reveals pt hx of GMB. Will need to clarify status of chemo/radiation regimen to determine if treatment plan has been completed if pt is appropriate for rehab. Thank you for the opportunity to assist as this individual prepares for transition to post acute care.

## 2017-08-06 NOTE — THERAPY
"Occupational Therapy Evaluation completed.   Functional Status:  Min A supine to sit.  Max A LB dressing.  Min A sit to stand.  Pt walked short distance in room with FWW and min A for balance.  Pt declined further activity/ADL's.  Pt required min prompting to follow pacemaker precautions despite pt verbalizing precautions at start of session.  Min A to return to supine.  Plan of Care: Will benefit from Occupational Therapy 3 times per week  Discharge Recommendations:  Equipment: Will Continue to Assess for Equipment Needs. Post-acute therapy Discharge to a transitional care facility for continued skilled therapy services.    See \"Rehab Therapy-Acute\" Patient Summary Report for complete documentation.    "

## 2017-08-06 NOTE — CARE PLAN
Problem: Skin Integrity  Goal: Risk for impaired skin integrity will decrease  Outcome: PROGRESSING AS EXPECTED  Patient repositions self with assistance from staff every 2 hours and bathes to avoid skin breakdown    Problem: Mobility  Goal: Risk for activity intolerance will decrease  Outcome: PROGRESSING SLOWER THAN EXPECTED  Patient is still having low tolerance for an movement, to include turning.  Will continue to turn Q2 and monitor

## 2017-08-06 NOTE — PROGRESS NOTES
Monitor Summary: Paced SR 61-82, TN .14, QRS .12, QT .42 with rare PVC per strip from monitor room

## 2017-08-06 NOTE — CARE PLAN
Problem: Safety  Goal: Will remain free from falls  Outcome: PROGRESSING AS EXPECTED    Problem: Bowel/Gastric:  Goal: Normal bowel function is maintained or improved  Outcome: PROGRESSING SLOWER THAN EXPECTED  Multiple loose BM's during the night. Tanya douglas held.

## 2017-08-06 NOTE — PROGRESS NOTES
Attempted to locate the pts spouses phone number to notify the family of pts move to [facility], however cannot locate a phone number and the pt is unaware of what her number is. Called home phone number listed on pts demographic sheet, however there was no answer. Attempted to call [facility] where the pt stated his wife is staying but got an answering machine and did not leave a message. Will notify pt of this result.

## 2017-08-06 NOTE — PROGRESS NOTES
[facility]    Date of Service: 2017    Chief Complaint  81 y.o. male admitted 2017 with Bradycardia        Interval Problem Update  Getting stronger slowly. Working with PT    Consultants/Specialty  Cardiology  Neurosurgery    Disposition  Rehab will take if chemotherapy can be delayed in 2-3 weeks        Review of Systems   Unable to perform ROS: mental acuity      Physical Exam  Laboratory/Imaging   Hemodynamics  Temp (24hrs), Av.4 °C (97.6 °F), Min:36.1 °C (97 °F), Max:36.8 °C (98.3 °F)   Temperature: 36.7 °C (98.1 °F)  Pulse  Av.9  Min: 41  Max: 99   Blood Pressure : 129/63 mmHg      Respiratory      Respiration: 17, Pulse Oximetry: 98 %     Work Of Breathing / Effort: Mild  RUL Breath Sounds: Clear, RML Breath Sounds: Clear, RLL Breath Sounds: Clear, RIANA Breath Sounds: Clear, LLL Breath Sounds: Clear    Fluids    Intake/Output Summary (Last 24 hours) at 17 1422  Last data filed at 17 1300   Gross per 24 hour   Intake    100 ml   Output   1075 ml   Net   -975 ml       Nutrition  Orders Placed This Encounter   Procedures   • Diet Order     Standing Status: Standing      Number of Occurrences: 1      Standing Expiration Date:      Order Specific Question:  Diet:     Answer:  Cardiac [6]     Physical Exam   Constitutional: He appears well-developed and well-nourished.   HENT:   Head: Normocephalic and atraumatic.   Recent brain surgery, scars CDI   Eyes: Conjunctivae and EOM are normal. No scleral icterus.   Neck: Normal range of motion. Neck supple.   Cardiovascular: Normal rate and regular rhythm.  Exam reveals no gallop and no friction rub.    Murmur heard.  Pacer site CDI, minimal soreness   Pulmonary/Chest: Effort normal and breath sounds normal. No respiratory distress. He has no wheezes. He has no rales.   Abdominal: Soft. Bowel sounds are normal. He exhibits no distension. There is no tenderness. There is no rebound and no guarding.   Musculoskeletal: He  exhibits no edema or tenderness.   Neurological: He is alert.   Skin: Skin is warm.   Psychiatric: He has a normal mood and affect. His behavior is normal.       Recent Labs      08/04/17   1110  08/05/17   0407 08/06/17   0224   WBC  4.1*  3.8*  3.9*   RBC  2.99*  2.75*  2.82*   HEMOGLOBIN  8.7*  8.2*  8.2*   HEMATOCRIT  27.7*  25.7*  26.1*   MCV  92.6  93.5  92.6   MCH  29.1  29.8  29.1   MCHC  31.4*  31.9*  31.4*   RDW  69.1*  71.2*  71.0*   PLATELETCT  144*  123*  121*   MPV  11.1  10.9  10.8     Recent Labs      08/04/17   0145  08/05/17   0407 08/06/17   0224   SODIUM  136  139  140   POTASSIUM  4.2  4.3  4.0   CHLORIDE  109  111  110   CO2  18*  21  22   GLUCOSE  144*  148*  152*   BUN  20  18  15   CREATININE  0.68  0.66  0.58   CALCIUM  7.8*  8.0*  8.2*             Recent Labs      08/04/17   0145   TRIGLYCERIDE  107   HDL  24*   LDL  94          Assessment/Plan     * Heart block, 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  Because of sustained bradycardia, pacemaker placed by Dr. Chung 8/3  Pacer working, Cardiology signed off.    Vasogenic cerebral edema (CMS-HCC) (present on admission)  Assessment & Plan  He has been started on IV Decadron  Neurosurgery has been consulted  Continue neuro checks  Dr. Ugarte mentions continue Decadron and monitoring  Rehab will accept if further radiochemotherapy can be delayed or postponed in a few weeks.   Ultimately defer to Dr. Ugarte    CAP (community acquired pneumonia) (present on admission)  Assessment & Plan  Appears to be mild, patient is started on Unasyn  Hold off on Zithromax given patient's prolonged QTC  Give doxycycline instead.  Follow Cultures  Continue RT  protocol  Improved.    Anemia  Assessment & Plan  Hb at 6 postoperatively. No evidence of acute bleed. Transfuse and get CT noncontrast r/o hematoma.    I spent 36 minutes, reviewing the chart, notes, vitals, labs, imaging, ordering labs, evaluating Alvin Grinnell for assessment, enacting the plan above. 50% of the time was spent in counseling Alvin Grinnell and wife, answering questions. Medical decision making is therefore complex. Time was devoted to counseling and coordinating care including review of records, pertinent lab data and studies, as well as discussing diagnostic evaluation and work up, planned therapeutic interventions and future disposition of care. Where indicated, the assessment and plan reflect discussion of patient with consultants, other healthcare providers, family members, and additional research needed to obtain further information in formulating the plan of care for Alvin Grinnell.             DVT Prophylaxis: Contraindicated - High bleeding risk

## 2017-08-07 NOTE — PREADMISSION SCREENING NOTE
Pre-Admission Screening Form    Patient Information:   Name: Alvin Grinnell     MRN: [id number]       : 1935      Age: 81 y.o.   Gender: male      Race: White [7]       Marital Status:  [2]  Family Contact: GrinnellSilva Kelly,Cydney Tyson        Relationship: Spouse [17]  Daughter [2]  Daughter [2]  Home Phone: [phone]               Cell Phone: [phone]  Advanced Directives: None  Code Status:  FULL  Current Attending Provider: Eliud Roa M.D.  Referring Physician: Dr. Roa        Physiatrist Consult: Dr. Villatoro        Referral Date: 2017  Primary Payor Source:  MEDICARE  Secondary Payor Source:  [facility]    Medical Information:   Date of Admission to Acute Care [place]2017  Room Number: S194/01  Rehabilitation Diagnosis: 02.9 Other Brain  81 year old male with right parietal/occipital GBM admitted for pneumonia, complete heart block.  He has undergone pacemaker; placement.  The right parietal occipital edema noted on recent head CT is not unexpected given recent surgery, chemo and radiation therapy.  Recommend Decadron.  Repeat head imaging scheduled for the ; if pacemaker is not MRI compatible, then CT +/- contrast.  @IMM@  Allergies   Allergen Reactions   • Bactrim [Sulfamethoxazole W-Trimethoprim] Unspecified      told pt he is allergic       Past Medical History   Diagnosis Date   • Hyperlipidemia    • Hypertension    • Glioblastoma multiforme (CMS-HCC) 17   • History of [place] fever    • Mitral valve prolapse    • Heart block, AV 8/3/2017     Past Surgical History   Procedure Laterality Date   • Craniotomy stealth Right 2017     Procedure: CRANIOTOMY STEALTH - PARIETAL OCCIPITAL;  Surgeon: Lavelle Ugarte M.D.;  Location: SURGERY [facility];  Service:    • Lung needle biopsy       [place] Fever   • Tonsillectomy         History Leading to Admission, Conditions that Caused the Need for  Rehab (CMS):     Monster Mcknight M.D. Physician [facility] Medicine H&P 8/3/2017 12:56 AM      Expand All Collapse All     Hospital Medicine History and Physical      Date of Service  8/3/2017    Chief Complaint  Chief Complaint    Patient presents with    •  Bradycardia        transfer from [place]        History of Presenting Illness  81 y.o. male past medical history of GBM status post resection, Temodar and radiation therapy who was transferred from [facility] in [place] for pneumonia, complete heart block, brain mass with vasogenic edema on 8/2/2017. Since states that he had a ground-level fall after he tripped while getting out of the shower. He hit his head but did not lose consciousness. He states that he has been feeling lightheaded for the past 1 week. At the outlying facility chest x-ray revealed bibasilar opacities, CT had revealed extensive vasogenic edema and right parietal, frontal, occipital, temporal lobes and attention to right cisterns with 2 mm midline shift appears to be increased from previous study. EKG at the [facility] facility also revealed a possible 3rd degree heart block. At this time the patient reports low grade fevers and a productive cough. He denies any chest pain or shortness of breath. Cardiology and neurosurgery have been consulted by the ER physician.           Assessment/Plan        I anticipate this patient will require at least two midnights for appropriate medical management, necessitating inpatient admission.    Heart block, AV (present on admission)  Assessment & Plan  Patient will be admitted to the telemetry unit and placed on cardiac monitoring  Cardiology has been consulted  If patient has bradycardia he can be started on IV dopamine infusion and temporary pacing    Vasogenic cerebral edema (CMS-HCC) (present on admission)  Assessment & Plan  He has been started on IV Decadron  Neurosurgery has been consulted  Continue neuro checks      CAP ([facility]  acquired pneumonia) (present on admission)  Assessment & Plan  Appears to be mild, patient is started on Unasyn  Hold off on Zithromax given patient's prolonged QTC  Follow Cultures  Continue RT protocol        VTE prophylaxis: Lovenox .                              Arlene Chavarria M.D. Physician Signed Cardiology Consults 8/3/2017 12:19 AM      Expand All Collapse All    Cardiology Consult Note:    Arlene Chavarria  Date & Time note created:    8/3/2017   12:19 AM      Referring MD:  Dr. Stiles        [facility] (Assessment and Plan):     Active Hospital Problems      Diagnosis    •  Heart block, AV [I44.30]        At this time, patient does have a collection of complex current medical issues.  With his current history of lightheadedness and EKG finding of 2-1 AV block with infra suzette conduction disease, certainly, there is indication for permanent pacemaker placement. However, due to his current ongoing chemotherapy treatment along with unknown prognosis overall for his brain cancer, I am not sure that this is to best time for PPM. In addition to this, patient also is infected with possible pneumonia. At least, we should wait for his pneumonia to resolve before putting in a PPM. Certainly at this time, patient is not indicated for temporary wire pacer. He is hemodynamically stable.    In the meantime, overnight, if patient develops decompensation, we will consider dopamine infusion or temporary wire pacer.    Certainly we will discuss his case with our EP colleagues for further inputs.    Thank you for referring this patient to our cardiology service.  We will follow patient with you.    Arlene Chavarria MD.  [facility].                            Telly Chung M.D. Physician Unsigned Transcription Cardiac Electrophysiology Procedures 8/3/2017 10:14 PM      Expand All Collapse All    DATE OF SERVICE:  08/03/2017     PROCEDURE:  Dual-chamber pacemaker  implantation.     INDICATION FOR PROCEDURE:  High-grade heart block.           Lavelle Ugarte M.D. Physician Signed Surgery Neurosurgery Consults 8/4/2017  9:30 AM      Expand All Collapse All    Date of Service  8/3/2017    Chief Complaint  Chief Complaint     Patient presents with     •   Bradycardia           transfer from [place]         History of Presenting Illness  81 y.o. male past medical history of GBM status post resection, Temodar and radiation therapy who was transferred from [facility] in [place] for pneumonia, complete heart block, brain mass with vasogenic edema on 8/2/2017. Since states that he had a ground-level fall after he tripped while getting out of the shower. He hit his head but did not lose consciousness. He states that he has been feeling lightheaded for the past 1 week. At the outlying facility chest x-ray revealed bibasilar opacities, CT had revealed extensive vasogenic edema and right parietal, frontal, occipital, temporal lobes and attention to right cisterns with 2 mm midline shift appears to be increased from previous study. [facility] a possible 3rd degree heart block. At this time the patient reports low grade fevers and a productive cough. He denies any chest pain or shortness of breath.     He has undergone pacemaker placement for heart block, and is being treated for pneumonia.                    Assessment/Plan                   81 year old male with right parietal/occipital GBM admitted for pneumonia, complete heart block.  He has undergone pacemaker; placement.  The right parietal occipital edema noted on recent head CT is not unexpected given recent surgery, chemo and radiation therapy.  Recommend Decadron.  Repeat head imaging scheduled for the 11th; if pacemaker is not MRI compatible, then CT +/- contrast.                      Jael Villatoro M.D. Physician Signed Physical Medicine & Rehab Consults 8/6/2017  1:31 PM      Consult Orders:     IP CONSULT FOR PHYSIATRY [[id number]] ordered by Elidu Roa M.D. at 08/06/17 1235          Expand All Collapse All    Medical chart review completed. Patient is a 81 y.o. year-old male admitted on 8/2/2017 as a transfer from [facility] in [place] for pneumonia, bradycardia/heart block and glioblastoma multiforme brain tumor with vasogenic edema. He is s/p resection of his brain tumor (4/2017 by Dr. Ugarte) with ongoing chemotherapy/radiation and had been having lightheadedness for 1 week. He is s/p pacemaker placement on 8/3 by Dr. Chung. He is being treated with IV decadron for the brain edema and is on antibiotics for the pneumonia.    Patient with multiple co-morbidities(including but not limited to GBM, anemia, leukopenia, thrombocytopenia, pneumonia); with cognitive deficits and functional deficits in mobility/self-care/swallowing/speech, and Severe de-conditioning. Pre-morbidly, this patient lived in a single level home with ramp to enter, with spouse. The patient was evaluated by acute care Physical Therapy; currently requiring contact-guard to maximum assistance for mobility, also with ongoing cognitive deficits.    The patient may be an excellent candidate for an acute inpatient rehabilitation program with a coordinated program of care at an intensity and frequency not available at a lower level of care.     First, please confirm he requires assistance for his ADLs via OT and/or nursing evaluation.    In addition, please determine when he will receive his next treatments (chemotherapy/radiation) for his brain cancer, as we will not be able to transfer him out for these treatments while on inpatient rehab. If his treatment can be delayed for 2-3 weeks, than I think he certainly would benefit from an inpatient rehab stay to get him stronger and prepared for the next round of treatments.    This recommendation is substantiated by the patient's current medical condition with intervention  and assessment of medical issues requiring an acute level of care for patient's safety and maximum outcome. A coordinated program of care will be provided by an interdisciplinary team including physical therapy, occupational therapy, speech language pathology, physiatry, rehab nursing and rehab psychology. Rehab goals include improved cognition, mobility, self-care management, strength and conditioning/endurance, pain management, bowel and bladder management, mood and affect, and safety with independent home management including caregiver training. Estimated length of stay is approximately 14-21 days. Rehab potential: Fair. Disposition: to pre-morbid independent living setting with supportive care of patient's spouse. We will continue to follow with you in anticipation of discharge to acute inpatient rehabilitation when medically stable to do so at the discretion of his  attending physician. Thank you for allowing us to participate in his   care. Please call with any questions regarding this recommendation.    Jael Villatoro M.D.  Physical Medicine and Rehabilitation                          Co-morbidities: as listed above   Potential Risk - Complications: Aphasia, Cognitive Impairment, Contractures, Deep Vein Thrombosis, Dysphagia, Incontinence, Malnutrition, Pain, Paralysis, Perceptual Impairment, Pneumonia, Pressure Ulcer and Seizures  Level of Risk: High    Ongoing Medical Management Needed (Medical/Nursing Needs):   Patient Active Problem List    Diagnosis Date Noted   • Heart block, AV 08/03/2017     Priority: High   • Vasogenic cerebral edema (CMS-HCC) 08/03/2017     Priority: High   • CAP (community acquired pneumonia) 08/03/2017     Priority: High   • Brain mass 04/05/2017     Priority: High   • Anemia 08/04/2017   • HTN (hypertension) 04/06/2017     Alis James R.N. Registered Nurse Signed  Progress Notes 8/7/2017 12:07 AM      Expand All Collapse All    Pt. alert and oriented with complaints of 6/10  "pain in left shoulder and requesting tylenol. Pt. medicated with tylenol per MAR.  Cardiac monitor in place and showing partially paced rhythm with HR in the 60's                        Current Vital Signs:   Temperature: 36.6 °C (97.9 °F) Pulse: 76 Respiration: 17 Blood Pressure : 160/60 mmHg  Weight: 71.9 kg (158 lb 8.2 oz) Height: 165.1 cm (5' 5\")  Pulse Oximetry: 99 % O2 (LPM): 2      Completed Laboratory Reports:  Recent Labs      08/05/17   0407  08/06/17   0224  08/07/17   0434   WBC  3.8*  3.9*  3.2*   HEMOGLOBIN  8.2*  8.2*  9.0*   HEMATOCRIT  25.7*  26.1*  27.7*   PLATELETCT  123*  121*  106*   SODIUM  139  140   --    POTASSIUM  4.3  4.0   --    BUN  18  15   --    CREATININE  0.66  0.58   --    GLUCOSE  148*  152*   --      Additional Labs: Not Applicable    Prior Living Situation:   Housing / Facility: Mobile Home  Steps Into Home:  (ramp)  Lives with - Patient's Self Care Capacity: Spouse  Equipment Owned: 4-Wheel Walker    Prior Level of Function / Living Situation:   Physical Therapy: Prior Services: Intermittent Physical Support for ADL Per Family  Housing / Facility: Mobile Home  Steps Into Home:  (ramp)  Bathroom Set up: Bathtub / Shower Combination, Grab Bars, Shower Chair  Equipment Owned: 4-Wheel Walker  Lives with - Patient's Self Care Capacity: Spouse  Bed Mobility: Independent  Transfer Status: Independent  Ambulation: Independent  Distance Ambulation (Feet):  (pt reports he used to walk 1 mile a day)  Assistive Devices Used: 4-Wheel Walker  Current Level of Function:   Level Of Assist: Contact Guard Assist  Assistive Device: Front Wheel Walker  Distance (Feet): 5  Deviation:  (decreased step length/tomer)  Weight Bearing Status: fwb  Supine to Sit: Minimal Assist  Sit to Supine: Minimal Assist  Scooting: Moderate Assist  Rolling: Moderate Assist to Rt.  Sit to Stand: Minimal Assist  Bed, Chair, Wheelchair Transfer: Minimal Assist  Transfer Method: Stand Pivot  Sitting Edge of Bed: 7 " min  Standin-7 min  Occupational Therapy:   Self Feeding: Independent  Grooming / Hygiene: Independent  Bathing: Independent  Dressing: Independent  Toileting: Independent  Laundry: Requires Assist  Kitchen Mobility: Requires Assist  Finances: Requires Assist  Home Management: Requires Assist  Shopping: Requires Assist  Prior Level Of Mobility: Independent With Device in Home  Prior Services: Intermittent Physical Support for ADL Per Family  Housing / Facility: Mobile Home  Current Level of Function:   Upper Body Dressing: Minimal Assist  Lower Body Dressing: Maximal Assist  Speech Language Pathology:      Rehabilitation Prognosis/Potential: Good  Estimated Length of Stay: 14 days    Nursing:   Orientation : Disoriented to Time  Continent    Scope/Intensity of Services Recommended:  Physical Therapy: 1 hr / day  5 days / week. Therapeutic Interventions Required: Maximize Endurance, Mobility, Strength and Safety  Occupational Therapy: 1 hr / day 5 days / week. Therapeutic Interventions Required: Maximize Self Care, ADLs, IADLs and Energy Conservation  Speech & Language Pathology: 1 hr / day 5 days / week. Therapeutic Interventions Required: Maximize Cognition and Safety  Rehabilitation Nursin/7. Therapeutic Interventions Required: Monitor Pain, Skin, Wound(s), Vital Signs, Intake and Output, Safety and Family Training  Rehabilitation Physician: 3 - 5 days / week. Therapeutic Interventions Required: Medical Management  Respiratory Care: consult . Therapeutic Interventions Required: Pulmonary Toileting and O2 Weaning  Dietician: consult . Therapeutic Interventions Required: nutritional needs.     Rehabilitation Goals and Plan (Expected frequency & duration of treatment in the IRF):   Return to the Community and Modified Independent Level of Care  Anticipated Date of Rehabilitation Admission: 2017  Patient/Family oriented IRF level of care/facility/plan: Yes  Patient/Family willing to participate in IRF  care/facility/plan: Yes  Patient able to tolerate IRF level of care proposed: Yes  Patient has potential to benefit IRF level of care proposed: Yes  Comments: Not Applicable    Special Needs or Precautions - Medical Necessity:  Safety Concerns/Precautions:  Fall Risk / High Risk for Falls and Balance  Complex Wound Care: post surgical   Pain Management  Special Equipment: sling   Requires Oxygen  Weight-bearing Status: Lower Extremity, pace maker placement protocol  Cardiac Precautions  Current Medications:    Current Facility-Administered Medications Ordered in Epic   Medication Dose Route Frequency Provider Last Rate Last Dose   • dexamethasone (DECADRON) tablet 4 mg  4 mg Oral Q6HRS Eliud Roa M.D.   4 mg at 08/07/17 1130   • oxycodone immediate-release (ROXICODONE) tablet 5 mg  5 mg Oral Q4HRS PRN Eliud Roa M.D.   5 mg at 08/04/17 1510   • senna-docusate (PERICOLACE or SENOKOT S) 8.6-50 MG per tablet 2 Tab  2 Tab Oral BID Monster Mcknight M.D.   2 Tab at 08/07/17 0854    And   • polyethylene glycol/lytes (MIRALAX) PACKET 1 Packet  1 Packet Oral QDAY PRN Monster Mcknight M.D.   1 Packet at 08/04/17 1325    And   • magnesium hydroxide (MILK OF MAGNESIA) suspension 30 mL  30 mL Oral QDAY PRN Monster Mcknight M.D.        And   • bisacodyl (DULCOLAX) suppository 10 mg  10 mg Rectal QDAY PRN Monster Mcknight M.D.       • NS infusion   Intravenous Continuous Monster Mcknight M.D. 100 mL/hr at 08/07/17 0900     • ondansetron (ZOFRAN) syringe/vial injection 4 mg  4 mg Intravenous Q4HRS PRN Monster Mcknight M.D.   4 mg at 08/04/17 1328   • ondansetron (ZOFRAN ODT) dispertab 4 mg  4 mg Oral Q4HRS PRN Monster Mcknight M.D.   4 mg at 08/07/17 0937   • acetaminophen (TYLENOL) tablet 650 mg  650 mg Oral Q6HRS PRN Monster Mcknight M.D.   650 mg at 08/06/17 2207   • glucose 4 g chewable tablet 16 g  16 g Oral Q15 MIN PRN Monster Mcknight M.D.        And   • dextrose 50% (D50W) injection 25 mL  25 mL Intravenous Q15 MIN PRN Monster Mcknight M.D.       •  Respiratory Care per Protocol   Nebulization Continuous RT Monster Mcknight M.D.       • ampicillin/sulbactam (UNASYN) 3 g in  mL IVPB  3 g Intravenous Q6HRS Eliud Roa M.D.   Stopped at 08/07/17 0931   • atropine injection 0.4 mg  0.4 mg Intravenous Once PRN Eliud Roa M.D.       • doxycycline monohydrate (ADOXA) tablet 100 mg  100 mg Oral Q12HRS Eliud Roa M.D.   100 mg at 08/07/17 0854     Current Outpatient Prescriptions Ordered in [facility]   Medication Sig Dispense Refill   • dexamethasone (DECADRON) 4 MG Tab Take 1 Tab by mouth every 6 hours. 30 Tab 0   • oxycodone immediate-release (ROXICODONE) 5 MG Tab Take 1 Tab by mouth every four hours as needed. 12 Tab 0   • bisacodyl (DULCOLAX) 10 MG Suppos Insert 1 Suppository in rectum 1 time daily as needed (if magnesium hydroxide ineffective after 24 hours).  0   • magnesium hydroxide (MILK OF MAGNESIA) 400 MG/5ML Suspension Take 30 mL by mouth 1 time daily as needed (if polyethylene glycol ineffective after 24 hours). 1 Bottle    • polyethylene glycol/lytes (MIRALAX) Pack Take 1 Packet by mouth 1 time daily as needed (if sennosides and docusate ineffective after 24 hours).  3   • senna-docusate (PERICOLACE OR SENOKOT S) 8.6-50 MG Tab Take 2 Tabs by mouth 2 Times a Day. 30 Tab 0     Diet:   DIET ORDERS (Through next 24h)    Start     Ordered    08/04/17 1252  SUPPLEMENTS   ONCE     Question:  Which Supplement  Answer:  Per RD    08/04/17 1252    08/03/17 1342  Diet Order   ALL MEALS     Question:  Diet:  Answer:  Cardiac    08/03/17 1342          Anticipated Discharge Destination / Patient/Family Goal:  Destination: Home with Assistance Support System: Spouse  Anticipated home health services: OT, PT, Nursing, Social Work and Aide  Previously used HH service/ provider: Not Applicable  Anticipated DME Needs: Oxygen and Walker  Outpatient Services: PT  Alternative resources to address additional identified needs:     Pre-Screen Completed: 8/7/2017 1:43 PM  Sohail Estrada

## 2017-08-07 NOTE — PROGRESS NOTES
Pt AAOx4, up with 2 assist. 2 L O2 NC. Left arm precautions due to pacemaker placement on 8/3. Denies N/V, numbness or tingling, new onset of SOB or chest pain. Changed dressing on posterior head from fall at home prior to admission. Up in chair for about 2 hours today. Plan to d/c to rehab around 1500.

## 2017-08-07 NOTE — PROGRESS NOTES
Pt. alert and oriented with complaints of 6/10 pain in left shoulder and requesting tylenol. Pt. medicated with tylenol per MAR.  Cardiac monitor in place and showing partially paced rhythm with HR in the 60's

## 2017-08-07 NOTE — PROGRESS NOTES
Itzel Nelson Fall Risk Assessment:     Last Known Fall: Within the last month  Mobility: Use of assistive device/requires assist of two people  Medications: Cardiovascular or central nervous system meds  Mental Status/LOC/Awareness: Awake, alert, and oriented to date, place, and person  Toileting Needs: Use of assistive device (Bedside commode, bedpan, urinal)  Volume/Electrolyte Status: Use of IV fluids/tube feeds  Communication/Sensory: Visual (Glasses)/hearing deficit  Behavior: Appropriate behavior  Itzel Nelson Fall Risk Total: 15  Fall Risk Level: HIGH RISK    Universal Fall Precautions:  call light/belongings in reach, bed in low position and locked, wheelchairs and assistive devices out of sight, siderails up x 2, use non-slip footwear, adequate lighting, clutter free and spill free environment, educate on level of risk, educate to call for assistance    Fall Risk Level Interventions:     TRIAL (TELE 8, NEURO, MED KRYSTAL 5) High Fall Risk Interventions  Place yellow fall risk ID band on patient: verified  Provide patient/family education based on risk assessment: verified  Educate patient/family to call staff for assistance when getting out of bed: verified  Place fall precaution signage outside patient door: verified  Place patient in room close to nursing station: currently not available/charge notified  Utilize bed/chair fall alarm: verified  Notify charge of high risk for huddle: verified    Patient Specific Interventions:     Medication: review medications with patient and family and limit combination of prn medications  Mental Status/LOC/Awareness: reinforce falls education, check on patient hourly, utilize bed/chair fall alarm and reinforce the use of call light  Toileting: provide frquent toileting and instruct patient/family on the need to call for assistance when toileting  Volume/Electrolyte Status: ensure patient remains hydrated and administer medications as ordered for nausea and  vomiting  Communication/Sensory: update plan of care on whiteboard, ensure proper positioning when transferrng/ambulating and ensure patient has glasses/contacts and hearing aids/dentures  Behavioral: engage patient in daily activities and administer medication as ordered  Mobility: utilize bed/chair fall alarm, ensure bed is locked and in lowest position and provide appropriate assistive device

## 2017-08-07 NOTE — PROGRESS NOTES
Monitor summary: SR 60-72, IA 0.12, QRS 0.12, QT 0.42, In and out of pacing per strip from monitor room.

## 2017-08-07 NOTE — DISCHARGE SUMMARY
HOSPITAL MEDICINE DISCHARGE SUMMARY    Name: Alvin Grinnell  MRN: [id number]  : 1935  Admit Date: 2017  Discharge Date: 2017  Attending Provider: Eliud Roa M.D.  Primary Care Physician: No primary care provider on file.    CHIEF COMPLAINT  Chief Complaint   Patient presents with   • Bradycardia     transfer from [place]       CODE STATUS  Full Code    DISCHARGE DIAGNOSES WITH THEIR RESPECTIVE HOSPITAL COURSE, PLAN AND FOLLOW-UP  Please review Dr. Monster Mcknight M.D. notes for further details of history of present illness, past medical/social/family histories, allergies and medications. Please review , Dr. Chung Cardiology consultation notes.    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  Because of sustained bradycardia, pacemaker placed by Dr. Chung 8/3  Pacer working, Cardiology signed off.  Follow up Dr. Chung in 1 week postpacer placement visit  Discharge to rehab, Dr. 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 Ugarte mentions continue Decadron and monitoring  Rehab will accept if further radiochemotherapy can be delayed or postponed in a few weeks.   Ultimately defer to Dr. Torito Ugarte recommended switching to PO decadron, no further surgical intervention in the next few weeks and not recommended.  I called Dr. Coto and he has completed radiotherapy and resommended that he cannot have chemotherapy in 2-3 weeks anyway until he is more conditioned. He was ok with him going to inpatient rehab.  [facility]  and [facility] Littlejohn.  Discussed with Dr. Villatoro. He will be discharged to inpatient rehabilitation.    CAP (community acquired pneumonia) (present on admission)  Assessment & Plan  Appears to be mild, patient is started on Unasyn  Hold off on Zithromax given patient's prolonged QTC  Give doxycycline instead.  Follow Cultures  Continue RT protocol  Improved.  Completed a 5 day course. No further antibiotics. Discussed with Pharmacy.    Anemia  Assessment & Plan  Hb at 6 postoperatively. No evidence of acute bleed. Transfuse and get CT noncontrast r/o hematoma.  CT turned out no hematoma.  Hb stable now at 9. Outpatient follow up for further owrkup.      DISCHARGE PHYSICAL EXAM      Physical Exam  Constitutional: He appears well-developed and well-nourished.   HENT:    Head: Normocephalic and atraumatic.   Recent brain surgery, scars CDI   Eyes: Conjunctivae and EOM are normal. No scleral icterus.   Neck: Normal range of motion. Neck supple.   Cardiovascular: Normal rate and regular rhythm.  Exam reveals no gallop and no friction rub.     Murmur heard.  Pacer site CDI, minimal soreness   Pulmonary/Chest: Effort normal and breath sounds normal. No respiratory distress. He has no wheezes. He has no rales.   Abdominal: Soft. Bowel sounds are normal. He exhibits no distension. There is no tenderness. There is no rebound and no guarding.   Musculoskeletal: He exhibits no edema or tenderness.   Neurological: He is alert.   Skin: Skin is warm.   Psychiatric: He has a normal mood and affect. His behavior is normal.        Alvin Grinnell improved and was deemed ready to be discharged from the hospital as there were no further inpatient needs. Alvin Grinnell felt comfortable going to inpatient rehab. The discharge plan was discussed with Alvin Grinnell, and agreed to it. Alvin Grinnell was subsequently discharged in improved and stable condition.    DISCHARGE MEDICATIONS:    Grinnell, Alvin   Home Medication  Instructions [facility]:[id number]    Printed on:08/07/17 1129   Medication Information                      acetaminophen (TYLENOL) 325 MG Tab  Take 650 mg by mouth 2 Times a Day.             bisacodyl (DULCOLAX) 10 MG Suppos  Insert 1 Suppository in rectum 1 time daily as needed (if magnesium hydroxide ineffective after 24 hours).             Calcium Carbonate (CALCIUM 600 PO)  Take 600 mg by mouth every day.             dexamethasone (DECADRON) 4 MG Tab  Take 1 Tab by mouth every 6 hours.             famotidine (PEPCID) 20 MG Tab  Take 20 mg by mouth every day.             Ferrous Sulfate (PX IRON) 27 MG Tab  Take 27 mg by mouth every day.             magnesium hydroxide (MILK OF MAGNESIA) 400 MG/5ML Suspension  Take 30 mL by mouth 1 time daily as needed (if polyethylene glycol ineffective after 24 hours).             Multiple Vitamins-Minerals (PRESERVISION AREDS 2 PO)  Take 1 Tab by mouth every day.             oxycodone immediate-release (ROXICODONE) 5 MG Tab  Take 1 Tab by mouth every four hours as needed.             polyethylene glycol/lytes (MIRALAX) Pack  Take 1 Packet by mouth 1 time daily as needed (if sennosides and docusate ineffective after 24 hours).             senna-docusate (PERICOLACE OR SENOKOT S) 8.6-50 MG Tab  Take 2 Tabs by mouth 2 Times a Day.             therapeutic multivitamin-minerals (THERAGRAN-M) Tab  Take 1 Tab by mouth every day.             vitamin D (CHOLECALCIFEROL) 1000 UNIT Tab  Take 1,000 Units by mouth 3 times a day.                 DISCHARGE VITALS, LABS and IMAGING  Filed Vitals:    08/06/17 2000 08/07/17 0000 08/07/17 0400 08/07/17 0700   BP: 123/72 131/84 164/74 154/73   Pulse: 61 63 60 60   Temp: 36.4 °C (97.6 °F) 36.8 °C (98.3 °F) 36.7 °C (98.1 °F) 36.8 °C (98.2 °F)   Resp: 17 17 17 16   Height:       Weight:       SpO2: 100% 99% 97% 99%     Lab Results   Component Value Date/Time    WBC 3.2* 08/07/2017 04:34 AM    RBC 2.99* 08/07/2017 04:34 AM    HEMOGLOBIN 9.0* 08/07/2017  04:34 AM    HEMATOCRIT 27.7* 08/07/2017 04:34 AM    MCV 92.6 08/07/2017 04:34 AM    MCH 30.1 08/07/2017 04:34 AM    MCHC 32.5* 08/07/2017 04:34 AM    MPV 10.8 08/07/2017 04:34 AM    NEUTROPHILS-POLYS 75.10* 08/04/2017 03:26 AM    LYMPHOCYTES 19.40* 08/04/2017 03:26 AM    MONOCYTES 3.90 08/04/2017 03:26 AM    EOSINOPHILS 0.00 08/04/2017 03:26 AM    BASOPHILS 0.00 08/04/2017 03:26 AM    ANISOCYTOSIS 1+ 08/02/2017 10:13 PM      Lab Results   Component Value Date/Time    SODIUM 140 08/06/2017 02:24 AM    POTASSIUM 4.0 08/06/2017 02:24 AM    CHLORIDE 110 08/06/2017 02:24 AM    CO2 22 08/06/2017 02:24 AM    GLUCOSE 152* 08/06/2017 02:24 AM    BUN 15 08/06/2017 02:24 AM    CREATININE 0.58 08/06/2017 02:24 AM      No results found for: PROTHROMBTM, INR     Dx-chest-portable (1 View)    8/4/2017 8/4/2017 6:15 AM HISTORY/REASON FOR EXAM:  Status post pacemaker placement. TECHNIQUE/EXAM DESCRIPTION AND NUMBER OF VIEWS: Single portable view of the chest. COMPARISON: Yesterday FINDINGS: A left-sided cardiac pacer remains in place, and its electrode tip positions are unchanged. There is mild cardiac enlargement. Perihilar interstitial opacities persist bilaterally along with right basilar atelectasis and an apparent right apical nodule, all of which is unchanged. There is no pneumothorax.     8/4/2017  Stable chest appearance compared with 8/3.    Dx-chest-portable (1 View)    8/3/2017  8/3/2017 1:45 PM HISTORY/REASON FOR EXAM:  S/P pacer or AICD procedure TECHNIQUE/EXAM DESCRIPTION AND NUMBER OF VIEWS: Single portable view of the chest. COMPARISON: 8/2/2017 FINDINGS: Interval placement of left-sided pacemaker. Mild pulmonary edema. Patchy airspace opacity in the right lower lobe. No pleural effusion. No pneumothorax. Stable cardiopericardial silhouette.     8/3/2017  1. Interval placement of left-sided pacemaker. No pneumothorax. 2. New/worsening patchy airspace opacity in the right lower lobe, atelectasis versus pneumonia. 3.  Mild interstitial pulmonary edema.    Dx-chest-portable (1 View)    2017 10:50 PM HISTORY/REASON FOR EXAM:  Weakness and bradycardia, recent ground level fall; history of cancer TECHNIQUE/EXAM DESCRIPTION AND NUMBER OF VIEWS: Single portable view of the chest. COMPARISON: Outside chest radiograph performed earlier the same date FINDINGS: HEART: Stable size. LUNGS: Lung volumes are low. There are bibasilar opacities.  There are perihilar opacities. There is a nodular opacity in the RIGHT upper lobe. PLEURA: No effusion or pneumothorax.     2017  1.  Unchanged atelectasis with possible superimposed pulmonary edema or other airspace disease 2.  RIGHT upper lobe pulmonary nodule, unchanged.    Echocardiogram Comp W/o Cont    2017  Transthoracic Echo Report Echocardiography Laboratory CONCLUSIONS No prior study is available for comparison. Normal left ventricular systolic function. Left ventricular ejection fraction is visually estimated to be 60%. Mild mitral regurgitation. GRINNELL, ALVIN Exam Date:         2017                    14:43 Exam Location:     Inpatient Priority:          Routine Ordering Physician:        DARÍO VALDIVIA Referring Physician:       [id number] Mcwilliams Sonographer:               Lorena Henry RDCS Age:    81     Gender:    M MRN:    [id number] :    1935 BSA:    1.76   Ht (in):    65     Wt (lb):    151 Exam Type:     Complete Indications:     Chest Pain ICD Codes:       786.5 CPT Codes:       60557 BP:   136    /   85     HR: Technical Quality:       Fair MEASUREMENTS  (Male / Female) Normal Values 2D ECHO LV Diastolic Diameter PLAX        4.7 cm                4.2 - 5.9 / 3.9 - 5.3 cm LV Systolic Diameter PLAX         3.4 cm                2.1 - 4.0 cm IVS Diastolic Thickness           1.4 cm                LVPW Diastolic Thickness          1.2 cm                LVOT Diameter                     2 cm                  Estimated LV Ejection Fraction    60 %                   LV Ejection Fraction MOD BP       60.2 %                >= 55  % LV Ejection Fraction MOD 4C       56.3 %                LV Ejection Fraction MOD 2C       64.1 %                DOPPLER AV Peak Velocity                  1.2 m/s               AV Peak Gradient                  6.2 mmHg              AV Mean Gradient                  2.8 mmHg              LVOT Peak Velocity                1 m/s                 AV Area Cont Eq vti               2.8 cm²               Mitral E Point Velocity           1.1 m/s               Mitral E to A Ratio               1.1                   MV Pressure Half Time             47.9 ms               MV Area PHT                       4.6 cm²               MV Deceleration Time              165 ms                MR ERO PISA                       0.1 cm²               MR Regurgitant Volume PISA        23.3 cm³              RVOT Peak Velocity                0.81 m/s              * Indicates values subject to auto-interpretation LV EF:  60    % FINDINGS Left Ventricle Normal left ventricular chamber size. Mild concentric left ventricular hypertrophy. Normal left ventricular systolic function. Left ventricular ejection fraction is visually estimated to be 60%. Right Ventricle Normal right ventricular size and systolic function. Right Atrium Normal right atrial size. Left Atrium Normal left atrial size. Mitral Valve Structurally normal mitral valve. Mild mitral regurgitation. Aortic Valve Structurally normal aortic valve without significant stenosis or regurgitation. Tricuspid Valve Structurally normal tricuspid valve without significant stenosis or regurgitation. Unable to estimate pulmonary artery pressure due to an inadequate tricuspid regurgitant jet. Pulmonic Valve Structurally normal pulmonic valve. Mild to moderate pulmonic insufficiency. Pericardium Normal pericardium without effusion. Aorta Normal aortic root for body surface area. Juan David De Luna M.D. (Electronically Signed)  Final Date:     04 August 2017                 18:07    Ct-chest And Abdomen W/o    8/4/2017 8/4/2017 7:25 PM HISTORY/REASON FOR EXAM:  Chest pressure/pain Hematemesis. Anemia. Rule out hematoma. TECHNIQUE/EXAM DESCRIPTION: CT scan of the chest and abdomen without contrast. Thin-section helical images were obtained from the lung apices through the iliac crests. Low dose optimization technique was utilized for this CT exam including automated exposure control and adjustment of the mA and/or kV according to patient size. COMPARISON:  None. FINDINGS: CT Chest: Lungs: Extensive bilateral groundglass opacity throughout both lungs. More airspace consolidations in the right middle lobe with associated cavitary changes. There is a 1.2 cm nodule in the right upper lobe with partial central calcification. Additional 4 mm nodule in the right lower lobe. Airway: Patent Pleura: Small bilateral pleural effusions. No pneumothorax. Thoracic aorta and great vessels:  No aneurysm. The ascending aorta measuring 3.9 cm Pulmonary arteries: Normal. Heart and pericardium: Moderate coronary artery calcification. No pericardial effusion. Lymph nodes: No enlarged mediastinal or hilar lymph nodes. Thoracic spine:  No fracture or malalignment. No compression deformity. Chest wall:   Unremarkable. CT Abdomen: Limited evaluation of the intra-abdominal organs due to lack of IV contrast. Liver: There are subcentimeter low attenuating lesions in the liver that are too small to characterize but are likely cysts. Spleen: Unremarkable. Pancreas: Unremarkable. Gallbladder: No stones. No cholecystitis. Adrenal glands: normal in size. Kidneys: Unremarkable. The kidneys enhance symmetrically. Moderate atherosclerotic disease of the abdominal aorta and its branches. There is no lymphadenopathy. The unopacified small bowel within the abdomen is unremarkable. Air-fluid level in the visualized transverse colon.     8/4/2017  1. Extensive bilateral  groundglass opacity throughout both lungs, in keeping with multifocal pneumonia. More airspace consolidation in the right middle lobe with cavitary change could relate to necrotizing pneumonia. 2. Small bilateral pleural effusions. 3. No evidence of retroperitoneal hemorrhage in the visualized abdomen. 4. Air-fluid level in the visualized transverse colon could be seen with diarrheal disease. 5. A 1.5 cm partially calcified nodule in the right upper lobe is indeterminant. Low and High Risk: Consider CT, PET/CT, or tissue sampling at 3 months Low Risk - Minimal or absent history of smoking and of other known risk factors. High Risk - History of smoking or of other known risk factors. Note: These recommendations do not apply to lung cancer screening, patients with immunosuppression, or patients with known primary cancer. Fleischner Society 2017 Guidelines for Management of Incidentally Detected Pulmonary Nodules in Adults       Please see discharge diagnoses, plan and follow up above for details of pending tests and postdischarge instructions for the clinic providers and specialists.    Please CC Dr. Villatoro, Physicatry, Dr. Son radiation onc, Dr. Middleton, Oncology [facility] Dr. Chavarria, Cardiology    For further details on discharge medications, patient education, diet, and activity, please refer to electronic copy of discharge instructions.       TIME SPENT: 40 minutes, with greater than 50% of the time spent on face-to-face encounter, addressing medical issues, coordination of care, counseling, discharge planning, medication reconciliation, and documentation.

## 2017-08-07 NOTE — CARE PLAN
Problem: Venous Thromboembolism (VTW)/Deep Vein Thrombosis (DVT) Prevention:  Goal: Patient will participate in Venous Thrombosis (VTE)/Deep Vein Thrombosis (DVT)Prevention Measures  Intervention: Ensure patient wears graduated elastic stockings (JOSE LUIS hose) and/or SCDs, if ordered, when in bed or chair (Remove at least once per shift for skin check)  SCDs in place to reduce VTE risk.

## 2017-08-07 NOTE — PROGRESS NOTES
Itzel Nelson Fall Risk Assessment:     Last Known Fall: Within the last month  Mobility: Use of assistive device/requires assist of two people  Medications: Cardiovascular or central nervous system meds  Mental Status/LOC/Awareness: Awake, alert, and oriented to date, place, and person  Toileting Needs: Use of assistive device (Bedside commode, bedpan, urinal)  Volume/Electrolyte Status: Use of IV fluids/tube feeds  Communication/Sensory: Visual (Glasses)/hearing deficit  Behavior: Appropriate behavior  Itzel Nelson Fall Risk Total: 15  Fall Risk Level: HIGH RISK    Universal Fall Precautions:  call light/belongings in reach, bed in low position and locked, wheelchairs and assistive devices out of sight, siderails up x 2, use non-slip footwear, adequate lighting, clutter free and spill free environment, educate on level of risk, educate to call for assistance    Fall Risk Level Interventions:     TRIAL (TELE 8, NEURO, MED KRYSTAL 5) High Fall Risk Interventions  Place yellow fall risk ID band on patient: verified  Provide patient/family education based on risk assessment: completed  Educate patient/family to call staff for assistance when getting out of bed: completed  Place fall precaution signage outside patient door: verified  Place patient in room close to nursing station: currently not available/charge notified  Utilize bed/chair fall alarm: verified  Notify charge of high risk for huddle: verified    Patient Specific Interventions:     Medication: review medications with patient and family and limit combination of prn medications  Mental Status/LOC/Awareness: reinforce falls education, check on patient hourly, utilize bed/chair fall alarm and reinforce the use of call light  Toileting: provide frquent toileting, monitor intake and output/use of appropriate interventions, instruct male patients prone to dizziness to void while sitting, instruct patient/family on the need to call for assistance when toileting and  do not leave patient unattended in bathroom/refer to toileting scripting  Volume/Electrolyte Status: monitor abnormal lab values and ensure IV fluids are appropriate  Communication/Sensory: ensure proper positioning when transferrng/ambulating and for visually impaired patients orient to their room surrounding and do not change their surroundings  Behavioral: encourage patient to voice feelings, engage patient in daily activities, administer medication as ordered and instruct/reinforce fall program rationale  Mobility: provide comfort measures during transport, utilize bed/chair fall alarm, ensure bed is locked and in lowest position and provide appropriate assistive device

## 2017-08-07 NOTE — CARE PLAN
Problem: Safety  Goal: Will remain free from falls  Outcome: PROGRESSING AS EXPECTED  Pt. educated on the need to use the call light prior to getting out of bed and for any needs. Pt. demonstrates understanding and uses call light appropriately.     Problem: Skin Integrity  Goal: Risk for impaired skin integrity will decrease  Outcome: PROGRESSING AS EXPECTED  Pt. being turned Q2H and tolerating well. Sacrum pink but blanching. Barrier wipes and moisturizer in use.

## 2017-08-07 NOTE — CARE PLAN
Problem: Infection  Goal: Will remain free from infection  Intervention: Implement standard precautions and perform hand washing before and after patient contact  Instruct pt/family on standard precautions to help prevent spread of infection/germs.

## 2017-08-08 PROBLEM — R53.81 DEBILITY: Status: ACTIVE | Noted: 2017-01-01

## 2017-08-08 PROBLEM — R00.1 BRADYCARDIA: Status: ACTIVE | Noted: 2017-01-01

## 2017-08-08 PROBLEM — C71.9 GBM (GLIOBLASTOMA MULTIFORME) (HCC): Status: ACTIVE | Noted: 2017-01-01

## 2017-08-08 NOTE — PROGRESS NOTES
Called hospitalist Wilner to notify of pt blood pressure of 171/83 and no prn blood pressure medications, no new orders at this time,will continue to monitor.

## 2017-08-08 NOTE — CARE PLAN
Problem: Bowel/Gastric:  Goal: Normal bowel function is maintained or improved  Outcome: PROGRESSING AS EXPECTED  Bowel sounds active. Stool softeners given. Recent BM    Problem: Urinary Elimination:  Goal: Ability to reestablish a normal urinary elimination pattern will improve  Outcome: PROGRESSING AS EXPECTED  Patient voiding well; clear yellow. Uses urinal appropriately.

## 2017-08-08 NOTE — PROGRESS NOTES
Pt to [facility] to [facility]. Dc instructions, med list, and follow up appointments discusses. All lines and monitors dc'd.

## 2017-08-08 NOTE — CARE PLAN
Problem: Nutritional:  Goal: Achieve adequate nutritional intake  Patient will consume 50% of meals   Outcome: MET Date Met:  08/08/17  Pt with adequate PO of meals at >50% consistently. Continue current POC as tolerated by pt. RD available PRN.

## 2017-08-08 NOTE — DISCHARGE INSTRUCTIONS
Discharge Instructions    Discharged to rehab by [facility] van with escort. Discharged via wheelchair, hospital escort: Yes.  Special equipment needed: Not Applicable    Be sure to schedule a follow-up appointment with your primary care doctor or any specialists as instructed.   Follow up attending phsyician at rehab or Dr. Villatoro upon receipt   Follow up with Dr. Middleton in 1-2 weeks after rehab to evaluate for chemotherapy   Follow up with Dr. Ctoo [facility] Onc in 1-2 weeks    Discharge Plan:   Influenza Vaccine Indication: Not indicated: Previously immunized this influenza season and > 8 years of age    I understand that a diet low in cholesterol, fat, and sodium is recommended for good health. Unless I have been given specific instructions below for another diet, I accept this instruction as my diet prescription.   Other diet: regular diet    Special Instructions: None    · Is patient discharged on Warfarin / Coumadin?   No     · Is patient Post Blood Transfusion?  No    Depression / Suicide Risk    As you are discharged from this [facility] facility, it is important to learn how to keep safe from harming yourself.    Recognize the warning signs:  · Abrupt changes in personality, positive or negative- including increase in energy   · Giving away possessions  · Change in eating patterns- significant weight changes-  positive or negative  · Change in sleeping patterns- unable to sleep or sleeping all the time   · Unwillingness or inability to communicate  · Depression  · Unusual sadness, discouragement and loneliness  · Talk of wanting to die  · Neglect of personal appearance   · Rebelliousness- reckless behavior  · Withdrawal from people/activities they love  · Confusion- inability to concentrate     If you or a loved one observes any of these behaviors or has concerns about self-harm, here's what you can do:  · Talk about it- your feelings and reasons for harming yourself  · Remove any means that you might use  to hurt yourself (examples: pills, rope, extension cords, firearm)  · Get professional help from the community (Mental Health, Substance Abuse, psychological counseling)  · Do not be alone:Call your Safe Contact- someone whom you trust who will be there for you.  · Call your local CRISIS HOTLINE 409-4089 or 476-835-8951  · Call your local Children's Mobile Crisis Response Team [place] (999) 637-5963 or www.[place]  · Call the toll free National Suicide Prevention Hotlines   · National Suicide Prevention Lifeline 014-602-YKFG (6122)  · [place] Hope Line Network 800-SUICIDE (190-9055)    Cerebral Edema  Cerebral edema is another term for swelling of the brain. Cells in the brain that have been injured or infected can swell, and blood vessels may leak fluid into the brain tissue. The extra fluid in the brain affects pressure in the skull and the flow of blood in the brain.   Cerebral edema can be mild to severe, depending on the cause and whether the swelling is in many areas of the brain. It is important to identify the cause and stop the swelling from getting worse as severe cases can cause permanent damage. Treatments are available to help bring down the swelling and restore brain function.   CAUSES   · Injury to your head, such as in a car accident or contact sport.  · Infection from a virus or bacteria.  · Stroke.  · High altitude sickness.  · A buildup of acid in your blood.  · Very high sugar levels caused by diabetes (diabetic ketoacidosis).  · Very high blood pressure (malignant hypertension).  · Liver disease.  · Poisoning, such as carbon monoxide or lead.  · Conditions in which your body retains too much water.  · Illegal drug use.  · Alcohol abuse.  · Reptile or marine animal bites.  · Brain tumor.  SIGNS AND SYMPTOMS  Symptoms may include:  · Headache.  · Nausea or vomiting.  · Dizziness.  · Neck pain.  · Trouble breathing.  · Blurred vision or other vision changes.  · Balance and motor  problems, such as trouble walking.  · Trouble speaking.  · Feeling confused.  · Passing out.  · Numbness or weakness in any body part.  DIAGNOSIS   Diagnosis may include:  · Medical history and physical exam.  · Imaging tests, including CT scans or MRI.  · Blood tests.  TREATMENT   For mild edema, several days of rest and limiting activities may be needed. More severe cases require treatment in a hospital. Treatment may include:  · Proper head and neck positioning to increase blood flow.  · Fluids given through your vein (intravenously or IV).  · Regulating your blood pressure and body temperature.  · Medicines to reduce inflammation and increase urination.  · Oxygen.  · Using a ventilator, which is a device to help you breathe.  · Shunting, which is a tube placed in the brain to help drain fluid.  · Surgery.  If the edema is caused by a condition, treatment will also focus on managing the condition.   HOME CARE INSTRUCTIONS   · Rest and limit your activities as directed by your health care provider.  · Take medicines only as directed by your health care provider.  · Manage any other health care conditions you may have, if applicable.  · Keep all follow-up visits as directed by your health care provider. This is important.  SEEK MEDICAL CARE IF:   Your symptoms return, do not get better, or get worse, including:  · Headache.  · Nausea or vomiting.  · Dizziness.  · Neck pain.  · Blurred vision or other vision changes.  SEEK IMMEDIATE MEDICAL CARE IF:  · You pass out.    · You have numbness or weakness in any body part.  · You feel confused.  · You have trouble breathing.  · You have balance and motor problems, such as trouble walking.  · You have trouble speaking.  MAKE SURE YOU:  · Understand these instructions.    · Will watch your condition.  · Will get help right away if you are not doing well or get worse.       This information is not intended to replace advice given to you by your health care provider. Make  sure you discuss any questions you have with your health care provider.     Document Released: 07/15/2015 Document Reviewed: 07/15/2015  Elsevier Interactive Patient Education ©2016 [employer]

## 2017-08-08 NOTE — PROGRESS NOTES
Monitor Summary: SR 61-77, UT .16, QRS .12, QT .42 with rare PVCs and BBB per strip from monitor room

## 2017-08-08 NOTE — PROGRESS NOTES
Patient admitted to facility at 1130 via wheelchair; accompanied by hospital transport.  Patient assisted to room and positioned in bed for comfort and safety; call light within reach.  Patient assisted with stowing belongings and oriented to room and facility.  Admission assessment performed and documented in computer. Patient received and reviewed education binder. Admission paperwork completed; signed copies placed in chart.  Will continue to monitor.

## 2017-08-08 NOTE — PROGRESS NOTES
Itzel Nelson Fall Risk Assessment:     Last Known Fall: Within the last month  Mobility: Use of assistive device/requires assist of two people  Medications: Cardiovascular or central nervous system meds  Mental Status/LOC/Awareness: Awake, alert, and oriented to date, place, and person  Toileting Needs: Use of assistive device (Bedside commode, bedpan, urinal)  Volume/Electrolyte Status: Use of IV fluids/tube feeds  Communication/Sensory: Visual (Glasses)/hearing deficit  Behavior: Appropriate behavior  Itzel Nelson Fall Risk Total: 15  Fall Risk Level: HIGH RISK    Universal Fall Precautions:  call light/belongings in reach, bed in low position and locked, wheelchairs and assistive devices out of sight, siderails up x 2, adequate lighting, clutter free and spill free environment, educate on level of risk, educate to call for assistance, use non-slip footwear    Fall Risk Level Interventions:     TRIAL (TELE 8, NEURO, MED KRYSTAL 5) High Fall Risk Interventions  Place yellow fall risk ID band on patient: verified  Provide patient/family education based on risk assessment: verified  Educate patient/family to call staff for assistance when getting out of bed: verified  Place fall precaution signage outside patient door: verified  Place patient in room close to nursing station: currently not available/charge notified  Utilize bed/chair fall alarm: verified  Notify charge of high risk for huddle: verified    Patient Specific Interventions:     Medication: review medications with patient and family  Mental Status/LOC/Awareness: reorient patient, reinforce falls education, encourage family to stay with patient, check on patient hourly, utilize bed/chair fall alarm and reinforce the use of call light  Toileting: provide frquent toileting and monitor intake and output/use of appropriate interventions  Volume/Electrolyte Status: ensure patient remains hydrated  Communication/Sensory: update plan of care on  whiteboard  Behavioral: encourage patient to voice feelings, administer medication as ordered and instruct/reinforce fall program rationale  Mobility: utilize bed/chair fall alarm and ensure bed is locked and in lowest position

## 2017-08-08 NOTE — DISCHARGE PLANNING
Pt accepted to [facility], DC summary in [facility]. Transport arranged for 10:30. Bedside Rn and Charge RN updated. Pt and family agreeable. Cobra completed and placed with pts hard chart.

## 2017-08-08 NOTE — CARE PLAN
Problem: Safety  Goal: Will remain free from injury  Pt. was instructed in call light use. Pt. agreed to use call light before getting out of bed, or if assistance is needed. Bed and chair alarm in place. Wearing non skid socks. Bed at the lowest position. Side rails up. Patient's wife at the bedside.     Problem: Urinary Elimination:  Goal: Ability to reestablish a normal urinary elimination pattern will improve  Outcome: PROGRESSING SLOWER THAN EXPECTED  Per patient report, he has some difficulty in urination. C/o Frequency and urgency. Urinal at the bedside. Staff empties urinal as needed. Will continue to monitor.

## 2017-08-09 PROBLEM — Z74.09 IMPAIRED MOBILITY AND ADLS: Status: ACTIVE | Noted: 2017-01-01

## 2017-08-09 PROBLEM — R46.89 COGNITIVE AND BEHAVIORAL CHANGES: Status: ACTIVE | Noted: 2017-01-01

## 2017-08-09 PROBLEM — Z78.9 IMPAIRED MOBILITY AND ADLS: Status: ACTIVE | Noted: 2017-01-01

## 2017-08-09 PROBLEM — R41.89 COGNITIVE AND BEHAVIORAL CHANGES: Status: ACTIVE | Noted: 2017-01-01

## 2017-08-09 NOTE — H&P
REHABILITATION HISTORY AND PHYSICAL/POST ADMISSION EVALUATION    8/8/2017  6:28 PM  Alvin Grinnell  [id number]  Admission  8/8/2017 11:10 AM  Reason for admission: GBM (glioblastoma multiforme) (CMS-[facility])    HPI:  The patient is a 81 y.o. male with a past medical history of hyperlipidemia, hypertension, GBM S/P radiation and chemotherapy CAP back in June ; now admitted for acute inpatient rehabilitation with severe functional debility to AV block and has had a pacemaker placed.  On admission the patient and medical record report that the patient was totally independent in ADLS and mobility prior to the initial diagnosis of of the GM. He had an extensive debulking procedure performed underwent radiation treatment and approximately 10 days ago completed  A course of Temodar.  The patient's wife reported that he has  been getting n weaker. While the acute chart reported that he had a single  fall. The wife reports that had two separate falls with the last one occuring when sitting at the edge of the bed just falling over.      He was transferred over from [facility] in [place] for pneumonia, complete heart block, brain mass with vasogenic edema on 8/2/2017.ss. He stated that he had been feeling lightheaded for the past 1 week. At the outlying facility chest x-ray revealed bibasilar opacities, CT had revealed extensive vasogenic edema and right parietal, frontal, occipital, temporal lobes and attenuation  to right cisterns with 2 mm midline shift appeared to be increased from previous study. EKG at the outlying facility also revealed a possible 3rd degree heart block. ON 8/3/2017 the patient underwent placement of a dual chamber pacemaker. The patient has significant debility. And despite being on decadron has substantial deficits in cognition, and performance of ADLS and mobility      Patient was evaluated by Rehab Medicine physician and Physical Therapy and Occupational Therapy and determined to be appropriate  for acute inpatient rehab and was transferred to [facility] on 8/8/2017    Pre-mobidly, the patient lived in a singel level motot home in town with spouse.  With this acute therapeutic intervention, this patient hopes to improve his functional status, and return to independent living with the supportive care of spouse and community resources.        REVIEW OF SYSTEMS:     A 12 point review of systems was performed and was negative in detail with the exception of items mentioned elsewhere in this document except for dizziness      PMH:  Past Medical History   Diagnosis Date   • Hyperlipidemia    • Hypertension    • Glioblastoma multiforme (CMS-HCC) 4/7/17   • History of [facility] fever    • Mitral valve prolapse    • Heart block, AV 8/3/2017       PSH:  Past Surgical History   Procedure Laterality Date   • Craniotomy stealth Right 4/7/2017     Procedure: CRANIOTOMY STEALTH - PARIETAL OCCIPITAL;  Surgeon: Lavelle Ugarte M.D.;  Location: SURGERY [facility];  Service:    • Lung needle biopsy       [place] Fever   • Tonsillectomy         FAMILY HISTORY:  Non-contributory    MEDICATIONS:  Current Facility-Administered Medications   Medication Dose   • calcium carbonate (TUMS) chewable tab 500 mg  500 mg   • dexamethasone (DECADRON) tablet 4 mg  4 mg   • oxycodone immediate-release (ROXICODONE) tablet 5 mg  5 mg   • Respiratory Care per Protocol     • Respiratory Care per Protocol     • Pharmacy Consult Request ...Pain Management Review 1 Each  1 Each   • acetaminophen (TYLENOL) tablet 650 mg  650 mg   • docusate sodium (COLACE) capsule 100 mg  100 mg    And   • senna-docusate (PERICOLACE or SENOKOT S) 8.6-50 MG per tablet 1 Tab  1 Tab    And   • lactulose 20 GM/30ML solution 30 mL  30 mL    And   • bisacodyl (DULCOLAX) suppository 10 mg  10 mg   • ondansetron (ZOFRAN ODT) dispertab 4 mg  4 mg    Or   • ondansetron (ZOFRAN) syringe/vial injection 4 mg  4 mg   • mag hydrox-al  "hydrox-simeth (MAALOX PLUS ES or MYLANTA DS) suspension 20 mL  20 mL   • trazodone (DESYREL) tablet 50 mg  50 mg   • omeprazole (PRILOSEC) capsule 20 mg  20 mg       ALLERGIES:  Bactrim    PSYCHOSOCIAL HISTORY:  Living Site:  Home  Living With:  spouse  Caregiver's availability:  Wife available  Number of stairs:  6 per wife but they have a ramp  Substance use history:  Denies    LEVEL OF FUNCTION PRIOR TO DISABILTY:  Mod I since his CAP in  using a walker    LEVEL OF FUNCTION PRIOR TO ADMISSION to [facility]:  Level Of Assist: Contact Guard Assist  Assistive Device: Front Wheel Walker  Distance (Feet): 5  Deviation:  (decreased step length/tomer)  Weight Bearing Status: fwb  Supine to Sit: Minimal Assist  Sit to Supine: Minimal Assist  Scooting: Moderate Assist  Rolling: Moderate Assist to Rt.  Sit to Stand: Minimal Assist  Bed, Chair, Wheelchair Transfer: Minimal Assist  Transfer Method: Stand Pivot  Sitting Edge of Bed: 7 min  Standin-7 min  Occupational Therapy:    Self Feeding: Independent  Grooming / Hygiene: Independent  Bathing: Independent  Dressing: Independent  Toileting: Independent  Laundry: Requires Assist  Kitchen Mobility: Requires Assist  Finances: Requires Assist  Home Management: Requires Assist  Shopping: Requires Assist  Prior Level Of Mobility: Independent With Device in Home  Prior Services: Intermittent Physical Support for ADL Per Family  Housing / Facility: Mobile Home  Current Level of Function:   Upper Body Dressing: Minimal Assist  Lower Body Dressing: Maximal Assist  Speech Language Pathology:        Rehabilitation Prognosis/Potential: Good  Estimated Length of Stay: 14 days    Nursing:    Orientation : Disoriented to Time  Continent    CURRENT LEVEL OF FUNCTION:   Same as level of function prior to admission to [facility]    PHYSICAL EXAM:     VITAL SIGNS:   height is 1.651 m (5' 5\") and weight is 67.813 kg (149 lb 8 oz). His " temperature is 36.9 °C (98.5 °F). His blood pressure is 152/80 and his pulse is 58. His respiration is 18 and oxygen saturation is 100%.     GENERAL: No apparent distress  HEENT: Normocephalic/atraumatic, EOMI, PERRL and No nystagmus with the exception of a laceration to occiput from his falls  CARDIAC: Regular rate and rhythm, normal S1, S2   LUNGS: Clear to auscultation   CHEST: bandage from pacemaker intact not opened  ABDOMINAL: bowel sounds present, soft, nontender and nondistended    EXTREMITIES: no contractures, spasticity, No calf tenderness bilaterally, 2+ bilateral DP/PT pulses. 1_ edema  NEURO:    Mental status:  A&Ox4 (person, place, date, situation) answers questions appropriately follows commands. Significant cognitive slowign noted, paucity of drive, Can follow 3 step commands with some difficult  Speech: fluent, no aphasia or dysarthria    CRANIAL NERVES:  2,3: visual acuity grossly intact, PERRL  3,4,6: EOMI bilaterally, no nystagmus or diplopia  5: intact in all branches  7: no facial asymmetry  8: hearing grossly intact  9,10: symmetric palate elevation  11: SCM/Trapezius strength 5/5 bilaterally  12: tongue protrudes midline    Motor:      Motor non-focal diffusely weak  Sensory: intact to light touch through out    DTRs: 2+ in bilateral biceps and patellar tendons  Negative babinski b/l  Negative Swenson b/l     Tone: no spasticity noted    Proprioception:  Coordination: finger to nose, fine motor intact with fingers to thumb    RADIOLOGY:  All recent films reviewed    LABS:      Recent Labs      08/07/17   0434  08/08/17   0224   WBC  3.2*  4.4*   RBC  2.99*  3.02*   HEMOGLOBIN  9.0*  9.0*   HEMATOCRIT  27.7*  28.3*   MCV  92.6  93.7   MCH  30.1  29.8   MCHC  32.5*  31.8*   RDW  67.7*  67.1*   PLATELETCT  106*  110*   MPV  10.8  10.9             PRIMARY REHAB DIAGNOSIS:    This patient is a 81 y.o. male admitted for acute inpatient rehabilitation with GBM (glioblastoma multiforme) (CMS-[facility]). As  well as signficant debility S/P pacemaker insertion    IMPAIRMENTS:   Cognitive  ADLs/IADLs  Mobility  Swallow    SECONDARY DIAGNOSIS/MEDICAL CO-MORBIDITIES AFFECTING FUNCTION:  Complete heart block  S/P pacemaker insertion  Hyperlipidemia  Hypertension    RELEVANT CHANGES SINCE PREADMISSION EVALUATION:    Status unchanged    The patient's rehabilitation potential is Good  The patient's medical prognosis is satisfactory    PLAN:   Discussion and Recommendations:   1. The patient requires an acute inpatient rehabilitation program with a coordinated program of care at an intensity and frequency not available at a lower level of care. This recommendation is substantiated by the patient's medical physicians who recommend that the patient's intervention and assessment of medical issues needs to be done at an acute level of care for patient's safety and maximum outcome.   2. A coordinated program of care will be supplied by an interdisciplinary team of physical therapy, occupational therapy, rehab physician, rehab nursing, and, if needed, speech therapy and rehab psychology. Rehab team presents a patient-specific rehabilitation and education program concentrating on prevention of future problems related to accessibility, mobility, skin, bowel, bladder, sexuality, and psychosocial and medical/surgical problems.   3. Need for Rehabilitation Physician: The rehab physician will be evaluating the patient on a multi-weekly basis to help coordinate the program of care. The rehab physician communicates between medical physicians, therapists, and nurses to maximize the patient's potential outcome. Specific areas in which the rehab physician will be providing daily assessment include the following:   A. Assessing the patient's heart rate and blood pressure response (vitals monitoring) to activity and making adjustments in medications or conservative measures as needed.   B. The rehab physician will be assessing the frequency at  which the program can be increased to allow the patient to reach optimal functional outcome.   C. The rehab physician will also provide assessments in daily skin care, especially in light of patient's impairments in mobility.   D. The rehab physician will provide special expertise in understanding how to work with functional impairment and recommend appropriate interventions, compensatory techniques, and education that will facilitate the patient's outcome.   4. Rehab R.N.   The rehab RN will be working with patient to carry over in room mobility and activities of daily living when the patient is not in 3 hours of skilled therapy. Rehab nursing will be working in conjunction with rehab physician to address all the medical issues above and continue to assess laboratory work and discuss abnormalities with the treating physicians, assess vitals, and response to activity, and discuss and report abnormalities with the rehab physician. Rehab RN will also continue daily skin care, supervise bladder/bowel program, instruct in medication administration, and ensure patient safety.       REHABILITATION ISSUES/ADVERSE POTENTIAL:  1.  Sever debility and effects oGM with vasogenic edema of the brain  . Patient demonstrates functional deficits in cognition, behavior, strength, balance, coordination, and ADL's. The patient requires therapy to correct these deficits prior to discharge. Patient is admitted to [facility] for comprehensive rehabilitation therapy, including physical, occupational and speech therapy.     Rehabilitation nursing monitors bowel and bladder control, educates on medication administration, co-morbidities and monitors patient safety.  Will continue decadron    Therapies to treat at intensity and frequency of (may change after completion of evaluation by all therapeutic disciplines):       PT:  Physical therapy to address mobility, transfer, gait training and evaluation for adaptive equipment  needs 1hour/day at least 5 days/week for the duration of the ELOS (see below)       OT:  Occupational therapy to address ADLs, self-care, home management training, functional mobility/transfers and assistive device evaluation, and community re-integration 1hour/day at least 5 days/week for the duration of the ELOS (see below).        ST/Dysphagia:  Speech therapy to address speech, language, and cognitive deficits as well as swallowing difficulties with retraining/dysphagia management and community re-integration with comprehension, expression, cognitive training 1hour/day at least 5 days/week for the duration of the ELOS (see below).     2.  Neurostimulants: None at this time but continue to assess daily for need to initiate should status change.    3.  DVT prophylaxis:  Patient is not on chemical anticoagulation with recent tumor resection. Will monitor upon transfer. especially withr ecent hisotry of falls Encourage OOB. Monitor daily for signs and symptoms of DVT including but not limited to swelling and pain to prevent the development of DVT that may interfere with therapies.  .    4.  Pain: No issues with pain currently / Controlled with tylenol and narcotics    5.  Nutrition/Dysphagia: Dietician monitors nutrient intake, recommend supplements prn and provide nutrition education to pt/family to promote optimal nutrition for wound healing/recovery.     6.  Bladder/bowel:  Start bowel and bladder program as described below, to prevent constipation, urinary retention (which may lead to UTI), and urinary incontinence (which will impact upon pt's functional independence).   - TV Q3h while awake with post void bladder scans, I&O cath for PVRs >400  - up to commode after meal     7.  Skin/dermal ulcer prophylaxis: Monitor for new skin conditions with q.2 h. turns as required to prevent the development of skin breakdown.     8.  Cognition/Behavior:  Psychologist Dr. Lr provides adjustment counseling to illness  and psychosocial barriers that may be potential barriers to rehabilitation.     10. Respiratory therapy: RT performs O2 management prn, breathing retraining, pulmonary hygiene and bronchospasm management prn to optimize participation in therapies.    MEDICAL CO-MORBIDITIES/ADVERSE POTENTIAL AFFECTING FUNCTION:  Complete heart block S/P pacemaker insertion  S/P pacemaker insertion pacemaker precautions  Vasogenic edema will continue decadron  Will get CT imaging if change in mental status  Ulcer prophylaxis while on high dose steroids  Seizure precautions with history of GBM and radiation and chemotherapy will monitor closely    Pt was seen today for > 75  min, and entire time spent in face-to-face contact was >50% in counseling and coordination of care as detailed in A/P above.        GOALS/EXPECTED LEVEL OF FUNCTION BASED ON CURRENT MEDICAL AND FUNCTIONAL STATUS (may change based on patient's medical status and rate of impairment recovery):  Transfers:   Supervision  Mobility/Gait:   Supervision  ADL's:   Supervision  Cognition:  Supervision level  Swallowing:  Supervision level on appropriate diet    DISPOSITION: Discharge to pre-morbid independent living setting with the supportive care of patient's spouse and community resources.      ELOS: 7-10 days

## 2017-08-09 NOTE — THERAPY
Speech Therapy Initial Plan of Care Note    1) Assessment:  Patient is 81 y.o. male with a diagnosis of GBM.  Additional factors influencing patient status / progress (ie: cognitive factors, co-morbidities, social support, etc):moderate cognitive deficits in the areas of executive functioning, attention and short term memory recall.  Long term and short term goals have been discussed with patient and they are in agreement.  2) Plan:  Recommend Speech Therapy 30-60 minutes per day 5-6 days per week for 2  weeks for the following treatments:  SLP Swallowing Dysfunction Treatment, SLP Self Care / ADL Training  and SLP Cognitive Skill Development.  3) Goals:  Please refer to care plan for goals.

## 2017-08-09 NOTE — THERAPY
Occupational Therapy Initial Plan of Care Note    1) Assessment:  The patient is a 81 y.o. male with a past medical history of hyperlipidemia, hypertension, GBM S/P radiation and chemotherapy CAP back in June ; now admitted for acute inpatient rehabilitation with severe functional debility to AV block and has had a pacemaker placed. Pre-mobidly, the patient lived in a singel level motor home in [facility] with spouse.  With this acute therapeutic intervention, this patient hopes to improve his functional status, and return to independent living with the supportive care of spouse and community resources.   Long term and short term goals have been discussed with patient and they are in agreement.  2) Plan:  Recommend Occupational Therapy  minutes per day 5-7 days per week for 2  weeks for the following treatments:  OT Self Care/ADL, OT Cognitive Skill Dev, OT Neuro Re-Ed/Balance, OT Sensory Int Techniques, OT Therapeutic Activity, OT Evaluation and OT Therapeutic Exercise.  3) Goals:  Please refer to care plan for goals.

## 2017-08-09 NOTE — CARE PLAN
Problem: Safety  Goal: Will remain free from falls  Intervention: Implement fall precautions  Use of call light encouraged to make needs known.Side rails up, bed in low position, non skid socks/shoes on when out of bed.Alarms in place for safety.Call light and things within reach.Will continue to monitor and assess needs and safety.      Problem: Bowel/Gastric:  Goal: Normal bowel function is maintained or improved  Intervention: Educate patient and significant other/support system about signs and symptoms of constipation and interventions to implement  Assisted to the bathroom, continent of bowel.Scheduled bowel medication given.[facility] 08/08.Will continue to monitor and assess for s/s of constipation.      Problem: Urinary Elimination:  Goal: Ability to reestablish a normal urinary elimination pattern will improve  Intervention: Assess and monitor for signs and symptoms of urinary retention  Pt is continent of bladder using urinal which staff empties.Will continue to monitor and assess level of continence.      Problem: Problem: Pain  Goal: STG - Patient will verbalize an acceptable level of pain  Pt denies any pain, calm and no sign of acute distress noted.Repositioned with pillows for comfort.Will continue to monitor and assess pain level and medicate as needed.

## 2017-08-09 NOTE — FLOWSHEET NOTE
08/09/17 0820   Events/Summary/Plan   Events/Summary/Plan Pt is on 2Lpm NC home use is stable will monitor.   Non-Invasive Resp Device Site Inspection Completed Intact   Location NC b/l ears   Interdisciplinary Plan of Care-Goals (Indications)   Obstructive Ventilatory Defect or Pulmonary Disease without Obvious Obstruction History / Diagnosis   Education   Education Yes - Pt. / Family has been Instructed in use of Home Oxygen   Respiratory WDL   Respiratory (WDL) X   Chest Exam   Work Of Breathing / Effort Mild   Respiration 17   Pulse 65   Breath Sounds   RUL Breath Sounds Clear   RML Breath Sounds Clear   RLL Breath Sounds Diminished   RIANA Breath Sounds Clear   LLL Breath Sounds Diminished   Secretions   Cough Non Productive   Oximetry   #Pulse Oximetry (Single Determination) Yes   Oxygen   Home O2 Use Prior To Admission? Yes   Home O2 LPM Flow 2 LPM   Home O2 Delivery Method Silicone Nasal Cannula   Home O2 Frequency of Use Continuous   Pulse Oximetry 97 %   O2 (LPM) 2   O2 Daily Delivery Respiratory  Silicone Nasal Cannula

## 2017-08-10 NOTE — CARE PLAN
Problem: Other Problem (see comments)  Goal: Other Goal    Monitor/Evaluation: Monitor PO intake, weight, labs, medication adjustments, skin integrity, GI function, vitals, I/Os, and overall hydration status. Adjust nutritional POC pending clinical outcomes.   RD following PRN. Interventions in place. PO adequate at this time.   Goal: Maintain adequate oral nutrient/fluid intake to promote nutrition optimization/healing/resolution of malnutrition/ weight stability.   Outcome: MET Date Met:  08/10/17

## 2017-08-10 NOTE — REHAB-OT IDT TEAM NOTE
Occupational Therapy  Activities of Daily Living  Eating Initial:  5 - Standby Prompting/Supervision or Set-up  Eating Current:  5 - Standby Prompting/Supervision or Set-up   Eating Description:   (Sup/set up to load utensils, bring to mouth and clear..)  Grooming Initial:  4 - Minimal Assistance  Grooming Current:  5 - Standby Prompting/Supervision or Set-up   Grooming Description:  Increased time, Supervision for safety, Verbal cueing, Set-up of equipment, Initial preparation for task (Seated at sinkside)  Bathing Initial:  4 - Minimal Assistance  Bathing Current:  4 - Minimal Assistance   Bathing Description:  Adaptive equipment, Grab bar, Tub bench, Long handled bath tool, Hand rails, Hand held shower, Increased time, Supervision for safety, Verbal cueing, Set-up of equipment, Initial preparation for task, Requires minimal contact  Upper Body Dressing Initial:  3 - Moderate Assistance  Upper Body Dressing Current:  3 - Moderate Assistance   Upper Body Dressing Description:  Increased time, Supervision for safety, Verbal cueing, Set-up of equipment, Initial preparation for task (Mod assist w/ clothing orientation and motor planning of pull over shirt)  Lower Body Dressing Initial:  3 - Moderate Assistance  Lower Body Dressing Current:  3 - Moderate Assistance   Lower Body Dressing Description:  3 - Moderate Assistance  Toileting Initial:  3 - Moderate Assistance  Toileting Current:  2 - Max Assistance   Toileting Description:  Adaptive equipment, Grab bar, Increased time, Supervision for safety, Verbal cueing, Set-up of equipment, Initial preparation for task  Toilet Transfer Initial:  4 - Minimal Assistance  Toilet Transfer Current:  4 - Minimal Assistance   Toilet Transfer Description:  4 - Minimal Assistance  Tub / Shower Transfer Initial:  4 - Minimal Assistance  Tub / Shower Transfer Current:  4 - Minimal Assistance   Tub / Shower Transfer Description:  Adaptive equipment, Grab bar, Increased time,  Supervision for safety, Verbal cueing, Set-up of equipment, Initial preparation for task  IADL:  TBD   Family Training/Education:  TBD   DME/DC Recommendations:  TBD     Strengths:  {WEEKLY TEAM CONF THERAPY STRENGTHS:453462}  Barriers:  {WEEKLY TEAM CONF THERAPY BARRIERS:263879}     # of short term goals set= ***    # of short term goals met= ***          Occupational Therapy Goals           Problem: Bathing     Dates: Start: 08/09/17       Goal: STG-Within one week, patient will bathe     Dates: Start: 08/09/17   Expected End: 08/16/17      Description: 1) Individualized Goal:  Within one week, patient will bathe at a level of supervision.     2) Interventions:  OT Self Care/ADL, OT Neuro Re-Ed/Balance, OT Therapeutic Activity and OT Therapeutic Exercise    Cosign:  Jorge Ayala OTR/VANNESSA           Problem: Dressing     Dates: Start: 08/09/17       Goal: STG-Within one week, patient will dress UB     Dates: Start: 08/09/17   Expected End: 08/16/17      Description: 1) Individualized Goal:  Within one week, patient will dress UB at a level of min a.     2) Interventions:  OT Self Care/ADL, OT Neuro Re-Ed/Balance, OT Therapeutic Activity and OT Therapeutic Exercise    Cosign:  Jorge Ayala OTR/VANNESSA              Goal: STG-Within one week, patient will dress LB     Dates: Start: 08/09/17   Expected End: 08/16/17      Description: 1) Individualized Goal:  Within one week, patient will dress LB at a level of min a.     2) Interventions:  OT Self Care/ADL, OT Neuro Re-Ed/Balance, OT Therapeutic Activity and OT Therapeutic Exercise    Cosign:  Jorge Ayala OTR/VANNESSA                Problem: OT Long Term Goals     Dates: Start: 08/09/17       Goal: LTG-By discharge, patient will complete basic self care tasks     Dates: Start: 08/09/17   Expected End: 08/23/17      Description: 1) Individualized Goal:  LTG-By discharge, patient will complete basic self care tasks at a level of mod. I.     2) Interventions:  OT Self Care/ADL, OT  Cognitive Skill Dev, OT Neuro Re-Ed/Balance, OT Therapeutic Activity and OT Therapeutic Exercise         Cosign:  Jorge Ayala OTR/L         Goal: LTG-By discharge, patient will perform bathroom transfers     Dates: Start: 08/09/17   Expected End: 08/23/17      Description: 1) Individualized Goal:  LTG-By discharge, patient will perform a simple meal preparation activity at a level of mod. I.     2) Interventions:  OT Self Care/ADL, OT Cognitive Skill Dev, OT Neuro Re-Ed/Balance, OT Therapeutic Activity and OT Therapeutic Exercise         Cosign:  BEATRIZ Alfredo/VANNESSA               Section completed by:  Chacha Hare

## 2017-08-10 NOTE — PROGRESS NOTES
"Rehab Progress Note     Interval Events (Subjective)  Patient was seen in his room with his wife. Notes of the therapist appreciated. He appears sharper to me today then yesterday. I believe the life is struggling with the patient's diagnosis was discussed this with case management further       Objective:  VITAL SIGNS: /78 mmHg  Pulse 57  Temp(Src) 36.4 °C (97.6 °F)  Resp 14  Ht 1.651 m (5' 5\")  Wt 67.813 kg (149 lb 8 oz)  BMI 24.88 kg/m2  SpO2 99%  Cardiac: regular rate and rhythm, nl S1/S2   Lungs: clear to auscultation bilaterally.   Abdomen: soft; NT, ND, BS present.   Extremities: minimal edema noted  bilaterally  Neuro: No change with the exception the patient is sharper    Recent Results (from the past 72 hour(s))   CBC WITHOUT DIFFERENTIAL    Collection Time: 08/08/17  2:24 AM   Result Value Ref Range    WBC 4.4 (L) 4.8 - 10.8 K/uL    RBC 3.02 (L) 4.70 - 6.10 M/uL    Hemoglobin 9.0 (L) 14.0 - 18.0 g/dL    Hematocrit 28.3 (L) 42.0 - 52.0 %    MCV 93.7 81.4 - 97.8 fL    MCH 29.8 27.0 - 33.0 pg    MCHC 31.8 (L) 33.7 - 35.3 g/dL    RDW 67.1 (H) 35.9 - 50.0 fL    Platelet Count 110 (L) 164 - 446 K/uL    MPV 10.9 9.0 - 12.9 fL   CBC with Differential    Collection Time: 08/09/17  5:24 AM   Result Value Ref Range    WBC 4.9 4.8 - 10.8 K/uL    RBC 3.46 (L) 4.70 - 6.10 M/uL    Hemoglobin 10.1 (L) 14.0 - 18.0 g/dL    Hematocrit 31.7 (L) 42.0 - 52.0 %    MCV 91.6 81.4 - 97.8 fL    MCH 29.2 27.0 - 33.0 pg    MCHC 31.9 (L) 33.7 - 35.3 g/dL    RDW 66.3 (H) 35.9 - 50.0 fL    Platelet Count 120 (L) 164 - 446 K/uL    MPV 10.8 9.0 - 12.9 fL    Neutrophils-Polys 79.60 (H) 44.00 - 72.00 %    Lymphocytes 11.60 (L) 22.00 - 41.00 %    Monocytes 3.90 0.00 - 13.40 %    Eosinophils 0.00 0.00 - 6.90 %    Basophils 0.20 0.00 - 1.80 %    Immature Granulocytes 4.70 (H) 0.00 - 0.90 %    Nucleated RBC 0.00 /100 WBC    Neutrophils (Absolute) 3.92 1.82 - 7.42 K/uL    Lymphs (Absolute) 0.57 (L) 1.00 - 4.80 K/uL    Monos " (Absolute) 0.19 0.00 - 0.85 K/uL    Eos (Absolute) 0.00 0.00 - 0.51 K/uL    Baso (Absolute) 0.01 0.00 - 0.12 K/uL    Immature Granulocytes (abs) 0.23 (H) 0.00 - 0.11 K/uL    NRBC (Absolute) 0.00 K/uL   Comp Metabolic Panel (CMP)    Collection Time: 08/09/17  5:24 AM   Result Value Ref Range    Sodium 138 135 - 145 mmol/L    Potassium 3.9 3.6 - 5.5 mmol/L    Chloride 104 96 - 112 mmol/L    Co2 27 20 - 33 mmol/L    Anion Gap 7.0 0.0 - 11.9    Glucose 134 (H) 65 - 99 mg/dL    Bun 21 8 - 22 mg/dL    Creatinine 0.67 0.50 - 1.40 mg/dL    Calcium 8.9 8.5 - 10.5 mg/dL    AST(SGOT) 17 12 - 45 U/L    ALT(SGPT) 29 2 - 50 U/L    Alkaline Phosphatase 73 30 - 99 U/L    Total Bilirubin 0.8 0.1 - 1.5 mg/dL    Albumin 3.3 3.2 - 4.9 g/dL    Total Protein 5.9 (L) 6.0 - 8.2 g/dL    Globulin 2.6 1.9 - 3.5 g/dL    A-G Ratio 1.3 g/dL   Prealbumin    Collection Time: 08/09/17  5:24 AM   Result Value Ref Range    Pre-Albumin 32.0 18.0 - 38.0 mg/dL   ESTIMATED GFR    Collection Time: 08/09/17  5:24 AM   Result Value Ref Range    GFR If African American >60 >60 mL/min/1.73 m 2    GFR If Non African American >60 >60 mL/min/1.73 m 2       Current Facility-Administered Medications   Medication Frequency   • non-formulary med 1 Tab DAILY   • calcium carbonate (TUMS) chewable tab 500 mg Q6HRS PRN   • dexamethasone (DECADRON) tablet 4 mg Q6HRS   • oxycodone immediate-release (ROXICODONE) tablet 5 mg Q4HRS PRN   • Respiratory Care per Protocol Continuous RT   • Pharmacy Consult Request ...Pain Management Review 1 Each PRN   • acetaminophen (TYLENOL) tablet 650 mg Q4HRS PRN   • docusate sodium (COLACE) capsule 100 mg DAILY    And   • senna-docusate (PERICOLACE or SENOKOT S) 8.6-50 MG per tablet 1 Tab Q EVENING    And   • lactulose 20 GM/30ML solution 30 mL Q24HRS PRN    And   • bisacodyl (DULCOLAX) suppository 10 mg QDAY PRN   • ondansetron (ZOFRAN ODT) dispertab 4 mg 4X/DAY PRN    Or   • ondansetron (ZOFRAN) syringe/vial injection 4 mg 4X/DAY PRN    • mag hydrox-al hydrox-simeth (MAALOX PLUS ES or MYLANTA DS) suspension 20 mL Q2HRS PRN   • trazodone (DESYREL) tablet 50 mg QHS PRN   • omeprazole (PRILOSEC) capsule 20 mg DAILY       Orders Placed This Encounter   Procedures   • Diet Order     Standing Status: Standing      Number of Occurrences: 1      Standing Expiration Date:      Order Specific Question:  Diet:     Answer:  Cardiac [6]       Assessment:  Active Hospital Problems    Diagnosis   • *GBM (glioblastoma multiforme) (CMS-HCC)   • Vasogenic cerebral edema (CMS-HCC)   • CAP (community acquired pneumonia)   • Heart block, AV   • Cognitive and behavioral changes   • Impaired mobility and ADLs   • Debility   • Bradycardia   • HTN (hypertension)       Medical Decision Making and Plan:  Patient is clearly more alert than he was yesterday during my initial evaluation he clearly may have been exhausted from the move    Therapist's PT, OT and SLP are completing their evaluations  Our 1st team conference. His Friday, August 11, 2017  I would like the patient to state abuse short one patient his diagnosis and prognosis  We'll discuss discharge options at team conference      Total time:  > 25 minutes.  I spent greater than 50% of the time for patient care and coordination on this date, including unit/floor time, and face-to-face time with the patient as per assessment and plan above.    Forrest Traylor M.D.

## 2017-08-10 NOTE — THERAPY
Physical Therapy Initial Plan of Care Note    1) Assessment: Patient is 81 y.o. male with a diagnosis of AV Heart Block, recent Pacemaker placement due to sustained bradycardia, Vasogenic cerebral edema, community acquired pneumonia, and Anemia.  Patient and SO report that occurred due to a fall at [facility] to the Left side; in April, pt also had Brain biopsy: Right parietal occipital craniotomy and Inraparenchymal tumor resection.  Additional factors influencing patient status / progress (ie: cognitive factors, co-morbidities, social support, etc): impaired memory, NWB LUE due to pacemaker, Cardiac precautions, 2L O2 use at home since April, prior to April was IND and active, good social support, 5 steps to enter home but also has a steep ramp, fall risk.  Long term and short term goals have been discussed with patient and spouse and they are in agreement.  2) Plan: Recommend Physical Therapy  minutes per day 5-6 days per week for 3 weeks for the following treatments:  PT Gait Training, PT Therapeutic Exercises, PT Neuro Re-Ed/Balance, PT Therapeutic Activity and PT Manual Therapy.  3) Goals:  Please refer to care plan for goals.

## 2017-08-10 NOTE — CARE PLAN
Problem: Safety  Goal: Will remain free from falls  Outcome: PROGRESSING AS EXPECTED  Reoriented with the use of call light and waiting for assistance before getting out of bed.    Problem: Respiratory:  Goal: Respiratory status will improve  Intervention: Assess and monitor pulmonary status  O2 sat of 97% on 2L/min via NC without s/s of respiratory distress noted.

## 2017-08-10 NOTE — CARE PLAN
Problem: Safety  Goal: Will remain free from injury  No falls or injury this shift.  Patient has not been impulsive this shift.     Problem: Respiratory:  Goal: Respiratory status will improve  Respirations even and unlabored.

## 2017-08-10 NOTE — REHAB-CM IDT TEAM NOTE
"Case Management   DC Planning  DC destination/dispostion:  Home w/ spouse in [place], [place] / mobile home w/ ramp/ tub/shower combo w/ grab bars.  Pt has home 02/4ww/tub shower seat @ home.    Referrals: None at this time.      DC Needs:  Anticipate family training, home health,     Barriers to discharge: Functional / cognitive status.  Spouse reports pt has to be indep w/ transfers/adl's indicating \"I'm old and I can't do everything for him\".  All three daughters live out of town.      Strengths: Pleasant, cooperative, supprortive spouse and  3 daughters, supportive friends/neigbors in [place], [place].      Section completed by:  Demetria Guido, EVI, CCM      "

## 2017-08-10 NOTE — PROGRESS NOTES
"Rehab Progress Note     Interval Events (Subjective)  Patient was seen in his room, Dr. Traylor the attending, the writer and patient's wife are present. Patient appears more alert today. Wfe is struggling with the patient's prognosis and is clearly inpatient for them to get back home. She states that patient's cognition and personality is still far from that she knows of her  but she admits that there has been improvement in patient's cognition and alertness.     Objective:  VITAL SIGNS: /77 mmHg  Pulse 58  Temp(Src) 36.6 °C (97.8 °F)  Resp 16  Ht 1.651 m (5' 5\")  Wt 67.813 kg (149 lb 8 oz)  BMI 24.88 kg/m2  SpO2 98%  Cardiac: regular rate and rhythm, nl S1/S2   Lungs: clear to auscultation bilaterally; he is on supplemental oxygen via nasal canula and is not in any respiratory distress.   Abdomen: soft; NT, ND, BS present.   Extremities: minimal edema noted  bilaterally  Neuro: No change with the exception the patient is sharper    Recent Results (from the past 72 hour(s))   CBC WITHOUT DIFFERENTIAL    Collection Time: 08/08/17  2:24 AM   Result Value Ref Range    WBC 4.4 (L) 4.8 - 10.8 K/uL    RBC 3.02 (L) 4.70 - 6.10 M/uL    Hemoglobin 9.0 (L) 14.0 - 18.0 g/dL    Hematocrit 28.3 (L) 42.0 - 52.0 %    MCV 93.7 81.4 - 97.8 fL    MCH 29.8 27.0 - 33.0 pg    MCHC 31.8 (L) 33.7 - 35.3 g/dL    RDW 67.1 (H) 35.9 - 50.0 fL    Platelet Count 110 (L) 164 - 446 K/uL    MPV 10.9 9.0 - 12.9 fL   CBC with Differential    Collection Time: 08/09/17  5:24 AM   Result Value Ref Range    WBC 4.9 4.8 - 10.8 K/uL    RBC 3.46 (L) 4.70 - 6.10 M/uL    Hemoglobin 10.1 (L) 14.0 - 18.0 g/dL    Hematocrit 31.7 (L) 42.0 - 52.0 %    MCV 91.6 81.4 - 97.8 fL    MCH 29.2 27.0 - 33.0 pg    MCHC 31.9 (L) 33.7 - 35.3 g/dL    RDW 66.3 (H) 35.9 - 50.0 fL    Platelet Count 120 (L) 164 - 446 K/uL    MPV 10.8 9.0 - 12.9 fL    Neutrophils-Polys 79.60 (H) 44.00 - 72.00 %    Lymphocytes 11.60 (L) 22.00 - 41.00 %    Monocytes 3.90 0.00 - " 13.40 %    Eosinophils 0.00 0.00 - 6.90 %    Basophils 0.20 0.00 - 1.80 %    Immature Granulocytes 4.70 (H) 0.00 - 0.90 %    Nucleated RBC 0.00 /100 WBC    Neutrophils (Absolute) 3.92 1.82 - 7.42 K/uL    Lymphs (Absolute) 0.57 (L) 1.00 - 4.80 K/uL    Monos (Absolute) 0.19 0.00 - 0.85 K/uL    Eos (Absolute) 0.00 0.00 - 0.51 K/uL    Baso (Absolute) 0.01 0.00 - 0.12 K/uL    Immature Granulocytes (abs) 0.23 (H) 0.00 - 0.11 K/uL    NRBC (Absolute) 0.00 K/uL   Comp Metabolic Panel (CMP)    Collection Time: 08/09/17  5:24 AM   Result Value Ref Range    Sodium 138 135 - 145 mmol/L    Potassium 3.9 3.6 - 5.5 mmol/L    Chloride 104 96 - 112 mmol/L    Co2 27 20 - 33 mmol/L    Anion Gap 7.0 0.0 - 11.9    Glucose 134 (H) 65 - 99 mg/dL    Bun 21 8 - 22 mg/dL    Creatinine 0.67 0.50 - 1.40 mg/dL    Calcium 8.9 8.5 - 10.5 mg/dL    AST(SGOT) 17 12 - 45 U/L    ALT(SGPT) 29 2 - 50 U/L    Alkaline Phosphatase 73 30 - 99 U/L    Total Bilirubin 0.8 0.1 - 1.5 mg/dL    Albumin 3.3 3.2 - 4.9 g/dL    Total Protein 5.9 (L) 6.0 - 8.2 g/dL    Globulin 2.6 1.9 - 3.5 g/dL    A-G Ratio 1.3 g/dL   Prealbumin    Collection Time: 08/09/17  5:24 AM   Result Value Ref Range    Pre-Albumin 32.0 18.0 - 38.0 mg/dL   ESTIMATED GFR    Collection Time: 08/09/17  5:24 AM   Result Value Ref Range    GFR If African American >60 >60 mL/min/1.73 m 2    GFR If Non African American >60 >60 mL/min/1.73 m 2       Current Facility-Administered Medications   Medication Frequency   • ICAPS (AREDS-2) Caps (patient own supply in drawer) DAILY   • calcium carbonate (TUMS) chewable tab 500 mg Q6HRS PRN   • dexamethasone (DECADRON) tablet 4 mg Q6HRS   • oxycodone immediate-release (ROXICODONE) tablet 5 mg Q4HRS PRN   • Respiratory Care per Protocol Continuous RT   • Pharmacy Consult Request ...Pain Management Review 1 Each PRN   • acetaminophen (TYLENOL) tablet 650 mg Q4HRS PRN   • docusate sodium (COLACE) capsule 100 mg DAILY    And   • senna-docusate (PERICOLACE or SENOKOT  S) 8.6-50 MG per tablet 1 Tab Q EVENING    And   • lactulose 20 GM/30ML solution 30 mL Q24HRS PRN    And   • bisacodyl (DULCOLAX) suppository 10 mg QDAY PRN   • ondansetron (ZOFRAN ODT) dispertab 4 mg 4X/DAY PRN    Or   • ondansetron (ZOFRAN) syringe/vial injection 4 mg 4X/DAY PRN   • mag hydrox-al hydrox-simeth (MAALOX PLUS ES or MYLANTA DS) suspension 20 mL Q2HRS PRN   • trazodone (DESYREL) tablet 50 mg QHS PRN   • omeprazole (PRILOSEC) capsule 20 mg DAILY       Orders Placed This Encounter   Procedures   • Diet Order     Standing Status: Standing      Number of Occurrences: 1      Standing Expiration Date:      Order Specific Question:  Diet:     Answer:  Cardiac [6]       Assessment:  Active Hospital Problems    Diagnosis   • *GBM (glioblastoma multiforme) (CMS-HCC)   • Vasogenic cerebral edema (CMS-HCC)   • CAP (community acquired pneumonia)   • Heart block, AV   • Cognitive and behavioral changes   • Impaired mobility and ADLs   • Debility   • Bradycardia   • HTN (hypertension)         GBM:  With vasogenic edema, cognitive and behavioral changes,severe debility, and impaired ADLs:   S/P radiation and chemotherapy CAP back in June;  brain mass with vasogenic edema on 8/2/2017. CT had revealed extensive vasogenic edema and right parietal, frontal, occipital, temporal lobes and attenuation  to right cisterns with 2 mm midline shift appeared to be increased from previous study;  Continue with Decadron  Continue with PT/OT/SLP    CAP: Completed treatment with 5 day course of Unasyn and Doxycycline in early August ; currently on supplemental oxygen; on RA O2 drops to 86-87%    Heart Block: Post pacemaker placement on 08/03/2017; needs follow up with Dr. Chung      Medical Decision Making and Plan:  Patient more alert than he was yesterday.    Therapies PT/OT/SLP in progress  Our 1st team conference. His Friday, August 11, 2017  We'll discuss discharge options at team conference          Madhavi Villalta  M.D.    Geriatrics Fellow

## 2017-08-10 NOTE — DISCHARGE PLANNING
CASE MANAGEMENT INITIAL ASSESSMENT    Admit Date:  8/8/2017     I met with patient and his spouse, Silva to discuss role of case management / discharge planning / team conference.  They both provided me with information for assessment-pt is slow to respond to questions.  [facility] is [facility] in [place].  She is an active .     Patient is a  81 y.o. male transferred from [facility] where he was hospitalized from 8/2/2017 to 8/8/2017 with a pace maker placed on 8/4/2017.      Accepting MD to [facility] is Dr. Forrest Traylor      Diagnosis: 02.9 Other brain  Vasogenic cerebral edema (CMS-HCC)  Bradycardia  Vasogenic cerebral edema (CMS-HCC)  Bradycardia  CAP (community acquired pneumonia)  Debility  GBM (glioblastoma multiforme) (CMS-[facility])    Co-morbidities:   Patient Active Problem List    Diagnosis Date Noted   • Heart block, AV 08/03/2017     Priority: High   • Vasogenic cerebral edema (CMS-HCC) 08/03/2017     Priority: High   • CAP (community acquired pneumonia) 08/03/2017     Priority: High   • Brain mass 04/05/2017     Priority: High   • Cognitive and behavioral changes 08/09/2017   • Impaired mobility and ADLs 08/09/2017   • Debility 08/08/2017   • Bradycardia 08/08/2017   • GBM (glioblastoma multiforme) (CMS-HCC) 08/08/2017   • Anemia 08/04/2017   • HTN (hypertension) 04/06/2017     Prior Living Situation:  Housing / Facility: Mobile Home with ramp in [place], [place]; shower/tub combo.  Lives with - Patient's Self Care Capacity: Spouse Silva     Prior Level of Function:  Medication Management: Indep  Finances: Indep  Home Management: Indep  Shopping: Indep  Prior Level Of Mobility: Indep  Driving / Transportation: Indep  Pt. Description Of Current ADL Function: Mobility Problems, Activities Of Daily Living / Self Care Restrictions    Support Systems:  Primary : Sandra Grinnell Spouse  Other support systems: Patient /spouse have  3 daughters who all  "live out of the area- residing in [place], [place], and [facility].   Advance Directives: No  Power of  (Name & Phone): None    Previous Services Utilized:   Equipment Owned: 4-Wheel Walker, Single Point Cane, Front-Wheel Walker, Tub / Shower Seat, Grab Bar(s) In Tub / Shower, Grab Bar(s) By Toilet, Hand Held Shower, Ramp, [facility]  Prior Services: Skilled Home Health Services through [facility]    Other Information:  Occupation (Pre-Hospital Vocational): Retired Due To Age  Ph[facility]acy: [facility]or [facility] in [place], [place].  Primary Payor Source: Medicare A  Primary Care Practitioner : Dr. Landeros @ [place], [place] for PCP; [facility].   Other MDs: Dr. Cruz Oncologist; Dr. Chung/ To Cardiology; Dr. Ugarte Neurosurgeon    Additional Case Management Questions:  Have you ever received case management services for yourself or a family member?  [facility]    Do you feel you have an an understanding of of what services  provide?  Yes    Do you have any additional questions regarding case management?    No      Patient / Family Goal:  Spouse indicates she needs pt to be able to get to the bathroom;/transfer as she is unable to provide much hands on care for pt.  She was not \"impressed with home health in [place]\".      Plan:  1. Continue to follow patient through hospitalization and provide discharge planning in collaboration with patient, family, physicians and ancillary services.     2. Utilize community resources to ensure a safe discharge.         "

## 2017-08-10 NOTE — DIETARY
"Nutrition Care/ Consult For Supplements/ Weight loss     Assessment:    Admitting Diagnosis: GBM (glioblastoma multiforme) (CMS-HCC)  Pertinent PMH: Hyperlipidemia, HTN, GBM s/p radiation and chemotherapy in June, AV block s/p pacemaker placement     Additional Information: Patient seen in room eating watermelon for snack.  Wife at bedside.  Patient with very flat affect.  He reports his appetite is \"okay.\"  Eats 3 meals/day at home although wife states they are small meals.  Drinks 1 Ensure at dinner.  Offered patient Ensure or vanilla milkshake- patient prefers vanilla milkshake.      Patient appears thin, muscle wasting noted to upper extremities, tempora/clavicle wasting observed.    Patient denies any GI issues.  He is self feeding with ease.  Dentition is good.  No swallowing issues. Food preferences noted. Got yogurt this morning- doesn't like it.     Needs foods chopped.     Patient states UBW of 158 lbs.  Lost weight while undergoing chemo-radiation.  Appetite is improving.     Appetite: fair to good   Diet: Cardiac + snacks TID between meals (fruit)   Average PO intake x 3 days: 74% (adequate) ate 100% of snack     Labs: Serum Glucose 134, T.P. 5.9   Medications: Decadron, Bowel Regimen, Prilosec, ICAPS   PRN Medications: noted  IVF: n/a     Height: 165.1cm  Weight: 67.813kg   Usual Body Weight: 71.82 kg   %Usual Body Weight: 94%  % Weight Loss: 6%   BMI: 24.88 (WNL)    Skin: tear to head, left anterior chest wound   GI: BM 8/10   Vitals: 2L C, Bradycardia noted, /78   I/Os: -30mL x 24 hours     Nutrient Needs:  Kcal: 1585- 1718 kcal/day BEE= 1321 x 1.2-1.3   Protein: 68-82g/day   Fluid: 2000mL /day         Diagnosis: Malnutrition (chronic/ non-severe) r/t inadequate oral intake in the setting of poor appetite s/t chronic disease as evidenced by patient s/p chemoradiation treatment s/t GBM resulting in intake < 75% of nutrient needs > 1 month, moderate depletion of muscle mass/ subcutaneous body fat, " tempora/clavicle wasting, 6% weight loss x 1 month.     Intervention/ Recommendations/POC:  1. Continue current diet + snacks.  High protein/ high kcal milkshake with supper (vanilla).  Nutrition rep to help with menus daily to aid in optimal food choices to promote adequate PO intake.   2.Encourage adequate PO/fluid intake.  3. Nutrition rep to see regarding food prefs/ honor within dietary restrictions (if indicated)     Monitor/Evaluation: Monitor PO intake, weight, labs, medication adjustments, skin integrity, GI function, vitals, I/Os, and overall hydration status.  Adjust nutritional POC pending clinical outcomes.    RD following PRN.  Interventions in place. PO adequate at this time.   Goal: Maintain adequate oral nutrient/fluid intake to promote nutrition optimization/healing/resolution of malnutrition/ weight stability.

## 2017-08-10 NOTE — REHAB-DIETARY IDT TEAM NOTE
"Dietary  Nutrition       Dietary Problems           Problem: Other Problem (see comments)     Description: Diagnosis: Malnutrition (chronic/ non-severe) r/t inadequate oral intake in the setting of poor appetite s/t chronic disease as evidenced by patient s/p chemoradiation treatment s/t GBM resulting in intake < 75% of nutrient needs > 1 month, moderate depletion of muscle mass/ subcutaneous body fat, tempora/clavicle wasting, 6% weight loss x 1 month.           Goal: Other Goal (Resolved)      Monitor/Evaluation: Monitor PO intake, weight, labs, medication adjustments, skin integrity, GI function, vitals, I/Os, and overall hydration status.  Adjust nutritional POC pending clinical outcomes.      RD following PRN.  Interventions in place. PO adequate at this time.     Goal: Maintain adequate oral nutrient/fluid intake to promote nutrition optimization/healing/resolution of malnutrition/ weight stability.             Nutrition Care/ Consult For Supplements/ Weight loss     Assessment:    Admitting Diagnosis: GBM (glioblastoma multiforme) (CMS-HCC)  Pertinent PMH: Hyperlipidemia, HTN, GBM s/p radiation and chemotherapy in June, AV block s/p pacemaker placement      Additional Information: Patient seen in room eating watermelon for snack.  Wife at bedside.  Patient with very flat affect.  He reports his appetite is \"okay.\"  Eats 3 meals/day at home although wife states they are small meals.  Drinks 1 Ensure at dinner.  Offered patient Ensure or vanilla milkshake- patient prefers vanilla milkshake.      Patient appears thin, muscle wasting noted to upper extremities, tempora/clavicle wasting observed.    Patient denies any GI issues.  He is self feeding with ease.  Dentition is good.  No swallowing issues. Food preferences noted. Got yogurt this morning- doesn't like it.     Needs foods chopped.     Patient states UBW of 158 lbs.  Lost weight while undergoing chemo-radiation.  Appetite is improving.     Appetite: fair " to good   Diet: Cardiac + snacks TID between meals (fruit)    Average PO intake x 3 days: 74% (adequate) ate 100% of snack     Labs: Serum Glucose 134, T.P. 5.9    Medications: Decadron, Bowel Regimen, Prilosec, ICAPS    PRN Medications: noted  IVF: n/a     Height: 165.1cm  Weight: 67.813kg    Usual Body Weight: 71.82 kg    %Usual Body Weight: 94%  % Weight Loss: 6%    BMI: 24.88 (WNL)    Skin: tear to head, left anterior chest wound    GI: BM 8/10    Vitals: 2L C, Bradycardia noted, /78    I/Os: -30mL x 24 hours     Nutrient Needs:  Kcal: 1585- 1718 kcal/day BEE= 1321 x 1.2-1.3    Protein: 68-82g/day    Fluid: 2000mL /day         Diagnosis: Malnutrition (chronic/ non-severe) r/t inadequate oral intake in the setting of poor appetite s/t chronic disease as evidenced by patient s/p chemoradiation treatment s/t GBM resulting in intake < 75% of nutrient needs > 1 month, moderate depletion of muscle mass/ subcutaneous body fat, tempora/clavicle wasting, 6% weight loss x 1 month.     Intervention/ Recommendations/POC:  1. Continue current diet + snacks.  High protein/ high kcal milkshake with supper (vanilla).  Nutrition rep to help with menus daily to aid in optimal food choices to promote adequate PO intake.    2.Encourage adequate PO/fluid intake.  3. Nutrition rep to see regarding food prefs/ honor within dietary restrictions (if indicated)      Monitor/Evaluation: Monitor PO intake, weight, labs, medication adjustments, skin integrity, GI function, vitals, I/Os, and overall hydration status.  Adjust nutritional POC pending clinical outcomes.     RD following PRN.  Interventions in place. PO adequate at this time.    Goal: Maintain adequate oral nutrient/fluid intake to promote nutrition optimization/healing/resolution of malnutrition/ weight stability.                               Section completed by:  Julia Dumont RD

## 2017-08-10 NOTE — PROGRESS NOTES
Endorsed by RN. Assumed care. Pt in bed sleeping. Compliant with prescribed HS Decadron and stool softener without adverse reaction noted. Posterior head skin tear and L chest pacemaker dressing intact. No sz episode reported/noted. Denied any pain/discomfort. Safety precautions in place. Call light within reach.

## 2017-08-11 NOTE — CARE PLAN
Problem: Swallowing STGs  Goal: STG-Within one week, patient will  1) Individualized goal: Tolerate regular textures and thin liquids without any overt signs or symptoms of asp/pen noted while in main dining provided SPV.   2) Interventions: SLP Swallowing Dysfunction Treatment    Outcome: MET Date Met:  08/11/17  met    Problem: Problem Solving STGs  Goal: STG-Within one week, patient will  1) Individualized goal: Complete divided attention tasks with 80% accuracy provided minimal assistance.   2) Interventions: SLP Self Care / ADL Training and SLP Cognitive Skill Development    Outcome: PROGRESSING AS EXPECTED  Mod-max a for attention     Problem: Memory STGs  Goal: STG-Within one week, patient will  1) Individualized goal: Learn and implement memory strategies to assist in recall of 4/5 pieces of information provided minimal assistance.   2) Interventions: SLP Self Care / ADL Training and SLP Cognitive Skill Development    Outcome: NOT MET  Goal not yet initiated

## 2017-08-11 NOTE — REHAB-RESPIRATORY IDT TEAM NOTE
Respiratory Therapy  Respiratory Therapy    Patient's respiratory plan of care for oxygen therapy is:  O2 Protocol Indications: Room Air SpO2 Less Than 90%, pt is on home oxygen 24/7 wears 2Lpm.  O2 Protocol Goals/Outcome: Normoxemia on Oxygen, SpO2 greater than 90%    Section completed by:  Nicole Abbasi, RRT

## 2017-08-11 NOTE — DISCHARGE PLANNING
"CASE MANAGEMENT/ I met with pt and spouse Silva providing an update from team conference, plan of care, and projected dc date of 8/25/2017.  Advised them of appt w/ Dr. Ugarte on 8/14 @ 1400.  [facility] to transport.  Spouse appears to be apprehensive about the level of care pt may need at home.   I did explain [facility] through [facility] to include [facility].  Pt states \" the [facility] doesn't pay for anything\".  I encouraged spouse to explore this as an option for assistance at home.   Spouse is reluctant to have home health services in place-will coordinate out pt therapy.     "

## 2017-08-11 NOTE — REHAB-OT IDT TEAM NOTE
Occupational Therapy  Activities of Daily Living  Eating Initial:  5 - Standby Prompting/Supervision or Set-up  Eating Current:  5 - Standby Prompting/Supervision or Set-up   Eating Description:   (Sup/set up to load utensils, bring to mouth and clear..)  Grooming Initial:  4 - Minimal Assistance  Grooming Current:  5 - Standby Prompting/Supervision or Set-up   Grooming Description:  Increased time, Supervision for safety, Verbal cueing, Set-up of equipment, Initial preparation for task (Seated at sinkside)  Bathing Initial:  4 - Minimal Assistance  Bathing Current:  4 - Minimal Assistance   Bathing Description:  Adaptive equipment, Grab bar, Tub bench, Long handled bath tool, Hand rails, Hand held shower, Increased time, Supervision for safety, Verbal cueing, Set-up of equipment, Initial preparation for task, Requires minimal contact  Upper Body Dressing Initial:  3 - Moderate Assistance  Upper Body Dressing Current:  3 - Moderate Assistance   Upper Body Dressing Description:  Increased time, Supervision for safety, Verbal cueing, Set-up of equipment, Initial preparation for task (Mod assist w/ clothing orientation and motor planning of pull over shirt)  Lower Body Dressing Initial:  3 - Moderate Assistance  Lower Body Dressing Current:  3 - Moderate Assistance   Lower Body Dressing Description:  3 - Moderate Assistance  Toileting Initial:  3 - Moderate Assistance  Toileting Current:  2 - Max Assistance   Toileting Description:  Adaptive equipment, Grab bar, Increased time, Supervision for safety, Verbal cueing, Set-up of equipment, Initial preparation for task  Toilet Transfer Initial:  4 - Minimal Assistance  Toilet Transfer Current:  4 - Minimal Assistance   Toilet Transfer Description:  4 - Minimal Assistance  Tub / Shower Transfer Initial:  4 - Minimal Assistance  Tub / Shower Transfer Current:  4 - Minimal Assistance   Tub / Shower Transfer Description:  Adaptive equipment, Grab bar, Increased time,  Supervision for safety, Verbal cueing, Set-up of equipment, Initial preparation for task  IADL:  TBD   Family Training/Education:  TBD   DME/DC Recommendations:  TBD     Strengths:  Pleasant and cooperative, Supportive family and Willingly participates in therapeutic activities  Barriers:  Limited mobility and Poor balance     # of short term goals set= 3  # of short term goals met= 0         Occupational Therapy Goals           Problem: Bathing     Dates: Start: 08/09/17       Goal: STG-Within one week, patient will bathe     Dates: Start: 08/09/17   Expected End: 08/16/17      Description: 1) Individualized Goal:  Within one week, patient will bathe at a level of supervision.     2) Interventions:  OT Self Care/ADL, OT Neuro Re-Ed/Balance, OT Therapeutic Activity and OT Therapeutic Exercise    Cosign:  BEATRIZ Alfredo/VANNESSA     Note: Goal Note filed on 08/11/17 0757 by Jorge Ayala MSOTR/L    Goal: STG-Within one week, patient will bathe  Outcome: NOT MET  Pt at Min assist             Problem: Dressing     Dates: Start: 08/09/17       Goal: STG-Within one week, patient will dress UB     Dates: Start: 08/09/17   Expected End: 08/16/17      Description: 1) Individualized Goal:  Within one week, patient will dress UB at a level of min a.     2) Interventions:  OT Self Care/ADL, OT Neuro Re-Ed/Balance, OT Therapeutic Activity and OT Therapeutic Exercise    Cosign:  BEATRIZ Alfredo/VANNESSA          Note: Goal Note filed on 08/11/17 0757 by Jorge Ayala MSOTR/L    Goal: STG-Within one week, patient will dress UB  Outcome: NOT MET  Pt at Mod assist          Goal: STG-Within one week, patient will dress LB     Dates: Start: 08/09/17   Expected End: 08/16/17      Description: 1) Individualized Goal:  Within one week, patient will dress LB at a level of min a.     2) Interventions:  OT Self Care/ADL, OT Neuro Re-Ed/Balance, OT Therapeutic Activity and OT Therapeutic Exercise    Cosign:  Jorge Ayala OTR/VANNESSA          Note: Goal Note  filed on 08/11/17 0757 by Jorge Ayala MS,OTR/L    Goal: STG-Within one week, patient will dress LB  Outcome: NOT MET  Pt at Mod assist            Problem: OT Long Term Goals     Dates: Start: 08/09/17       Goal: LTG-By discharge, patient will complete basic self care tasks     Dates: Start: 08/09/17   Expected End: 08/23/17      Description: 1) Individualized Goal:  LTG-By discharge, patient will complete basic self care tasks at a level of mod. I.     2) Interventions:  OT Self Care/ADL, OT Cognitive Skill Dev, OT Neuro Re-Ed/Balance, OT Therapeutic Activity and OT Therapeutic Exercise         Cosign:  BEATRIZ Alfredo/VANNESSA         Goal: LTG-By discharge, patient will perform bathroom transfers     Dates: Start: 08/09/17   Expected End: 08/23/17      Description: 1) Individualized Goal:  LTG-By discharge, patient will perform a simple meal preparation activity at a level of mod. I.     2) Interventions:  OT Self Care/ADL, OT Cognitive Skill Dev, OT Neuro Re-Ed/Balance, OT Therapeutic Activity and OT Therapeutic Exercise         Cosign:  BEATRIZ Alfredo/VANNESSA               Section completed by:  Jorge Ayala MS,OTR/L

## 2017-08-11 NOTE — CARE PLAN
Problem: Safety  Goal: Will remain free from injury  Outcome: PROGRESSING AS EXPECTED  Patient demonstrates good safety technique this shift.  Asks for assistance when needed and does not attempt self transfer.  Able to verbalize needs.  Will continue to monitor.    Problem: SKIN INTEGRITY  Goal: LTG - Patient will be free from infection  Outcome: PROGRESSING AS EXPECTED  Changed dressing over pacemaker incision. Steri strips in place, no drainage or swelling noted.

## 2017-08-11 NOTE — REHAB-NURSING IDT TEAM NOTE
Nursing  Nursing  Diet/Nutrition:  Cardiac and Thin Liquids  Medication Administration:  Whole with Liquid Wash  % consumed at meals in last 24 hours:  Consumed %  of meals per documentation.  Breakfast:  80%   Lunch:  80%  Dinner:  20%   Snack schedule:  HS  Appetite:  Good  Fluid Intake/Output in past 24 hours: In: 690 [P.O.:690]  Out: 800   Admit Weight:  Weight: 67.813 kg (149 lb 8 oz)  Weight Last 7 Days   [67.813 kg (149 lb 8 oz)] 67.813 kg (149 lb 8 oz) (08/08 1100)    Pain Issues:  DENIES PAIN    Bowel:    Bowel Assist Initial Score:  4 - Minimal Assistance  Bowel Assist Current Score:  4 - Minimal Assistance  Bowl Accidents in last 7 days:     Last bowel movement: 08/10/17  Stool: Medium, Soft, Formed, Brown       Usual bowel pattern:  every other day  Scheduled bowel medications:  senna-docusate (PERICOLACE or SENOKOT S)   PRN bowel medications used in last 24 hours:  None  Nursing Interventions:  Increased time, PRN medication, Supervision, Brief  Effectiveness of bowel program:   good=regular, predictable, controlled emptying of bowel  Bladder:    Bladder Assist Initial Score:  4 - Minimal Assistance  Bladder Assist Current Score:  7 - Independent  Bladder Accidents in last 7 days:   NONE  Interventions:  Increased time, Supervision, Emptying of device  Effectiveness of bladder training:  Good=regular, predictable, emptying of bladder, patient initiates time voiding      Wound:         Patient Lines/Drains/Airways Status    Active Current Wounds     Name: Placement date: Placement time: Site: Days:    Wound POA Skin Tear Breast;Head Posterior 08/03/17  0700   7    Wound POA Skin Tear Head Posterior 08/03/17 2000   7    Wound Not POA Other (comment) Breast Left Anterior 08/03/17 2000   7                   Interventions:    Effectiveness of intervention:  wound is improving       Sleep/wake cycle:   Average hours slept:  Sleeps greater than 6 hours without waking  Sleep medication usage:   None    Patient/Family Training/Education:  Bladder Management/Training, Bowel Management/Training, Diet/Nutrition, Fall Prevention, General Self Care, Medication Management, Safe Transfers, Safety and Skin Care  Strengths: Alert and oriented, Able to follow instructions, Pleasant and cooperative and Motivated for self care and independence   Barriers:   Fatigue and Generalized weakness            Nursing Problems           Problem: Bowel/Gastric:     Goal: Normal bowel function is maintained or improved         Goal: Will not experience complications related to bowel motility           Problem: Communication     Goal: The ability to communicate needs accurately and effectively will improve           Problem: Discharge Barriers/Planning     Goal: Patient's continuum of care needs will be met           Problem: Infection     Goal: Will remain free from infection           Problem: Knowledge Deficit     Goal: Knowledge of disease process/condition, treatment plan, diagnostic tests, and medications will improve         Goal: Knowledge of the prescribed therapeutic regimen will improve           Problem: Mobility     Goal: Risk for activity intolerance will decrease           Problem: Problem: Pain     Goal: LTG - Patient will manage pain with the appropriate technique/Intervention         Goal: STG - Patient will reduce or eliminate use of analgesics         Goal: STG - Patient will verbalize an acceptable level of pain         Goal: STG - patients pain is managed to allow active participation in daily activities           Problem: Respiratory:     Goal: Respiratory status will improve           Problem: SKIN INTEGRITY     Goal: LTG - Patient will be free from infection         Goal: LTG - PATIENT WILL MAINTAIN/IMPROVE SKIN INTEGRITY THROUGH PROPER SKIN CARE TECHNIQUES.         Goal: LTG - Patient will demonstrate appropriate pressure relief techniques         Goal: STG - Patient exhibits signs of wound healing.          Goal: STG - patient demonstrates pressure reduction techniques           Problem: Safety     Goal: Will remain free from injury         Goal: Will remain free from falls           Problem: Urinary Elimination:     Goal: Ability to reestablish a normal urinary elimination pattern will improve           Problem: Venous Thromboembolism (VTW)/Deep Vein Thrombosis (DVT) Prevention:     Goal: Patient will participate in Venous Thrombosis (VTE)/Deep Vein Thrombosis (DVT)Prevention Measures                Long Term Goals:   At discharge patient will be able to function safely at home and in the community with support.    Section completed by:  Sowmya Delarosa R.N.

## 2017-08-11 NOTE — CARE PLAN
Problem: Mobility  Goal: STG-Within one week, patient will ambulate household distance  1) Individualized goal: FWW, oxygen, gt belt, 2x 50 feet indoors, SBA  2) Interventions: PT Gait Training, PT Therapeutic Exercises, PT Neuro Re-Ed/Balance, PT Therapeutic Activity and PT Manual Therapy.  Outcome: NOT MET  Patient requires additional A or an AD for balance.  Goal was set 2 days ago.  Goal: STG-Within one week, patient will ascend and descend four to six stairs  1) Individualized goal: 8 stairs, B railings, oxygen, gt belt, CGA  2) Interventions: PT Gait Training, PT Therapeutic Exercises, PT Neuro Re-Ed/Balance, PT Therapeutic Activity and PT Manual Therapy.  Outcome: NOT MET  Patient requires increased A.  Goal was set 2 days ago.    Problem: Mobility Transfers  Goal: STG-Within one week, patient will transfer bed to chair  1) Individualized goal: FWW, oxygen, gt belt, SBA  2) Interventions: PT Gait Training, PT Therapeutic Exercises, PT Neuro Re-Ed/Balance, PT Therapeutic Activity and PT Manual Therapy.  Outcome: NOT MET  Patient requires increased A.  Goal was set 2 days ago.

## 2017-08-11 NOTE — REHAB-PHARMACY IDT TEAM NOTE
Pharmacy  Pharmacy  Antibiotics/Antifungals/Antivirals:  Medication:      Active Orders     None        Route:        NA  Stop Date:  NA  Reason:      NA  Antihypertensives/Cardiac:  Medication:    Orders     None        Patient Vitals for the past 24 hrs:   BP Pulse   08/10/17 1400 126/77 mmHg (!) 58   08/10/17 0931 125/60 mmHg 62   08/10/17 0910 - 69   08/10/17 0630 135/78 mmHg (!) 57   08/09/17 1930 100/65 mmHg 83       Anticoagulation:  Medication: None    Other key medications: A review of the medication list reveals no issues at this time.    Section completed by: Marcell Bee [facility]

## 2017-08-11 NOTE — REHAB-COLLABORATIVE ONGOING IDT TEAM NOTE
Weekly Interdisciplinary Team Conference Note    Weekly Interdisciplinary Team Conference # 1  Date:  8/11/2017    Clinicians present and reporting at team conference include the following:   MD: Forrest Traylor MD   RN:  Bonita Serna RN   PT:   Sourav Palomino, PT, DPT  OT:  Jorge Ayala OT   ST:  Liv Mckeon, MS, CCC-SLP  CM:  Demetria Guido, ANASTASIYAW, MOLINA  REC:  Not Applicable  RT:  Not Applicable  RPh:  Marcell Bee [facility]  Other:   None  All reporting clinicians have a working knowledge of this patient's plan of care.    Targeted DC Date:  8/25/2017     Medical    Patient Active Problem List    Diagnosis Date Noted   • Heart block, AV 08/03/2017     Priority: High   • Vasogenic cerebral edema (CMS-HCC) 08/03/2017     Priority: High   • CAP (community acquired pneumonia) 08/03/2017     Priority: High   • Brain mass 04/05/2017     Priority: High   • Cognitive and behavioral changes 08/09/2017   • Impaired mobility and ADLs 08/09/2017   • Debility 08/08/2017   • Bradycardia 08/08/2017   • GBM (glioblastoma multiforme) (CMS-[facility]) 08/08/2017   • Anemia 08/04/2017   • HTN (hypertension) 04/06/2017     Results     ** No results found for the last 24 hours. **        Nursing  Diet/Nutrition:  Cardiac and Thin Liquids  Medication Administration:  Whole with Liquid Wash  % consumed at meals in last 24 hours:  Consumed %  of meals per documentation.  Breakfast:  80%   Lunch:  80%  Dinner:  20%   Snack schedule:  HS  Appetite:  Good  Fluid Intake/Output in past 24 hours: In: 690 [P.O.:690]  Out: 800   Admit Weight:  Weight: 67.813 kg (149 lb 8 oz)  Weight Last 7 Days   [67.813 kg (149 lb 8 oz)] 67.813 kg (149 lb 8 oz) (08/08 1100)    Pain Issues:  DENIES PAIN    Bowel:    Bowel Assist Initial Score:  4 - Minimal Assistance  Bowel Assist Current Score:  4 - Minimal Assistance  Bowl Accidents in last 7 days:     Last bowel movement: 08/10/17  Stool: Medium, Soft, Formed, Brown       Usual bowel pattern:  every other  day  Scheduled bowel medications:  senna-docusate (PERICOLACE or SENOKOT S)   PRN bowel medications used in last 24 hours:  None  Nursing Interventions:  Increased time, PRN medication, Supervision, Brief  Effectiveness of bowel program:   good=regular, predictable, controlled emptying of bowel  Bladder:    Bladder Assist Initial Score:  4 - Minimal Assistance  Bladder Assist Current Score:  7 - Independent  Bladder Accidents in last 7 days:   NONE  Interventions:  Increased time, Supervision, Emptying of device  Effectiveness of bladder training:  Good=regular, predictable, emptying of bladder, patient initiates time voiding      Wound:         Patient Lines/Drains/Airways Status    Active Current Wounds     Name: Placement date: Placement time: Site: Days:    Wound POA Skin Tear Breast;Head Posterior 08/03/17  0700   7    Wound POA Skin Tear Head Posterior 08/03/17 2000   7    Wound Not POA Other (comment) Breast Left Anterior 08/03/17 2000   7                   Interventions:    Effectiveness of intervention:  wound is improving       Sleep/wake cycle:   Average hours slept:  Sleeps greater than 6 hours without waking  Sleep medication usage:  None    Patient/Family Training/Education:  Bladder Management/Training, Bowel Management/Training, Diet/Nutrition, Fall Prevention, General Self Care, Medication Management, Safe Transfers, Safety and Skin Care  Strengths: Alert and oriented, Able to follow instructions, Pleasant and cooperative and Motivated for self care and independence   Barriers:   Fatigue and Generalized weakness            Nursing Problems           Problem: Bowel/Gastric:     Goal: Normal bowel function is maintained or improved         Goal: Will not experience complications related to bowel motility           Problem: Communication     Goal: The ability to communicate needs accurately and effectively will improve           Problem: Discharge Barriers/Planning     Goal: Patient's continuum of  care needs will be met           Problem: Infection     Goal: Will remain free from infection           Problem: Knowledge Deficit     Goal: Knowledge of disease process/condition, treatment plan, diagnostic tests, and medications will improve         Goal: Knowledge of the prescribed therapeutic regimen will improve           Problem: Mobility     Goal: Risk for activity intolerance will decrease           Problem: Problem: Pain     Goal: LTG - Patient will manage pain with the appropriate technique/Intervention         Goal: STG - Patient will reduce or eliminate use of analgesics         Goal: STG - Patient will verbalize an acceptable level of pain         Goal: STG - patients pain is managed to allow active participation in daily activities           Problem: Respiratory:     Goal: Respiratory status will improve           Problem: SKIN INTEGRITY     Goal: LTG - Patient will be free from infection         Goal: LTG - PATIENT WILL MAINTAIN/IMPROVE SKIN INTEGRITY THROUGH PROPER SKIN CARE TECHNIQUES.         Goal: LTG - Patient will demonstrate appropriate pressure relief techniques         Goal: STG - Patient exhibits signs of wound healing.         Goal: STG - patient demonstrates pressure reduction techniques           Problem: Safety     Goal: Will remain free from injury         Goal: Will remain free from falls           Problem: Urinary Elimination:     Goal: Ability to reestablish a normal urinary elimination pattern will improve           Problem: Venous Thromboembolism (VTW)/Deep Vein Thrombosis (DVT) Prevention:     Goal: Patient will participate in Venous Thrombosis (VTE)/Deep Vein Thrombosis (DVT)Prevention Measures                Long Term Goals:   At discharge patient will be able to function safely at home and in the community with support.    Section completed by:  Sowmya Delarosa R.N.        Respiratory Therapy    Patient's respiratory plan of care for oxygen therapy is:  O2 Protocol  Indications: Room Air SpO2 Less Than 90%, pt is on home oxygen 24/7 wears 2Lpm.  O2 Protocol Goals/Outcome: Normoxemia on Oxygen, SpO2 greater than 90%    Section completed by:  Nicole Abbasi, SHANE    Mobility  Bed mobility:   Min A to SBA  Bed /Chair/Wheelchair Transfer Initial:  4 - Minimal Assistance  Bed /Chair/Wheelchair Transfer Current:  4 - Minimal Assistance   Bed/Chair/Wheelchair Transfer Description:  Adaptive equipment, Increased time, Supervision for safety, Verbal cueing, Set-up of equipment, Initial preparation for task, Assist with two limbs  Walk Initial:  1 - Total Assistance  Walk Current:  2 - Max Assistance   Walk Description:   (reps of 115 and 135 feet indoors; gt belt, oxygen management, Min A, no UE support today; cueing for path finding, safety, memory; discussed RPE scale and not going more than 6 out of 10; mild SOB and requires cueing for pacing)  Wheelchair Initial:  2 - Max Assistance  Wheelchair Current:  2 - Max Assistance   Wheelchair Description:   (70 feet, BLE, Min A to SBA, cueing, bradykinetic, task attention, oxygen setup)  Stairs Initial:  0 - Not tested,unsafe activity  Stairs Current: 2 - Max Assistance   Stairs Description:  (Min A with B railings for 8 stairs; requires setup, ceuing, and oxygen management; step-to pattern, cueing for decreased LUE usage, and bradykinetic)  Patient/Family Training/Education:  Initiated education  DME/DC Recommendations:  3 weeks; outpatient PT due to pt's experience with home health; O2 and SPC TBD; intermittent SPV  Strengths:  Independent PLOF, Making steady progress towards goals, Motivated for self care and independence, Pleasant and cooperative, Supportive family and Willingly participates in therapeutic activities  Barriers:   Decreased endurance, Dementia, Generalized weakness, Limited mobility, Poor activity tolerance, Poor carryover of learning, Poor insight/denial of deficits and Other: limited mobility and has been using  oxygen since April 2017  # of short term goals set= 3 set at eval 2 days ago  # of short term goals met= 0 since eval       Physical Therapy Problems           Problem: Mobility     Dates: Start: 08/10/17       Goal: STG-Within one week, patient will ambulate household distance     Dates: Start: 08/10/17      Description: 1) Individualized goal:  FWW, oxygen, gt belt, 2x 50 feet indoors, SBA    2) Interventions:  PT Gait Training, PT Therapeutic Exercises, PT Neuro Re-Ed/Balance, PT Therapeutic Activity and PT Manual Therapy.        Note: Goal Note filed on 08/11/17 0934 by Jose Palomino, P.T.    Goal: STG-Within one week, patient will ambulate household distance  Outcome: NOT MET  Patient requires additional A or an AD for balance.  Goal was set 2 days   ago.          Goal: STG-Within one week, patient will ascend and descend four to six stairs     Dates: Start: 08/10/17      Description: 1) Individualized goal:  8 stairs, B railings, oxygen, gt belt, CGA    2) Interventions:  PT Gait Training, PT Therapeutic Exercises, PT Neuro Re-Ed/Balance, PT Therapeutic Activity and PT Manual Therapy.        Note: Goal Note filed on 08/11/17 0934 by Jose Palomino, P.T.    Goal: STG-Within one week, patient will ascend and descend four to six   stairs  Outcome: NOT MET  Patient requires increased A.  Goal was set 2 days ago.            Problem: Mobility Transfers     Dates: Start: 08/10/17       Goal: STG-Within one week, patient will transfer bed to chair     Dates: Start: 08/10/17      Description: 1) Individualized goal:  FWW, oxygen, gt belt, SBA    2) Interventions:  PT Gait Training, PT Therapeutic Exercises, PT Neuro Re-Ed/Balance, PT Therapeutic Activity and PT Manual Therapy.        Note: Goal Note filed on 08/11/17 0934 by Jose Palomino, P.T.    Goal: STG-Within one week, patient will transfer bed to chair  Outcome: NOT MET  Patient requires increased A.  Goal was set 2 days ago.             Problem: PT-Long Term Goals     Dates: Start: 08/10/17       Goal: LTG-By discharge, patient will ambulate     Dates: Start: 08/10/17      Description: 1) Individualized goal:  SPC, SPV, 2x 150 feet    2) Interventions:  PT Gait Training, PT Therapeutic Exercises, PT Neuro Re-Ed/Balance, PT Therapeutic Activity and PT Manual Therapy.            Goal: LTG-By discharge, patient will transfer one surface to another     Dates: Start: 08/10/17      Description: 1) Individualized goal:  SPC, SPV    2) Interventions:  PT Gait Training, PT Therapeutic Exercises, PT Neuro Re-Ed/Balance, PT Therapeutic Activity and PT Manual Therapy.             Goal: LTG-By discharge, patient will ambulate up/down 4-6 stairs     Dates: Start: 08/10/17      Description: 1) Individualized goal:  SPC and 1 rail, SPV, 6 stairs    2) Interventions:  PT Gait Training, PT Therapeutic Exercises, PT Neuro Re-Ed/Balance, PT Therapeutic Activity and PT Manual Therapy.                    Section completed by:  Jose Palomino PBostonTBoston, DPT    Activities of Daily Living  Eating Initial:  5 - Standby Prompting/Supervision or Set-up  Eating Current:  5 - Standby Prompting/Supervision or Set-up   Eating Description:   (Sup/set up to load utensils, bring to mouth and clear..)  Grooming Initial:  4 - Minimal Assistance  Grooming Current:  5 - Standby Prompting/Supervision or Set-up   Grooming Description:  Increased time, Supervision for safety, Verbal cueing, Set-up of equipment, Initial preparation for task (Seated at sinkside)  Bathing Initial:  4 - Minimal Assistance  Bathing Current:  4 - Minimal Assistance   Bathing Description:  Adaptive equipment, Grab bar, Tub bench, Long handled bath tool, Hand rails, Hand held shower, Increased time, Supervision for safety, Verbal cueing, Set-up of equipment, Initial preparation for task, Requires minimal contact  Upper Body Dressing Initial:  3 - Moderate Assistance  Upper Body Dressing Current:  3 - Moderate  Assistance   Upper Body Dressing Description:  Increased time, Supervision for safety, Verbal cueing, Set-up of equipment, Initial preparation for task (Mod assist w/ clothing orientation and motor planning of pull over shirt)  Lower Body Dressing Initial:  3 - Moderate Assistance  Lower Body Dressing Current:  3 - Moderate Assistance   Lower Body Dressing Description:  3 - Moderate Assistance  Toileting Initial:  3 - Moderate Assistance  Toileting Current:  2 - Max Assistance   Toileting Description:  Adaptive equipment, Grab bar, Increased time, Supervision for safety, Verbal cueing, Set-up of equipment, Initial preparation for task  Toilet Transfer Initial:  4 - Minimal Assistance  Toilet Transfer Current:  4 - Minimal Assistance   Toilet Transfer Description:  4 - Minimal Assistance  Tub / Shower Transfer Initial:  4 - Minimal Assistance  Tub / Shower Transfer Current:  4 - Minimal Assistance   Tub / Shower Transfer Description:  Adaptive equipment, Grab bar, Increased time, Supervision for safety, Verbal cueing, Set-up of equipment, Initial preparation for task  IADL:  TBD   Family Training/Education:  TBD   DME/DC Recommendations:  TBD     Strengths:  Pleasant and cooperative, Supportive family and Willingly participates in therapeutic activities  Barriers:  Limited mobility and Poor balance     # of short term goals set= 3  # of short term goals met= 0         Occupational Therapy Goals           Problem: Bathing     Dates: Start: 08/09/17       Goal: STG-Within one week, patient will bathe     Dates: Start: 08/09/17   Expected End: 08/16/17      Description: 1) Individualized Goal:  Within one week, patient will bathe at a level of supervision.     2) Interventions:  OT Self Care/ADL, OT Neuro Re-Ed/Balance, OT Therapeutic Activity and OT Therapeutic Exercise    Cosign:  Jorge Ayala OTR/L     Note: Goal Note filed on 08/11/17 0757 by Jorge Ayala MS,OTR/L    Goal: STG-Within one week, patient will  bathe  Outcome: NOT MET  Pt at Min assist             Problem: Dressing     Dates: Start: 08/09/17       Goal: STG-Within one week, patient will dress UB     Dates: Start: 08/09/17   Expected End: 08/16/17      Description: 1) Individualized Goal:  Within one week, patient will dress UB at a level of min a.     2) Interventions:  OT Self Care/ADL, OT Neuro Re-Ed/Balance, OT Therapeutic Activity and OT Therapeutic Exercise    Cosign:  Jorge Ayala OTR/L          Note: Goal Note filed on 08/11/17 0757 by Jorge Ayala MS,OTR/L    Goal: STG-Within one week, patient will dress UB  Outcome: NOT MET  Pt at Mod assist          Goal: STG-Within one week, patient will dress LB     Dates: Start: 08/09/17   Expected End: 08/16/17      Description: 1) Individualized Goal:  Within one week, patient will dress LB at a level of min a.     2) Interventions:  OT Self Care/ADL, OT Neuro Re-Ed/Balance, OT Therapeutic Activity and OT Therapeutic Exercise    Cosign:  Jorge Ayala OTR/L          Note: Goal Note filed on 08/11/17 0757 by Jorge Ayala MS,OTR/L    Goal: STG-Within one week, patient will dress LB  Outcome: NOT MET  Pt at Mod assist            Problem: OT Long Term Goals     Dates: Start: 08/09/17       Goal: LTG-By discharge, patient will complete basic self care tasks     Dates: Start: 08/09/17   Expected End: 08/23/17      Description: 1) Individualized Goal:  LTG-By discharge, patient will complete basic self care tasks at a level of mod. I.     2) Interventions:  OT Self Care/ADL, OT Cognitive Skill Dev, OT Neuro Re-Ed/Balance, OT Therapeutic Activity and OT Therapeutic Exercise         Cosign:  Jorge Ayala OTR/L         Goal: LTG-By discharge, patient will perform bathroom transfers     Dates: Start: 08/09/17   Expected End: 08/23/17      Description: 1) Individualized Goal:  LTG-By discharge, patient will perform a simple meal preparation activity at a level of mod. I.     2) Interventions:  OT Self Care/ADL, OT  Cognitive Skill Dev, OT Neuro Re-Ed/Balance, OT Therapeutic Activity and OT Therapeutic Exercise         Cosign:  Jorge Ayala OTR/L               Section completed by:  Jorge Ayala MS,OTR/L    Cognitive Linquistic Functions  Comprehension Initial:  5 - Stand-by Prompting/Supervision or Set-up  Comprehension Current:  4 - Minimal Assistance   Comprehension Description:  Glasses  Expression Initial:  5 - Stand-by Prompting/Supervision or Set-up  Expression Current:  5 - Stand-by Prompting/Supervision or Set-up   Expression Description:  Set-up of equipment, Verbal cueing  Social Interaction Initial:  6 - Modified Independent  Social Interaction Current:  5 - supervision    Social Interaction Description:  Increased time  Problem Solving Initial:  5 - Standby Prompting/Supervision or Set-up  Problem Solving Current:  3 - Moderate Assistance   Problem Solving Description:  Verbal cueing, Increased time  Memory Initial:  5 - Standby Prompting/Supervision or Set-up  Memory Current:  3 - Moderate Assistance   Memory Description:  Verbal cueing, Supervision  Executive Functioning / Organization Initial:  Severe (2)  Executive Functioning / Organization Current:  Severe (2)   Executive Functioning Desciption:  Attention, planning and organization and comprehension of instructions.   Swallowing  Swallowing Status: Regular and  Thin liquids tolerating well with no overt s/sx of asp/pen noted - no longer seen for dysphagia management at this time.    Orders Placed This Encounter   Procedures   • Diet Order     Standing Status: Standing      Number of Occurrences: 1      Standing Expiration Date:      Order Specific Question:  Diet:     Answer:  Cardiac [6]     Behavior Modification Plan  Allow for rest time, Keep instructions simple/concrete, Give clear feedback, Redirect to task/topic, Provide reasonable choices, Decrease the chance of failure by offering activities that are within the patient's abilities, Analyze tasks  (break down into smaller steps) and Reinforce participation in desired tasks  Assistive Technology  Low/Impaired vision equipment and Low tech: Calendar, planner, schedule, alarms/timers, pill organizer, post-it notes, lists  Family Training/Education:  Ongoing with SO  DC Recommendations: TBD  Strengths:  Motivated for self care and independence, Pleasant and cooperative, Supportive family and Willingly participates in therapeutic activities  Barriers:  Confused, Impulsive, Poor carryover of learning, Poor insight/denial of deficits and Other: moderate to severe cognitive deficits  # of short term goals set=3  # of short term goals met=1        Speech Therapy Problems           Problem: Memory STGs     Dates: Start: 08/09/17       Goal: STG-Within one week, patient will     Dates: Start: 08/09/17      Description: 1) Individualized goal:  Learn and implement memory strategies to assist in recall of 4/5 pieces of information provided minimal assistance.     2) Interventions:  SLP Self Care / ADL Training  and SLP Cognitive Skill Development        Note: Goal Note filed on 08/11/17 0731 by Liv Mckeon MS,CCC-SLP    Goal: STG-Within one week, patient will  Outcome: NOT MET  Goal not yet initiated             Problem: Problem Solving STGs     Dates: Start: 08/09/17       Goal: STG-Within one week, patient will     Dates: Start: 08/09/17      Description: 1) Individualized goal:  Complete divided attention tasks with 80% accuracy provided minimal assistance.     2) Interventions:  SLP Self Care / ADL Training  and SLP Cognitive Skill Development        Note: Goal Note filed on 08/11/17 0731 by Liv Mckeon MS,CCC-SLP    Goal: STG-Within one week, patient will  Outcome: PROGRESSING AS EXPECTED  Mod-max a for attention             Problem: Speech/Swallowing LTGs     Dates: Start: 08/09/17       Goal: LTG-By discharge, patient will     Dates: Start: 08/09/17      Description: 1) Individualized goal:  Complete  "functional problem solving and recall information with 80% accuracy provided SPV.    2) Interventions:  SLP Swallowing Dysfunction Treatment, SLP Self Care / ADL Training  and SLP Cognitive Skill Development                   Section completed by:  Liv Mckeon MS,[facility]-SLP       Nutrition       Dietary Problems           Problem: Other Problem (see comments)     Description: Diagnosis: Malnutrition (chronic/ non-severe) r/t inadequate oral intake in the setting of poor appetite s/t chronic disease as evidenced by patient s/p chemoradiation treatment s/t GBM resulting in intake < 75% of nutrient needs > 1 month, moderate depletion of muscle mass/ subcutaneous body fat, tempora/clavicle wasting, 6% weight loss x 1 month.           Goal: Other Goal (Resolved)      Monitor/Evaluation: Monitor PO intake, weight, labs, medication adjustments, skin integrity, GI function, vitals, I/Os, and overall hydration status.  Adjust nutritional POC pending clinical outcomes.      RD following PRN.  Interventions in place. PO adequate at this time.     Goal: Maintain adequate oral nutrient/fluid intake to promote nutrition optimization/healing/resolution of malnutrition/ weight stability.             Nutrition Care/ Consult For Supplements/ Weight loss     Assessment:    Admitting Diagnosis: GBM (glioblastoma multiforme) (CMS-HCC)  Pertinent PMH: Hyperlipidemia, HTN, GBM s/p radiation and chemotherapy in June, AV block s/p pacemaker placement      Additional Information: Patient seen in room eating watermelon for snack.  Wife at bedside.  Patient with very flat affect.  He reports his appetite is \"okay.\"  Eats 3 meals/day at home although wife states they are small meals.  Drinks 1 Ensure at dinner.  Offered patient Ensure or vanilla milkshake- patient prefers vanilla milkshake.      Patient appears thin, muscle wasting noted to upper extremities, tempora/clavicle wasting observed.    Patient denies any GI issues.  He is self " feeding with ease.  Dentition is good.  No swallowing issues. Food preferences noted. Got yogurt this morning- doesn't like it.     Needs foods chopped.     Patient states UBW of 158 lbs.  Lost weight while undergoing chemo-radiation.  Appetite is improving.     Appetite: fair to good   Diet: Cardiac + snacks TID between meals (fruit)    Average PO intake x 3 days: 74% (adequate) ate 100% of snack     Labs: Serum Glucose 134, T.P. 5.9    Medications: Decadron, Bowel Regimen, Prilosec, ICAPS    PRN Medications: noted  IVF: n/a     Height: 165.1cm  Weight: 67.813kg    Usual Body Weight: 71.82 kg    %Usual Body Weight: 94%  % Weight Loss: 6%    BMI: 24.88 (WNL)    Skin: tear to head, left anterior chest wound    GI: BM 8/10    Vitals: 2L C, Bradycardia noted, /78    I/Os: -30mL x 24 hours     Nutrient Needs:  Kcal: 1585- 1718 kcal/day BEE= 1321 x 1.2-1.3    Protein: 68-82g/day    Fluid: 2000mL /day         Diagnosis: Malnutrition (chronic/ non-severe) r/t inadequate oral intake in the setting of poor appetite s/t chronic disease as evidenced by patient s/p chemoradiation treatment s/t GBM resulting in intake < 75% of nutrient needs > 1 month, moderate depletion of muscle mass/ subcutaneous body fat, tempora/clavicle wasting, 6% weight loss x 1 month.     Intervention/ Recommendations/POC:  1. Continue current diet + snacks.  High protein/ high kcal milkshake with supper (vanilla).  Nutrition rep to help with menus daily to aid in optimal food choices to promote adequate PO intake.    2.Encourage adequate PO/fluid intake.  3. Nutrition rep to see regarding food prefs/ honor within dietary restrictions (if indicated)      Monitor/Evaluation: Monitor PO intake, weight, labs, medication adjustments, skin integrity, GI function, vitals, I/Os, and overall hydration status.  Adjust nutritional POC pending clinical outcomes.     RD following PRN.  Interventions in place. PO adequate at this time.    Goal: Maintain  "adequate oral nutrient/fluid intake to promote nutrition optimization/healing/resolution of malnutrition/ weight stability.                               Section completed by:  Julia Dumont RD    REHAB-Pharmacy IDT Team Note by Marcell Bee RPH at 8/10/2017  5:45 PM  Version 1 of 1    Author:  Marcell Bee RPH Service:  (none) Author Type:  Pharmacist    Filed:  8/10/2017  5:47 PM Note Time:  8/10/2017  5:45 PM Status:  Signed    :  Marcell Bee RPH (Pharmacist)           Pharmacy  Pharmacy  Antibiotics/Antifungals/Antivirals:  Medication:      Active Orders     None        Route:        NA  Stop Date:  NA  Reason:      NA  Antihypertensives/Cardiac:  Medication:    Orders     None        Patient Vitals for the past 24 hrs:   BP Pulse   08/10/17 1400 126/77 mmHg (!) 58   08/10/17 0931 125/60 mmHg 62   08/10/17 0910 - 69   08/10/17 0630 135/78 mmHg (!) 57   08/09/17 1930 100/65 mmHg 83       Anticoagulation:  Medication: None    Other key medications: A review of the medication list reveals no issues at this time.    Section completed by: Marcell Bee [facility]        DC Planning  DC destination/dispostion:  Home w/ spouse in [place], [place] / mobile home w/ ramp/ tub/shower combo w/ grab bars.  Pt has home 02/4ww/tub shower seat @ home.    Referrals: None at this time.      DC Needs:  Anticipate family training, home health,     Barriers to discharge: Functional / cognitive status.  Spouse reports pt has to be indep w/ transfers/adl's indicating \"I'm old and I can't do everything for him\".  All three daughters live out of town.      Strengths: Pleasant, cooperative, supprortive spouse and  3 daughters, supportive friends/neigbors in [place], [place].      Section completed by:  EVI Grey, CCM     Summary:  Gait 135 x 2 w/ min assist due to decreased balance, toileting max assist, transfers min assist, total assist w/ exec function, problem solving mod to max assist. Min assist w/  comprehension.  Full code. "  PT need to follow up w/ Cardiology.  Pt has appt w/ Dr. Ugarte on 8/14 @ 2pm.  Transport scheduled.      Physician Summary  Forrest Traylor MD participated and led team conference discussion.

## 2017-08-11 NOTE — CARE PLAN
Problem: Bathing  Goal: STG-Within one week, patient will bathe  1) Individualized Goal: Within one week, patient will bathe at a level of supervision.   2) Interventions: OT Self Care/ADL, OT Neuro Re-Ed/Balance, OT Therapeutic Activity and OT Therapeutic Exercise    Cosign: Jorge Ayala OTR/VANNESSA   Outcome: NOT MET  Pt at Min assist     Problem: Dressing  Goal: STG-Within one week, patient will dress UB  1) Individualized Goal: Within one week, patient will dress UB at a level of min a.   2) Interventions: OT Self Care/ADL, OT Neuro Re-Ed/Balance, OT Therapeutic Activity and OT Therapeutic Exercise    Cosign: Jorge Ayala OTR/VANNESSA   Outcome: NOT MET  Pt at Mod assist  Goal: STG-Within one week, patient will dress LB  1) Individualized Goal: Within one week, patient will dress LB at a level of min a.   2) Interventions: OT Self Care/ADL, OT Neuro Re-Ed/Balance, OT Therapeutic Activity and OT Therapeutic Exercise    Cosign: Jorge Ayala OTR/VANNESSA   Outcome: NOT MET  Pt at Mod assist

## 2017-08-11 NOTE — REHAB-SLP IDT TEAM NOTE
Speech Therapy  Cognitive Linquistic Functions  Comprehension Initial:  5 - Stand-by Prompting/Supervision or Set-up  Comprehension Current:  4 - Minimal Assistance   Comprehension Description:  Glasses  Expression Initial:  5 - Stand-by Prompting/Supervision or Set-up  Expression Current:  5 - Stand-by Prompting/Supervision or Set-up   Expression Description:  Set-up of equipment, Verbal cueing  Social Interaction Initial:  6 - Modified Independent  Social Interaction Current:  5 - supervision    Social Interaction Description:  Increased time  Problem Solving Initial:  5 - Standby Prompting/Supervision or Set-up  Problem Solving Current:  3 - Moderate Assistance   Problem Solving Description:  Verbal cueing, Increased time  Memory Initial:  5 - Standby Prompting/Supervision or Set-up  Memory Current:  3 - Moderate Assistance   Memory Description:  Verbal cueing, Supervision  Executive Functioning / Organization Initial:  Severe (2)  Executive Functioning / Organization Current:  Severe (2)   Executive Functioning Desciption:  Attention, planning and organization and comprehension of instructions.   Swallowing  Swallowing Status: Regular and  Thin liquids tolerating well with no overt s/sx of asp/pen noted - no longer seen for dysphagia management at this time.    Orders Placed This Encounter   Procedures   • Diet Order     Standing Status: Standing      Number of Occurrences: 1      Standing Expiration Date:      Order Specific Question:  Diet:     Answer:  Cardiac [6]     Behavior Modification Plan  Allow for rest time, Keep instructions simple/concrete, Give clear feedback, Redirect to task/topic, Provide reasonable choices, Decrease the chance of failure by offering activities that are within the patient's abilities, Analyze tasks (break down into smaller steps) and Reinforce participation in desired tasks  Assistive Technology  Low/Impaired vision equipment and Low tech: Calendar, planner, schedule,  alarms/timers, pill organizer, post-it notes, lists  Family Training/Education:  Ongoing with SO  DC Recommendations: TBD  Strengths:  Motivated for self care and independence, Pleasant and cooperative, Supportive family and Willingly participates in therapeutic activities  Barriers:  Confused, Impulsive, Poor carryover of learning, Poor insight/denial of deficits and Other: moderate to severe cognitive deficits  # of short term goals set=3  # of short term goals met=1        Speech Therapy Problems           Problem: Memory STGs     Dates: Start: 08/09/17       Goal: STG-Within one week, patient will     Dates: Start: 08/09/17      Description: 1) Individualized goal:  Learn and implement memory strategies to assist in recall of 4/5 pieces of information provided minimal assistance.     2) Interventions:  SLP Self Care / ADL Training  and SLP Cognitive Skill Development        Note: Goal Note filed on 08/11/17 0731 by Liv Mckeon MS,CCC-SLP    Goal: STG-Within one week, patient will  Outcome: NOT MET  Goal not yet initiated             Problem: Problem Solving STGs     Dates: Start: 08/09/17       Goal: STG-Within one week, patient will     Dates: Start: 08/09/17      Description: 1) Individualized goal:  Complete divided attention tasks with 80% accuracy provided minimal assistance.     2) Interventions:  SLP Self Care / ADL Training  and SLP Cognitive Skill Development        Note: Goal Note filed on 08/11/17 0731 by Liv Mckeon MS,CCC-SLP    Goal: STG-Within one week, patient will  Outcome: PROGRESSING AS EXPECTED  Mod-max a for attention             Problem: Speech/Swallowing LTGs     Dates: Start: 08/09/17       Goal: LTG-By discharge, patient will     Dates: Start: 08/09/17      Description: 1) Individualized goal:  Complete functional problem solving and recall information with 80% accuracy provided SPV.    2) Interventions:  SLP Swallowing Dysfunction Treatment, SLP Self Care / ADL Training  and  SLP Cognitive Skill Development                   Section completed by:  Liv Mckeon MS,CCC-SLP

## 2017-08-11 NOTE — CARE PLAN
Problem: Safety  Goal: Will remain free from injury  Reinforced use of call light, no impulsivity at this time, siderails up x 2, bed on low position, non skid socks/shoes when OOB.

## 2017-08-11 NOTE — REHAB-PT IDT TEAM NOTE
Physical Therapy  Mobility  Bed mobility:   Min A to SBA  Bed /Chair/Wheelchair Transfer Initial:  4 - Minimal Assistance  Bed /Chair/Wheelchair Transfer Current:  4 - Minimal Assistance   Bed/Chair/Wheelchair Transfer Description:  Adaptive equipment, Increased time, Supervision for safety, Verbal cueing, Set-up of equipment, Initial preparation for task, Assist with two limbs  Walk Initial:  1 - Total Assistance  Walk Current:  2 - Max Assistance   Walk Description:   (reps of 115 and 135 feet indoors; gt belt, oxygen management, Min A, no UE support today; cueing for path finding, safety, memory; discussed RPE scale and not going more than 6 out of 10; mild SOB and requires cueing for pacing)  Wheelchair Initial:  2 - Max Assistance  Wheelchair Current:  2 - Max Assistance   Wheelchair Description:   (70 feet, BLE, Min A to SBA, cueing, bradykinetic, task attention, oxygen setup)  Stairs Initial:  0 - Not tested,unsafe activity  Stairs Current: 2 - Max Assistance   Stairs Description:  (Min A with B railings for 8 stairs; requires setup, ceuing, and oxygen management; step-to pattern, cueing for decreased LUE usage, and bradykinetic)  Patient/Family Training/Education:  Initiated education  DME/DC Recommendations:  3 weeks; outpatient PT due to pt's experience with home health; O2 and SPC TBD; intermittent SPV  Strengths:  Independent PLOF, Making steady progress towards goals, Motivated for self care and independence, Pleasant and cooperative, Supportive family and Willingly participates in therapeutic activities  Barriers:   Decreased endurance, Dementia, Generalized weakness, Limited mobility, Poor activity tolerance, Poor carryover of learning, Poor insight/denial of deficits and Other: limited mobility and has been using oxygen since April 2017  # of short term goals set= 3 set at [facility] 2 days ago  # of short term goals met= 0 since [facility]       Physical Therapy Problems           Problem: Mobility     Dates:  Start: 08/10/17       Goal: STG-Within one week, patient will ambulate household distance     Dates: Start: 08/10/17      Description: 1) Individualized goal:  FWW, oxygen, gt belt, 2x 50 feet indoors, SBA    2) Interventions:  PT Gait Training, PT Therapeutic Exercises, PT Neuro Re-Ed/Balance, PT Therapeutic Activity and PT Manual Therapy.        Note: Goal Note filed on 08/11/17 0934 by Jose Palomino, P.T.    Goal: STG-Within one week, patient will ambulate household distance  Outcome: NOT MET  Patient requires additional A or an AD for balance.  Goal was set 2 days   ago.          Goal: STG-Within one week, patient will ascend and descend four to six stairs     Dates: Start: 08/10/17      Description: 1) Individualized goal:  8 stairs, B railings, oxygen, gt belt, CGA    2) Interventions:  PT Gait Training, PT Therapeutic Exercises, PT Neuro Re-Ed/Balance, PT Therapeutic Activity and PT Manual Therapy.        Note: Goal Note filed on 08/11/17 0934 by Jose Palomino, P.T.    Goal: STG-Within one week, patient will ascend and descend four to six   stairs  Outcome: NOT MET  Patient requires increased A.  Goal was set 2 days ago.            Problem: Mobility Transfers     Dates: Start: 08/10/17       Goal: STG-Within one week, patient will transfer bed to chair     Dates: Start: 08/10/17      Description: 1) Individualized goal:  FWW, oxygen, gt belt, SBA    2) Interventions:  PT Gait Training, PT Therapeutic Exercises, PT Neuro Re-Ed/Balance, PT Therapeutic Activity and PT Manual Therapy.        Note: Goal Note filed on 08/11/17 0934 by Jose Palomino, P.T.    Goal: STG-Within one week, patient will transfer bed to chair  Outcome: NOT MET  Patient requires increased A.  Goal was set 2 days ago.            Problem: PT-Long Term Goals     Dates: Start: 08/10/17       Goal: LTG-By discharge, patient will ambulate     Dates: Start: 08/10/17      Description: 1) Individualized goal:  SPC, SPV, 2x  150 feet    2) Interventions:  PT Gait Training, PT Therapeutic Exercises, PT Neuro Re-Ed/Balance, PT Therapeutic Activity and PT Manual Therapy.            Goal: LTG-By discharge, patient will transfer one surface to another     Dates: Start: 08/10/17      Description: 1) Individualized goal:  SPC, SPV    2) Interventions:  PT Gait Training, PT Therapeutic Exercises, PT Neuro Re-Ed/Balance, PT Therapeutic Activity and PT Manual Therapy.             Goal: LTG-By discharge, patient will ambulate up/down 4-6 stairs     Dates: Start: 08/10/17      Description: 1) Individualized goal:  SPC and 1 rail, SPV, 6 stairs    2) Interventions:  PT Gait Training, PT Therapeutic Exercises, PT Neuro Re-Ed/Balance, PT Therapeutic Activity and PT Manual Therapy.                    Section completed by:  Jose Palomino P.T., DPT

## 2017-08-11 NOTE — PROGRESS NOTES
"Rehab Progress Note     Interval Events (Subjective)  Patient was seen in his room with his wife. Notes of the therapist appreciated. He continues to get sharper as time goes on I believe that his wife is struggling with the patient's diagnosis and doubts her ability to take care of the patient home I discussed this with case management further . Patient was discussed at team conference results reviewed with the patient as well as the spouse      Cardiac thin liquids  Contient of bowel and bladder  Wife overwhelmed    PT    Walking without UE support balance 150 feet x 2 balance  Issues min assist  Physical therapy suggest outpatient therapy upon discharge  OT    SBA supervision, eating and grooming   Min asssit for bathing     Mod asssit fro dressingToielting    Max assist for pericare    Fucntional transfers min assist        Min asssit fro Comp    For expression adn SI supervision    Mod to max for problems solving  executive total assist    I have concerns regarding wife's ability to care for patient    PPS still pending    Tentative discharge 25th                Objective:  VITAL SIGNS: /81 mmHg  Pulse 71  Temp(Src) 36.7 °C (98.1 °F)  Resp 18  Ht 1.651 m (5' 5\")  Wt 67.813 kg (149 lb 8 oz)  BMI 24.88 kg/m2  SpO2 99%  Cardiac: regular rate and rhythm, nl S1/S2   Lungs: clear to auscultation bilaterally.   Abdomen: soft; NT, ND, BS present.   Extremities: minimal edema noted  bilaterally  Neuro: No change with the exception the patient is sharper    Recent Results (from the past 72 hour(s))   CBC with Differential    Collection Time: 08/09/17  5:24 AM   Result Value Ref Range    WBC 4.9 4.8 - 10.8 K/uL    RBC 3.46 (L) 4.70 - 6.10 M/uL    Hemoglobin 10.1 (L) 14.0 - 18.0 g/dL    Hematocrit 31.7 (L) 42.0 - 52.0 %    MCV 91.6 81.4 - 97.8 fL    MCH 29.2 27.0 - 33.0 pg    MCHC 31.9 (L) 33.7 - 35.3 g/dL    RDW 66.3 (H) 35.9 - 50.0 fL    Platelet Count 120 (L) 164 - 446 K/uL    MPV 10.8 9.0 - 12.9 fL    " Neutrophils-Polys 79.60 (H) 44.00 - 72.00 %    Lymphocytes 11.60 (L) 22.00 - 41.00 %    Monocytes 3.90 0.00 - 13.40 %    Eosinophils 0.00 0.00 - 6.90 %    Basophils 0.20 0.00 - 1.80 %    Immature Granulocytes 4.70 (H) 0.00 - 0.90 %    Nucleated RBC 0.00 /100 WBC    Neutrophils (Absolute) 3.92 1.82 - 7.42 K/uL    Lymphs (Absolute) 0.57 (L) 1.00 - 4.80 K/uL    Monos (Absolute) 0.19 0.00 - 0.85 K/uL    Eos (Absolute) 0.00 0.00 - 0.51 K/uL    Baso (Absolute) 0.01 0.00 - 0.12 K/uL    Immature Granulocytes (abs) 0.23 (H) 0.00 - 0.11 K/uL    NRBC (Absolute) 0.00 K/uL   Comp Metabolic Panel (CMP)    Collection Time: 08/09/17  5:24 AM   Result Value Ref Range    Sodium 138 135 - 145 mmol/L    Potassium 3.9 3.6 - 5.5 mmol/L    Chloride 104 96 - 112 mmol/L    Co2 27 20 - 33 mmol/L    Anion Gap 7.0 0.0 - 11.9    Glucose 134 (H) 65 - 99 mg/dL    Bun 21 8 - 22 mg/dL    Creatinine 0.67 0.50 - 1.40 mg/dL    Calcium 8.9 8.5 - 10.5 mg/dL    AST(SGOT) 17 12 - 45 U/L    ALT(SGPT) 29 2 - 50 U/L    Alkaline Phosphatase 73 30 - 99 U/L    Total Bilirubin 0.8 0.1 - 1.5 mg/dL    Albumin 3.3 3.2 - 4.9 g/dL    Total Protein 5.9 (L) 6.0 - 8.2 g/dL    Globulin 2.6 1.9 - 3.5 g/dL    A-G Ratio 1.3 g/dL   Prealbumin    Collection Time: 08/09/17  5:24 AM   Result Value Ref Range    Pre-Albumin 32.0 18.0 - 38.0 mg/dL   ESTIMATED GFR    Collection Time: 08/09/17  5:24 AM   Result Value Ref Range    GFR If African American >60 >60 mL/min/1.73 m 2    GFR If Non African American >60 >60 mL/min/1.73 m 2       Current Facility-Administered Medications   Medication Frequency   • ICAPS (AREDS-2) Caps (patient own supply in drawer) DAILY   • calcium carbonate (TUMS) chewable tab 500 mg Q6HRS PRN   • dexamethasone (DECADRON) tablet 4 mg Q6HRS   • oxycodone immediate-release (ROXICODONE) tablet 5 mg Q4HRS PRN   • Respiratory Care per Protocol Continuous RT   • Pharmacy Consult Request ...Pain Management Review 1 Each PRN   • acetaminophen (TYLENOL) tablet 650  mg Q4HRS PRN   • docusate sodium (COLACE) capsule 100 mg DAILY    And   • senna-docusate (PERICOLACE or SENOKOT S) 8.6-50 MG per tablet 1 Tab Q EVENING    And   • lactulose 20 GM/30ML solution 30 mL Q24HRS PRN    And   • bisacodyl (DULCOLAX) suppository 10 mg QDAY PRN   • ondansetron (ZOFRAN ODT) dispertab 4 mg 4X/DAY PRN    Or   • ondansetron (ZOFRAN) syringe/vial injection 4 mg 4X/DAY PRN   • mag hydrox-al hydrox-simeth (MAALOX PLUS ES or MYLANTA DS) suspension 20 mL Q2HRS PRN   • trazodone (DESYREL) tablet 50 mg QHS PRN   • omeprazole (PRILOSEC) capsule 20 mg DAILY       Orders Placed This Encounter   Procedures   • Diet Order     Standing Status: Standing      Number of Occurrences: 1      Standing Expiration Date:      Order Specific Question:  Diet:     Answer:  Cardiac [6]       Assessment:  Active Hospital Problems    Diagnosis   • *GBM (glioblastoma multiforme) (CMS-HCC)   • Vasogenic cerebral edema (CMS-HCC)   • CAP (community acquired pneumonia)   • Heart block, AV   • Cognitive and behavioral changes   • Impaired mobility and ADLs   • Debility   • Bradycardia   • HTN (hypertension)       Medical Decision Making and Plan:  Continues to be more alert and better in therapy  Discharge may be problematic with a wife's anxiety regarding taking the patient home    Let's see how he progresses and address discharge destination at that time sometime next week  Continue PT, OT and SLP  We will continue the steroids at this time for the patient's vasogenic edema    Team Conference         Nursing  Diet/Nutrition:  Cardiac and Thin Liquids  Medication Administration:  Whole with Liquid Wash  % consumed at meals in last 24 hours:  Consumed %  of meals per documentation.  Breakfast:  80%    Lunch:  80%  Dinner:  20%    Snack schedule:  HS  Appetite:  Good  Fluid Intake/Output in past 24 hours: In: 690 [P.O.:690]  Out: 800   Admit Weight:  Weight: 67.813 kg (149 lb 8 oz)  Weight Last 7 Days   [67.813 kg (149 lb 8  oz)] 67.813 kg (149 lb 8 oz) (08/08 1100)    Pain Issues:  DENIES PAIN    Bowel:    Bowel Assist Initial Score:  4 - Minimal Assistance  Bowel Assist Current Score:  4 - Minimal Assistance  Bowl Accidents in last 7 days:     Last bowel movement: 08/10/17  Stool: Medium, Soft, Formed, Brown        Usual bowel pattern:  every other day  Scheduled bowel medications:  senna-docusate (PERICOLACE or SENOKOT S)    PRN bowel medications used in last 24 hours:  None  Nursing Interventions:  Increased time, PRN medication, Supervision, Brief  Effectiveness of bowel program:   good=regular, predictable, controlled emptying of bowel  Bladder:    Bladder Assist Initial Score:  4 - Minimal Assistance  Bladder Assist Current Score:  7 - Independent  Bladder Accidents in last 7 days:   NONE  Interventions:  Increased time, Supervision, Emptying of device  Effectiveness of bladder training:  Good=regular, predictable, emptying of bladder, patient initiates time voiding      Wound:          Patient Lines/Drains/Airways Status     Active Current Wounds       Name:  Placement date:  Placement time:  Site:  Days:      Wound POA Skin Tear Breast;Head Posterior  08/03/17   0700     7      Wound POA Skin Tear Head Posterior  08/03/17 2000     7      Wound Not POA Other (comment) Breast Left Anterior  08/03/17 2000     7                        Interventions:    Effectiveness of intervention:  wound is improving       Sleep/wake cycle:    Average hours slept:  Sleeps greater than 6 hours without waking  Sleep medication usage:  None    Patient/Family Training/Education:  Bladder Management/Training, Bowel Management/Training, Diet/Nutrition, Fall Prevention, General Self Care, Medication Management, Safe Transfers, Safety and Skin Care  Strengths: Alert and oriented, Able to follow instructions, Pleasant and cooperative and Motivated for self care and independence   Barriers:   Fatigue and Generalized weakness               Nursing  Problems              Problem: Bowel/Gastric:       Goal: Normal bowel function is maintained or improved              Goal: Will not experience complications related to bowel motility                Problem: Communication       Goal: The ability to communicate needs accurately and effectively will improve                Problem: Discharge Barriers/Planning       Goal: Patient's continuum of care needs will be met                Problem: Infection       Goal: Will remain free from infection                Problem: Knowledge Deficit       Goal: Knowledge of disease process/condition, treatment plan, diagnostic tests, and medications will improve              Goal: Knowledge of the prescribed therapeutic regimen will improve                Problem: Mobility       Goal: Risk for activity intolerance will decrease                Problem: Problem: Pain       Goal: LTG - Patient will manage pain with the appropriate technique/Intervention              Goal: STG - Patient will reduce or eliminate use of analgesics              Goal: STG - Patient will verbalize an acceptable level of pain              Goal: STG - patients pain is managed to allow active participation in daily activities                Problem: Respiratory:       Goal: Respiratory status will improve                Problem: SKIN INTEGRITY       Goal: LTG - Patient will be free from infection              Goal: LTG - PATIENT WILL MAINTAIN/IMPROVE SKIN INTEGRITY THROUGH PROPER SKIN CARE TECHNIQUES.              Goal: LTG - Patient will demonstrate appropriate pressure relief techniques              Goal: STG - Patient exhibits signs of wound healing.              Goal: STG - patient demonstrates pressure reduction techniques                Problem: Safety       Goal: Will remain free from injury              Goal: Will remain free from falls                Problem: Urinary Elimination:       Goal: Ability to reestablish a normal urinary elimination pattern  will improve                Problem: Venous Thromboembolism (VTW)/Deep Vein Thrombosis (DVT) Prevention:       Goal: Patient will participate in Venous Thrombosis (VTE)/Deep Vein Thrombosis (DVT)Prevention Measures                     Long Term Goals:   At discharge patient will be able to function safely at home and in the community with support.    Section completed by:  Sowmya Delarosa R.N.        Respiratory Therapy    Patient's respiratory plan of care for oxygen therapy is:  O2 Protocol Indications: Room Air SpO2 Less Than 90%, pt is on home oxygen 24/7 wears 2Lpm.  O2 Protocol Goals/Outcome: Normoxemia on Oxygen, SpO2 greater than 90%    Section completed by:  Nicole Abbasi, RRT    Mobility  Bed mobility:   Min A to SBA  Bed /Chair/Wheelchair Transfer Initial:  4 - Minimal Assistance  Bed /Chair/Wheelchair Transfer Current:  4 - Minimal Assistance              Bed/Chair/Wheelchair Transfer Description:  Adaptive equipment, Increased time, Supervision for safety, Verbal cueing, Set-up of equipment, Initial preparation for task, Assist with two limbs  Walk Initial:  1 - Total Assistance  Walk Current:  2 - Max Assistance              Walk Description:   (reps of 115 and 135 feet indoors; gt belt, oxygen management, Min A, no UE support today; cueing for path finding, safety, memory; discussed RPE scale and not going more than 6 out of 10; mild SOB and requires cueing for pacing)  Wheelchair Initial:  2 - Max Assistance  Wheelchair Current:  2 - Max Assistance              Wheelchair Description:   (70 feet, BLE, Min A to SBA, cueing, bradykinetic, task attention, oxygen setup)  Stairs Initial:  0 - Not tested,unsafe activity  Stairs Current: 2 - Max Assistance              Stairs Description:  (Min A with B railings for 8 stairs; requires setup, ceuing, and oxygen management; step-to pattern, cueing for decreased LUE usage, and bradykinetic)  Patient/Family Training/Education:  Initiated  education  DME/DC Recommendations:  3 weeks; outpatient PT due to pt's experience with home health; O2 and SPC TBD; intermittent SPV  Strengths:  Independent PLOF, Making steady progress towards goals, Motivated for self care and independence, Pleasant and cooperative, Supportive family and Willingly participates in therapeutic activities  Barriers:   Decreased endurance, Dementia, Generalized weakness, Limited mobility, Poor activity tolerance, Poor carryover of learning, Poor insight/denial of deficits and Other: limited mobility and has been using oxygen since April 2017  # of short term goals set= 3 set at [facility] 2 days ago  # of short term goals met= 0 since eval        Physical Therapy Problems              Problem: Mobility       Dates:  Start: 08/10/17         Goal: STG-Within one week, patient will ambulate household distance        Dates:  Start: 08/10/17       Description:  1) Individualized goal:  FWW, oxygen, gt belt, 2x 50 feet indoors, SBA      2) Interventions:  PT Gait Training, PT Therapeutic Exercises, PT Neuro Re-Ed/Balance, PT Therapeutic Activity and PT Manual Therapy.           Note:  Goal Note filed on 08/11/17 0934 by Jose Palomino, P.T.      Goal: STG-Within one week, patient will ambulate household distance  Outcome: NOT MET  Patient requires additional A or an AD for balance.  Goal was set 2 days    ago.             Goal: STG-Within one week, patient will ascend and descend four to six stairs        Dates:  Start: 08/10/17       Description:  1) Individualized goal:  8 stairs, B railings, oxygen, gt belt, CGA      2) Interventions:  PT Gait Training, PT Therapeutic Exercises, PT Neuro Re-Ed/Balance, PT Therapeutic Activity and PT Manual Therapy.           Note:  Goal Note filed on 08/11/17 0934 by Jose Palomino, P.T.      Goal: STG-Within one week, patient will ascend and descend four to six    stairs  Outcome: NOT MET  Patient requires increased A.  Goal was set 2 days  ago.               Problem: Mobility Transfers       Dates:  Start: 08/10/17         Goal: STG-Within one week, patient will transfer bed to chair        Dates:  Start: 08/10/17       Description:  1) Individualized goal:  FWW, oxygen, gt belt, SBA      2) Interventions:  PT Gait Training, PT Therapeutic Exercises, PT Neuro Re-Ed/Balance, PT Therapeutic Activity and PT Manual Therapy.           Note:  Goal Note filed on 08/11/17 0934 by Jose Palomino P.T.      Goal: STG-Within one week, patient will transfer bed to chair  Outcome: NOT MET  Patient requires increased A.  Goal was set 2 days ago.               Problem: PT-Long Term Goals       Dates:  Start: 08/10/17         Goal: LTG-By discharge, patient will ambulate        Dates:  Start: 08/10/17       Description:  1) Individualized goal:  SPC, SPV, 2x 150 feet      2) Interventions:  PT Gait Training, PT Therapeutic Exercises, PT Neuro Re-Ed/Balance, PT Therapeutic Activity and PT Manual Therapy.                 Goal: LTG-By discharge, patient will transfer one surface to another        Dates:  Start: 08/10/17       Description:  1) Individualized goal:  SPC, SPV      2) Interventions:  PT Gait Training, PT Therapeutic Exercises, PT Neuro Re-Ed/Balance, PT Therapeutic Activity and PT Manual Therapy.                  Goal: LTG-By discharge, patient will ambulate up/down 4-6 stairs        Dates:  Start: 08/10/17       Description:  1) Individualized goal:  SPC and 1 rail, SPV, 6 stairs      2) Interventions:  PT Gait Training, PT Therapeutic Exercises, PT Neuro Re-Ed/Balance, PT Therapeutic Activity and PT Manual Therapy.                         Section completed by:  Jose Palomino, PBostonT., DPT    Activities of Daily Living  Eating Initial:  5 - Standby Prompting/Supervision or Set-up  Eating Current:  5 - Standby Prompting/Supervision or Set-up              Eating Description:   (Sup/set up to load utensils, bring to mouth and clear..)  Grooming  Initial:  4 - Minimal Assistance  Grooming Current:  5 - Standby Prompting/Supervision or Set-up              Grooming Description:  Increased time, Supervision for safety, Verbal cueing, Set-up of equipment, Initial preparation for task (Seated at sinkside)  Bathing Initial:  4 - Minimal Assistance  Bathing Current:  4 - Minimal Assistance              Bathing Description:  Adaptive equipment, Grab bar, Tub bench, Long handled bath tool, Hand rails, Hand held shower, Increased time, Supervision for safety, Verbal cueing, Set-up of equipment, Initial preparation for task, Requires minimal contact  Upper Body Dressing Initial:  3 - Moderate Assistance  Upper Body Dressing Current:  3 - Moderate Assistance              Upper Body Dressing Description:  Increased time, Supervision for safety, Verbal cueing, Set-up of equipment, Initial preparation for task (Mod assist w/ clothing orientation and motor planning of pull over shirt)  Lower Body Dressing Initial:  3 - Moderate Assistance  Lower Body Dressing Current:  3 - Moderate Assistance              Lower Body Dressing Description:  3 - Moderate Assistance  Toileting Initial:  3 - Moderate Assistance  Toileting Current:  2 - Max Assistance              Toileting Description:  Adaptive equipment, Grab bar, Increased time, Supervision for safety, Verbal cueing, Set-up of equipment, Initial preparation for task  Toilet Transfer Initial:  4 - Minimal Assistance  Toilet Transfer Current:  4 - Minimal Assistance              Toilet Transfer Description:  4 - Minimal Assistance  Tub / Shower Transfer Initial:  4 - Minimal Assistance  Tub / Shower Transfer Current:  4 - Minimal Assistance              Tub / Shower Transfer Description:  Adaptive equipment, Grab bar, Increased time, Supervision for safety, Verbal cueing, Set-up of equipment, Initial preparation for task  IADL:  TBD   Family Training/Education:  TBD   DME/DC Recommendations:  TBD     Strengths:  Pleasant and  cooperative, Supportive family and Willingly participates in therapeutic activities  Barriers:  Limited mobility and Poor balance     # of short term goals set= 3  # of short term goals met= 0          Occupational Therapy Goals              Problem: Bathing       Dates:  Start: 08/09/17         Goal: STG-Within one week, patient will bathe        Dates:  Start: 08/09/17   Expected End: 08/16/17       Description:  1) Individualized Goal:  Within one week, patient will bathe at a level of supervision.       2) Interventions:  OT Self Care/ADL, OT Neuro Re-Ed/Balance, OT Therapeutic Activity and OT Therapeutic Exercise      Cosign:  Jorge Ayala OTR/L      Note:  Goal Note filed on 08/11/17 0757 by Jorge Ayala MSOTR/L      Goal: STG-Within one week, patient will bathe  Outcome: NOT MET  Pt at Min assist                 Problem: Dressing       Dates:  Start: 08/09/17         Goal: STG-Within one week, patient will dress UB        Dates:  Start: 08/09/17   Expected End: 08/16/17       Description:  1) Individualized Goal:  Within one week, patient will dress UB at a level of min a.       2) Interventions:  OT Self Care/ADL, OT Neuro Re-Ed/Balance, OT Therapeutic Activity and OT Therapeutic Exercise      Cosign:  Jorge Ayala OTR/L             Note:  Goal Note filed on 08/11/17 0757 by Jorge Ayala MSOTR/L      Goal: STG-Within one week, patient will dress UB  Outcome: NOT MET  Pt at Mod assist             Goal: STG-Within one week, patient will dress LB        Dates:  Start: 08/09/17   Expected End: 08/16/17       Description:  1) Individualized Goal:  Within one week, patient will dress LB at a level of min a.       2) Interventions:  OT Self Care/ADL, OT Neuro Re-Ed/Balance, OT Therapeutic Activity and OT Therapeutic Exercise      Cosign:  Jorge Ayala OTR/VANNESSA             Note:  Goal Note filed on 08/11/17 0757 by Jorge Ayala MSOTR/L      Goal: STG-Within one week, patient will dress LB  Outcome: NOT MET  Pt at  Mod assist               Problem: OT Long Term Goals       Dates:  Start: 08/09/17         Goal: LTG-By discharge, patient will complete basic self care tasks        Dates:  Start: 08/09/17   Expected End: 08/23/17       Description:  1) Individualized Goal:  LTG-By discharge, patient will complete basic self care tasks at a level of mod. I.       2) Interventions:  OT Self Care/ADL, OT Cognitive Skill Dev, OT Neuro Re-Ed/Balance, OT Therapeutic Activity and OT Therapeutic Exercise             Cosign:  BEATRIZ Alfredo/VANNESSA            Goal: LTG-By discharge, patient will perform bathroom transfers        Dates:  Start: 08/09/17   Expected End: 08/23/17       Description:  1) Individualized Goal:  LTG-By discharge, patient will perform a simple meal preparation activity at a level of mod. I.       2) Interventions:  OT Self Care/ADL, OT Cognitive Skill Dev, OT Neuro Re-Ed/Balance, OT Therapeutic Activity and OT Therapeutic Exercise             Cosign:  BEATRIZ Alfredo/VANNESSA                  Section completed by:  Jorge Ayala MS,RAYMUNDOR/VANNESSA    Cognitive Linquistic Functions  Comprehension Initial:  5 - Stand-by Prompting/Supervision or Set-up  Comprehension Current:  4 - Minimal Assistance              Comprehension Description:  Glasses  Expression Initial:  5 - Stand-by Prompting/Supervision or Set-up  Expression Current:  5 - Stand-by Prompting/Supervision or Set-up              Expression Description:  Set-up of equipment, Verbal cueing  Social Interaction Initial:  6 - Modified Independent  Social Interaction Current:  5 - supervision                Social Interaction Description:  Increased time  Problem Solving Initial:  5 - Standby Prompting/Supervision or Set-up  Problem Solving Current:  3 - Moderate Assistance              Problem Solving Description:  Verbal cueing, Increased time  Memory Initial:  5 - Standby Prompting/Supervision or Set-up  Memory Current:  3 - Moderate Assistance              Memory Description:   Verbal cueing, Supervision  Executive Functioning / Organization Initial:  Severe (2)  Executive Functioning / Organization Current:  Severe (2)              Executive Functioning Desciption:  Attention, planning and organization and comprehension of instructions.   Swallowing  Swallowing Status: Regular and  Thin liquids tolerating well with no overt s/sx of asp/pen noted - no longer seen for dysphagia management at this time.    Orders Placed This Encounter    Procedures    •  Diet Order        Standing Status:  Standing          Number of Occurrences:  1          Standing Expiration Date:          Order Specific Question:   Diet:        Answer:   Cardiac [6]      Behavior Modification Plan  Allow for rest time, Keep instructions simple/concrete, Give clear feedback, Redirect to task/topic, Provide reasonable choices, Decrease the chance of failure by offering activities that are within the patient's abilities, Analyze tasks (break down into smaller steps) and Reinforce participation in desired tasks  Assistive Technology  Low/Impaired vision equipment and Low tech: Calendar, planner, schedule, alarms/timers, pill organizer, post-it notes, lists  Family Training/Education:  Ongoing with SO  DC Recommendations: TBD  Strengths:  Motivated for self care and independence, Pleasant and cooperative, Supportive family and Willingly participates in therapeutic activities  Barriers:  Confused, Impulsive, Poor carryover of learning, Poor insight/denial of deficits and Other: moderate to severe cognitive deficits  # of short term goals set=3  # of short term goals met=1         Speech Therapy Problems              Problem: Memory STGs       Dates:  Start: 08/09/17         Goal: STG-Within one week, patient will        Dates:  Start: 08/09/17       Description:  1) Individualized goal:  Learn and implement memory strategies to assist in recall of 4/5 pieces of information provided minimal assistance.       2) Interventions:   SLP Self Care / ADL Training  and SLP Cognitive Skill Development           Note:  Goal Note filed on 08/11/17 0731 by Liv Mckeon MS,CCC-SLP      Goal: STG-Within one week, patient will  Outcome: NOT MET  Goal not yet initiated                 Problem: Problem Solving STGs       Dates:  Start: 08/09/17         Goal: STG-Within one week, patient will        Dates:  Start: 08/09/17       Description:  1) Individualized goal:  Complete divided attention tasks with 80% accuracy provided minimal assistance.       2) Interventions:  SLP Self Care / ADL Training  and SLP Cognitive Skill Development           Note:  Goal Note filed on 08/11/17 0731 by Liv Mckeon MS,CCC-SLP      Goal: STG-Within one week, patient will  Outcome: PROGRESSING AS EXPECTED  Mod-max a for attention                 Problem: Speech/Swallowing LTGs       Dates:  Start: 08/09/17         Goal: LTG-By discharge, patient will        Dates:  Start: 08/09/17       Description:  1) Individualized goal:  Complete functional problem solving and recall information with 80% accuracy provided SPV.      2) Interventions:  SLP Swallowing Dysfunction Treatment, SLP Self Care / ADL Training  and SLP Cognitive Skill Development                        Section completed by:  Liv Mckeon MS,CCC-SLP       Nutrition        Dietary Problems              Problem: Other Problem (see comments)       Description:  Diagnosis: Malnutrition (chronic/ non-severe) r/t inadequate oral intake in the setting of poor appetite s/t chronic disease as evidenced by patient s/p chemoradiation treatment s/t GBM resulting in intake < 75% of nutrient needs > 1 month, moderate depletion of muscle mass/ subcutaneous body fat, tempora/clavicle wasting, 6% weight loss x 1 month.                Goal: Other Goal (Resolved)          Monitor/Evaluation: Monitor PO intake, weight, labs, medication adjustments, skin integrity, GI function, vitals, I/Os, and overall hydration status.   "Adjust nutritional POC pending clinical outcomes.        RD following PRN.  Interventions in place. PO adequate at this time.       Goal: Maintain adequate oral nutrient/fluid intake to promote nutrition optimization/healing/resolution of malnutrition/ weight stability.                  Nutrition Care/ Consult For Supplements/ Weight loss     Assessment:    Admitting Diagnosis: GBM (glioblastoma multiforme) (CMS-HCC)  Pertinent PMH: Hyperlipidemia, HTN, GBM s/p radiation and chemotherapy in June, AV block s/p pacemaker placement      Additional Information: Patient seen in room eating watermelon for snack.  Wife at bedside.  Patient with very flat affect.  He reports his appetite is \"okay.\"  Eats 3 meals/day at home although wife states they are small meals.  Drinks 1 Ensure at dinner.  Offered patient Ensure or vanilla milkshake- patient prefers vanilla milkshake.      Patient appears thin, muscle wasting noted to upper extremities, tempora/clavicle wasting observed.    Patient denies any GI issues.  He is self feeding with ease.  Dentition is good.  No swallowing issues. Food preferences noted. Got yogurt this morning- doesn't like it.     Needs foods chopped.     Patient states UBW of 158 lbs.  Lost weight while undergoing chemo-radiation.  Appetite is improving.     Appetite: fair to good    Diet: Cardiac + snacks TID between meals (fruit)    Average PO intake x 3 days: 74% (adequate) ate 100% of snack     Labs: Serum Glucose 134, T.P. 5.9    Medications: Decadron, Bowel Regimen, Prilosec, ICAPS    PRN Medications: noted  IVF: n/a     Height: 165.1cm  Weight: 67.813kg    Usual Body Weight: 71.82 kg    %Usual Body Weight: 94%  % Weight Loss: 6%    BMI: 24.88 (WNL)    Skin: tear to head, left anterior chest wound    GI: BM 8/10    Vitals: 2L C, Bradycardia noted, /78    I/Os: -30mL x 24 hours     Nutrient Needs:  Kcal: 1585- 1718 kcal/day BEE= 1321 x 1.2-1.3    Protein: 68-82g/day    Fluid: 2000mL /day "         Diagnosis: Malnutrition (chronic/ non-severe) r/t inadequate oral intake in the setting of poor appetite s/t chronic disease as evidenced by patient s/p chemoradiation treatment s/t GBM resulting in intake < 75% of nutrient needs > 1 month, moderate depletion of muscle mass/ subcutaneous body fat, tempora/clavicle wasting, 6% weight loss x 1 month.     Intervention/ Recommendations/POC:  1. Continue current diet + snacks.  High protein/ high kcal milkshake with supper (vanilla).  Nutrition rep to help with menus daily to aid in optimal food choices to promote adequate PO intake.    2.Encourage adequate PO/fluid intake.  3. Nutrition rep to see regarding food prefs/ honor within dietary restrictions (if indicated)      Monitor/Evaluation: Monitor PO intake, weight, labs, medication adjustments, skin integrity, GI function, vitals, I/Os, and overall hydration status.  Adjust nutritional POC pending clinical outcomes.     RD following PRN.  Interventions in place. PO adequate at this time.    Goal: Maintain adequate oral nutrient/fluid intake to promote nutrition optimization/healing/resolution of malnutrition/ weight stability.                                     Section completed by:  Julia Dumont RD    REHAB-Pharmacy IDT Team Note by Marcell Bee RPH at 8/10/2017  5:45 PM   Version 1 of 1      Author:  Marcell Bee RPH  Service:  (none)  Author Type:  Pharmacist      Filed:  8/10/2017  5:47 PM  Note Time:  8/10/2017  5:45 PM  Status:  Signed      :  Marcell Bee RPH (Pharmacist)                Pharmacy  Pharmacy  Antibiotics/Antifungals/Antivirals:  Medication:      Active Orders       None          Route:        NA  Stop Date:  NA  Reason:      NA  Antihypertensives/Cardiac:  Medication:    Orders       None          Patient Vitals for the past 24 hrs:    BP  Pulse    08/10/17 1400  126/77 mmHg  (!) 58    08/10/17 0931  125/60 mmHg  62    08/10/17 0910  -  69    08/10/17 0630  135/78 mmHg   "(!) 57    08/09/17 1930  100/65 mmHg  83        Anticoagulation:  Medication: None    Other key medications: A review of the medication list reveals no issues at this time.    Section completed by: Marcell Bee [facility]           DC Planning  DC destination/dispostion:  Home w/ spouse in [place], [place] / mobile home w/ ramp/ tub/shower combo w/ grab bars.  Pt has home 02/4ww/tub shower seat @ home.    Referrals: None at this time.        DC Needs:  Anticipate family training, home health,      Barriers to discharge: Functional / cognitive status.  Spouse reports pt has to be indep w/ transfers/adl's indicating \"I'm old and I can't do everything for him\".  All three daughters live out of town.      Strengths: Pleasant, cooperative, supprortive spouse and  3 daughters, supportive friends/neigbors in [place], [place].      Section completed by:  Demetria Guido, LSW, CCM     Summary:  Gait 135 x 2 w/ min assist due to decreased balance, toileting max assist, transfers min assist, total assist w/ exec function, problem solving mod to max assist. Min assist w/  comprehension.  Full code.  PT need to follow up w/ Cardiology.  Pt has appt w/ Dr. Ugarte on 8/14 @ 2pm.  Transport scheduled.      Physician Summary  I participated and led team conference discussion.                    Total time:  > 35 minutes.  I spent greater than 50% of the time for patient care and coordination on this date, including unit/floor time, and face-to-face time with the patient as per assessment and plan above.    Forrest Traylor M.D.                  "

## 2017-08-11 NOTE — FLOWSHEET NOTE
Slight redness noted by RN on cheeks. She placed thin dressing strips on cheeks wear tubing lays. Ears are OK. Remains on O2 at 2L as per home use.      08/11/17 1030   Events/Summary/Plan   Non-Invasive Resp Device Site Inspection Completed (slight red cheeks, ears okay)   Location Nasal cannula   Respiratory WDL   Respiratory (WDL) X   Chest Exam   Respiration 18   Pulse 71   Breath Sounds   RUL Breath Sounds Clear   RML Breath Sounds Clear   RLL Breath Sounds Clear;Diminished   RIANA Breath Sounds Clear   LLL Breath Sounds Clear;Diminished   Secretions   Cough Non Productive   How Sputum Obtained Cough on Request   Oximetry   #Pulse Oximetry (Single Determination) Yes   Oxygen   Home O2 Use Prior To Admission? Yes   Home O2 LPM Flow 2 LPM   Home O2 Delivery Method Silicone Nasal Cannula   Home O2 Frequency of Use Continuous   Pulse Oximetry 96 %   O2 (LPM) 2   O2 Daily Delivery Respiratory  Silicone Nasal Cannula

## 2017-08-12 NOTE — CARE PLAN
Problem: Safety  Goal: Will remain free from injury  Intervention: Educate patient and significant other/support system about adaptive mobility strategies and safe transfers  Pt uses call light consistently and appropriately. Waits for assistance does not attempt self transfer this shift. Able to verbalize needs.

## 2017-08-12 NOTE — CARE PLAN
Problem: Bowel/Gastric:  Goal: Normal bowel function is maintained or improved  Outcome: PROGRESSING AS EXPECTED  Patient is on bowel med daily. No s/s of constipation. Had BM today and remains continent of bowel. Will continue to monitor.     Problem: Problem: Pain  Goal: STG - patients pain is managed to allow active participation in daily activities  Outcome: PROGRESSING AS EXPECTED  Patient denied any pain or discomfort during shift.

## 2017-08-12 NOTE — PROGRESS NOTES
Endorsed by RN. Assumed care. Pt back to bed, sleeping. VSS. Bed rails up. Will continue to monitor.    A/o x4. Able to communicate needs. O2 sat of 99% on 2L/min via NC without s/s of respiratory distress noted. Compliant with HS medications. HS snack given. Denied any pain/discomfort. Weekly wound photos taken. Reminded to use call light and waiting for assistance before getting OOB. No sz episode reported/noted and sz precautions in place. Call light within reach.

## 2017-08-12 NOTE — FLOWSHEET NOTE
08/12/17 1410   Events/Summary/Plan   Non-Invasive Resp Device Site Inspection Completed Intact   Location NC   Respiratory WDL   Respiratory (WDL) X   Chest Exam   Respiration 16   Pulse 83   Oximetry   #Pulse Oximetry (Single Determination) Yes   Oxygen   Home O2 Use Prior To Admission? Yes   Home O2 LPM Flow 2 LPM   Home O2 Delivery Method Nasal Cannula   Home O2 Frequency of Use Continuous   Pulse Oximetry 98 %   O2 (LPM) 2   O2 Daily Delivery Respiratory  Nasal Cannula

## 2017-08-12 NOTE — CARE PLAN
Problem: Infection  Goal: Will remain free from infection  Outcome: PROGRESSING AS EXPECTED  Assessed posterior head skin tear, no s/s of infection noted and is healing well. Applied new biatain dressing.    Problem: Other Problem (see comments)  Intervention: Other Intervention  Encouraged to eat HS snack and increase fluid intake to prevent dehydration, effective.

## 2017-08-12 NOTE — PROGRESS NOTES
Received shift report and assumed care of patient.  Patient awake, calm and stable, assisted up in wheelchair. Denies pain or discomfort at this time.  Will continue to monitor.

## 2017-08-13 NOTE — CARE PLAN
Problem: Safety  Goal: Will remain free from injury  Outcome: PROGRESSING AS EXPECTED  Pt. Encouraged to continue using call light within reach, needs attended. Hourly rounding continue for safety. No impulsiveness noted so far.    Problem: SKIN INTEGRITY  Goal: LTG - Patient will be free from infection  Outcome: PROGRESSING AS EXPECTED  Pt. Skin checked, left chest pacemaker site healing no s/s of infection noted. Skin tear in the head monitor for s/s of infection healing.    Problem: Problem: Pain  Goal: STG - Patient will verbalize an acceptable level of pain  Outcome: PROGRESSING AS EXPECTED  Pt. Denies pain when assessed earlier, resting comfortably at this time and sleeping soundly, continue monitor condition, no complaints made.

## 2017-08-13 NOTE — FLOWSHEET NOTE
Remains on O2 at 2L as per home use. Pads placed on O2 tubing at cheeks per wife request. Patient states is more comfortable.      08/13/17 1015   Events/Summary/Plan   Non-Invasive Resp Device Site Inspection Completed Intact  (Placed pads on tubing at cheeks per wife request.)   Location Nasal cannula   Respiratory WDL   Respiratory (WDL) X   Chest Exam   Respiration 18   Pulse 77   Breath Sounds   RUL Breath Sounds Clear   RML Breath Sounds Clear   RLL Breath Sounds Clear;Diminished   RIANA Breath Sounds Clear   LLL Breath Sounds Clear;Diminished   Secretions   Cough Non Productive   How Sputum Obtained Cough on Request   Oximetry   #Pulse Oximetry (Single Determination) Yes   Oxygen   Home O2 Use Prior To Admission? Yes   Home O2 LPM Flow 2 LPM   Home O2 Delivery Method Silicone Nasal Cannula   Home O2 Frequency of Use Continuous   Pulse Oximetry 95 %   O2 (LPM) 2   O2 Daily Delivery Respiratory  Silicone Nasal Cannula

## 2017-08-13 NOTE — CARE PLAN
Problem: Respiratory:  Goal: Respiratory status will improve  Outcome: PROGRESSING AS EXPECTED  Continues on 2 L O2 continous via nc. O2 sats 95%-100%, WOB mild. Lung sounds clear-diminished bilateral bases.     Problem: Problem: Pain  Goal: STG - Patient will reduce or eliminate use of analgesics  Outcome: DISCHARGED-GOAL NOT MET Date Met:  08/13/17  Denies pain or need for analgesic. No non-verbal descriptors of pain noted.  No analgesics administered.

## 2017-08-14 NOTE — CARE PLAN
Problem: Safety  Goal: Will remain free from injury  Outcome: PROGRESSING AS EXPECTED  Pt uses call light consistently and appropriately. Waits for assistance does not attempt self transfer this shift. Able to verbalize needs.     Problem: SKIN INTEGRITY  Goal: LTG - Patient will be free from infection  Outcome: PROGRESSING AS EXPECTED  Left chest pacemaker site healing, steri strips in place, no s/s of infection noted. New dressing was applied to skin tear on head. Affected area appears discolored and no drainage present.

## 2017-08-14 NOTE — PROGRESS NOTES
"Rehab Progress Note     Interval Events (Subjective)  Patient was seen in his room by Dr. Traylor the attending, and the writer. Patient's wife is present. Later patient was seen by the writer in the therapy room while working on arm bike. Patient appears more alert today and his processing speed has improved. He was able to state the date with help of cues; he was able to choose the name of hospital from multiple choices. He could state the reason for his hospital admission.  He completed 10 minute of exercise on arm bike and was able to reach almost 1 mile. Half way through the exercise his O2 sat was 98% on 2 liters NC; the oxygen was held and finished the rest of exercise on RA; oxygen was 94% after completing the exercise on room air.   Wife admits that she has also noticed some improvement in patient's condition. She is hoping he'd be able to get around the house independently prior discharge to home.      Objective:  VITAL SIGNS: /83 mmHg  Pulse 95  Temp(Src) 36.7 °C (98 °F)  Resp 19  Ht 1.651 m (5' 5\")  Wt 66.2 kg (145 lb 15.1 oz)  BMI 24.29 kg/m2  SpO2 99%  Cardiac: regular rate and rhythm, nl S1/S2   Lungs: clear to auscultation bilaterally; he is on supplemental oxygen via nasal canula and is not in any respiratory distress.   Abdomen: soft; NT, ND, BS present.   Extremities: minimal edema noted  bilaterally  Neuro: No change with the exception the patient is sharper    No results found for this or any previous visit (from the past 72 hour(s)).    Current Facility-Administered Medications   Medication Frequency   • ICAPS (AREDS-2) Caps (patient own supply in drawer) DAILY   • calcium carbonate (TUMS) chewable tab 500 mg Q6HRS PRN   • dexamethasone (DECADRON) tablet 4 mg Q6HRS   • oxycodone immediate-release (ROXICODONE) tablet 5 mg Q4HRS PRN   • Respiratory Care per Protocol Continuous RT   • Pharmacy Consult Request ...Pain Management Review 1 Each PRN   • acetaminophen (TYLENOL) tablet 650 " mg Q4HRS PRN   • docusate sodium (COLACE) capsule 100 mg DAILY    And   • senna-docusate (PERICOLACE or SENOKOT S) 8.6-50 MG per tablet 1 Tab Q EVENING    And   • lactulose 20 GM/30ML solution 30 mL Q24HRS PRN    And   • bisacodyl (DULCOLAX) suppository 10 mg QDAY PRN   • ondansetron (ZOFRAN ODT) dispertab 4 mg 4X/DAY PRN    Or   • ondansetron (ZOFRAN) syringe/vial injection 4 mg 4X/DAY PRN   • mag hydrox-al hydrox-simeth (MAALOX PLUS ES or MYLANTA DS) suspension 20 mL Q2HRS PRN   • trazodone (DESYREL) tablet 50 mg QHS PRN   • omeprazole (PRILOSEC) capsule 20 mg DAILY       Orders Placed This Encounter   Procedures   • Diet Order     Standing Status: Standing      Number of Occurrences: 1      Standing Expiration Date:      Order Specific Question:  Diet:     Answer:  Cardiac [6]       Assessment:  Active Hospital Problems    Diagnosis   • *GBM (glioblastoma multiforme) (CMS-HCC)   • Vasogenic cerebral edema (CMS-HCC)   • CAP (community acquired pneumonia)   • Heart block, AV   • Cognitive and behavioral changes   • Impaired mobility and ADLs   • Debility   • Bradycardia   • HTN (hypertension)         GBM:  With vasogenic edema, cognitive and behavioral changes,severe debility, and impaired ADLs:   S/P radiation and chemotherapy CAP back in June;  brain mass with vasogenic edema on 8/2/2017. CT had revealed extensive vasogenic edema and right parietal, frontal, occipital, temporal lobes and attenuation  to right cisterns with 2 mm midline shift appeared to be increased from previous study;  Continue with Decadron  Continue with PT/OT/SLP    CAP: Completed treatment with 5 day course of Unasyn and Doxycycline in early August ; His O2 saturation remained at 94% on RA and during exercise on arm bike.   - Continue to monitor and he may be able to be weaned off the oxygen if remains stable.     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 Chung      Medical Decision Making and  Plan:  Patient shows improvement in cognition and processing speed.   Therapies PT/OT/SLP in progress  Our 1st team conference was August 11, 2017 and led by Dr. Traylor.           Madhavi Villalta M.D.    Geriatrics Fellow

## 2017-08-14 NOTE — CARE PLAN
Problem: Safety  Goal: Will remain free from injury  Outcome: PROGRESSING AS EXPECTED  patient verbalized will not get up without assist. call button within reach.safety and fall precaution reinforced.     Problem: Mobility  Goal: Risk for activity intolerance will decrease  Outcome: PROGRESSING AS EXPECTED  patient transfer safely from bed to wheelchair and visce versa with minimal assist. patient was encouraged to do ADLs as much as he  she could tolerate with staff on standby/ assist for safety.

## 2017-08-14 NOTE — FLOWSHEET NOTE
08/14/17 0825   Events/Summary/Plan   Events/Summary/Plan Pt on 2Lpm NC home use stable will monitor.   Non-Invasive Resp Device Site Inspection Completed Intact   Location NC b/l ears   Interdisciplinary Plan of Care-Goals (Indications)   Obstructive Ventilatory Defect or Pulmonary Disease without Obvious Obstruction Strong Subjective / Objective Improvement;History / Diagnosis   Education   Education Yes - Pt. / Family has been Instructed in use of Home Oxygen   Respiratory WDL   Respiratory (WDL) X   Chest Exam   Work Of Breathing / Effort Mild   Respiration 18   Pulse 95   Breath Sounds   RUL Breath Sounds Clear   RML Breath Sounds Clear   RLL Breath Sounds Clear;Diminished   RIANA Breath Sounds Clear   LLL Breath Sounds Clear;Diminished   Secretions   Cough Non Productive   Oximetry   #Pulse Oximetry (Single Determination) Yes   Oxygen   Home O2 Use Prior To Admission? Yes   Home O2 LPM Flow 2 LPM   Home O2 Delivery Method Silicone Nasal Cannula   Home O2 Frequency of Use Continuous   Pulse Oximetry 96 %   O2 (LPM) 2   O2 Daily Delivery Respiratory  Silicone Nasal Cannula

## 2017-08-14 NOTE — PROGRESS NOTES
received patient on bed. Conscious, coherent ,not in PC distress. no SOB. no chest pain. verbalized no significant changes in patient neurovascular status. safety and fall precaution reinforced.patient dressing in the head dry and intact. Patient on strip bed alarm. Patient verbalized will not get up without assist.

## 2017-08-15 NOTE — CARE PLAN
Problem: Safety  Goal: Will remain free from falls  Intervention: Implement fall precautions  Use of call light encouraged to make needs known.Side rails up x 2, bed in low position, non skid socks/shoes on when out of bed.Alarms in place for safety.Call light and things within reach.Will continue to monitor and assess needs and safety.      Problem: Bowel/Gastric:  Goal: Normal bowel function is maintained or improved  Intervention: Educate patient and significant other/support system about signs and symptoms of constipation and interventions to implement  Pt is continent of bowel per report.Scheduled bowel medication given.[facility] 08/14.Will continue to monitor and assess for s/s of constipation.      Problem: Problem: Pain  Goal: STG - Patient will verbalize an acceptable level of pain  Pt denies any pain and no sign of acute distress noted.Repositioned with pillows for comfort.Will continue to monitor and assess pain and medicate as needed.

## 2017-08-15 NOTE — TELEPHONE ENCOUNTER
----- Message from Vandana Navas sent at 8/15/2017  9:33 AM PDT -----  Regarding: Patient has Pacemaker and question about seeing doctor  Jaun    Please call Demetria at [facility] at [phone]. Patient had a Pacemaker implanted on 8/3 by Dr Chung at [facility]. She needs to find out if the patient needs to be seen in our office?

## 2017-08-15 NOTE — DISCHARGE PLANNING
Met w/ pt informing her of appt w/ Dr. Chung office for lida.   She confirms she has an MRI scheduled for 9/18 and then an  appt with Dr. Ugarte Neuro and Dr. Barclay Oncologist @ [facility] on 9/19/2017.  TC to Dr. Wolf's office-appt made for 9/21/2-017 as he is out of the office earlier that week.  Updated spouse and she is in agreement.     Plan: IDT tomorrow.

## 2017-08-15 NOTE — FLOWSHEET NOTE
08/15/17 0815   Events/Summary/Plan   Events/Summary/Plan Pt is on 2Lpm NC home use no wean per protocol, pt stable will monitor.   Non-Invasive Resp Device Site Inspection Completed Intact   Location NC b/l ears   Interdisciplinary Plan of Care-Goals (Indications)   Obstructive Ventilatory Defect or Pulmonary Disease without Obvious Obstruction Strong Subjective / Objective Improvement   Education   Education Yes - Pt. / Family has been Instructed in use of Home Oxygen   Respiratory WDL   Respiratory (WDL) X   Chest Exam   Work Of Breathing / Effort Mild   Respiration 17   Pulse 78   Breath Sounds   RUL Breath Sounds Clear   RML Breath Sounds Clear   RLL Breath Sounds Clear;Diminished   RIANA Breath Sounds Clear   LLL Breath Sounds Clear;Diminished   Secretions   Cough Non Productive   Oximetry   #Pulse Oximetry (Single Determination) Yes   Oxygen   Home O2 Use Prior To Admission? Yes   Home O2 LPM Flow 2 LPM   Home O2 Delivery Method Silicone Nasal Cannula   Home O2 Frequency of Use Continuous   Pulse Oximetry 98 %   O2 (LPM) 2   O2 Daily Delivery Respiratory  Silicone Nasal Cannula

## 2017-08-15 NOTE — CARE PLAN
Problem: Bowel/Gastric:  Goal: Normal bowel function is maintained or improved  Outcome: PROGRESSING SLOWER THAN EXPECTED  Pt. Reports having bowel urgency, stating that this problem started right after his brain surgery. Bowel sounds normoactive. LBM 8/15/17.    Problem: Problem: Pain  Goal: LTG - Patient will manage pain with the appropriate technique/Intervention  Outcome: PROGRESSING AS EXPECTED  Denies pain or need for analgesic. No non-verbal descriptors of pain noted. No analgesics administered.

## 2017-08-16 NOTE — PROGRESS NOTES
"Rehab Progress Note     Interval Events (Subjective)  Patient was seen in his room  As well as in PT. he clearly has improved far greater than expected him to. I am very pleasant surprise be continually wrong. Notes of the therapist reviewed wife remains pessimistic but does look less so than early in his hospitalization          Objective:  VITAL SIGNS: /75 mmHg  Pulse 81  Temp(Src) 36.5 °C (97.7 °F)  Resp 19  Ht 1.651 m (5' 5\")  Wt 66.2 kg (145 lb 15.1 oz)  BMI 24.29 kg/m2  SpO2 99%  Cardiac: regular rate and rhythm, nl S1/S2   Lungs: clear to auscultation bilaterally; he is on supplemental oxygen via nasal canula and is not in any respiratory distress.   Abdomen: soft; NT, ND, BS present.   Extremities: minimal edema noted  bilaterally  Neuro: No change with the exception the patient is sharper and is also more animated and showing faster processing speed     No results found for this or any previous visit (from the past 72 hour(s)).    Current Facility-Administered Medications   Medication Frequency   • ICAPS (AREDS-2) Caps (patient own supply in drawer) DAILY   • calcium carbonate (TUMS) chewable tab 500 mg Q6HRS PRN   • dexamethasone (DECADRON) tablet 4 mg Q6HRS   • oxycodone immediate-release (ROXICODONE) tablet 5 mg Q4HRS PRN   • Respiratory Care per Protocol Continuous RT   • Pharmacy Consult Request ...Pain Management Review 1 Each PRN   • acetaminophen (TYLENOL) tablet 650 mg Q4HRS PRN   • docusate sodium (COLACE) capsule 100 mg DAILY    And   • senna-docusate (PERICOLACE or SENOKOT S) 8.6-50 MG per tablet 1 Tab Q EVENING    And   • lactulose 20 GM/30ML solution 30 mL Q24HRS PRN    And   • bisacodyl (DULCOLAX) suppository 10 mg QDAY PRN   • ondansetron (ZOFRAN ODT) dispertab 4 mg 4X/DAY PRN    Or   • ondansetron (ZOFRAN) syringe/vial injection 4 mg 4X/DAY PRN   • mag hydrox-al hydrox-simeth (MAALOX PLUS ES or MYLANTA DS) suspension 20 mL Q2HRS PRN   • trazodone (DESYREL) tablet 50 mg QHS PRN "   • omeprazole (PRILOSEC) capsule 20 mg DAILY       Orders Placed This Encounter   Procedures   • Diet Order     Standing Status: Standing      Number of Occurrences: 1      Standing Expiration Date:      Order Specific Question:  Diet:     Answer:  Cardiac [6]       Assessment:  Active Hospital Problems    Diagnosis   • *GBM (glioblastoma multiforme) (CMS-HCC)   • Vasogenic cerebral edema (CMS-HCC)   • CAP (community acquired pneumonia)   • Heart block, AV   • Cognitive and behavioral changes   • Impaired mobility and ADLs   • Debility   • Bradycardia   • HTN (hypertension)         GBM:  With vasogenic edema, cognitive and behavioral changes,severe debility, and impaired ADLs:   S/P radiation and chemotherapy CAP back in June;  brain mass with vasogenic edema on 8/2/2017. CT had revealed extensive vasogenic edema and right parietal, frontal, occipital, temporal lobes and attenuation  to right cisterns with 2 mm midline shift appeared to be increased from previous study;  Continue with Decadron  Continue with PT/OT/SLP    CAP: Completed treatment with 5 day course of Unasyn and Doxycycline in early August ; His O2 saturation remained at 94% on RA and during exercise on arm bike.   - Continue to monitor and he may be able to be weaned off the oxygen if remains stable.     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 Chung      Medical Decision Making and Plan:  Patient continues to show improvement in cognition and processing speed.   Therapies PT/OT/SLP to continue  Team conference will be held this Friday, August 18, 2017  Our tentative discharge date is 8/25/2017          Forrest Traylor M.D.      I spent greater than 25 minutes face to face was spent with the patient with greater than 50% of the time spent counseling and coordinating care    Oskar Traylor M.D.

## 2017-08-16 NOTE — CARE PLAN
Problem: Safety  Goal: Will remain free from falls  Intervention: Implement fall precautions  Appropriately uses call light to make needs known.Bed in low position, non skid socks/shoes on when out of bed.Alarms in place for safety.Call light and things within reach.Will continue to monitor and assess needs and safety.      Problem: Problem: Pain  Goal: STG - Patient will verbalize an acceptable level of pain  Pt is calm, comfortable and stable.Repositioned with pillows for comfort.Will continue to monitor and assess pain level and medicate as needed.

## 2017-08-16 NOTE — FLOWSHEET NOTE
08/16/17 0850   Events/Summary/Plan   Events/Summary/Plan Pt is on 2Lpm home use, pt stable will monitor.   Non-Invasive Resp Device Site Inspection Completed Intact   Location NC b/l ears   Interdisciplinary Plan of Care-Goals (Indications)   Obstructive Ventilatory Defect or Pulmonary Disease without Obvious Obstruction Strong Subjective / Objective Improvement   Education   Education Yes - Pt. / Family has been Instructed in use of Home Oxygen   Respiratory WDL   Respiratory (WDL) X   Chest Exam   Work Of Breathing / Effort Mild   Respiration 18   Pulse 80   Breath Sounds   RUL Breath Sounds Clear   RML Breath Sounds Clear   RLL Breath Sounds Clear;Diminished   RIANA Breath Sounds Clear   LLL Breath Sounds Clear;Diminished   Secretions   Cough Non Productive   Oximetry   #Pulse Oximetry (Single Determination) Yes   Oxygen   Home O2 Use Prior To Admission? Yes   Home O2 LPM Flow 2 LPM   Home O2 Delivery Method Silicone Nasal Cannula   Home O2 Frequency of Use Continuous   Pulse Oximetry 99 %   O2 (LPM) 2   O2 Daily Delivery Respiratory  Silicone Nasal Cannula

## 2017-08-16 NOTE — CARE PLAN
Problem: Safety  Goal: Will remain free from injury  Outcome: PROGRESSING AS EXPECTED  Patient uses call light appropriately. Bed locked and in the lowest position. Non skid socks in place. Hourly rounding in place. Wife at the bedside.    Problem: Urinary Elimination:  Goal: Ability to reestablish a normal urinary elimination pattern will improve  Outcome: PROGRESSING AS EXPECTED  Pt. is continent of bladder and uses urinal to void. Pt.has some frequency and urgency. Urinal is emptied by staff as needed.

## 2017-08-16 NOTE — PROGRESS NOTES
"Rehab Progress Note     Interval Events (Subjective)  Patient was seen on his wheelchair. Dr. Traylor the attending, Dr. Mccall and the writer were present. He is clearly alert, smiles and is more attentive.  He has been on RA oxygen and had desaturation with activity.  There has been no events over night and he is progressing towards his treatment goals.           Objective:  VITAL SIGNS: /68 mmHg  Pulse 80  Temp(Src) 36.7 °C (98.1 °F)  Resp 18  Ht 1.651 m (5' 5\")  Wt 66.2 kg (145 lb 15.1 oz)  BMI 24.29 kg/m2  SpO2 99%  Cardiac: regular rate and rhythm, nl S1/S2   Lungs: clear to auscultation bilaterally; he is on supplemental oxygen via nasal canula and is not in any respiratory distress.   Abdomen: soft; NT, ND, BS present.   Extremities: minimal edema noted  bilaterally  Neuro: No change with the exception the patient is sharper and is also more animated and showing faster processing speed     No results found for this or any previous visit (from the past 72 hour(s)).    Current Facility-Administered Medications   Medication Frequency   • ICAPS (AREDS-2) Caps (patient own supply in drawer) DAILY   • calcium carbonate (TUMS) chewable tab 500 mg Q6HRS PRN   • dexamethasone (DECADRON) tablet 4 mg Q6HRS   • oxycodone immediate-release (ROXICODONE) tablet 5 mg Q4HRS PRN   • Respiratory Care per Protocol Continuous RT   • Pharmacy Consult Request ...Pain Management Review 1 Each PRN   • acetaminophen (TYLENOL) tablet 650 mg Q4HRS PRN   • docusate sodium (COLACE) capsule 100 mg DAILY    And   • senna-docusate (PERICOLACE or SENOKOT S) 8.6-50 MG per tablet 1 Tab Q EVENING    And   • lactulose 20 GM/30ML solution 30 mL Q24HRS PRN    And   • bisacodyl (DULCOLAX) suppository 10 mg QDAY PRN   • ondansetron (ZOFRAN ODT) dispertab 4 mg 4X/DAY PRN    Or   • ondansetron (ZOFRAN) syringe/vial injection 4 mg 4X/DAY PRN   • mag hydrox-al hydrox-simeth (MAALOX PLUS ES or MYLANTA DS) suspension 20 mL Q2HRS PRN   • " trazodone (DESYREL) tablet 50 mg QHS PRN   • omeprazole (PRILOSEC) capsule 20 mg DAILY       Orders Placed This Encounter   Procedures   • Diet Order     Standing Status: Standing      Number of Occurrences: 1      Standing Expiration Date:      Order Specific Question:  Diet:     Answer:  Cardiac [6]       Assessment:  Active Hospital Problems    Diagnosis   • *GBM (glioblastoma multiforme) (CMS-HCC)   • Vasogenic cerebral edema (CMS-HCC)   • CAP (community acquired pneumonia)   • Heart block, AV   • Cognitive and behavioral changes   • Impaired mobility and ADLs   • Debility   • Bradycardia   • HTN (hypertension)         GBM:  With vasogenic edema, cognitive and behavioral changes,severe debility, and impaired ADLs:   S/P radiation and chemotherapy CAP back in June;  brain mass with vasogenic edema on 8/2/2017. CT had revealed extensive vasogenic edema and right parietal, frontal, occipital, temporal lobes and attenuation  to right cisterns with 2 mm midline shift appeared to be increased from previous study;  Continue with Decadron  Continue with PT/OT/SLP    CAP: Completed treatment with 5 day course of Unasyn and Doxycycline in early August ; Will resume the home oxygen since he has been doing well on it.     Heart Block: Post pacemaker placement on 08/03/2017; He is scheduled for pacer check      Medical Decision Making and Plan:  Patient continues to show improvement in cognition and processing speed.   Therapies PT/OT/SLP to continue  Team conference will be held this Friday, August 18, 2017  Our tentative discharge date is 8/25/2017          Madhavi Villalta M.D.     Geriatrics Fellow

## 2017-08-17 NOTE — CARE PLAN
Problem: Urinary Elimination:  Goal: Ability to reestablish a normal urinary elimination pattern will improve  Outcome: PROGRESSING AS EXPECTED  Patient remains continent of bladder. Use bathroom for elimination. Will continue to monitor.     Problem: Problem: Pain  Goal: STG - Patient will verbalize an acceptable level of pain  Outcome: PROGRESSING AS EXPECTED  Patient denied any pain or discomfort during shift.

## 2017-08-17 NOTE — REHAB-CM IDT TEAM NOTE
Case Management      DC Planning  DC destination/dispostion:  Home w/ spouse in [place], [place] / mobile home w/ ramp/ tub/shower combo w/ grab bars.  Pt has home 02/4ww/tub shower seat @ home.   Appt with Dr. Chung for Pacer Check on 8/18 @ 98:45am.      Referrals: None at this time.       DC Needs:  Family training, home health inititally for RN/PT/Nurses Aide in [place], [place].    Follow up with Cardiology in [place], [place].  Follow up appts scheduled with the following:  Dr. Ugarte 9/19,  [facility]ologist @ [facility], and Dr. Cruz Radiation Oncologist on 9/21.      Barriers to discharge: Fall risk, decreased strength/endurance.     Strengths: Pleasant, cooperative, supprortive spouse, supportive friends/neigbors in [place], [place].                Section completed by:  EVI Grey, CCM

## 2017-08-17 NOTE — FLOWSHEET NOTE
08/17/17 0825   Events/Summary/Plan   Events/Summary/Plan Pt is on 2Lpm NC home use stable bakari well will monitor.   Non-Invasive Resp Device Site Inspection Completed Intact   Location NC b/l ears   Interdisciplinary Plan of Care-Goals (Indications)   Obstructive Ventilatory Defect or Pulmonary Disease without Obvious Obstruction Strong Subjective / Objective Improvement   Education   Education Yes - Pt. / Family has been Instructed in use of Home Oxygen   Respiratory WDL   Respiratory (WDL) X   Chest Exam   Work Of Breathing / Effort Mild   Respiration 18   Pulse 89   Breath Sounds   RUL Breath Sounds Clear   RML Breath Sounds Clear   RLL Breath Sounds Clear;Diminished   RIANA Breath Sounds Clear   LLL Breath Sounds Clear;Diminished   Secretions   Cough Non Productive   Oximetry   #Pulse Oximetry (Single Determination) Yes   Oxygen   Home O2 Use Prior To Admission? Yes   Home O2 LPM Flow 2 LPM   Home O2 Delivery Method Silicone Nasal Cannula   Home O2 Frequency of Use Continuous   Pulse Oximetry 98 %   O2 (LPM) 2   O2 Daily Delivery Respiratory  Silicone Nasal Cannula

## 2017-08-17 NOTE — REHAB-PHARMACY IDT TEAM NOTE
Pharmacy  Pharmacy  Antibiotics/Antifungals/Antivirals:  Medication:      Active Orders     None        Route:        NA  Stop Date:  NA  Reason:      NA  Antihypertensives/Cardiac:  Medication:    Orders     None        Patient Vitals for the past 24 hrs:   BP Pulse   08/17/17 0825 - 89   08/17/17 0700 100/61 mmHg 80   08/16/17 1845 118/84 mmHg 96   08/16/17 1500 110/75 mmHg 84       Anticoagulation:  Medication: None    Other key medications: A review of the medication list reveals no issues at this time.    Section completed by: Marcell Bee [facility]

## 2017-08-17 NOTE — PROGRESS NOTES
"Rehab Progress Note       Chief complaint:  Follow up on debility after treatment for GBM; Heart block    08/17/2017    Interval Events (Subjective)  Patient was seen in OT GYM. Dr. Traylor the attending, the writer, therapist and the wife were present. He is clearly alert, smiles and is more attentive.    There has been no events over night and he is progressing towards his treatment goals.   Wife is more calm but still seems concerned and does not seem to have good insight about patient's prognosis. She states \" I do not want to go through this again.\" Patient is demonstrating progress towards his goals but based on the diagnosis of GBM and grave prognosis, future decompensation may be inevitable.  He is already scheduled for tentative discharge date of 08/25/2017 to their mobile home with spouse and home health. Follow up appointments with Neurosurgeon, cardiologist and oncologist are made.         Objective:  VITAL SIGNS: /61 mmHg  Pulse 89  Temp(Src) 36.7 °C (98.1 °F)  Resp 18  Ht 1.651 m (5' 5\")  Wt 66.2 kg (145 lb 15.1 oz)  BMI 24.29 kg/m2  SpO2 98%  Cardiac: regular rate and rhythm, nl S1/S2   Lungs: clear to auscultation bilaterally, breath sounds slightly decreased to left lung field which is not a new finding; he is on supplemental oxygen via nasal canula and is not in any respiratory distress.   Abdomen: soft; NT, ND, BS present.   Extremities: No edema  Neuro: No change with the exception the patient is sharper and oriented to time and person.         No results found for this or any previous visit (from the past 72 hour(s)).    Current Facility-Administered Medications   Medication Frequency   • ICAPS (AREDS-2) Caps (patient own supply in drawer) DAILY   • calcium carbonate (TUMS) chewable tab 500 mg Q6HRS PRN   • dexamethasone (DECADRON) tablet 4 mg Q6HRS   • oxycodone immediate-release (ROXICODONE) tablet 5 mg Q4HRS PRN   • Respiratory Care per Protocol Continuous RT   • Pharmacy Consult " Request ...Pain Management Review 1 Each PRN   • acetaminophen (TYLENOL) tablet 650 mg Q4HRS PRN   • docusate sodium (COLACE) capsule 100 mg DAILY    And   • senna-docusate (PERICOLACE or SENOKOT S) 8.6-50 MG per tablet 1 Tab Q EVENING    And   • lactulose 20 GM/30ML solution 30 mL Q24HRS PRN    And   • bisacodyl (DULCOLAX) suppository 10 mg QDAY PRN   • ondansetron (ZOFRAN ODT) dispertab 4 mg 4X/DAY PRN    Or   • ondansetron (ZOFRAN) syringe/vial injection 4 mg 4X/DAY PRN   • mag hydrox-al hydrox-simeth (MAALOX PLUS ES or MYLANTA DS) suspension 20 mL Q2HRS PRN   • trazodone (DESYREL) tablet 50 mg QHS PRN   • omeprazole (PRILOSEC) capsule 20 mg DAILY       Orders Placed This Encounter   Procedures   • Diet Order     Standing Status: Standing      Number of Occurrences: 1      Standing Expiration Date:      Order Specific Question:  Diet:     Answer:  Cardiac [6]       Assessment:  Active Hospital Problems    Diagnosis   • *GBM (glioblastoma multiforme) (CMS-HCC)   • Vasogenic cerebral edema (CMS-HCC)   • CAP (community acquired pneumonia)   • Heart block, AV   • Cognitive and behavioral changes   • Impaired mobility and ADLs   • Debility   • Bradycardia   • HTN (hypertension)         GBM:  With vasogenic edema, cognitive and behavioral changes,severe debility, and impaired ADLs:   S/P radiation and chemotherapy CAP back in June;  brain mass with vasogenic edema on 8/2/2017. CT had revealed extensive vasogenic edema and right parietal, frontal, occipital, temporal lobes and attenuation  to right cisterns with 2 mm midline shift appeared to be increased from previous study;  Continue with Decadron  Continue with PT/OT/SLP    CAP: Completed treatment with 5 day course of Unasyn and Doxycycline in early August ; Continue with home oxygen    Heart Block: Post pacemaker placement on 08/03/2017; He is scheduled for pacer check tomorrow 08/18/2017      Medical Decision Making and Plan:  Patient continues to show improvement  in cognition and processing speed.   Therapies PT/OT/SLP to continue  Pacemaker follow up with Dr. Chung tomorrow 08/18/2017  Team conference will be held tomorrow, Friday August 18, 2017   Our tentative discharge date is 8/25/2017  Follow up appts after dsicharge scheduled with the following:  Dr. Ugarte 9/19,  [facility]cologist [facility], and Dr. Cruz Radiation Oncologist on 9/21.             Madhavi Villalta M.D.     Geriatrics Fellow

## 2017-08-17 NOTE — CARE PLAN
Problem: Safety  Goal: Will remain free from injury  Pt remains free from accidental injury.Bed in low position, non skid socks/shoe son when out of bed.Alarms in place for safety.Call light and things within reach.Will continue to monitor and assess needs and safety.    Problem: Bowel/Gastric:  Goal: Normal bowel function is maintained or improved  Intervention: Educate patient and significant other/support system about signs and symptoms of constipation and interventions to implement  Pt is continent of bowel per report.Scheduled senna given.LBM 08/16.Will continue to monitor and assess for s/s of constipation.      Problem: Urinary Elimination:  Goal: Ability to reestablish a normal urinary elimination pattern will improve  Intervention: Assist patient to sit on commode or toilet for voiding  Pt is continent of bladder using urinal which staff empties as needed.Denies any discomfort.Will continue to monitor and assess.

## 2017-08-18 NOTE — CARE PLAN
Problem: Safety  Goal: Will remain free from injury  Outcome: PROGRESSING AS EXPECTED  Patient use call light for assistance. No impulsivity behavior noted. Did not attempt to transfer without staff. Will continue to monitor.     Problem: Problem: Pain  Goal: LTG - Patient will manage pain with the appropriate technique/Intervention  Outcome: PROGRESSING AS EXPECTED  Patient denied any pain or discomfort during shift.

## 2017-08-18 NOTE — CARE PLAN
Problem: Problem Solving STGs  Goal: STG-Within one week, patient will  1) Individualized goal: Complete divided attention tasks with 80% accuracy provided minimal assistance.   2) Interventions: SLP Self Care / ADL Training and SLP Cognitive Skill Development    Outcome: NOT MET  Min-mod a required     Problem: Memory STGs  Goal: STG-Within one week, patient will  1) Individualized goal: Learn and implement memory strategies to assist in recall of 4/5 pieces of information provided minimal assistance.   2) Interventions: SLP Self Care / ADL Training and SLP Cognitive Skill Development    Outcome: MET Date Met:  08/18/17  Goal met

## 2017-08-18 NOTE — FLOWSHEET NOTE
Remains on O2 at 2L, sat=96%. Home use is 2L. Wife states has been on O2 for over 2 months. Has a home O2 order for 2L 24/7. Question to wean?     08/18/17 1105   Events/Summary/Plan   Non-Invasive Resp Device Site Inspection Completed Intact   Location Nasal cannula   Respiratory WDL   Respiratory (WDL) X   Chest Exam   Respiration 18   Pulse 92   Breath Sounds   RUL Breath Sounds Clear   RML Breath Sounds Clear   RLL Breath Sounds Clear;Diminished   RIANA Breath Sounds Clear   LLL Breath Sounds Clear;Diminished   Secretions   Cough Non Productive   How Sputum Obtained Cough on Request   Oximetry   #Pulse Oximetry (Single Determination) Yes   Oxygen   Home O2 Use Prior To Admission? Yes   Home O2 LPM Flow 2 LPM   Home O2 Delivery Method Silicone Nasal Cannula   Home O2 Frequency of Use Continuous   Pulse Oximetry 96 %   O2 (LPM) 2   O2 Daily Delivery Respiratory  Silicone Nasal Cannula

## 2017-08-18 NOTE — CARE PLAN
Problem: Bathing  Goal: STG-Within one week, patient will bathe  1) Individualized Goal: Within one week, patient will bathe at a level of supervision.   2) Interventions: OT Self Care/ADL, OT Neuro Re-Ed/Balance, OT Therapeutic Activity and OT Therapeutic Exercise    Cosign: MARK Alfredo   Outcome: MET Date Met:  08/18/17  Pt at Sup/SBA    Problem: Dressing  Goal: STG-Within one week, patient will dress UB  1) Individualized Goal: Within one week, patient will dress UB at a level of min a.   2) Interventions: OT Self Care/ADL, OT Neuro Re-Ed/Balance, OT Therapeutic Activity and OT Therapeutic Exercise    Cosign: MARK Alfredo   Outcome: MET Date Met:  08/18/17  Pt at Sup/SBA  Goal: STG-Within one week, patient will dress LB  1) Individualized Goal: Within one week, patient will dress LB at a level of min a.   2) Interventions: OT Self Care/ADL, OT Neuro Re-Ed/Balance, OT Therapeutic Activity and OT Therapeutic Exercise    Cosign: MARK Alfredo   Outcome: MET Date Met:  08/18/17  Pt at Sup/SBA

## 2017-08-18 NOTE — REHAB-SLP IDT TEAM NOTE
Speech Therapy  Cognitive Linquistic Functions  Comprehension Initial:  5 - Stand-by Prompting/Supervision or Set-up  Comprehension Current:  4 - Minimal Assistance   Comprehension Description:  Glasses  Expression Initial:  5 - Stand-by Prompting/Supervision or Set-up  Expression Current:  5 - Stand-by Prompting/Supervision or Set-up   Expression Description:  Set-up of equipment, Verbal cueing  Social Interaction Initial:  6 - Modified Independent  Social Interaction Current:  5 - Stand-by Prompting/Supervision or Set-up   Social Interaction Description:  Increased time  Problem Solving Initial:  5 - Standby Prompting/Supervision or Set-up  Problem Solving Current:  4 min a - 3 - Moderate Assistance   Problem Solving Description:  Verbal cueing, Increased time  Memory Initial:  5 - Standby Prompting/Supervision or Set-up  Memory Current:  4 - min a    Memory Description:  Verbal cueing, Supervision  Executive Functioning / Organization Initial:  Severe (2)  Executive Functioning / Organization Current:  Moderate (3)   Executive Functioning Desciption:  Attention, comprehension and processing   Swallowing  Swallowing Status: not seen for dysphagia management    Orders Placed This Encounter   Procedures   • Diet Order     Standing Status: Standing      Number of Occurrences: 1      Standing Expiration Date:      Order Specific Question:  Diet:     Answer:  Cardiac [6]     Behavior Modification Plan  Allow for rest time, Keep instructions simple/concrete, Redirect to task/topic, Provide reasonable choices, Decrease the chance of failure by offering activities that are within the patient's abilities, Analyze tasks (break down into smaller steps) and Reinforce participation in desired tasks  Assistive Technology  Low/Impaired vision equipment and Low tech: Calendar, planner, schedule, alarms/timers, pill organizer, post-it notes, lists  Family Training/Education:  Ongoing with SO  DC Recommendations:  TBD  Strengths:   Able to follow instructions, Making steady progress towards goals, Motivated for self care and independence, Pleasant and cooperative, Supportive family and Willingly participates in therapeutic activities  Barriers:  Poor insight/denial of deficits and Other: attention, ps and memory   # of short term goals set=2  # of short term goals met=1        Speech Therapy Problems           Problem: Problem Solving STGs     Dates: Start: 08/09/17       Goal: STG-Within one week, patient will     Dates: Start: 08/09/17      Description: 1) Individualized goal:  Complete divided attention tasks with 80% accuracy provided minimal assistance.     2) Interventions:  SLP Self Care / ADL Training  and SLP Cognitive Skill Development        Note: Goal Note filed on 08/18/17 0855 by Liv Mckeon MS,CCC-SLP    Goal: STG-Within one week, patient will  Outcome: NOT MET  Min-mod a required           Goal: STG-Within one week, patient will     Dates: Start: 08/18/17      Description: 1) Individualized goal:  Complete money and time management word problems with 80% accuracy provided min a.     2) Interventions:  SLP Self Care / ADL Training  and SLP Cognitive Skill Development              Problem: Speech/Swallowing LTGs     Dates: Start: 08/09/17       Goal: LTG-By discharge, patient will     Dates: Start: 08/09/17      Description: 1) Individualized goal:  Complete functional problem solving and recall information with 80% accuracy provided SPV.    2) Interventions:  SLP Swallowing Dysfunction Treatment, SLP Self Care / ADL Training  and SLP Cognitive Skill Development                   Section completed by:  Liv Mckeon MS,CCC-SLP

## 2017-08-18 NOTE — MR AVS SNAPSHOT
Alvin Grinnell   2017 9:45 AM   Appointment   MRN: [id number]    Department:  [facility]   Dept Phone:  [phone]    Description:  Male : 1935   Provider:  [facility]           Allergies as of 2017     Allergen Noted Reactions    Bactrim [Sulfamethoxazole W-Trimethoprim] 2017   Unspecified    Dr told pt he is allergic        Vital Signs     Smoking Status                   Never Smoker            Basic Information     Date Of Birth Sex Race Ethnicity Preferred Language    1935 Male White Non- English      Your appointments     Sep 21, 2017 11:30 AM   Follow Up with Nii MONTERROSO M.D.   [facility] (--)    [address] [address] [phone]              Problem List              ICD-10-CM Priority Class Noted - Resolved    Brain mass G93.9 High  2017 - Present    HTN (hypertension) I10   2017 - Present    Heart block, AV I44.30 High  8/3/2017 - Present    Vasogenic cerebral edema (CMS-HCC) G93.6 High  8/3/2017 - Present    CAP (community acquired pneumonia) J18.9 High  8/3/2017 - Present    Anemia D64.9   2017 - Present    Debility R53.81   2017 - Present    Bradycardia R00.1   2017 - Present    GBM (glioblastoma multiforme) (CMS-HCC) C71.9   2017 - Present    Cognitive and behavioral changes R41.89, R46.89   2017 - Present    Impaired mobility and ADLs Z74.09   2017 - Present      Health Maintenance        Date Due Completion Dates    IMM DTaP/Tdap/Td Vaccine (1 - Tdap) 1956 ---    COLONOSCOPY 1987 ---    IMM ZOSTER VACCINE 1997 ---    IMM INFLUENZA (1) 2017    IMM PNEUMOCOCCAL 65+ (ADULT) LOW/MEDIUM RISK SERIES (2 of 2 - PPSV23) 2018            Current Immunizations     13-VALENT PCV PREVNAR 2017 10:05 AM    Influenza Vaccine Adult HD 2017 10:04 AM      Below and/or attached are the medications your provider expects you to take. Review all of  your home medications and newly ordered medications with your provider and/or pharmacist. Follow medication instructions as directed by your provider and/or pharmacist. Please keep your medication list with you and share with your provider. Update the information when medications are discontinued, doses are changed, or new medications (including over-the-counter products) are added; and carry medication information at all times in the event of emergency situations     Allergies:  BACTRIM - Unspecified               Medications  Valid as of: August 18, 2017 - 10:30 AM    Generic Name Brand Name Tablet Size Instructions for use    Acetaminophen (Tab) TYLENOL 325 MG Take 650 mg by mouth 2 Times a Day.        Bisacodyl (Suppos) DULCOLAX 10 MG Insert 1 Suppository in rectum 1 time daily as needed (if magnesium hydroxide ineffective after 24 hours).        Calcium Carbonate   Take 600 mg by mouth every day.        Cholecalciferol (Tab) cholecalciferol 1000 UNIT Take 1,000 Units by mouth 3 times a day.        Dexamethasone (Tab) DECADRON 4 MG Take 1 Tab by mouth every 6 hours.        Famotidine (Tab) PEPCID 20 MG Take 20 mg by mouth every day.        Ferrous Sulfate (Tab) Ferrous Sulfate 27 MG Take 27 mg by mouth every day.        Magnesium Hydroxide (Suspension) MILK OF MAGNESIA 400 MG/5ML Take 30 mL by mouth 1 time daily as needed (if polyethylene glycol ineffective after 24 hours).        Multiple Vitamins-Minerals   Take 1 Tab by mouth every day.        Multiple Vitamins-Minerals (Tab) THERAGRAN-M  Take 1 Tab by mouth every day.        OxyCODONE HCl (Tab) ROXICODONE 5 MG Take 1 Tab by mouth every four hours as needed.        Polyethylene Glycol 3350 (Pack) MIRALAX  Take 1 Packet by mouth 1 time daily as needed (if sennosides and docusate ineffective after 24 hours).        Sennosides-Docusate Sodium (Tab) PERICOLACE or SENOKOT S 8.6-50 MG Take 2 Tabs by mouth 2 Times a Day.        Simvastatin (Tab) ZOCOR 10 MG Take 10  mg by mouth every evening.        .                 Medicines prescribed today were sent to:     [id number] [place], [place] - [address]    [address] [place] [address]    Phone: [phone] Fax: [phone]    Open 24 Hours?: No      Medication refill instructions:       If your prescription bottle indicates you have medication refills left, it is not necessary to call your provider’s office. Please contact your pharmacy and they will refill your medication.    If your prescription bottle indicates you do not have any refills left, you may request refills at any time through one of the following ways: The online Qwenty system (except Urgent Care), by calling your provider’s office, or by asking your pharmacy to contact your provider’s office with a refill request. Medication refills are processed only during regular business hours and may not be available until the next business day. Your provider may request additional information or to have a follow-up visit with you prior to refilling your medication.   *Please Note: Medication refills are assigned a new Rx number when refilled electronically. Your pharmacy may indicate that no refills were authorized even though a new prescription for the same medication is available at the pharmacy. Please request the medicine by name with the pharmacy before contacting your provider for a refill.           Qwenty Access Code: Activation code not generated  Current Qwenty Status: Active

## 2017-08-18 NOTE — PROGRESS NOTES
"Rehab Progress Note     Interval Events (Subjective)  Patient was seen in his room  As well as in PT. he clearly continues to improve. Notes of the therapist reviewed wife remains pessimistic but does look less so than early in his hospitalization. She is concerned about taking the patient home. He was discussed at team conference and I reviewed the results with the patient and his wife          Caridac diet thin liquids 100% denies patiebnt  Contient of bowel and bladder  Sleeps well    PT    Min asssit bed mobilithy  SBA transfers  Walk 775 feet x2 with $wheeled walker      Wheelchair 110 feet with SBA  12 steps CG asssit with both handrails  endruance    OT  SBA superviioon for eating grooming bathing alld ressing    Min assist fo toeilting    SBA for bed            Functional transfer min toielt SBA shower    Much better, supervision for C<E and SI  Min to mod for simple problems solving with attention  Min asssit for memory goal  Executive mod to min    Will recommned home health  8/23/2017  Needs training                  Objective:  VITAL SIGNS: /81 mmHg  Pulse 77  Temp(Src) 36.6 °C (97.9 °F)  Resp 20  Ht 1.651 m (5' 5\")  Wt 66.2 kg (145 lb 15.1 oz)  BMI 24.29 kg/m2  SpO2 97%  Cardiac: regular rate and rhythm, nl S1/S2   Lungs: clear to auscultation bilaterally; he is on supplemental oxygen via nasal canula and is not in any respiratory distress.   Abdomen: soft; NT, ND, BS present.   Extremities: minimal edema noted  bilaterally  Neuro: No change with the exception the patient is sharper and is also more animated and showing faster processing speed     No results found for this or any previous visit (from the past 72 hour(s)).    Current Facility-Administered Medications   Medication Frequency   • ICAPS (AREDS-2) Caps (patient own supply in drawer) DAILY   • calcium carbonate (TUMS) chewable tab 500 mg Q6HRS PRN   • dexamethasone (DECADRON) tablet 4 mg Q6HRS   • oxycodone immediate-release " (ROXICODONE) tablet 5 mg Q4HRS PRN   • Respiratory Care per Protocol Continuous RT   • Pharmacy Consult Request ...Pain Management Review 1 Each PRN   • acetaminophen (TYLENOL) tablet 650 mg Q4HRS PRN   • docusate sodium (COLACE) capsule 100 mg DAILY    And   • senna-docusate (PERICOLACE or SENOKOT S) 8.6-50 MG per tablet 1 Tab Q EVENING    And   • lactulose 20 GM/30ML solution 30 mL Q24HRS PRN    And   • bisacodyl (DULCOLAX) suppository 10 mg QDAY PRN   • ondansetron (ZOFRAN ODT) dispertab 4 mg 4X/DAY PRN    Or   • ondansetron (ZOFRAN) syringe/vial injection 4 mg 4X/DAY PRN   • mag hydrox-al hydrox-simeth (MAALOX PLUS ES or MYLANTA DS) suspension 20 mL Q2HRS PRN   • trazodone (DESYREL) tablet 50 mg QHS PRN   • omeprazole (PRILOSEC) capsule 20 mg DAILY       Orders Placed This Encounter   Procedures   • Diet Order     Standing Status: Standing      Number of Occurrences: 1      Standing Expiration Date:      Order Specific Question:  Diet:     Answer:  Cardiac [6]       Assessment:  Active Hospital Problems    Diagnosis   • *GBM (glioblastoma multiforme) (CMS-HCC)   • Vasogenic cerebral edema (CMS-HCC)   • CAP (community acquired pneumonia)   • Heart block, AV   • Cognitive and behavioral changes   • Impaired mobility and ADLs   • Debility   • Bradycardia   • HTN (hypertension)         GBM:  With vasogenic edema, cognitive and behavioral changes,severe debility, and impaired ADLs:   S/P radiation and chemotherapy CAP back in June;  brain mass with vasogenic edema on 8/2/2017. CT had revealed extensive vasogenic edema and right parietal, frontal, occipital, temporal lobes and attenuation  to right cisterns with 2 mm midline shift appeared to be increased from previous study;  Continue with Decadron  Continue with PT/OT/SLP    CAP: Completed treatment with 5 day course of Unasyn and Doxycycline in early August ; His O2 saturation remained at 94% on RA and during exercise on arm bike.   - Continue to monitor and he may  be able to be weaned off the oxygen if remains stable.     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 Chung      Medical Decision Making and Plan:  Patient continues to show improvement in cognition and processing speed.  His wife continues to be anxious regarding bringing the patient home.  She ahs a limited understanding of the patient's prognosis  Therapies PT/OT/SLP to continue  At team Conference we changed the discharge date to 8/23/2017        Team Conference      Nursing  Diet/Nutrition:  Cardiac and Thin Liquids  Medication Administration:  Whole with Liquid Wash  % consumed at meals in last 24 hours:  Consumed 100-100% of meals per documentation.  Breakfast:  100%    Lunch:  100%  Dinner:  100%    Snack schedule:  HS  Supplement:  Other: None  Appetite:  Excellent  Fluid Intake/Output in past 24 hours: In: 960 [P.O.:960]  Out: 725   Admit Weight:  Weight: 67.813 kg (149 lb 8 oz)  Weight Last 7 Days   [66.2 kg (145 lb 15.1 oz)] 66.2 kg (145 lb 15.1 oz) (08/13 1300)    Pain Issues:     Location:  --  --         Severity:  Denies    Scheduled pain medications:  None     PRN pain medications used in last 24 hours:  None   Non Pharmacologic Interventions:  other: Declines  Effectiveness of pain management plan:  Denies pain    Bowel:    Bowel Assist Initial Score:  4 - Minimal Assistance  Bowel Assist Current Score:  5 - Standby Prompting/Supervision or Set-up  Bowl Accidents in last 7 days:  0  Last bowel movement: 08/17/17  Stool: Large, Soft      Usual bowel pattern:  every other day  Scheduled bowel medications:  docusate sodium (COLACE) and senna-docusate (PERICOLACE or SENOKOT S)    PRN bowel medications used in last 24 hours:  None  Nursing Interventions:  Scheduled medication, Supervision, Verbal cueing, Positioning on commode/toilet  Effectiveness of bowel program:   good=regular, predictable, controlled emptying of bowel  Bladder:    Bladder Assist Initial Score:  4  - Minimal Assistance  Bladder Assist Current Score:  4 - Minimal Assistance  Bladder Accidents in last 7 days:  3  PVR range for past 24-48 hours:  [211]    Intermittent Catheterization:  0  Medications affecting bladder:  None    Time void schedule/voiding pattern:  Voiding every 2-4 hours  Interventions:  Increased time, Supervision, Emptying of device, Urinal  Effectiveness of bladder training:  Poor=PVR > 200 cc      Wound:          Patient Lines/Drains/Airways Status     Active Current Wounds       Name:  Placement date:  Placement time:  Site:  Days:      Wound POA Skin Tear Head Posterior  08/03/17 2000 14      Wound Not POA Other (comment) Breast Left Anterior  08/03/17 2000 14                        Interventions:  Monitor for signs and symptoms of infection. Dressing change as needed.  Effectiveness of intervention:  wound is improving     Sleep/wake cycle:    Average hours slept:  Sleeps 4-6 hours without waking  Sleep medication usage:  None    Patient/Family Training/Education:  Bladder Management/Training, Diet/Nutrition, Fall Prevention, General Self Care, Medication Management, Safe Transfers, Safety, Skin Care and Wound Care  Discharge Supply Recommendations:  Strengths: Alert and oriented, Able to follow instructions, Pleasant and cooperative and Effective communication skills   Barriers:   Fatigue, Generalized weakness and Limited mobility               Nursing Problems              Problem: Bowel/Gastric:       Goal: Normal bowel function is maintained or improved              Goal: Will not experience complications related to bowel motility                Problem: Communication       Goal: The ability to communicate needs accurately and effectively will improve                Problem: Discharge Barriers/Planning       Goal: Patient's continuum of care needs will be met                Problem: Infection       Goal: Will remain free from infection                Problem: Knowledge  Deficit       Goal: Knowledge of disease process/condition, treatment plan, diagnostic tests, and medications will improve              Goal: Knowledge of the prescribed therapeutic regimen will improve                Problem: Mobility       Goal: Risk for activity intolerance will decrease                Problem: Problem: Pain       Goal: LTG - Patient will manage pain with the appropriate technique/Intervention              Goal: STG - Patient will reduce or eliminate use of analgesics (Resolved)              Goal: STG - Patient will verbalize an acceptable level of pain              Goal: STG - patients pain is managed to allow active participation in daily activities                Problem: Respiratory:       Goal: Respiratory status will improve                Problem: SKIN INTEGRITY       Goal: LTG - Patient will be free from infection              Goal: LTG - PATIENT WILL MAINTAIN/IMPROVE SKIN INTEGRITY THROUGH PROPER SKIN CARE TECHNIQUES.              Goal: LTG - Patient will demonstrate appropriate pressure relief techniques              Goal: STG - Patient exhibits signs of wound healing.              Goal: STG - patient demonstrates pressure reduction techniques                Problem: Safety       Goal: Will remain free from injury              Goal: Will remain free from falls                Problem: Urinary Elimination:       Goal: Ability to reestablish a normal urinary elimination pattern will improve                Problem: Venous Thromboembolism (VTW)/Deep Vein Thrombosis (DVT) Prevention:       Goal: Patient will participate in Venous Thrombosis (VTE)/Deep Vein Thrombosis (DVT)Prevention Measures                     Long Term Goals:   At discharge patient will be able to function safely at home and in the community with support.    Section completed by:  Nicolette Mann R.N.        Respiratory Therapy  Patient's respiratory plan of care for oxygen therapy is:  O2 Protocol Indications: Room  "Air SpO2 Less Than 90%  O2 Protocol Goals/Outcome: Normoxemia on Oxygen, SpO2 greater than 90%, pt has home oxygen 2Lpm with [employer], no wean.    Section completed by:  Nicole Abbasi, RRT    Mobility  Bed mobility:   Min A  Bed /Chair/Wheelchair Transfer Initial:  4 - Minimal Assistance  Bed /Chair/Wheelchair Transfer Current:  4 - Minimal Assistance              Bed/Chair/Wheelchair Transfer Description:   ( min assist supine to sit, SBA SPT 4WW )  Walk Initial:  1 - Total Assistance  Walk Current:  2 - Max Assistance              Walk Description:  Extra time, Oxygen, Safety concerns, Supervision for safety, Verbal cueing, Walker (75' x 2 4WW(gym to BR) CGA/close SBA cues for inc step length B.)  Wheelchair Initial:  2 - Max Assistance  Wheelchair Current:  2 - Max Assistance              Wheelchair Description:  Extra time, Safety concerns, Verbal cueing, Supervision for safety (110' x 1 using B LE alt UE(min use of L UE 2/2 pace prec), cues for L side attention as pt has decreased L peripheral vision s)  Stairs Initial:  0 - Not tested,unsafe activity  Stairs Current: 2 - Max Assistance              Stairs Description: Hand rails, Oxygen, Extra time, Verbal cueing, Supervision for safety, Safety concerns, Ascends/descends 4 to 11 steps (ascended/descended 12 (4\" stairs) B HR cues for turning away from 02 line, CGA on stairs step-to pattern)  Patient/Family Training/Education:  Education on pacing, safety with functional mobility   DME/DC Recommendations:  TBD; pt has 4WW  Strengths:  Independent PLOF, Making steady progress towards goals, Motivated for self care and independence, Pleasant and cooperative, Supportive family and Willingly participates in therapeutic activities  Barriers:   Decreased endurance, Generalized weakness and Poor activity tolerance  # of short term goals set= 3  # of short term goals met=2         Physical Therapy Problems              Problem: Mobility Transfers       " Dates:  Start: 08/10/17         Goal: STG-Within one week, patient will transfer bed to chair        Dates:  Start: 08/10/17       Description:  1) Individualized goal:  FWW, oxygen, gt belt, SBA      2) Interventions:  PT Gait Training, PT Therapeutic Exercises, PT Neuro Re-Ed/Balance, PT Therapeutic Activity and PT Manual Therapy.           Note:  Goal Note filed on 08/18/17 0847 by Madhavi Denney DPT      Goal: STG-Within one week, patient will transfer bed to chair  Outcome: NOT MET  Pt requiring min A supine </> sit               Problem: PT-Long Term Goals       Dates:  Start: 08/10/17         Goal: LTG-By discharge, patient will ambulate        Dates:  Start: 08/10/17       Description:  1) Individualized goal:  SPC, SPV, 2x 150 feet      2) Interventions:  PT Gait Training, PT Therapeutic Exercises, PT Neuro Re-Ed/Balance, PT Therapeutic Activity and PT Manual Therapy.                 Goal: LTG-By discharge, patient will transfer one surface to another        Dates:  Start: 08/10/17       Description:  1) Individualized goal:  SPC, SPV      2) Interventions:  PT Gait Training, PT Therapeutic Exercises, PT Neuro Re-Ed/Balance, PT Therapeutic Activity and PT Manual Therapy.                  Goal: LTG-By discharge, patient will ambulate up/down 4-6 stairs        Dates:  Start: 08/10/17       Description:  1) Individualized goal:  SPC and 1 rail, SPV, 6 stairs      2) Interventions:  PT Gait Training, PT Therapeutic Exercises, PT Neuro Re-Ed/Balance, PT Therapeutic Activity and PT Manual Therapy.                         Section completed by:  Madhavi Denney DPT    Activities of Daily Living  Eating Initial:  5 - Standby Prompting/Supervision or Set-up  Eating Current:  5 - Standby Prompting/Supervision or Set-up              Eating Description:  Increased time, Supervision for safety, Set-up of equipment or meal/tube feeding  Grooming Initial:  4 - Minimal Assistance  Grooming Current:  5 - Standby  Prompting/Supervision or Set-up              Grooming Description:  Supervision for safety, Increased time, Verbal cueing (Seated at sinkside)  Bathing Initial:  4 - Minimal Assistance  Bathing Current:  5 - Standby Prompting/Supervision or Set-up              Bathing Description:  Adaptive equipment, Grab bar, Tub bench, Long handled bath tool, Hand held shower, Increased time, Supervision for safety, Set-up of equipment, Initial preparation for task  Upper Body Dressing Initial:  3 - Moderate Assistance  Upper Body Dressing Current:  5 - Standby Prompting/Supervision or Set-up              Upper Body Dressing Description:  Verbal cueing (Button up shirt don/doff - VQ's to problem solve O2 line in shirt)  Lower Body Dressing Initial:  3 - Moderate Assistance  Lower Body Dressing Current:  5 - Standby Prompting/Supervsion or Set-up              Lower Body Dressing Description:  5 - Standby Prompting/Supervsion or Set-up  Toileting Initial:  3 - Moderate Assistance  Toileting Current:  4 - Minimal Assistance              Toileting Description:  Grab bar, Increased time, Supervision for safety, Verbal cueing, Set-up of equipment, Initial preparation for task (Contact Guard for clothing management holding onto wall mounted grab bar + pericare/wiping)  Toilet Transfer Initial:  4 - Minimal Assistance  Toilet Transfer Current:  4 - Minimal Assistance              Toilet Transfer Description:  4 - Minimal Assistance  Tub / Shower Transfer Initial:  4 - Minimal Assistance  Tub / Shower Transfer Current:  5 - Standby Prompting/Supervision or Set-up              Tub / Shower Transfer Description:  Adaptive equipment, Grab bar, Increased time, Supervision for safety, Verbal cueing, Set-up of equipment, Initial preparation for task  IADL:  TBD   Family Training/Education:  TBD   DME/DC Recommendations:  TBD     Strengths:  Making steady progress towards goals, Motivated for self care and independence, Pleasant and cooperative  and Willingly participates in therapeutic activities  Barriers:  Decreased endurance, Generalized weakness, Limited mobility and Poor balance     # of short term goals set= 3    # of short term goals met= 3           Occupational Therapy Goals              Problem: Dressing       Dates:  Start: 08/09/17         Goal: STG-Within one week, patient will dress LB        Dates:  Start: 08/18/17       Description:  1) Individualized Goal:  At Mod Indep      2) Interventions: OT Self Care/ADL, OT Cognitive Skill Dev, OT Neuro Re-Ed/Balance, OT Therapeutic Activity, OT Evaluation and OT Therapeutic Exercise                   Problem: Functional Transfers       Dates:  Start: 08/18/17         Goal: STG-Within one week, patient will transfer to toilet        Dates:  Start: 08/18/17       Description:  1) Individualized Goal:  At Mod Indep w/ AE      2) Interventions:  OT Self Care/ADL, OT Cognitive Skill Dev, OT Neuro Re-Ed/Balance, OT Therapeutic Activity, OT Evaluation and OT Therapeutic Exercise                   Problem: OT Long Term Goals       Dates:  Start: 08/09/17         Goal: LTG-By discharge, patient will complete basic self care tasks        Dates:  Start: 08/09/17   Expected End: 08/23/17       Description:  1) Individualized Goal:  LTG-By discharge, patient will complete basic self care tasks at a level of mod. I.       2) Interventions:  OT Self Care/ADL, OT Cognitive Skill Dev, OT Neuro Re-Ed/Balance, OT Therapeutic Activity and OT Therapeutic Exercise             Cosign:  Jorge Ayala OTR/L            Goal: LTG-By discharge, patient will perform bathroom transfers        Dates:  Start: 08/09/17   Expected End: 08/23/17       Description:  1) Individualized Goal:  LTG-By discharge, patient will perform a simple meal preparation activity at a level of mod. I.       2) Interventions:  OT Self Care/ADL, OT Cognitive Skill Dev, OT Neuro Re-Ed/Balance, OT Therapeutic Activity and OT Therapeutic Exercise              Cosign:  Jorge Ayala OTR/L              Problem: Toileting       Dates:  Start: 08/18/17         Goal: STG-Within one week, patient will complete toileting tasks        Dates:  Start: 08/18/17       Description:  1) Individualized Goal:  At Mod Indep       2) Interventions:  OT Self Care/ADL, OT Cognitive Skill Dev, OT Neuro Re-Ed/Balance, OT Therapeutic Activity, OT Evaluation and OT Therapeutic Exercise                       Section completed by:  Jorge Ayala MS,OTR/L    Cognitive Linquistic Functions  Comprehension Initial:  5 - Stand-by Prompting/Supervision or Set-up  Comprehension Current:  4 - Minimal Assistance              Comprehension Description:  Glasses  Expression Initial:  5 - Stand-by Prompting/Supervision or Set-up  Expression Current:  5 - Stand-by Prompting/Supervision or Set-up              Expression Description:  Set-up of equipment, Verbal cueing  Social Interaction Initial:  6 - Modified Independent  Social Interaction Current:  5 - Stand-by Prompting/Supervision or Set-up              Social Interaction Description:  Increased time  Problem Solving Initial:  5 - Standby Prompting/Supervision or Set-up  Problem Solving Current:  4 min a - 3 - Moderate Assistance              Problem Solving Description:  Verbal cueing, Increased time  Memory Initial:  5 - Standby Prompting/Supervision or Set-up  Memory Current:  4 - min a               Memory Description:  Verbal cueing, Supervision  Executive Functioning / Organization Initial:  Severe (2)  Executive Functioning / Organization Current:  Moderate (3)              Executive Functioning Desciption:  Attention, comprehension and processing   Swallowing  Swallowing Status: not seen for dysphagia management    Orders Placed This Encounter    Procedures    •  Diet Order        Standing Status:  Standing          Number of Occurrences:  1          Standing Expiration Date:          Order Specific Question:   Diet:        Answer:   Cardiac  [6]      Behavior Modification Plan  Allow for rest time, Keep instructions simple/concrete, Redirect to task/topic, Provide reasonable choices, Decrease the chance of failure by offering activities that are within the patient's abilities, Analyze tasks (break down into smaller steps) and Reinforce participation in desired tasks  Assistive Technology  Low/Impaired vision equipment and Low tech: Calendar, planner, schedule, alarms/timers, pill organizer, post-it notes, lists  Family Training/Education:  Ongoing with SO  DC Recommendations:  TBD  Strengths:  Able to follow instructions, Making steady progress towards goals, Motivated for self care and independence, Pleasant and cooperative, Supportive family and Willingly participates in therapeutic activities  Barriers:  Poor insight/denial of deficits and Other: attention, ps and memory   # of short term goals set=2  # of short term goals met=1         Speech Therapy Problems              Problem: Problem Solving STGs       Dates:  Start: 08/09/17         Goal: STG-Within one week, patient will        Dates:  Start: 08/09/17       Description:  1) Individualized goal:  Complete divided attention tasks with 80% accuracy provided minimal assistance.       2) Interventions:  SLP Self Care / ADL Training  and SLP Cognitive Skill Development           Note:  Goal Note filed on 08/18/17 0855 by iLv Mckeon MS,CCC-SLP      Goal: STG-Within one week, patient will  Outcome: NOT MET  Min-mod a required               Goal: STG-Within one week, patient will        Dates:  Start: 08/18/17       Description:  1) Individualized goal:  Complete money and time management word problems with 80% accuracy provided min a.       2) Interventions:  SLP Self Care / ADL Training  and SLP Cognitive Skill Development                   Problem: Speech/Swallowing LTGs       Dates:  Start: 08/09/17         Goal: LTG-By discharge, patient will        Dates:  Start: 08/09/17      "  Description:  1) Individualized goal:  Complete functional problem solving and recall information with 80% accuracy provided SPV.      2) Interventions:  SLP Swallowing Dysfunction Treatment, SLP Self Care / ADL Training  and SLP Cognitive Skill Development                        Section completed by:  Liv Mckeon MS,[facility]-SLP       Nutrition        Dietary Problems              Problem: Other Problem (see comments)       Description:  Diagnosis: Malnutrition (chronic/ non-severe) r/t inadequate oral intake in the setting of poor appetite s/t chronic disease as evidenced by patient s/p chemoradiation treatment s/t GBM resulting in intake < 75% of nutrient needs > 1 month, moderate depletion of muscle mass/ subcutaneous body fat, tempora/clavicle wasting, 6% weight loss x 1 month.                Goal: Other Goal (Resolved)          Monitor/Evaluation: Monitor PO intake, weight, labs, medication adjustments, skin integrity, GI function, vitals, I/Os, and overall hydration status.  Adjust nutritional POC pending clinical outcomes.        RD following PRN.  Interventions in place. PO adequate at this time.       Goal: Maintain adequate oral nutrient/fluid intake to promote nutrition optimization/healing/resolution of malnutrition/ weight stability.                  Nutrition Care/ Consult For Supplements/ Weight loss     Assessment:    Admitting Diagnosis: GBM (glioblastoma multiforme) (CMS-HCC)  Pertinent PMH: Hyperlipidemia, HTN, GBM s/p radiation and chemotherapy in June, AV block s/p pacemaker placement      Additional Information: Patient seen in room eating watermelon for snack.  Wife at bedside.  Patient with very flat affect.  He reports his appetite is \"okay.\"  Eats 3 meals/day at home although wife states they are small meals.  Drinks 1 Ensure at dinner.  Offered patient Ensure or vanilla milkshake- patient prefers vanilla milkshake.      Patient appears thin, muscle wasting noted to upper extremities, " tempora/clavicle wasting observed.    Patient denies any GI issues.  He is self feeding with ease.  Dentition is good.  No swallowing issues. Food preferences noted. Got yogurt this morning- doesn't like it.     Needs foods chopped.     Patient states UBW of 158 lbs.  Lost weight while undergoing chemo-radiation.  Appetite is improving.     Appetite: fair to good    Diet: Cardiac + snacks TID between meals (fruit)    Average PO intake x 3 days: 74% (adequate) ate 100% of snack     Labs: Serum Glucose 134, T.P. 5.9    Medications: Decadron, Bowel Regimen, Prilosec, ICAPS    PRN Medications: noted  IVF: n/a     Height: 165.1cm  Weight: 67.813kg    Usual Body Weight: 71.82 kg    %Usual Body Weight: 94%  % Weight Loss: 6%    BMI: 24.88 (WNL)    Skin: tear to head, left anterior chest wound    GI: BM 8/10    Vitals: 2L C, Bradycardia noted, /78    I/Os: -30mL x 24 hours     Nutrient Needs:  Kcal: 1585- 1718 kcal/day BEE= 1321 x 1.2-1.3    Protein: 68-82g/day    Fluid: 2000mL /day         Diagnosis: Malnutrition (chronic/ non-severe) r/t inadequate oral intake in the setting of poor appetite s/t chronic disease as evidenced by patient s/p chemoradiation treatment s/t GBM resulting in intake < 75% of nutrient needs > 1 month, moderate depletion of muscle mass/ subcutaneous body fat, tempora/clavicle wasting, 6% weight loss x 1 month.     Intervention/ Recommendations/POC:  1. Continue current diet + snacks.  High protein/ high kcal milkshake with supper (vanilla).  Nutrition rep to help with menus daily to aid in optimal food choices to promote adequate PO intake.    2.Encourage adequate PO/fluid intake.  3. Nutrition rep to see regarding food prefs/ honor within dietary restrictions (if indicated)      Monitor/Evaluation: Monitor PO intake, weight, labs, medication adjustments, skin integrity, GI function, vitals, I/Os, and overall hydration status.  Adjust nutritional POC pending clinical outcomes.     RD following  PRN.  Interventions in place. PO adequate at this time.    Goal: Maintain adequate oral nutrient/fluid intake to promote nutrition optimization/healing/resolution of malnutrition/ weight stability.                                     Section completed by:  Julia Dumont RD    REHAB-Pharmacy IDT Team Note by Marcell Bee RPH at 8/17/2017  1:26 PM   Version 1 of 1      Author:  Marcell Bee RPH  Service:  (none)  Author Type:  Pharmacist      Filed:  8/17/2017  1:27 PM  Note Time:  8/17/2017  1:26 PM  Status:  Signed      :  Marcell Bee RPH (Pharmacist)                Pharmacy  Pharmacy  Antibiotics/Antifungals/Antivirals:  Medication:      Active Orders       None          Route:        NA  Stop Date:  NA  Reason:      NA  Antihypertensives/Cardiac:  Medication:    Orders       None          Patient Vitals for the past 24 hrs:    BP  Pulse    08/17/17 0825  -  89    08/17/17 0700  100/61 mmHg  80    08/16/17 1845  118/84 mmHg  96    08/16/17 1500  110/75 mmHg  84        Anticoagulation:  Medication: None    Other key medications: A review of the medication list reveals no issues at this time.    Section completed by: Marcell Bee [facility]              DC Planning  DC destination/dispostion:  Home w/ spouse in [place], [place] / mobile home w/ ramp/ tub/shower combo w/ grab bars.  Pt has home 02/4ww/tub shower seat @ home.   Appt with Dr. Chung for Pacer Check on 8/18 @ 98:45am.      Referrals: None at this time.        DC Needs:  Family training, home health inititally for RN/PT/Nurses Aide in [place], [place].    Follow up with Cardiology in [place], [place].  Follow up appts scheduled with the following:  Dr. Ugarte 9/19,  Oncologist @ [facility], and Dr. Cruz Radiation Oncologist on 9/21.       Barriers to discharge: Fall risk, decreased strength/endurance.     Strengths: Pleasant, cooperative, supprortive spouse, supportive friends/neigbors in [place], [place].                  Section completed by:  Demetria Guido,  [place], [facility]     Summary:  Home health for RN/PT/OT/nurses aid.  Pt has made improvements w/ all therapies.  Min assist memory recall/ exec functioning mod to min assist, gait 75 feet x 2 with FWW.  Family training w/ spouse.  Dc 8/23 Wednesday.  Pt has all dme at home.  Spouse to transport back to [place].      Physician Summary  I participated and led team conference discussion             I spent greater than 35 minutes face to face was spent with the patient with greater than 50% of the time spent counseling and coordinating care    Oskar Traylor M.D.

## 2017-08-18 NOTE — REHAB-RESPIRATORY IDT TEAM NOTE
Respiratory Therapy  Respiratory Therapy  Patient's respiratory plan of care for oxygen therapy is:  O2 Protocol Indications: Room Air SpO2 Less Than 90%  O2 Protocol Goals/Outcome: Normoxemia on Oxygen, SpO2 greater than 90%, pt has home oxygen 2Lpm with [employer], no wean.    Section completed by:  Nicole Abbasi, RRT

## 2017-08-18 NOTE — PROGRESS NOTES
Wound site is healing well.  Patient advised to watch for increased redness, swelling, oozing or fever-verbalized understanding.  At this time patient is @ [facility].

## 2017-08-18 NOTE — CARE PLAN
Problem: Safety  Goal: Will remain free from injury  Outcome: PROGRESSING AS EXPECTED  Reviewed fall and safety precautions with patient. Pt uses call light consistently and appropriately and has not attempted self transfer this shift. Able to verbalize needs. Call light and belongings within reach, bed alarm in use, bed in lowest position, treaded socks in use, room near nurses' station, and hourly rounding in place.        Problem: Problem: Pain  Goal: STG - Patient will verbalize an acceptable level of pain  Outcome: PROGRESSING AS EXPECTED  Patient has denied pain so far this shift. Will continue to monitor for pain.

## 2017-08-18 NOTE — CARE PLAN
Problem: Mobility  Goal: STG-Within one week, patient will ambulate household distance  1) Individualized goal: FWW, oxygen, gt belt, 2x 50 feet indoors, SBA  2) Interventions: PT Gait Training, PT Therapeutic Exercises, PT Neuro Re-Ed/Balance, PT Therapeutic Activity and PT Manual Therapy.  Outcome: MET Date Met:  08/18/17  Goal: STG-Within one week, patient will ascend and descend four to six stairs  1) Individualized goal: 8 stairs, B railings, oxygen, gt belt, CGA  2) Interventions: PT Gait Training, PT Therapeutic Exercises, PT Neuro Re-Ed/Balance, PT Therapeutic Activity and PT Manual Therapy.  Outcome: MET Date Met:  08/18/17    Problem: Mobility Transfers  Goal: STG-Within one week, patient will transfer bed to chair  1) Individualized goal: FWW, oxygen, gt belt, SBA  2) Interventions: PT Gait Training, PT Therapeutic Exercises, PT Neuro Re-Ed/Balance, PT Therapeutic Activity and PT Manual Therapy.  Outcome: NOT MET  Pt requiring min A supine </> sit

## 2017-08-18 NOTE — REHAB-PT IDT TEAM NOTE
"Physical Therapy  Mobility  Bed mobility:   Min A  Bed /Chair/Wheelchair Transfer Initial:  4 - Minimal Assistance  Bed /Chair/Wheelchair Transfer Current:  4 - Minimal Assistance   Bed/Chair/Wheelchair Transfer Description:   ( min assist supine to sit, SBA SPT 4WW )  Walk Initial:  1 - Total Assistance  Walk Current:  2 - Max Assistance   Walk Description:  Extra time, Oxygen, Safety concerns, Supervision for safety, Verbal cueing, Walker (75' x 2 4WW(gym to BR) CGA/close SBA cues for inc step length B.)  Wheelchair Initial:  2 - Max Assistance  Wheelchair Current:  2 - Max Assistance   Wheelchair Description:  Extra time, Safety concerns, Verbal cueing, Supervision for safety (110' x 1 using B LE alt UE(min use of L UE 2/2 pace prec), cues for L side attention as pt has decreased L peripheral vision s)  Stairs Initial:  0 - Not tested,unsafe activity  Stairs Current: 2 - Max Assistance   Stairs Description: Hand rails, Oxygen, Extra time, Verbal cueing, Supervision for safety, Safety concerns, Ascends/descends 4 to 11 steps (ascended/descended 12 (4\" stairs) B HR cues for turning away from 02 line, CGA on stairs step-to pattern)  Patient/Family Training/Education:  Education on pacing, safety with functional mobility   DME/DC Recommendations:  TBD; pt has 4WW  Strengths:  Independent PLOF, Making steady progress towards goals, Motivated for self care and independence, Pleasant and cooperative, Supportive family and Willingly participates in therapeutic activities  Barriers:   Decreased endurance, Generalized weakness and Poor activity tolerance  # of short term goals set= 3  # of short term goals met=2        Physical Therapy Problems           Problem: Mobility Transfers     Dates: Start: 08/10/17       Goal: STG-Within one week, patient will transfer bed to chair     Dates: Start: 08/10/17      Description: 1) Individualized goal:  FWW, oxygen, gt belt, SBA    2) Interventions:  PT Gait Training, PT Therapeutic " Exercises, PT Neuro Re-Ed/Balance, PT Therapeutic Activity and PT Manual Therapy.        Note: Goal Note filed on 08/18/17 0847 by Madhavi Denney DPT    Goal: STG-Within one week, patient will transfer bed to chair  Outcome: NOT MET  Pt requiring min A supine </> sit            Problem: PT-Long Term Goals     Dates: Start: 08/10/17       Goal: LTG-By discharge, patient will ambulate     Dates: Start: 08/10/17      Description: 1) Individualized goal:  SPC, SPV, 2x 150 feet    2) Interventions:  PT Gait Training, PT Therapeutic Exercises, PT Neuro Re-Ed/Balance, PT Therapeutic Activity and PT Manual Therapy.            Goal: LTG-By discharge, patient will transfer one surface to another     Dates: Start: 08/10/17      Description: 1) Individualized goal:  SPC, SPV    2) Interventions:  PT Gait Training, PT Therapeutic Exercises, PT Neuro Re-Ed/Balance, PT Therapeutic Activity and PT Manual Therapy.             Goal: LTG-By discharge, patient will ambulate up/down 4-6 stairs     Dates: Start: 08/10/17      Description: 1) Individualized goal:  SPC and 1 rail, SPV, 6 stairs    2) Interventions:  PT Gait Training, PT Therapeutic Exercises, PT Neuro Re-Ed/Balance, PT Therapeutic Activity and PT Manual Therapy.                    Section completed by:  Madhavi Denney DPT

## 2017-08-18 NOTE — REHAB-OT IDT TEAM NOTE
Occupational Therapy  Activities of Daily Living  Eating Initial:  5 - Standby Prompting/Supervision or Set-up  Eating Current:  5 - Standby Prompting/Supervision or Set-up   Eating Description:  Increased time, Supervision for safety, Set-up of equipment or meal/tube feeding  Grooming Initial:  4 - Minimal Assistance  Grooming Current:  5 - Standby Prompting/Supervision or Set-up   Grooming Description:  Supervision for safety, Increased time, Verbal cueing (Seated at sinkside)  Bathing Initial:  4 - Minimal Assistance  Bathing Current:  5 - Standby Prompting/Supervision or Set-up   Bathing Description:  Adaptive equipment, Grab bar, Tub bench, Long handled bath tool, Hand held shower, Increased time, Supervision for safety, Set-up of equipment, Initial preparation for task  Upper Body Dressing Initial:  3 - Moderate Assistance  Upper Body Dressing Current:  5 - Standby Prompting/Supervision or Set-up   Upper Body Dressing Description:  Verbal cueing (Button up shirt don/doff - VQ's to problem solve O2 line in shirt)  Lower Body Dressing Initial:  3 - Moderate Assistance  Lower Body Dressing Current:  5 - Standby Prompting/Supervsion or Set-up   Lower Body Dressing Description:  5 - Standby Prompting/Supervsion or Set-up  Toileting Initial:  3 - Moderate Assistance  Toileting Current:  4 - Minimal Assistance   Toileting Description:  Grab bar, Increased time, Supervision for safety, Verbal cueing, Set-up of equipment, Initial preparation for task (Contact Guard for clothing management holding onto wall mounted grab bar + pericare/wiping)  Toilet Transfer Initial:  4 - Minimal Assistance  Toilet Transfer Current:  4 - Minimal Assistance   Toilet Transfer Description:  4 - Minimal Assistance  Tub / Shower Transfer Initial:  4 - Minimal Assistance  Tub / Shower Transfer Current:  5 - Standby Prompting/Supervision or Set-up   Tub / Shower Transfer Description:  Adaptive equipment, Grab bar, Increased time, Supervision  for safety, Verbal cueing, Set-up of equipment, Initial preparation for task  IADL:  TBD   Family Training/Education:  TBD   DME/DC Recommendations:  TBD     Strengths:  Making steady progress towards goals, Motivated for self care and independence, Pleasant and cooperative and Willingly participates in therapeutic activities  Barriers:  Decreased endurance, Generalized weakness, Limited mobility and Poor balance     # of short term goals set= 3    # of short term goals met= 3          Occupational Therapy Goals           Problem: Dressing     Dates: Start: 08/09/17       Goal: STG-Within one week, patient will dress LB     Dates: Start: 08/18/17      Description: 1) Individualized Goal:  At Mod Indep    2) Interventions: OT Self Care/ADL, OT Cognitive Skill Dev, OT Neuro Re-Ed/Balance, OT Therapeutic Activity, OT Evaluation and OT Therapeutic Exercise              Problem: Functional Transfers     Dates: Start: 08/18/17       Goal: STG-Within one week, patient will transfer to toilet     Dates: Start: 08/18/17      Description: 1) Individualized Goal:  At Mod Indep w/ AE    2) Interventions:  OT Self Care/ADL, OT Cognitive Skill Dev, OT Neuro Re-Ed/Balance, OT Therapeutic Activity, OT Evaluation and OT Therapeutic Exercise              Problem: OT Long Term Goals     Dates: Start: 08/09/17       Goal: LTG-By discharge, patient will complete basic self care tasks     Dates: Start: 08/09/17   Expected End: 08/23/17      Description: 1) Individualized Goal:  LTG-By discharge, patient will complete basic self care tasks at a level of mod. I.     2) Interventions:  OT Self Care/ADL, OT Cognitive Skill Dev, OT Neuro Re-Ed/Balance, OT Therapeutic Activity and OT Therapeutic Exercise         Cosign:  BEATRIZ Alfredo/VANNESSA         Goal: LTG-By discharge, patient will perform bathroom transfers     Dates: Start: 08/09/17   Expected End: 08/23/17      Description: 1) Individualized Goal:  LTG-By discharge, patient will perform a  simple meal preparation activity at a level of mod. I.     2) Interventions:  OT Self Care/ADL, OT Cognitive Skill Dev, OT Neuro Re-Ed/Balance, OT Therapeutic Activity and OT Therapeutic Exercise         Cosign:  Jorge Ayala OTR/L           Problem: Toileting     Dates: Start: 08/18/17       Goal: STG-Within one week, patient will complete toileting tasks     Dates: Start: 08/18/17      Description: 1) Individualized Goal:  At Mod Indep     2) Interventions:  OT Self Care/ADL, OT Cognitive Skill Dev, OT Neuro Re-Ed/Balance, OT Therapeutic Activity, OT Evaluation and OT Therapeutic Exercise                  Section completed by:  Jorge Ayala MS,OTR/L

## 2017-08-18 NOTE — REHAB-NURSING IDT TEAM NOTE
Nursing  Nursing  Diet/Nutrition:  Cardiac and Thin Liquids  Medication Administration:  Whole with Liquid Wash  % consumed at meals in last 24 hours:  Consumed 100-100% of meals per documentation.  Breakfast:  100%   Lunch:  100%  Dinner:  100%   Snack schedule:  HS  Supplement:  Other: None  Appetite:  Excellent  Fluid Intake/Output in past 24 hours: In: 960 [P.O.:960]  Out: 725   Admit Weight:  Weight: 67.813 kg (149 lb 8 oz)  Weight Last 7 Days   [66.2 kg (145 lb 15.1 oz)] 66.2 kg (145 lb 15.1 oz) (08/13 1300)    Pain Issues:    Location:  --  --         Severity:  Denies   Scheduled pain medications:  None     PRN pain medications used in last 24 hours:  None   Non Pharmacologic Interventions:  other: Declines  Effectiveness of pain management plan:  Denies pain    Bowel:    Bowel Assist Initial Score:  4 - Minimal Assistance  Bowel Assist Current Score:  5 - Standby Prompting/Supervision or Set-up  Bowl Accidents in last 7 days:  0  Last bowel movement: 08/17/17  Stool: Large, Soft     Usual bowel pattern:  every other day  Scheduled bowel medications:  docusate sodium (COLACE) and senna-docusate (PERICOLACE or SENOKOT S)   PRN bowel medications used in last 24 hours:  None  Nursing Interventions:  Scheduled medication, Supervision, Verbal cueing, Positioning on commode/toilet  Effectiveness of bowel program:   good=regular, predictable, controlled emptying of bowel  Bladder:    Bladder Assist Initial Score:  4 - Minimal Assistance  Bladder Assist Current Score:  4 - Minimal Assistance  Bladder Accidents in last 7 days:  3  PVR range for past 24-48 hours:  [211]   Intermittent Catheterization:  0  Medications affecting bladder:  None    Time void schedule/voiding pattern:  Voiding every 2-4 hours  Interventions:  Increased time, Supervision, Emptying of device, Urinal  Effectiveness of bladder training:  Poor=PVR > 200 cc      Wound:         Patient Lines/Drains/Airways Status    Active Current Wounds      Name: Placement date: Placement time: Site: Days:    Wound POA Skin Tear Head Posterior 08/03/17 2000 14    Wound Not POA Other (comment) Breast Left Anterior 08/03/17 2000 14                   Interventions:  Monitor for signs and symptoms of infection. Dressing change as needed.  Effectiveness of intervention:  wound is improving     Sleep/wake cycle:   Average hours slept:  Sleeps 4-6 hours without waking  Sleep medication usage:  None    Patient/Family Training/Education:  Bladder Management/Training, Diet/Nutrition, Fall Prevention, General Self Care, Medication Management, Safe Transfers, Safety, Skin Care and Wound Care  Discharge Supply Recommendations:  Strengths: Alert and oriented, Able to follow instructions, Pleasant and cooperative and Effective communication skills   Barriers:   Fatigue, Generalized weakness and Limited mobility            Nursing Problems           Problem: Bowel/Gastric:     Goal: Normal bowel function is maintained or improved         Goal: Will not experience complications related to bowel motility           Problem: Communication     Goal: The ability to communicate needs accurately and effectively will improve           Problem: Discharge Barriers/Planning     Goal: Patient's continuum of care needs will be met           Problem: Infection     Goal: Will remain free from infection           Problem: Knowledge Deficit     Goal: Knowledge of disease process/condition, treatment plan, diagnostic tests, and medications will improve         Goal: Knowledge of the prescribed therapeutic regimen will improve           Problem: Mobility     Goal: Risk for activity intolerance will decrease           Problem: Problem: Pain     Goal: LTG - Patient will manage pain with the appropriate technique/Intervention         Goal: STG - Patient will reduce or eliminate use of analgesics (Resolved)         Goal: STG - Patient will verbalize an acceptable level of pain         Goal: STG -  patients pain is managed to allow active participation in daily activities           Problem: Respiratory:     Goal: Respiratory status will improve           Problem: SKIN INTEGRITY     Goal: LTG - Patient will be free from infection         Goal: LTG - PATIENT WILL MAINTAIN/IMPROVE SKIN INTEGRITY THROUGH PROPER SKIN CARE TECHNIQUES.         Goal: LTG - Patient will demonstrate appropriate pressure relief techniques         Goal: STG - Patient exhibits signs of wound healing.         Goal: STG - patient demonstrates pressure reduction techniques           Problem: Safety     Goal: Will remain free from injury         Goal: Will remain free from falls           Problem: Urinary Elimination:     Goal: Ability to reestablish a normal urinary elimination pattern will improve           Problem: Venous Thromboembolism (VTW)/Deep Vein Thrombosis (DVT) Prevention:     Goal: Patient will participate in Venous Thrombosis (VTE)/Deep Vein Thrombosis (DVT)Prevention Measures                Long Term Goals:   At discharge patient will be able to function safely at home and in the community with support.    Section completed by:  Nicolette Mann R.N.

## 2017-08-18 NOTE — REHAB-COLLABORATIVE ONGOING IDT TEAM NOTE
Weekly Interdisciplinary Team Conference Note    Weekly Interdisciplinary Team Conference # 2  Date:  8/18/2017    Clinicians present and reporting at team conference include the following:   MD: Forrest Traylor MD   RN:  Fabienne Zheng RN   PT:   Madhavi Denney, PT, DPT  OT:  Jorge Ayala OT   ST:  Liv Mckeon, MS, CCC-SLP  CM:  EVI Lund, MOLINA  REC:  Not Applicable  RT:  Not Applicable  RPh:  Marcell Bee [facility]  Other:   None  All reporting clinicians have a working knowledge of this patient's plan of care.    Targeted DC Date:  8/23/2017     Medical    Patient Active Problem List    Diagnosis Date Noted   • Heart block, AV 08/03/2017     Priority: High   • Vasogenic cerebral edema (CMS-HCC) 08/03/2017     Priority: High   • CAP (community acquired pneumonia) 08/03/2017     Priority: High   • Brain mass 04/05/2017     Priority: High   • Cognitive and behavioral changes 08/09/2017   • Impaired mobility and ADLs 08/09/2017   • Debility 08/08/2017   • Bradycardia 08/08/2017   • GBM (glioblastoma multiforme) (CMS-HCC) 08/08/2017   • Anemia 08/04/2017   • HTN (hypertension) 04/06/2017     Results     ** No results found for the last 24 hours. **        Nursing  Diet/Nutrition:  Cardiac and Thin Liquids  Medication Administration:  Whole with Liquid Wash  % consumed at meals in last 24 hours:  Consumed 100-100% of meals per documentation.  Breakfast:  100%   Lunch:  100%  Dinner:  100%   Snack schedule:  HS  Supplement:  Other: None  Appetite:  Excellent  Fluid Intake/Output in past 24 hours: In: 960 [P.O.:960]  Out: 725   Admit Weight:  Weight: 67.813 kg (149 lb 8 oz)  Weight Last 7 Days   [66.2 kg (145 lb 15.1 oz)] 66.2 kg (145 lb 15.1 oz) (08/13 1300)    Pain Issues:    Location:  --  --         Severity:  Denies   Scheduled pain medications:  None     PRN pain medications used in last 24 hours:  None   Non Pharmacologic Interventions:  other: Declines  Effectiveness of pain management plan:  Denies  pain    Bowel:    Bowel Assist Initial Score:  4 - Minimal Assistance  Bowel Assist Current Score:  5 - Standby Prompting/Supervision or Set-up  Bowl Accidents in last 7 days:  0  Last bowel movement: 08/17/17  Stool: Large, Soft     Usual bowel pattern:  every other day  Scheduled bowel medications:  docusate sodium (COLACE) and senna-docusate (PERICOLACE or SENOKOT S)   PRN bowel medications used in last 24 hours:  None  Nursing Interventions:  Scheduled medication, Supervision, Verbal cueing, Positioning on commode/toilet  Effectiveness of bowel program:   good=regular, predictable, controlled emptying of bowel  Bladder:    Bladder Assist Initial Score:  4 - Minimal Assistance  Bladder Assist Current Score:  4 - Minimal Assistance  Bladder Accidents in last 7 days:  3  PVR range for past 24-48 hours:  [211]   Intermittent Catheterization:  0  Medications affecting bladder:  None    Time void schedule/voiding pattern:  Voiding every 2-4 hours  Interventions:  Increased time, Supervision, Emptying of device, Urinal  Effectiveness of bladder training:  Poor=PVR > 200 cc      Wound:         Patient Lines/Drains/Airways Status    Active Current Wounds     Name: Placement date: Placement time: Site: Days:    Wound POA Skin Tear Head Posterior 08/03/17 2000 14    Wound Not POA Other (comment) Breast Left Anterior 08/03/17 2000 14                   Interventions:  Monitor for signs and symptoms of infection. Dressing change as needed.  Effectiveness of intervention:  wound is improving     Sleep/wake cycle:   Average hours slept:  Sleeps 4-6 hours without waking  Sleep medication usage:  None    Patient/Family Training/Education:  Bladder Management/Training, Diet/Nutrition, Fall Prevention, General Self Care, Medication Management, Safe Transfers, Safety, Skin Care and Wound Care  Discharge Supply Recommendations:  Strengths: Alert and oriented, Able to follow instructions, Pleasant and cooperative and Effective  communication skills   Barriers:   Fatigue, Generalized weakness and Limited mobility            Nursing Problems           Problem: Bowel/Gastric:     Goal: Normal bowel function is maintained or improved         Goal: Will not experience complications related to bowel motility           Problem: Communication     Goal: The ability to communicate needs accurately and effectively will improve           Problem: Discharge Barriers/Planning     Goal: Patient's continuum of care needs will be met           Problem: Infection     Goal: Will remain free from infection           Problem: Knowledge Deficit     Goal: Knowledge of disease process/condition, treatment plan, diagnostic tests, and medications will improve         Goal: Knowledge of the prescribed therapeutic regimen will improve           Problem: Mobility     Goal: Risk for activity intolerance will decrease           Problem: Problem: Pain     Goal: LTG - Patient will manage pain with the appropriate technique/Intervention         Goal: STG - Patient will reduce or eliminate use of analgesics (Resolved)         Goal: STG - Patient will verbalize an acceptable level of pain         Goal: STG - patients pain is managed to allow active participation in daily activities           Problem: Respiratory:     Goal: Respiratory status will improve           Problem: SKIN INTEGRITY     Goal: LTG - Patient will be free from infection         Goal: LTG - PATIENT WILL MAINTAIN/IMPROVE SKIN INTEGRITY THROUGH PROPER SKIN CARE TECHNIQUES.         Goal: LTG - Patient will demonstrate appropriate pressure relief techniques         Goal: STG - Patient exhibits signs of wound healing.         Goal: STG - patient demonstrates pressure reduction techniques           Problem: Safety     Goal: Will remain free from injury         Goal: Will remain free from falls           Problem: Urinary Elimination:     Goal: Ability to reestablish a normal urinary elimination pattern will  "improve           Problem: Venous Thromboembolism (VTW)/Deep Vein Thrombosis (DVT) Prevention:     Goal: Patient will participate in Venous Thrombosis (VTE)/Deep Vein Thrombosis (DVT)Prevention Measures                Long Term Goals:   At discharge patient will be able to function safely at home and in the community with support.    Section completed by:  Nicolette Mann R.N.        Respiratory Therapy  Patient's respiratory plan of care for oxygen therapy is:  O2 Protocol Indications: Room Air SpO2 Less Than 90%  O2 Protocol Goals/Outcome: Normoxemia on Oxygen, SpO2 greater than 90%, pt has home oxygen 2Lpm with [employer], no wean.    Section completed by:  Nicole Abbasi, RRT    Mobility  Bed mobility:   Min A  Bed /Chair/Wheelchair Transfer Initial:  4 - Minimal Assistance  Bed /Chair/Wheelchair Transfer Current:  4 - Minimal Assistance   Bed/Chair/Wheelchair Transfer Description:   ( min assist supine to sit, SBA SPT 4WW )  Walk Initial:  1 - Total Assistance  Walk Current:  2 - Max Assistance   Walk Description:  Extra time, Oxygen, Safety concerns, Supervision for safety, Verbal cueing, Walker (75' x 2 4WW(gym to BR) CGA/close SBA cues for inc step length B.)  Wheelchair Initial:  2 - Max Assistance  Wheelchair Current:  2 - Max Assistance   Wheelchair Description:  Extra time, Safety concerns, Verbal cueing, Supervision for safety (110' x 1 using B LE alt UE(min use of L UE 2/2 pace prec), cues for L side attention as pt has decreased L peripheral vision s)  Stairs Initial:  0 - Not tested,unsafe activity  Stairs Current: 2 - Max Assistance   Stairs Description: Hand rails, Oxygen, Extra time, Verbal cueing, Supervision for safety, Safety concerns, Ascends/descends 4 to 11 steps (ascended/descended 12 (4\" stairs) B HR cues for turning away from 02 line, CGA on stairs step-to pattern)  Patient/Family Training/Education:  Education on pacing, safety with functional mobility   DME/DC " Recommendations:  TBD; pt has 4WW  Strengths:  Independent PLOF, Making steady progress towards goals, Motivated for self care and independence, Pleasant and cooperative, Supportive family and Willingly participates in therapeutic activities  Barriers:   Decreased endurance, Generalized weakness and Poor activity tolerance  # of short term goals set= 3  # of short term goals met=2        Physical Therapy Problems           Problem: Mobility Transfers     Dates: Start: 08/10/17       Goal: STG-Within one week, patient will transfer bed to chair     Dates: Start: 08/10/17      Description: 1) Individualized goal:  FWW, oxygen, gt belt, SBA    2) Interventions:  PT Gait Training, PT Therapeutic Exercises, PT Neuro Re-Ed/Balance, PT Therapeutic Activity and PT Manual Therapy.        Note: Goal Note filed on 08/18/17 0847 by Madhavi Denney DPT    Goal: STG-Within one week, patient will transfer bed to chair  Outcome: NOT MET  Pt requiring min A supine </> sit            Problem: PT-Long Term Goals     Dates: Start: 08/10/17       Goal: LTG-By discharge, patient will ambulate     Dates: Start: 08/10/17      Description: 1) Individualized goal:  SPC, SPV, 2x 150 feet    2) Interventions:  PT Gait Training, PT Therapeutic Exercises, PT Neuro Re-Ed/Balance, PT Therapeutic Activity and PT Manual Therapy.            Goal: LTG-By discharge, patient will transfer one surface to another     Dates: Start: 08/10/17      Description: 1) Individualized goal:  SPC, SPV    2) Interventions:  PT Gait Training, PT Therapeutic Exercises, PT Neuro Re-Ed/Balance, PT Therapeutic Activity and PT Manual Therapy.             Goal: LTG-By discharge, patient will ambulate up/down 4-6 stairs     Dates: Start: 08/10/17      Description: 1) Individualized goal:  SPC and 1 rail, SPV, 6 stairs    2) Interventions:  PT Gait Training, PT Therapeutic Exercises, PT Neuro Re-Ed/Balance, PT Therapeutic Activity and PT Manual Therapy.                     Section completed by:  Madhavi Denney DPT    Activities of Daily Living  Eating Initial:  5 - Standby Prompting/Supervision or Set-up  Eating Current:  5 - Standby Prompting/Supervision or Set-up   Eating Description:  Increased time, Supervision for safety, Set-up of equipment or meal/tube feeding  Grooming Initial:  4 - Minimal Assistance  Grooming Current:  5 - Standby Prompting/Supervision or Set-up   Grooming Description:  Supervision for safety, Increased time, Verbal cueing (Seated at sinkside)  Bathing Initial:  4 - Minimal Assistance  Bathing Current:  5 - Standby Prompting/Supervision or Set-up   Bathing Description:  Adaptive equipment, Grab bar, Tub bench, Long handled bath tool, Hand held shower, Increased time, Supervision for safety, Set-up of equipment, Initial preparation for task  Upper Body Dressing Initial:  3 - Moderate Assistance  Upper Body Dressing Current:  5 - Standby Prompting/Supervision or Set-up   Upper Body Dressing Description:  Verbal cueing (Button up shirt don/doff - VQ's to problem solve O2 line in shirt)  Lower Body Dressing Initial:  3 - Moderate Assistance  Lower Body Dressing Current:  5 - Standby Prompting/Supervsion or Set-up   Lower Body Dressing Description:  5 - Standby Prompting/Supervsion or Set-up  Toileting Initial:  3 - Moderate Assistance  Toileting Current:  4 - Minimal Assistance   Toileting Description:  Grab bar, Increased time, Supervision for safety, Verbal cueing, Set-up of equipment, Initial preparation for task (Contact Guard for clothing management holding onto wall mounted grab bar + pericare/wiping)  Toilet Transfer Initial:  4 - Minimal Assistance  Toilet Transfer Current:  4 - Minimal Assistance   Toilet Transfer Description:  4 - Minimal Assistance  Tub / Shower Transfer Initial:  4 - Minimal Assistance  Tub / Shower Transfer Current:  5 - Standby Prompting/Supervision or Set-up   Tub / Shower Transfer Description:  Adaptive equipment, Grab bar,  Increased time, Supervision for safety, Verbal cueing, Set-up of equipment, Initial preparation for task  IADL:  TBD   Family Training/Education:  TBD   DME/DC Recommendations:  TBD     Strengths:  Making steady progress towards goals, Motivated for self care and independence, Pleasant and cooperative and Willingly participates in therapeutic activities  Barriers:  Decreased endurance, Generalized weakness, Limited mobility and Poor balance     # of short term goals set= 3    # of short term goals met= 3          Occupational Therapy Goals           Problem: Dressing     Dates: Start: 08/09/17       Goal: STG-Within one week, patient will dress LB     Dates: Start: 08/18/17      Description: 1) Individualized Goal:  At Mod Indep    2) Interventions: OT Self Care/ADL, OT Cognitive Skill Dev, OT Neuro Re-Ed/Balance, OT Therapeutic Activity, OT Evaluation and OT Therapeutic Exercise              Problem: Functional Transfers     Dates: Start: 08/18/17       Goal: STG-Within one week, patient will transfer to toilet     Dates: Start: 08/18/17      Description: 1) Individualized Goal:  At Mod Indep w/ AE    2) Interventions:  OT Self Care/ADL, OT Cognitive Skill Dev, OT Neuro Re-Ed/Balance, OT Therapeutic Activity, OT Evaluation and OT Therapeutic Exercise              Problem: OT Long Term Goals     Dates: Start: 08/09/17       Goal: LTG-By discharge, patient will complete basic self care tasks     Dates: Start: 08/09/17   Expected End: 08/23/17      Description: 1) Individualized Goal:  LTG-By discharge, patient will complete basic self care tasks at a level of mod. I.     2) Interventions:  OT Self Care/ADL, OT Cognitive Skill Dev, OT Neuro Re-Ed/Balance, OT Therapeutic Activity and OT Therapeutic Exercise         Cosign:  Jorge Ayala OTR/L         Goal: LTG-By discharge, patient will perform bathroom transfers     Dates: Start: 08/09/17   Expected End: 08/23/17      Description: 1) Individualized Goal:  LTG-By  discharge, patient will perform a simple meal preparation activity at a level of mod. I.     2) Interventions:  OT Self Care/ADL, OT Cognitive Skill Dev, OT Neuro Re-Ed/Balance, OT Therapeutic Activity and OT Therapeutic Exercise         Cosign:  BEATRIZ Alfredo/VANNESSA           Problem: Toileting     Dates: Start: 08/18/17       Goal: STG-Within one week, patient will complete toileting tasks     Dates: Start: 08/18/17      Description: 1) Individualized Goal:  At Mod Indep     2) Interventions:  OT Self Care/ADL, OT Cognitive Skill Dev, OT Neuro Re-Ed/Balance, OT Therapeutic Activity, OT Evaluation and OT Therapeutic Exercise                  Section completed by:  Jorge Ayala MS,OTR/L    Cognitive Linquistic Functions  Comprehension Initial:  5 - Stand-by Prompting/Supervision or Set-up  Comprehension Current:  4 - Minimal Assistance   Comprehension Description:  Glasses  Expression Initial:  5 - Stand-by Prompting/Supervision or Set-up  Expression Current:  5 - Stand-by Prompting/Supervision or Set-up   Expression Description:  Set-up of equipment, Verbal cueing  Social Interaction Initial:  6 - Modified Independent  Social Interaction Current:  5 - Stand-by Prompting/Supervision or Set-up   Social Interaction Description:  Increased time  Problem Solving Initial:  5 - Standby Prompting/Supervision or Set-up  Problem Solving Current:  4 min a - 3 - Moderate Assistance   Problem Solving Description:  Verbal cueing, Increased time  Memory Initial:  5 - Standby Prompting/Supervision or Set-up  Memory Current:  4 - min a    Memory Description:  Verbal cueing, Supervision  Executive Functioning / Organization Initial:  Severe (2)  Executive Functioning / Organization Current:  Moderate (3)   Executive Functioning Desciption:  Attention, comprehension and processing   Swallowing  Swallowing Status: not seen for dysphagia management    Orders Placed This Encounter   Procedures   • Diet Order     Standing Status: Standing       Number of Occurrences: 1      Standing Expiration Date:      Order Specific Question:  Diet:     Answer:  Cardiac [6]     Behavior Modification Plan  Allow for rest time, Keep instructions simple/concrete, Redirect to task/topic, Provide reasonable choices, Decrease the chance of failure by offering activities that are within the patient's abilities, Analyze tasks (break down into smaller steps) and Reinforce participation in desired tasks  Assistive Technology  Low/Impaired vision equipment and Low tech: Calendar, planner, schedule, alarms/timers, pill organizer, post-it notes, lists  Family Training/Education:  Ongoing with SO  DC Recommendations:  TBD  Strengths:  Able to follow instructions, Making steady progress towards goals, Motivated for self care and independence, Pleasant and cooperative, Supportive family and Willingly participates in therapeutic activities  Barriers:  Poor insight/denial of deficits and Other: attention, ps and memory   # of short term goals set=2  # of short term goals met=1        Speech Therapy Problems           Problem: Problem Solving STGs     Dates: Start: 08/09/17       Goal: STG-Within one week, patient will     Dates: Start: 08/09/17      Description: 1) Individualized goal:  Complete divided attention tasks with 80% accuracy provided minimal assistance.     2) Interventions:  SLP Self Care / ADL Training  and SLP Cognitive Skill Development        Note: Goal Note filed on 08/18/17 0855 by Liv Mckeon MS,CCC-SLP    Goal: STG-Within one week, patient will  Outcome: NOT MET  Min-mod a required           Goal: STG-Within one week, patient will     Dates: Start: 08/18/17      Description: 1) Individualized goal:  Complete money and time management word problems with 80% accuracy provided min a.     2) Interventions:  SLP Self Care / ADL Training  and SLP Cognitive Skill Development              Problem: Speech/Swallowing LTGs     Dates: Start: 08/09/17       Goal:  "LTG-By discharge, patient will     Dates: Start: 08/09/17      Description: 1) Individualized goal:  Complete functional problem solving and recall information with 80% accuracy provided SPV.    2) Interventions:  SLP Swallowing Dysfunction Treatment, SLP Self Care / ADL Training  and SLP Cognitive Skill Development                   Section completed by:  Liv Mckeon MS,[facility]-SLP       Nutrition       Dietary Problems           Problem: Other Problem (see comments)     Description: Diagnosis: Malnutrition (chronic/ non-severe) r/t inadequate oral intake in the setting of poor appetite s/t chronic disease as evidenced by patient s/p chemoradiation treatment s/t GBM resulting in intake < 75% of nutrient needs > 1 month, moderate depletion of muscle mass/ subcutaneous body fat, tempora/clavicle wasting, 6% weight loss x 1 month.           Goal: Other Goal (Resolved)      Monitor/Evaluation: Monitor PO intake, weight, labs, medication adjustments, skin integrity, GI function, vitals, I/Os, and overall hydration status.  Adjust nutritional POC pending clinical outcomes.      RD following PRN.  Interventions in place. PO adequate at this time.     Goal: Maintain adequate oral nutrient/fluid intake to promote nutrition optimization/healing/resolution of malnutrition/ weight stability.             Nutrition Care/ Consult For Supplements/ Weight loss     Assessment:    Admitting Diagnosis: GBM (glioblastoma multiforme) (CMS-HCC)  Pertinent PMH: Hyperlipidemia, HTN, GBM s/p radiation and chemotherapy in June, AV block s/p pacemaker placement      Additional Information: Patient seen in room eating watermelon for snack.  Wife at bedside.  Patient with very flat affect.  He reports his appetite is \"okay.\"  Eats 3 meals/day at home although wife states they are small meals.  Drinks 1 Ensure at dinner.  Offered patient Ensure or vanilla milkshake- patient prefers vanilla milkshake.      Patient appears thin, muscle wasting " noted to upper extremities, tempora/clavicle wasting observed.    Patient denies any GI issues.  He is self feeding with ease.  Dentition is good.  No swallowing issues. Food preferences noted. Got yogurt this morning- doesn't like it.     Needs foods chopped.     Patient states UBW of 158 lbs.  Lost weight while undergoing chemo-radiation.  Appetite is improving.     Appetite: fair to good   Diet: Cardiac + snacks TID between meals (fruit)    Average PO intake x 3 days: 74% (adequate) ate 100% of snack     Labs: Serum Glucose 134, T.P. 5.9    Medications: Decadron, Bowel Regimen, Prilosec, ICAPS    PRN Medications: noted  IVF: n/a     Height: 165.1cm  Weight: 67.813kg    Usual Body Weight: 71.82 kg    %Usual Body Weight: 94%  % Weight Loss: 6%    BMI: 24.88 (WNL)    Skin: tear to head, left anterior chest wound    GI: BM 8/10    Vitals: 2L C, Bradycardia noted, /78    I/Os: -30mL x 24 hours     Nutrient Needs:  Kcal: 1585- 1718 kcal/day BEE= 1321 x 1.2-1.3    Protein: 68-82g/day    Fluid: 2000mL /day         Diagnosis: Malnutrition (chronic/ non-severe) r/t inadequate oral intake in the setting of poor appetite s/t chronic disease as evidenced by patient s/p chemoradiation treatment s/t GBM resulting in intake < 75% of nutrient needs > 1 month, moderate depletion of muscle mass/ subcutaneous body fat, tempora/clavicle wasting, 6% weight loss x 1 month.     Intervention/ Recommendations/POC:  1. Continue current diet + snacks.  High protein/ high kcal milkshake with supper (vanilla).  Nutrition rep to help with menus daily to aid in optimal food choices to promote adequate PO intake.    2.Encourage adequate PO/fluid intake.  3. Nutrition rep to see regarding food prefs/ honor within dietary restrictions (if indicated)      Monitor/Evaluation: Monitor PO intake, weight, labs, medication adjustments, skin integrity, GI function, vitals, I/Os, and overall hydration status.  Adjust nutritional POC pending clinical  outcomes.     RD following PRN.  Interventions in place. PO adequate at this time.    Goal: Maintain adequate oral nutrient/fluid intake to promote nutrition optimization/healing/resolution of malnutrition/ weight stability.                               Section completed by:  Julia Dumont RD    REHAB-Pharmacy IDT Team Note by Marcell Bee RPH at 8/17/2017  1:26 PM  Version 1 of 1    Author:  Marcell Bee RPH Service:  (none) Author Type:  Pharmacist    Filed:  8/17/2017  1:27 PM Note Time:  8/17/2017  1:26 PM Status:  Signed    :  Marcell Bee RPH (Pharmacist)           Pharmacy  Pharmacy  Antibiotics/Antifungals/Antivirals:  Medication:      Active Orders     None        Route:        NA  Stop Date:  NA  Reason:      NA  Antihypertensives/Cardiac:  Medication:    Orders     None        Patient Vitals for the past 24 hrs:   BP Pulse   08/17/17 0825 - 89   08/17/17 0700 100/61 mmHg 80   08/16/17 1845 118/84 mmHg 96   08/16/17 1500 110/75 mmHg 84       Anticoagulation:  Medication: None    Other key medications: A review of the medication list reveals no issues at this time.    Section completed by: Marcell Bee [facility]           DC Planning  DC destination/dispostion:  Home w/ spouse in [place], [place] / mobile home w/ ramp/ tub/shower combo w/ grab bars.  Pt has home 02/4ww/tub shower seat @ home.   Appt with Dr. Chung for Pacer Check on 8/18 @ 98:45am.      Referrals: None at this time.       DC Needs:  Family training, home health inititally for RN/PT/Nurses Aide in [place], [place].    Follow up with Cardiology in [place], [place].  Follow up appts scheduled with the following:  Dr. Ugarte 9/19,  Oncologist @ [facility], and Dr. Cruz Radiation Oncologist on 9/21.      Barriers to discharge: Fall risk, decreased strength/endurance.     Strengths: Pleasant, cooperative, supprortive spouse, supportive friends/neigbors in [place], [place].                Section completed by:  EVI Grey, [facility]     Summary:   Home health for RN/PT/OT/nurses aid.  Pt has made improvements w/ all therapies.  Min assist memory recall/ exec functioning mod to min assist, gait 75 feet x 2 with FWW.  Family training w/ spouse.  Dc 8/23 Wednesday.  Pt has all dme at home.  Spouse to transport back to [place].     Physician Summary  Forrest Traylor MD participated and led team conference discussion.

## 2017-08-19 NOTE — CARE PLAN
Problem: Urinary Elimination:  Goal: Ability to reestablish a normal urinary elimination pattern will improve  Outcome: PROGRESSING AS EXPECTED  Patient remains continent of bladder. No accident noted.     Problem: Problem: Pain  Goal: LTG - Patient will manage pain with the appropriate technique/Intervention  Outcome: PROGRESSING AS EXPECTED  Patient denied any pain or discomfort during shift.

## 2017-08-19 NOTE — CARE PLAN
Problem: Bowel/Gastric:  Goal: Normal bowel function is maintained or improved  Outcome: PROGRESSING AS EXPECTED  Patient remains continent of bowel. Patient had a small, soft formed, brown bowel movement this shift. Scheduled senna-docusate administered. Patient denies nausea or abdominal discomfort. Bowel sounds were normoactive x 4 on assessment.    Problem: Urinary Elimination:  Goal: Ability to reestablish a normal urinary elimination pattern will improve  Outcome: PROGRESSING AS EXPECTED  Patient has remained continent of bladder using the bedside urinal at night. 0005 patient's PVR was 146 mL after voiding 300 mL of yellow urine. Patient denied pain or discomfort with voiding.

## 2017-08-19 NOTE — FLOWSHEET NOTE
08/19/17 1540   Events/Summary/Plan   Non-Invasive Resp Device Site Inspection Completed Intact   Location nc   Respiratory WDL   Respiratory (WDL) X   Chest Exam   Respiration 18   Pulse 76   Oximetry   #Pulse Oximetry (Single Determination) Yes   Oxygen   Home O2 Use Prior To Admission? Yes   Home O2 LPM Flow 2 LPM   Home O2 Delivery Method Nasal Cannula   Home O2 Frequency of Use Continuous   Pulse Oximetry 98 %   O2 (LPM) 2   O2 Daily Delivery Respiratory  Nasal Cannula

## 2017-08-20 NOTE — CARE PLAN
Problem: Safety  Goal: Will remain free from injury  Outcome: PROGRESSING AS EXPECTED  No attempts to transfers himself during the day. He calls 100% of the time. His wife is present most of the day.

## 2017-08-20 NOTE — CARE PLAN
Problem: SKIN INTEGRITY  Goal: LTG - Patient will demonstrate appropriate pressure relief techniques  Outcome: PROGRESSING AS EXPECTED  Patient remains free from new skin injury this shift. Reviewed risk for pressure ulcers and pressure relief techniques with patient. Patient verbalized understanding of pressure relief techniques. Pillows in place to relieve pressure points. Dressing to posterior head skin tear remains clean dry and intact. Closure strips in place over pacemake incision site remain clean, dry, and intact.    Problem: Problem: Pain  Goal: STG - Patient will verbalize an acceptable level of pain  Outcome: PROGRESSING AS EXPECTED  Patient denies pain so far this shift. Will continue to monitor for pain.

## 2017-08-20 NOTE — FLOWSHEET NOTE
Remains on O2 at 2L, as per home use.     08/20/17 0920   Events/Summary/Plan   Non-Invasive Resp Device Site Inspection Completed Intact   Location Nasal cannula   Respiratory WDL   Respiratory (WDL) X   Chest Exam   Respiration 18   Pulse 91   Breath Sounds   RUL Breath Sounds Clear   RML Breath Sounds Clear   RLL Breath Sounds Clear;Diminished   RIANA Breath Sounds Clear   LLL Breath Sounds Clear;Diminished   Secretions   Cough Non Productive   How Sputum Obtained Cough on Request   Oximetry   #Pulse Oximetry (Single Determination) Yes   Oxygen   Home O2 Use Prior To Admission? Yes   Home O2 LPM Flow 2 LPM   Home O2 Delivery Method Silicone Nasal Cannula   Home O2 Frequency of Use Continuous   Pulse Oximetry 97 %   O2 (LPM) 2   O2 Daily Delivery Respiratory  Silicone Nasal Cannula

## 2017-08-21 PROBLEM — D64.9 ANEMIA: Chronic | Status: ACTIVE | Noted: 2017-01-01

## 2017-08-21 PROBLEM — E87.20 LACTIC ACIDOSIS: Status: ACTIVE | Noted: 2017-01-01

## 2017-08-21 PROBLEM — I26.99 PULMONARY EMBOLISM (HCC): Status: ACTIVE | Noted: 2017-01-01

## 2017-08-21 PROBLEM — I44.30 HEART BLOCK, AV: Chronic | Status: ACTIVE | Noted: 2017-01-01

## 2017-08-21 PROBLEM — G93.89 BRAIN MASS: Chronic | Status: ACTIVE | Noted: 2017-01-01

## 2017-08-21 NOTE — CARE PLAN
Problem: Dressing  Goal: STG-Within one week, patient will dress LB  1) Individualized Goal: At Mod Indep  2) Interventions: OT Self Care/ADL, OT Cognitive Skill Dev, OT Neuro Re-Ed/Balance, OT Therapeutic Activity, OT Evaluation and OT Therapeutic Exercise  Outcome: DISCHARGED-GOAL NOT MET Date Met:  08/21/17    Problem: OT Long Term Goals  Goal: LTG-By discharge, patient will complete basic self care tasks  1) Individualized Goal: LTG-By discharge, patient will complete basic self care tasks at a level of mod. I.   2) Interventions: OT Self Care/ADL, OT Cognitive Skill Dev, OT Neuro Re-Ed/Balance, OT Therapeutic Activity and OT Therapeutic Exercise    Cosign: Jorge Ayala OTR/L   Outcome: DISCHARGED-GOAL NOT MET Date Met:  08/21/17  Goal: LTG-By discharge, patient will perform bathroom transfers  1) Individualized Goal: LTG-By discharge, patient will perform a simple meal preparation activity at a level of mod. I.   2) Interventions: OT Self Care/ADL, OT Cognitive Skill Dev, OT Neuro Re-Ed/Balance, OT Therapeutic Activity and OT Therapeutic Exercise    Cosign: Jorge Ayala OTR/L   Outcome: DISCHARGED-GOAL NOT MET Date Met:  08/21/17    Problem: Toileting  Goal: STG-Within one week, patient will complete toileting tasks  1) Individualized Goal: At Mod Indep   2) Interventions: OT Self Care/ADL, OT Cognitive Skill Dev, OT Neuro Re-Ed/Balance, OT Therapeutic Activity, OT Evaluation and OT Therapeutic Exercise  Outcome: DISCHARGED-GOAL NOT MET Date Met:  08/21/17    Problem: Functional Transfers  Goal: STG-Within one week, patient will transfer to toilet  1) Individualized Goal: At Mod Indep w/ AE  2) Interventions: OT Self Care/ADL, OT Cognitive Skill Dev, OT Neuro Re-Ed/Balance, OT Therapeutic Activity, OT Evaluation and OT Therapeutic Exercise  Outcome: DISCHARGED-GOAL NOT MET Date Met:  08/21/17

## 2017-08-21 NOTE — PROGRESS NOTES
"Called to room 120d by his  Liv. She stated that she brought him back to his room because he started to \"gasp\" for air and became anxious. His O2 sat was 77% at that time. Rapid response called. At this point we laid him down in the bed and increased O2 to 6l. Patient was talking to staff at this time and he complained of feeling cold. His body was shaking like he was cold. EKG was completed as ordered. We were not able to establish an IV due to poor perfusion. His hands were cold and pale. See the Rapid Report and the Charge RN report for vitals. At no time did patient lose consciousness and he remained calm. Wife arrived and was given an update of his status from Dr. Traylor. Dr. Haq was called for a consult and he also arrived. Ultimately, a decision was made to transfer patient to the ER. Wife had long discussion with Dr. Traylor and Demetria the  on the importance of transferring him to the ER for emergent assessment/tests. She was very reluctant even with [facility] present to take him away. The transfer was delayed by 30min due to wife's refusal to transfer. After 30min she did finally agree to have him go with [facility] to [facility] ER.   "

## 2017-08-21 NOTE — ASSESSMENT & PLAN NOTE
- Hb is 9 (baseline), the patient has pancytopenia.   - no obvious bleeding.   - hx of blood transfusion after the surgery last April Hb was 6  - MCV: 92 Plt: 61   - Iron panel was normal.  - Was given 4 units RBC  - The patient is not stable to get CT head and abd.   - Follow up and transfusion if Hb less than 7 and keep Plt>50  - d/c heparin and he was given protamine.

## 2017-08-21 NOTE — CARE PLAN
Problem: Safety  Goal: Will remain free from falls  Outcome: PROGRESSING AS EXPECTED  Pt uses call light consistently and appropriately, patient waits for assistance does not attempt self transfer this shift and is able to verbalize needs. Patient denies pain. Patients room is in close proximity to the nurses station, bed in lowest position, bed alarm activated, personal items and call light within reach for patient. Will continue to monitor.     Problem: Bowel/Gastric:  Goal: Normal bowel function is maintained or improved  Outcome: PROGRESSING AS EXPECTED  Patient having regular bowel movements; last BM 8/19/2017. Patient denies s/s constipation; bowel sounds auscultated in all 4 quadrants, scheduled bowel meds given as ordered.  Will continue to monitor.

## 2017-08-21 NOTE — DISCHARGE PLANNING
Case management/  I met with pt's wife, Silva.   She is concerned about pt's projected dc date of 8/23.  Informed her this was the date discussed at IDT held on Friday, but due to his medical status-dc date has been placed on hold.  PT to transfer to [facility] today.     Dc plan at rehab was for pt to return home w/ home health through [facility].  Pt has all dme at home including home 02/ramp.  It appears pt may need to transition to a skilled nursing facility as it appears spouse is unable to provide level of care he will need.  She prefers [facility] for skilled as needed.  [facility]     Dicussed w/ Dr. Traylor also.    Pt to transfer to [facility].

## 2017-08-21 NOTE — H&P
[facility] Internal Medicine Admitting History and Physical    Name Grinnell, Alvin       1935   Age/Sex 81 y.o. male   MRN [id number]   Code Status Full     After 5PM or if no immediate response to page, please call for cross-coverage  Attending/Team: Dr Vini Prabhakar team  Call [phone] to page    2nd Call - Day Sr. Resident (R2/R3):   Dr Villegas       Chief Complaint:  SOB/PE    HPI:  81 y.o. male with a past medical history of hyperlipidemia, hypertension, GBM S/P radiation and chemotherapy CAP back in  and heart block s/p pacemaker last August was brought from rehab center because of sudden SOB.    Today morning around 1030 in the rehab center, rapid response was called to patient room for irregular breathing pattern, his sat was 77% in roomair and he was using mouth and accessory muscle, he was transferred to the ED and CT PE showed b/l PE in the ED the patient is stable his sat>90% on 5 L mask he denies any chest pain or SOB and BP was 120/80 but he has tachycardia.     The underwent brain tumor removal last April fror GBM and later he had CAP and last august he had pacemaker for heart block.     The patient lives with his wife, he uses a walker, no smoking or drinking no fall but he has issue with memory according to his wife.     The patient is hemodynamically stable no need for TpA, no bleeding, we will start with heparin and discuss filter choice later.       Review of Systems   Constitutional: Negative for fever and chills.   HENT: Negative for hearing loss and tinnitus.    Respiratory: Negative for cough and hemoptysis.    Cardiovascular: Negative for chest pain, palpitations, orthopnea and claudication.   Gastrointestinal: Negative for heartburn and nausea.   Genitourinary: Negative for dysuria and frequency.   Skin: Negative for rash.   Neurological: Positive for headaches.   Psychiatric/Behavioral: Negative for depression, suicidal ideas and substance abuse.             Past Medical  History:   Past Medical History   Diagnosis Date   • Hyperlipidemia    • Hypertension    • Glioblastoma multiforme (CMS-HCC) 4/7/17   • History of [place] fever    • Mitral valve prolapse    • Heart block, AV 8/3/2017       Past Surgical History:  Past Surgical History   Procedure Laterality Date   • Craniotomy stealth Right 4/7/2017     Procedure: CRANIOTOMY STEALTH - PARIETAL OCCIPITAL;  Surgeon: Lavelle Ugarte M.D.;  Location: SURGERY [facility];  Service:    • Lung needle biopsy       [place] Fever   • Tonsillectomy         Current Outpatient Medications:  Home Medications     Reviewed by Tereso Arrington (Pharmacy Tech) on 08/21/17 at 1137  Med List Status: Complete    Medication Last Dose Status    acetaminophen (TYLENOL) 325 MG Tab 8/20/2017 Active    dexamethasone (DECADRON) 4 MG Tab 8/21/2017 Active    docusate sodium (COLACE) 100 MG Cap 8/21/2017 Active    Multiple Vitamins-Minerals (ICAPS AREDS 2 PO) 8/21/2017 Active    omeprazole (PRILOSEC) 20 MG delayed-release capsule 8/21/2017 Active    senna-docusate (PERICOLACE OR SENOKOT S) 8.6-50 MG Tab 8/20/2017 Active                Medication Allergy/Sensitivities:  Allergies   Allergen Reactions   • Bactrim [Sulfamethoxazole W-Trimethoprim] Unspecified      told pt he is allergic           Family History:  No family history on file.    Social History:  Social History     Social History   • Marital Status:      Spouse Name: N/A   • Number of Children: N/A   • Years of Education: N/A     Occupational History   • Not on file.     Social History Main Topics   • Smoking status: Never Smoker    • Smokeless tobacco: Not on file   • Alcohol Use: No   • Drug Use: No   • Sexual Activity: Not on file     Other Topics Concern   • Not on file     Social History Narrative     Living situation: with his wife   PCP : Pcp Not In Computer        Physical Exam     Filed Vitals:    08/21/17 1201 08/21/17 1301 08/21/17 1401 08/21/17 1503   BP:        Pulse: 86 96 79    Temp:    38.1 °C (100.5 °F)   Resp: 25 24 28    Weight:       SpO2: 95% 96% 95% 75%     Body mass index is 26.29 kg/(m^2).  /66 mmHg  Pulse 79  Temp(Src) 38.1 °C (100.5 °F)  Resp 28  Wt 71.668 kg (158 lb)  SpO2 75%  O2 therapy: Pulse Oximetry: (!) 75 %, O2 (LPM): 5, O2 Delivery: Nasal Cannula    Physical Exam   Constitutional: He is oriented to person, place, and time and well-developed, well-nourished, and in no distress. No distress.   Neck: No JVD present. No tracheal deviation present.   Cardiovascular: Normal rate.    No murmur heard.  Pulmonary/Chest: Effort normal. No respiratory distress. He has no wheezes. He has no rales.   Abdominal: Soft. He exhibits no distension. There is no tenderness. There is no rebound.   Musculoskeletal: He exhibits edema. He exhibits no tenderness.   R leg edema mor yasmine L side.    Neurological: He is alert and oriented to person, place, and time.   Skin: Skin is warm. No rash noted. There is pallor.             Data Review       Old Records Request:   Completed  Current Records review and summary: Completed    Lab Data Review:  Recent Results (from the past 24 hour(s))   EKG    Collection Time: 17  8:49 AM   Result Value Ref Range    Report       [facility]    Test Date:  2017  Pt Name:    ALVIN GRINNELL               Department: [facility]  MRN:        [id number]                      Room:       [facility]  Gender:     M                            Technician: [id number]  :        [id number]                   Requested By:CORRINE FORD  Order #:    [id number]                    Reading MD: Ruby Martini MD    Measurements  Intervals                                Axis  Rate:       115                          P:          41  NM:         108                          QRS:        266  QRSD:       122                          T:          26  QT:         352  QTc:        487    Interpretive Statements  ATRIAL-SENSED VENTRICULAR-PACED  COMPLEXES  NO FURTHER ANALYSIS ATTEMPTED DUE TO PACED RHYTHM  Compared to ECG 08/04/2017 13:18:09  No significant changes    Electronically Signed On 8- 13:45:12 PDT by Ruby Martini MD     CBC WITH DIFFERENTIAL    Collection Time: 08/21/17 10:40 AM   Result Value Ref Range    WBC 5.0 4.8 - 10.8 K/uL    RBC 2.99 (L) 4.70 - 6.10 M/uL    Hemoglobin 9.0 (L) 14.0 - 18.0 g/dL    Hematocrit 27.5 (L) 42.0 - 52.0 %    MCV 92.0 81.4 - 97.8 fL    MCH 30.1 27.0 - 33.0 pg    MCHC 32.7 (L) 33.7 - 35.3 g/dL    RDW 66.9 (H) 35.9 - 50.0 fL    Platelet Count 61 (L) 164 - 446 K/uL    MPV 12.4 9.0 - 12.9 fL    Nucleated RBC 0.00 /100 WBC    NRBC (Absolute) 0.00 K/uL    Neutrophils-Polys 89.00 (H) 44.00 - 72.00 %    Lymphocytes 1.90 (L) 22.00 - 41.00 %    Monocytes 1.80 0.00 - 13.40 %    Eosinophils 0.00 0.00 - 6.90 %    Basophils 0.00 0.00 - 1.80 %    Neutrophils (Absolute) 4.73 1.82 - 7.42 K/uL    Lymphs (Absolute) 0.10 (L) 1.00 - 4.80 K/uL    Monos (Absolute) 0.09 0.00 - 0.85 K/uL    Eos (Absolute) 0.00 0.00 - 0.51 K/uL    Baso (Absolute) 0.00 0.00 - 0.12 K/uL    Anisocytosis 1+    DIFFERENTIAL MANUAL    Collection Time: 08/21/17 10:40 AM   Result Value Ref Range    Bands-Stabs 5.50 0.00 - 10.00 %    Metamyelocytes 0.90 %    Progranulocytes 0.90 %    Manual Diff Status PERFORMED    PERIPHERAL SMEAR REVIEW    Collection Time: 08/21/17 10:40 AM   Result Value Ref Range    Peripheral Smear Review see below    PLATELET ESTIMATE    Collection Time: 08/21/17 10:40 AM   Result Value Ref Range    Plt Estimation Decreased    MORPHOLOGY    Collection Time: 08/21/17 10:40 AM   Result Value Ref Range    RBC Morphology Present     Large Platelets 1+     Poikilocytosis 1+     Ovalocytes 1+     Toxic Gran Slight    Lactic acid (lactate)    Collection Time: 08/21/17 10:56 AM   Result Value Ref Range    Lactic Acid 4.8 (HH) 0.5 - 2.0 mmol/L   COMP METABOLIC PANEL    Collection Time: 08/21/17 10:56 AM   Result Value Ref Range    Sodium 134  (L) 135 - 145 mmol/L    Potassium 4.5 3.6 - 5.5 mmol/L    Chloride 102 96 - 112 mmol/L    Co2 20 20 - 33 mmol/L    Anion Gap 12.0 (H) 0.0 - 11.9    Glucose 242 (H) 65 - 99 mg/dL    Bun 39 (H) 8 - 22 mg/dL    Creatinine 0.81 0.50 - 1.40 mg/dL    Calcium 8.9 8.5 - 10.5 mg/dL    AST(SGOT) 17 12 - 45 U/L    ALT(SGPT) 38 2 - 50 U/L    Alkaline Phosphatase 91 30 - 99 U/L    Total Bilirubin 0.6 0.1 - 1.5 mg/dL    Albumin 2.7 (L) 3.2 - 4.9 g/dL    Total Protein 5.6 (L) 6.0 - 8.2 g/dL    Globulin 2.9 1.9 - 3.5 g/dL    A-G Ratio 0.9 g/dL   BNP    Collection Time: 08/21/17 10:56 AM   Result Value Ref Range    B Natriuretic Peptide 110 (H) 0 - 100 pg/mL   ESTIMATED GFR    Collection Time: 08/21/17 10:56 AM   Result Value Ref Range    GFR If African American >60 >60 mL/min/1.73 m 2    GFR If Non African American >60 >60 mL/min/1.73 m 2   URINALYSIS    Collection Time: 08/21/17  1:24 PM   Result Value Ref Range    Micro Urine Req Microscopic     Color Dark Yellow     Character Hazy (A)     Specific Gravity 1.015 <1.035    Ph 6.0 5.0-8.0    Glucose 500 (A) Negative mg/dL    Ketones Negative Negative mg/dL    Protein 100 (A) Negative mg/dL    Bilirubin Negative Negative    Urobilinogen, Urine Normal Negative    Nitrite Negative Negative    Leukocyte Esterase Negative Negative    Occult Blood Negative Negative   URINE MICROSCOPIC (W/UA)    Collection Time: 08/21/17  1:24 PM   Result Value Ref Range    Amorphous Crystal Present /hpf    Granular Casts >10 (A) /lpf   Lactic acid (lactate)    Collection Time: 08/21/17  1:56 PM   Result Value Ref Range    Lactic Acid 3.0 (H) 0.5 - 2.0 mmol/L       Imaging/Procedures Review:    ndependant Imaging Review: Completed     LE VENOUS DUPLEX (Specify in Comments Left, Right Or Bilateral)   Preliminary Result      CT-CTA CHEST PULMONARY ARTERY W/ RECONS   Final Result      1.  Bilateral pulmonary emboli. No CT evidence for right ventricular strain.      2.  Patchy diffuse groundglass density with  tiny nodules and cavitary lesions, increased from the prior exam.      3.  Small bilateral pleural effusions      4.  Atherosclerosis      5.  Mediastinal adenopathy, nonspecific.         Comment: Results discussed with Dr. Zheng at 12:40 PM            DX-CHEST-PORTABLE (1 VIEW)   Final Result      Increased diffuse interstitial opacities within both lungs consistent with atelectasis/possible mild edema or pneumonitis.             EKG:   EKG Independant Review: Completed  QTc:487, HR: 115, pacemaker rhythm     (X) Records reviewed and summarized in current documentation             Assessment/Plan     * Pulmonary embolism (CMS-HCC) (present on admission)  Assessment & Plan  - Sudden SOB and R leg swelling for last 4-5 days.   - tachycardia, low grade fever BP: 120/70.   - CT PE: B/L PE  - EKG: paced rythem   - The patent is stable, denies any SOB or chest pain   - The patient is stable and not qualified for TpA.  - We will start with heparin.  - discuss with the wife and the patient anticoagulation therapy or filter later.   - doppler for his legs.   - O2 therapy per protocol.     Lactic acidosis (present on admission)  Assessment & Plan  - LA: 4.8  - anion katie: 12 and Hco3: 20   - WBC : 5 and low grade fever.   - procalcitonin is pending.   - Chest X ray no pneumonia and UA: no Wbc  - likely related to dehydration and the fever due to PE.  - no signs of infection, no need for ABX at this time, to follow up.     Brain mass (present on admission)  Assessment & Plan  - GBM  - surgery last April  - s/p chemotherapy and radiation.   - according to Dr. Ugarte the patient on PO decadron, no further surgical intervention at this time.   - follow up and ask for neuro consult if needed.     Heart block, AV (present on admission)  Assessment & Plan  - pacemaker placed by Dr. Chung 8/3  - Pacer working, no chest pain  -   - tachycardia due to PE.  - STABLE, follow up.     Anemia (present on admission)  Assessment &  Plan  - Hb is 9 (baseline)  - no bleeding   - hx of blood transfusion after the surgery Hb was 6  - MCV: 92 Plt: 61   - we will order FIT, thyroid and iron panel.             Anticipated Hospital stay:  >2 midnights        Quality Measures  EKG reviewed, Labs reviewed, Medications reviewed and Radiology images reviewed  Martins catheter: No Martins      DVT Prophylaxis: Heparin

## 2017-08-21 NOTE — ED NOTES
BIB [facility] to rm 9, pt transferred from [facility]  Chief Complaint   Patient presents with   • Shortness of Breath     sent from [facility], r/o DVT/PE     Pt had acute onset SOB and hypoxia, normally on 2L O2 and was in the 70s, placed on 4L prehospital.  Upon pt has no complaints, 96% on an oxymask, lungs clear, denies any cough or CP.  Pt has a sepsis score of 3, protocol ordered.

## 2017-08-21 NOTE — ED NOTES
Lab called with critical result of lactic acid 4.8 at 1115. Critical lab result read back to Lab.   Dr. Zheng notified of critical lab result at 1115.  Critical lab result read back by Dr. Zheng.

## 2017-08-21 NOTE — PROGRESS NOTES
Patient discharged via [facility] at 1025 per Dr Haq and Dr Traylor orders. RICK signed and wife at bedside.

## 2017-08-21 NOTE — ED PROVIDER NOTES
ED Provider Note    Scribed for Gale Zheng M.D. by Jean Marie Rodríguez. 8/21/2017, 11:16 AM.    Primary care provider: Pcp Pt States None  Means of arrival: EMS  History obtained from: patient and wife  History limited by: none    CHIEF COMPLAINT  Chief Complaint   Patient presents with   • Shortness of Breath     sent from [facility], r/o DVT/PE       HPI  Alvin Grinnell is a 81 y.o. male who presents to the Emergency Department as a transfer from [facility] complaining of sudden shortness of breath starting this morning. Wife reports associated productive cough. Patient was working with the speech therapist today when he suddenly started to feel short of breath. He states that he currently is not feeling short of breath. Wife reports a recent history of pneumonia. Patient denies chest pain, fever, decreased appetite, dysuria.    REVIEW OF SYSTEMS  Pertinent positives include shortness of breath, productive cough. Pertinent negatives include no chest pain, fever, decreased appetite, dysuria. See HPI for further details. All other systems reviewed and negative.   C.    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    SURGICAL HISTORY   has past surgical history that includes craniotomy stealth (Right, 4/7/2017); lung needle biopsy; and tonsillectomy.    SOCIAL HISTORY  Social History   Substance Use Topics   • Smoking status: Never Smoker    • Smokeless tobacco: No   • Alcohol Use: No      History   Drug Use No       FAMILY HISTORY  None noted    CURRENT MEDICATIONS    Current facility-administered medications:   •  dexamethasone (DECADRON) tablet 4 mg, 4 mg, Oral, Q6HRS, Jason Villegas M.D.  •  docusate sodium (COLACE) capsule 100 mg, 100 mg, Oral, DAILY, Jason Villegas M.D.  •  omeprazole (PRILOSEC) capsule 20 mg, 20 mg, Oral,  DAILY, Jason Villegas M.D.  •  senna-docusate (PERICOLACE or SENOKOT S) 8.6-50 MG per tablet 1 Tab, 1 Tab, Oral, Q EVENING, Jason Villegas M.D.  •  senna-docusate (PERICOLACE or SENOKOT S) 8.6-50 MG per tablet 2 Tab, 2 Tab, Oral, BID **AND** polyethylene glycol/lytes (MIRALAX) PACKET 1 Packet, 1 Packet, Oral, QDAY PRN **AND** magnesium hydroxide (MILK OF MAGNESIA) suspension 30 mL, 30 mL, Oral, QDAY PRN **AND** bisacodyl (DULCOLAX) suppository 10 mg, 10 mg, Rectal, QDAY PRN, Jason Villegas M.D.  •  Respiratory Care per Protocol, , Nebulization, Continuous RT, Jason Villegas M.D.  •  NS infusion, , Intravenous, Continuous, Jason Villegas M.D.  •  enalaprilat (VASOTEC) injection 1.25 mg, 1.25 mg, Intravenous, Q6HRS PRN, Jason Villegas M.D.  •  ondansetron (ZOFRAN) syringe/vial injection 4 mg, 4 mg, Intravenous, Q4HRS PRN, Jason Villegas M.D.  •  ondansetron (ZOFRAN ODT) dispertab 4 mg, 4 mg, Oral, Q4HRS PRN, Jason Villegas M.D.  •  MD ALERT...HEPARIN WEIGHT BASED PROTOCOL Pharmacist to implement 1 Each, 1 Each, Other, PRN, Jason Villegas M.D.  •  acetaminophen (TYLENOL) tablet 650 mg, 650 mg, Oral, Q6HRS PRN, Jason Villegas M.D.    Current outpatient prescriptions:   •  docusate sodium (COLACE) 100 MG Cap, Take 100 mg by mouth every day., Disp: , Rfl:   •  senna-docusate (PERICOLACE OR SENOKOT S) 8.6-50 MG Tab, Take 1 Tab by mouth every evening., Disp: , Rfl:   •  Multiple Vitamins-Minerals (ICAPS AREDS 2 PO), Take 1 Cap by mouth every day., Disp: , Rfl:   •  omeprazole (PRILOSEC) 20 MG delayed-release capsule, Take 20 mg by mouth every day., Disp: , Rfl:   •  dexamethasone (DECADRON) 4 MG Tab, Take 1 Tab by mouth every 6 hours., Disp: 30 Tab, Rfl: 0  •  acetaminophen (TYLENOL) 325 MG Tab, Take 650 mg by mouth every four hours as needed. Indications: Pain, Disp: , Rfl:     ALLERGIES  Allergies   Allergen Reactions   • Bactrim [Sulfamethoxazole W-Trimethoprim]  Unspecified     Dr told pt he is allergic         PHYSICAL EXAM  VITAL SIGNS: /66 mmHg  Pulse 102  Temp(Src) 38.2 °C (100.8 °F)  Resp 18  Wt 71.668 kg (158 lb)  SpO2 96%    General: WDWN, nontoxic appearing in NAD; A+Ox3; V/S as above. Febrile   Skin: warm and dry; good color; no rash   HEENT: NC; EOMs intact; PERRL; no scleral icterus. Healing surgical incision over the parietal scalp area.   Neck: FROM; no meningismus, no LAD   Cardiovascular: Tachycardic heart rate and rhythm. No murmurs, rubs, or gallops; pulses 2+ bilaterally radially and DP areas   Lungs: Clear to auscultation with good air movement bilaterally. No wheezes, rhonchi, or rales.   Abdomen: BS present; soft; NTND; no rebound, guarding, or rigidity. No organomegaly or pulsatile mass; no CVAT   Extremities: BERMAN x 4; no e/o trauma;Trace edema in the left lower extremity. Right leg with 2+ pedal edema. Negative Kathleen's  Neurologic: CNs III-XII grossly intact; distal sensation intact; strength 5/5 UE/LEs; Word finding difficulty noted.   Psychiatric: Appropriate affect, normal mood                                                           DIAGNOSTIC STUDIES / PROCEDURES    LABS  Results for orders placed or performed during the hospital encounter of 08/21/17   Lactic acid (lactate)   Result Value Ref Range    Lactic Acid 4.8 (HH) 0.5 - 2.0 mmol/L   CBC WITH DIFFERENTIAL   Result Value Ref Range    WBC 5.0 4.8 - 10.8 K/uL    RBC 2.99 (L) 4.70 - 6.10 M/uL    Hemoglobin 9.0 (L) 14.0 - 18.0 g/dL    Hematocrit 27.5 (L) 42.0 - 52.0 %    MCV 92.0 81.4 - 97.8 fL    MCH 30.1 27.0 - 33.0 pg    MCHC 32.7 (L) 33.7 - 35.3 g/dL    RDW 66.9 (H) 35.9 - 50.0 fL    Platelet Count 61 (L) 164 - 446 K/uL    MPV 12.4 9.0 - 12.9 fL    Nucleated RBC 0.00 /100 WBC    NRBC (Absolute) 0.00 K/uL    Neutrophils-Polys 89.00 (H) 44.00 - 72.00 %    Lymphocytes 1.90 (L) 22.00 - 41.00 %    Monocytes 1.80 0.00 - 13.40 %    Eosinophils 0.00 0.00 - 6.90 %    Basophils 0.00 0.00  - 1.80 %    Neutrophils (Absolute) 4.73 1.82 - 7.42 K/uL    Lymphs (Absolute) 0.10 (L) 1.00 - 4.80 K/uL    Monos (Absolute) 0.09 0.00 - 0.85 K/uL    Eos (Absolute) 0.00 0.00 - 0.51 K/uL    Baso (Absolute) 0.00 0.00 - 0.12 K/uL    Anisocytosis 1+    COMP METABOLIC PANEL   Result Value Ref Range    Sodium 134 (L) 135 - 145 mmol/L    Potassium 4.5 3.6 - 5.5 mmol/L    Chloride 102 96 - 112 mmol/L    Co2 20 20 - 33 mmol/L    Anion Gap 12.0 (H) 0.0 - 11.9    Glucose 242 (H) 65 - 99 mg/dL    Bun 39 (H) 8 - 22 mg/dL    Creatinine 0.81 0.50 - 1.40 mg/dL    Calcium 8.9 8.5 - 10.5 mg/dL    AST(SGOT) 17 12 - 45 U/L    ALT(SGPT) 38 2 - 50 U/L    Alkaline Phosphatase 91 30 - 99 U/L    Total Bilirubin 0.6 0.1 - 1.5 mg/dL    Albumin 2.7 (L) 3.2 - 4.9 g/dL    Total Protein 5.6 (L) 6.0 - 8.2 g/dL    Globulin 2.9 1.9 - 3.5 g/dL    A-G Ratio 0.9 g/dL   BNP   Result Value Ref Range    B Natriuretic Peptide 110 (H) 0 - 100 pg/mL   DIFFERENTIAL MANUAL   Result Value Ref Range    Bands-Stabs 5.50 0.00 - 10.00 %    Metamyelocytes 0.90 %    Progranulocytes 0.90 %    Manual Diff Status PERFORMED    PERIPHERAL SMEAR REVIEW   Result Value Ref Range    Peripheral Smear Review see below    PLATELET ESTIMATE   Result Value Ref Range    Plt Estimation Decreased    MORPHOLOGY   Result Value Ref Range    RBC Morphology Present     Large Platelets 1+     Poikilocytosis 1+     Ovalocytes 1+     Toxic Gran Slight    ESTIMATED GFR   Result Value Ref Range    GFR If African American >60 >60 mL/min/1.73 m 2    GFR If Non African American >60 >60 mL/min/1.73 m 2   All labs reviewed by me.    RADIOLOGY  LE VENOUS DUPLEX (Specify in Comments Left, Right Or Bilateral)   Preliminary Result      CT-CTA CHEST PULMONARY ARTERY W/ RECONS   Final Result      1.  Bilateral pulmonary emboli. No CT evidence for right ventricular strain.      2.  Patchy diffuse groundglass density with tiny nodules and cavitary lesions, increased from the prior exam.      3.  Small  bilateral pleural effusions      4.  Atherosclerosis      5.  Mediastinal adenopathy, nonspecific.         Comment: Results discussed with Dr. Zheng at 12:40 PM            DX-CHEST-PORTABLE (1 VIEW)   Final Result      Increased diffuse interstitial opacities within both lungs consistent with atelectasis/possible mild edema or pneumonitis.      The radiologist's interpretation of all radiological studies have been reviewed by me.    COURSE & MEDICAL DECISION MAKING  Pertinent Labs & Imaging studies reviewed. (See chart for details)    Alvin Grinnell is a 81 y.o. male who presents complaining of acute onset of SOB.  Patient has a recent diagnosis of glioblastoma multiforme.  Patient is alert, hemodynamically stable, not in respiratory distress at this time. He has obvious swelling in the right lower extremity, possibly from a DVT.    Differential Diagnoses include but are not limited to PE, pneumonia, ACS, CHF, DVT.    11:16 AM - Patient seen and examined at bedside. Discussed evaluation of his symptoms to rule out PE and pneumonia. Wife verbalizes understanding and agreement. Ordered , LE venous duplex, CT CTA chest pulmonary artery, DX chest, Lactic acid x2, BNP, CBC with differential, CMP, Urinalysis, Urine culture, Blood culture x2, Differential manual, Peripheral smear review, Platelet estimate, Morphology to evaluate his symptoms. Patient was treated with NS 2151 ml for fluid resuscitation and possible sepsis, Rocephin 2 g.     12:39 PM - I discussed the patient's case and the above findings with radiology/Dr. Ernandez who indicates that the patient has bilateral PE. Patient will be treated with Lovenox 80 mg.    12:45 PM - Paged [facility] ICU team.    1:07 PM - I discussed the patient's case and the above findings with [facility] who agrees to admit the patient. I feel the patient would be appropriate for the ICU given his complex medical issues, initial hypoxia, and the real possibility of him  decompensating.    DISPOSITION:  Patient will be admitted to hospital in guarded condition.    CRITICAL CARE  The very real possibilty of a deterioration of this patient's condition required the highest level of my preparedness for sudden, emergent intervention.  I provided critical care services, which included medication orders, frequent reevaluations of the patient's condition and response to treatment, ordering and reviewing test results, and discussing the case with various consultants.  The critical care time associated with the care of the patient was 35 minutes. Review chart for interventions. This time is exclusive of any other billable procedures.     FINAL IMPRESSION  1. Pulmonary embolism, other       Critical care time 35 minutes     Jean Marie ADAM (Levyibmoon), am scribing for, and in the presence of, Gale Zheng M.D..    Electronically signed by: Jean Marie Rodríguez (Yan[place]), 8/21/2017    Gale ADAM M.D. personally performed the services described in this documentation, as scribed by Jean Marie Rodríguez in my presence, and it is both accurate and complete.    The note accurately reflects work and decisions made by me.  Gale Zheng  8/24/2017  1:49 PM

## 2017-08-21 NOTE — FLOWSHEET NOTE
08/21/17 0825   Events/Summary/Plan   Events/Summary/Plan Pt up alert at breakfast on 2Lpm NC home use 24/7 hands cold this morning unable to get a reading used ear probe sats 96% will monitor.   Non-Invasive Resp Device Site Inspection Completed Intact   Location NC b/l ears   Interdisciplinary Plan of Care-Goals (Indications)   Obstructive Ventilatory Defect or Pulmonary Disease without Obvious Obstruction Strong Subjective / Objective Improvement   Education   Education Yes - Pt. / Family has been Instructed in use of Home Oxygen   Respiratory WDL   Respiratory (WDL) X   Chest Exam   Work Of Breathing / Effort Mild   Respiration 17   Pulse 88   Breath Sounds   RUL Breath Sounds Clear   RML Breath Sounds Clear   RLL Breath Sounds Clear   RIANA Breath Sounds Clear   LLL Breath Sounds Clear   Secretions   Cough Non Productive   Oximetry   #Pulse Oximetry (Single Determination) Yes   Oxygen   Home O2 Use Prior To Admission? Yes   Home O2 LPM Flow 2 LPM   Home O2 Delivery Method Silicone Nasal Cannula   Home O2 Frequency of Use Continuous   Pulse Oximetry 96 %   O2 (LPM) 2   O2 Daily Delivery Respiratory  Silicone Nasal Cannula

## 2017-08-21 NOTE — PROGRESS NOTES
Rehab Progress Note     Interval Events (Subjective)    Chief Complaint: SOB feeling cold  Called to see patient who is feeling cold and has decreased pulse oximetry  Denies SOB or chest discomfort    However he clearly needs more oxygen than before  He is mouth breathing and needle via an mass versus nasal cannula      Objective:  VITAL SIGNS:     Pulse oximetry 93 on 6 liters via mask.  Was 83 % when I first saw patient  Pulse 115 irregular RR 24    Cardiac: Tachycardic paced rhythm   Lungs: clear to auscultation bilaterally; he is on supplemental oxygen via nasal canula he is tachypnea Abdomen: soft; NT, ND, BS present.   Extremities: minimal edema noted left new pitting edema on right up to mid calf  Neuro: No change though it was reported that the patient was confused earlier     Recent Results (from the past 72 hour(s))   EKG    Collection Time: 17  8:49 AM   Result Value Ref Range    Report       [facility]    Test Date:  2017  Pt Name:    ALVIN GRINNELL               Department: [facility]  MRN:        [id number]                      Room:       [facility]  Gender:     M                            Technician: [id number]  :        [id number]                   Requested By:CORRINE FORD  Order #:    [id number]                    Reading MD:    Measurements  Intervals                                Axis  Rate:       115                          P:          41  SD:         108                          QRS:        266  QRSD:       122                          T:          26  QT:         352  QTc:        487    Interpretive Statements  ATRIAL-SENSED VENTRICULAR-PACED COMPLEXES  NO FURTHER ANALYSIS ATTEMPTED DUE TO PACED RHYTHM  Compared to ECG 2017 13:18:09  No significant changes         Current Facility-Administered Medications   Medication Frequency   • ICAPS (AREDS-2) Caps (patient own supply in drawer) DAILY   • calcium carbonate (TUMS) chewable tab 500 mg Q6HRS PRN   • dexamethasone (DECADRON) tablet 4  mg Q6HRS   • oxycodone immediate-release (ROXICODONE) tablet 5 mg Q4HRS PRN   • Respiratory Care per Protocol Continuous RT   • Pharmacy Consult Request ...Pain Management Review 1 Each PRN   • acetaminophen (TYLENOL) tablet 650 mg Q4HRS PRN   • docusate sodium (COLACE) capsule 100 mg DAILY    And   • senna-docusate (PERICOLACE or SENOKOT S) 8.6-50 MG per tablet 1 Tab Q EVENING    And   • lactulose 20 GM/30ML solution 30 mL Q24HRS PRN    And   • bisacodyl (DULCOLAX) suppository 10 mg QDAY PRN   • ondansetron (ZOFRAN ODT) dispertab 4 mg 4X/DAY PRN    Or   • ondansetron (ZOFRAN) syringe/vial injection 4 mg 4X/DAY PRN   • mag hydrox-al hydrox-simeth (MAALOX PLUS ES or MYLANTA DS) suspension 20 mL Q2HRS PRN   • trazodone (DESYREL) tablet 50 mg QHS PRN   • omeprazole (PRILOSEC) capsule 20 mg DAILY       Orders Placed This Encounter   Procedures   • Diet Order     Standing Status: Standing      Number of Occurrences: 1      Standing Expiration Date:      Order Specific Question:  Diet:     Answer:  Cardiac [6]       Assessment:  Active Hospital Problems    Diagnosis   • *GBM (glioblastoma multiforme) (CMS-HCC)   • Vasogenic cerebral edema (CMS-HCC)   • CAP (community acquired pneumonia)   • Heart block, AV   • Cognitive and behavioral changes   • Impaired mobility and ADLs   • Debility   • Bradycardia   • HTN (hypertension)         GBM:  With vasogenic edema, cognitive and behavioral changes,severe debility, and impaired ADLs:   S/P radiation and chemotherapy CAP back in June;  brain mass with vasogenic edema on 8/2/2017. CT had revealed extensive vasogenic edema and right parietal, frontal, occipital, temporal lobes and attenuation  to right cisterns with 2 mm midline shift appeared to be increased from previous study;  Continue with Decadron  Continue with PT/OT/SLP    CAP: Completed treatment with 5 day course of Unasyn and Doxycycline in early August ; His O2 saturation remained at 94% on RA and during exercise on arm  bike.   - Continue to monitor and he may be able to be weaned off the oxygen if remains stable.     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 Chung    COLD/ increased O2 requirements:    Medical Decision Making and Plan:    SOB with increased O2 requirments    Possible PE, Doppler to Ro DVT  Ordered stat CBC, BMP, BNP chest -X-ray  WIll consider CT angiogram  Will Hold therapy  Asked to be seen by Dr. Haq  We initially thought we managed the patient while in our facility but after further review  The severity of his condition we felt it was important that the patient be seen in the emergency room. With his diagnosis we did discuss with the patient and his wife changing the patient's code status to DNR basis diagnosis but they insisted on being a full code      I called the emergency room and updated the clinicians on a presumed diagnosis and can recommendations      I spent greater than 60 minutes managing this patient's significant exacerbation of his condition        Oskar Traylor M.D.

## 2017-08-21 NOTE — ASSESSMENT & PLAN NOTE
- Sudden SOB and R leg swelling for last 4-5 days.   - tachycardia, low grade fever BP: 120/70 RR>25  - doppler for his legs: DVT on the R leg.   - CT PE: B/L PE  - EKG: paced rythem   - did not receive TpA.  - intubated on 8/22  - on heparin.  - increase O2 requirement last night.   - Echo if needed.

## 2017-08-21 NOTE — ED NOTES
Med rec updated and complete  Allergies reviewed  Pt does not know his medications  Received MAR'S from [facility].

## 2017-08-21 NOTE — ASSESSMENT & PLAN NOTE
- GBM  - surgery last April  - s/p chemotherapy last dose was 7/30/17  - s/p radiation last may.   - according to Dr. Ugarte the patient on PO decadron, no further surgical intervention at this time.   - CT head on 8/23: no bleeding.   - oncology consulted.

## 2017-08-21 NOTE — CONSULTS
DATE OF SERVICE:  08/21/2017  INTERNAL MEDICAL CONSULT STAT.    REQUESTING PHYSICIAN:  Forrest Traylor MD    INDICATIONS:  Tachycardia, hypoxemia.    HISTORY OF PRESENT ILLNESS:  This is a very pleasant 81-year-old white male   who underwent a resection of a glioblastoma multiforme via a right parietal   occipital craniotomy on 04/07/2016 by Dr. Lavelle Ugarte.  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  That was the right parietal lobe mass.  A second segment   showed similar findings.  This was sent out to [place] for confirmation.  The   patient developed acute onset of shortness of breath, hypoxemia and   lightheadedness earlier this morning.  He does have a pacemaker for   high-degree heart block, which was placed by Dr. Chung on 08/03/2017.  The   patient was having bouts of syncope.  No complications with this pacemaker   placement on 08/03/2017.  The patient has not been cleared for anticoagulation   due to the fairly recent brain mass discovery and resection, though it is   bordering on 4 months.  He has refused SCD's.  The right leg is very edematous.  The lungs are   clear.  Heart rate is 120, very high likelihood of pulmonary embolism.  The   tumor actually is in the right parietooccipital lobe more toward the occipital   lobe actually, fairly sizable in nature, ring enhancing lesion here on MRI.    The size of the tumor was 4.1 cm.  There was hemorrhage within the   postoperative cavity that is why this gentleman did not actually get approved   for anticoagulation.  He has a normal left ventricular systolic function.    EKG this morning showed a paced dual chambered pacemaker rhythm.  The rate was 115.    The patient had 74% sats on room air with 5 liters facemask oxygen.  It bumped   up to approximately 91%.  The blood pressure also was quite unstable with   systolics I believe less than 100.  Currently, lying flat, he is  maintaining   systolic of approximately 100 and mentating, though not mentating well.  No   focal new deficits.  No headache.  The patient has no prior history of DVT,   though it certainly looks like that right leg is suspicious for clot as it is   edematous to the thigh.    PAST MEDICAL HISTORY:  Hyperlipidemia, hypertension, the above-mentioned   recently discovered glioblastoma multiforme grade IV on 04/07/2017, mitral   valve prolapse, the above-mentioned high degree heart block requiring   pacemaker after 08/03/2017, coccidioidomycosis in the remote past.    PAST SURGICAL HISTORY:  The above-mentioned craniotomy, tonsillectomy in the   remote past, pacemaker placement in the remote past.  No other significant   past surgical history.    CURRENT MEDICATIONS:  Decadron 4 mg q. 6 hours, Colace 100 every day, Icaps 1   p.o. daily from home, omeprazole 20 mg daily.    FAMILY HISTORY:  Noncontributory.    REVIEW OF SYSTEMS:  Complete 14-point review of systems was checked and found   to be negative per AMA/CMS guidelines.    ALLERGIES:  BACTRIM.    SOCIAL HISTORY:  He lives in [place], [place] with his wife.  The patient is   retired.  He worked basically as a  most of his life.  No smoking or significant ETOH.    PHYSICAL EXAMINATION:  VITAL SIGNS:  Blood pressure was 100 systolic, heart rate 120, respiratory   rate 24, temperature 97.5, sats on 6 liters 94%, sats on room air 70%.  GENERAL:  This is a tachypneic, ill-appearing elderly white male, tachypnic and in  Respiratory distress.  HEENT:  Pupils were equal, round and reactive.  Sclerae were anicteric.    Conjunctivae fairly normal in appearance.  NECK:  Supple.  No obvious JVD or adenopathy at this moment.  LUNGS:  Completely clear.  Excellent air movement.  ABDOMEN:  Soft, nontender, nondistended.  No guarding, rebound or masses.  EXTREMITIES:  3+ edema to the thigh on the right.  Good capillary refill in   the right lower extremity.  Left lower  extremity had trace edema.  NEUROLOGIC:  The patient was slow to respond to questions, though he seemed   appropriate.  Visual fields were not checked.  Gait was not checked as the   patient was unstable hemodynamically.  He did answer questions and speech was   clear and fluent.  Wife was at the bedside.    ASSESSMENT:  1.  Probable pulmonary embolism.  2.  Probable right lower Leg DVT  3.  Stage IV glioblastoma multiforme.  4.  History of high-degree heart block with permanent pacemaker placement.  5.  Tachycardia and hypotension/ hypoxemia.  6.  Hyperlipidemia.    PLAN:  The patient is critically ill.  He is hemodynamically unstable.  I   think we should send him to the ER via ambulance stat.  I think this gentleman   needs to be stabilized and a decision needs to be made whether he needs an IVC   filter given the bleeding to the craniotomy cavity 4 months ago versus   anticoagulation, maybe a filter, though he certainly was doing well in therapy   prior to this.  Further workup will be best facilitated in the emergency   room.  The patient is critically ill.  Thirty-six minutes spent stabilizing this gentleman  And arranging transport via ambulence Stat.   Case discussed at length with attending  Dr Traylor and accepted by ER doctor Sofia.        ____________________________________     MD NANO ACUÑA / NAYELY    DD:  08/21/2017 10:19:47  DT:  08/21/2017 11:13:43    D#:  [id number]  Job#:  [id number]

## 2017-08-21 NOTE — DISCHARGE INSTRUCTIONS
Occupational Therapy Discharge Instructions for Alvin Grinnell    8/21/2017    Level of Assist Required for Eating: Requires Supervision with Eating  Level of Assist Required for Grooming: Able to Complete Grooming without Assist  Level of Assist Required for Dressing: Requires Supervision with Dressing  Level of Assist Required for Toileting: Requires Supervision with Toileting  Level of Assist Required for Toilet Transfer: Requires Supervision with Toilet Transfer  Level of Assist Required for Bathing: Requires Supervision with Bathing  Level of Assist Required for Shower Transfer: Requires Supervision with Shower Transfer  Level of Assist Required for Home Mgmt: Requires Physical Assist with Home Management  Level of Assist Required for Meal Prep: Requires Physical Assist with Meal Preparation  Driving: May not Drive, Please Contact Physician for Further Information  Home Exercise Program:  (per chart review, pt d/c acute, did not complete ELOS)

## 2017-08-21 NOTE — PROGRESS NOTES
Late Entry from 0846:  Rapid response called to patient room for irregular breathing pattern per speech therapy at breakfast, oxygen saturation reading at 77% room air. Patient transferred back to bed, Dr Traylor, Dr Mccall, pharmacy, RN, respiratory and speech therapy present. Patient was noted to have bilateral lower extremity edema which is a new finding per physician. Oral temperature was unable to be taken due to patient labored breathing, using mouth and accessory muscles with every respiration. Orders received for STAT EKG, CBC, CMP, CXR oxygen mask. STAT EKG performed and oxygen mask before decision to transfer to [facility]. Wife at bedside. COBRA completed.

## 2017-08-21 NOTE — FLOWSHEET NOTE
08/21/17 0845   Events/Summary/Plan   Events/Summary/Plan RAPID called pt feeling SOB, sats low, pt tachy, finger probe not reading, ear probe reads 88%, placed pt on oxymask 6Lpm sats increased to 94% , EKG done, labs being drawn. check for PE.   Non-Invasive Resp Device Site Inspection Completed Intact   Location NC b/l ears, oxy mask   Interdisciplinary Plan of Care-Goals (Indications)   Obstructive Ventilatory Defect or Pulmonary Disease without Obvious Obstruction History / Diagnosis   Education   Education Yes - Pt. / Family has been Instructed in use of Home Oxygen   Respiratory WDL   Respiratory (WDL) X   Chest Exam   Work Of Breathing / Effort Mild   Respiration 20   Pulse (!) 110   Oximetry   #Pulse Oximetry (Single Determination) Yes   Oxygen   Pulse Oximetry 94 %   O2 (LPM) 6   O2 Daily Delivery Respiratory  Silicone Nasal Cannula   EKG Group   EKG Completed Yes

## 2017-08-22 PROBLEM — D61.818 PANCYTOPENIA (HCC): Status: ACTIVE | Noted: 2017-01-01

## 2017-08-22 PROBLEM — J18.9 PNEUMONIA: Status: ACTIVE | Noted: 2017-01-01

## 2017-08-22 PROBLEM — J96.01 ACUTE RESPIRATORY FAILURE WITH HYPOXIA (HCC): Status: ACTIVE | Noted: 2017-01-01

## 2017-08-22 NOTE — PROGRESS NOTES
Maria R from Lab called with critical result of WBC and platelet count at 0730. Critical lab result read back to Maria R.   Dr. Martini notified of critical lab result at 0750.  Critical lab result read back by Dr. Martini.

## 2017-08-22 NOTE — PROGRESS NOTES
Pulmonary Critical Care Progress Note        Date of Service:  8/22/2017  Chief Complaint: Sudden onset of shortness of breath    History of Present Illness: 82 y.o. male admitted for acute hypoxic respiratory failure due to bilateral pulmonary embolism with worsening status requiring intubation.     ROS:  Unable to obtain as the patient is sedated on the ventilator    Interval Events:  24 hour interval history reviewed   - No overnight events   - A little confused overnight   - No rhythm changes overnight   - UOP 500cc overnight   - Hemodynamically stable   - Desats with simple movements   - HFO2 at 50L at 90%   - Coughing up bloody sputum   - CXR(personally reviewed imaging and report): worsening diffuse bilateral pulmonary infiltrates    PFSH:  No change.    Respiratory:     Pulse Oximetry: 93 %    Exam: coarse throughout, no wheezing and labored breathing  ImagingAvailable data reviewed         Invalid input(s): [id number]    HemoDynamics:  Pulse: 78, Heart Rate (Monitored): 89  Blood Pressure : 112/66 mmHg, NIBP: 108/65 mmHg       Exam: tachycardia  Imaging: Available data reviewed  Recent Labs      08/21/17   1056   BNPBTYPENAT  110*       Neuro:  GCS Total Stacia Coma Score: 14       Exam: no focal deficits noted Motor and sensory exam grossly intact  Imaging: Available data reviewed    Fluids:  Intake/Output       08/20/17 0700 - 08/21/17 0659 (Not Admitted) 08/21/17 0700 - 08/22/17 0659 08/22/17 0700 - 08/23/17 0659      [id number] 8451-0804 Total 2374-2839 8111-6252 Total 7681-2662 0640-8226 Total       Intake    P.O.  --  -- --  --  240 240  --  -- --    P.O. -- -- -- -- 240 240 -- -- --    I.V.  --  -- --  --  130.2 130.2  --  -- --    Heparin Volume -- -- -- -- 130.2 130.2 -- -- --    Total Intake -- -- -- -- 370.2 370.2 -- -- --       Output    Urine  --  -- --  150  500 650  --  -- --    Number of Times Voided -- -- -- 3 x 10 x 13 x -- -- --    Void (ml) -- -- -- 150 500 650 -- -- --    Stool  --   -- --  --  -- --  --  -- --    Number of Times Stooled -- -- -- 0 x 2 x 2 x -- -- --    Total Output -- -- -- 150 500 650 -- -- --       Net I/O     -- -- -- -150 -129.8 -279.8 -- -- --        Weight: 71.9 kg (158 lb 8.2 oz)  Recent Labs      17   10517   SODIUM  134*  137   POTASSIUM  4.5  4.2   CHLORIDE  102  107   CO2  20  20   BUN  39*  31*   CREATININE  0.81  0.64   CALCIUM  8.9  8.0*       GI/Nutrition:  Exam: abdomen is soft and non-tender, normal active bowel sounds  Imaging: Available data reviewed  tube feed Tolerated  Liver Function  Recent Labs      17   10517   ALTSGPT  38  30   ASTSGOT  17  18   ALKPHOSPHAT  91  71   TBILIRUBIN  0.6  0.6   GLUCOSE  242*  188*       Heme:  Recent Labs      17   1040  17   0625   RBC  2.99*   --   2.73*   --    HEMOGLOBIN  9.0*   --   8.0*   --    HEMATOCRIT  27.5*   --   25.2*   --    PLATELETCT  61*   --   42*   --    APTT   --   49.6*   --   136.6*   IRON  48*   --    --    --    TOTIRONBC  174*   --    --    --        Infectious Disease:  Temp  Av.7 °C (99.8 °F)  Min: 36.9 °C (98.4 °F)  Max: 38.3 °C (100.9 °F)  Micro: reviewed  Recent Labs      17   1040  17   1056  17   0052   WBC  5.0   --    --   2.4*   NEUTSPOLYS  89.00*   --    --   95.60*   LYMPHOCYTES  1.90*   --    --   3.50*   MONOCYTES  1.80   --    --   0.90   EOSINOPHILS  0.00   --    --   0.00   BASOPHILS  0.00   --    --   0.00   ASTSGOT   --   17  18   --    ALTSGPT   --   38  30   --    ALKPHOSPHAT   --   91  71   --    TBILIRUBIN   --   0.6  0.6   --      Current Facility-Administered Medications   Medication Dose Frequency Provider Last Rate Last Dose   • piperacillin-tazobactam (ZOSYN) 3.375 g in  mL IVPB  3.375 g Q6HRS Sanjay Bhatia M.D.   Stopped at 17 0500   • senna-docusate (PERICOLACE or SENOKOT S) 8.6-50 MG per tablet 2 Tab  2 Tab BID Jason Villegas,  M.D.   2 Tab at 08/21/17 2115    And   • polyethylene glycol/lytes (MIRALAX) PACKET 1 Packet  1 Packet QDAY PRN Jason Villegas M.D.        And   • magnesium hydroxide (MILK OF MAGNESIA) suspension 30 mL  30 mL QDAY PRN Jason Villegas M.D.        And   • bisacodyl (DULCOLAX) suppository 10 mg  10 mg QDAY PRN Jason Villegas M.D.       • Respiratory Care per Protocol   Continuous RT Jason Villegas M.D.       • NS infusion   Continuous Jason Villegas M.D. 150 mL/hr at 08/22/17 0653     • enalaprilat (VASOTEC) injection 1.25 mg  1.25 mg Q6HRS PRN Jason Villegas M.D.       • ondansetron (ZOFRAN) syringe/vial injection 4 mg  4 mg Q4HRS PRN Jason Villegas M.D.       • ondansetron (ZOFRAN ODT) dispertab 4 mg  4 mg Q4HRS PRN Jason Villegas M.D.       • acetaminophen (TYLENOL) tablet 650 mg  650 mg Q6HRS PRN Jason Villegas M.D.       • heparin 1000 units/mL injection 2,600 Units  2,600 Units PRN Tho aBrahona PHARMD   Stopped at 08/21/17 2341    And   • heparin infusion 25,000 units in 500 ml 0.45% nacl   Continuous Tho Barahona PHARMD 21 mL/hr at 08/22/17 0713 1,050 Units/hr at 08/22/17 0713   • dexamethasone (DECADRON) injection 4 mg  4 mg Q6HRS Jason Villegas M.D.   4 mg at 08/22/17 0536   • famotidine (PEPCID) injection 20 mg  20 mg BID Jason Villegas M.D.   20 mg at 08/21/17 2115     Last reviewed on 8/21/2017 11:37 AM by Tereso Arrington    Quality  Measures:  Radiology images reviewed, Labs reviewed and Medications reviewed  Martins catheter: Critically Ill - Requiring Accurate Measurement of Urinary Output      DVT Prophylaxis: Heparin  DVT prophylaxis - mechanical: SCDs  Ulcer prophylaxis: Not indicated  Antibiotics: Treating active infection/contamination beyond 24 hours perioperative coverage        Problems/Plan:  Acute Hypoxic Respiratory Failure due to PE   - intubated 8/22   - Continue RT/O2 protocols   - Will wean PEEP and FiO2 as  tolerated   - may need forced diuresis eventually  Acute Bilateral PE   - Heparin drip with eventual transition to xarelto   - No thrombolytics given as there is no RV strain on CTA of chest as well as high bleed risk due to recent craniotomy for GBM   - Monitor hemodynamics closely and may require vasopressor therapies   - ECHO pending   - doppler u/s of lower extremities with bilateral DVT  Acute lactic acidosis   - continue to monitor and likely due to above processes  GBM s/p resection with XRT and chemo   - will repeat CT head   - last chemo/XRT 7/31   - need to discuss prognosis with oncologist  Complete AV block   - s/p pacer placement on 8/3  Acute Pancytopenia   - concern for sepsis   - septic protocols initiated   - started zosyn   - follow cultures   - may need vasopressors   - monitor closely  Thrombocytopenia   - query related to chemo vs sepsis/consumption   - check CT head   Hyperglycemia   - ISS  Moderate Protein Calorie Malnutrition   - Cortrak placed   - enteral feedings  Prognosis   - poor given patients multiple underlying co-morbidities prior to his PE diagnosis    Discussed patient condition and risk of morbidity and/or mortality with Family, RN, RT, Pharmacy, Dietary, [facility] resident, Code status disscussed, Charge nurse / hot rounds and Patient.    The patient remains critically ill.  Critical care time = 42 minutes in directly providing and coordinating critical care and extensive data review.  No time overlap and excludes procedures.

## 2017-08-22 NOTE — CARE PLAN
Problem: Communication  Goal: The ability to communicate needs accurately and effectively will improve  Outcome: PROGRESSING AS EXPECTED  Oriented patient and spouse to environment, educating patient on plan of care, procedures, treatments, and medications.    Problem: Respiratory:  Goal: Respiratory status will improve  Outcome: PROGRESSING AS EXPECTED  Assessing and monitoring pulmonary status, educating and encouraging coughing and deep breathing.

## 2017-08-22 NOTE — CARE PLAN
Problem: Oxygenation:  Goal: Maintain adequate oxygenation dependent on patient condition  Outcome: PROGRESSING SLOWER THAN EXPECTED  Pt is currently progressing slower than expected.   Currently on HHFNC 40L .80

## 2017-08-22 NOTE — RESPIRATORY CARE
Respiratory Therapy Update          Placed on Heated High Flow Nasal Cannula (HHFNC) 30lpm FiO2 90%.

## 2017-08-22 NOTE — ASSESSMENT & PLAN NOTE
Low WBC, Plt and Hb  Last modi of chemo 7/30/17  Fever and infiltration on CXR.   Could be related top chemotherapy or part of multi organ failure.   oncology consulted.  Zosyn  Follow up and RBC transfusion if Hb less than 7  Neupogen started on 8/23 to keep ANC>1500

## 2017-08-22 NOTE — PROCEDURES
Central Venous Catheter (CVC, Central Line) Placement:    Date: 8/22/17  Time: 12:00 PM  Indication: Hemodynamic monitoring/Intravenous access  Resident: Dr Villegas  Attending: Dr. Martini.     A time-out was completed verifying correct patient, procedure, site, positioning, and special equipment if applicable. The patient was placed in a dependent position appropriate for central line placement based on the vein to be cannulated. The patient’s right neck was prepped and draped in sterile fashion. A triple lumen 9-Mosotho Cordis catheter was introduced into the the R internal jugular using the Seldinger technique under ultrasound guidance. The catheter was threaded smoothly over the guide wire and appropriate blood return was obtained. Each lumen of the catheter was evacuated of air and flushed with sterile saline. The catheter was then sutured in place to the skin and a sterile dressing applied. Perfusion to the extremity distal to the point of catheter insertion was checked and found to be adequate.   Dr. Martini was present for the entire procedure.    Estimated Blood Loss: 3 cm  The patient tolerated the procedure well and there were no complications.

## 2017-08-22 NOTE — PROGRESS NOTES
Reported critical WBC and PLT values to on call UNJAILYN Prabhakar.  MD stated he would put orders in for antibiotics and labs.

## 2017-08-22 NOTE — CARE PLAN
Problem: Ventilation Defect:  Goal: Ability to achieve and maintain unassisted ventilation or tolerate decreased levels of ventilator support  Outcome: PROGRESSING AS EXPECTED  Vent Day 1 Pt was intubated with 8.0 ETT @ 25 at 11:53 by Dr Martini.  CMV 20/410/80%/8

## 2017-08-22 NOTE — PROCEDURES
Procedure Note    Date: 8/22/2017  Time: 1200p    Procedure: Intubation    Indication: Acute hypoxic respiratory failure, Bilateral pulmonary emboli  Consent: Emergency/implied    Procedure: A time-out was performed. Airway assessed and patient found to have Mallampati class 2.  Equipment prepared and RT/RN at bedside. Patient pre-oxygenated with 100% FiO2.  After medication delivery, a Glidescope was used to acheive direct visualization and a grade 1 view. A 8.0 ETT was placed with 1 attempt(s) into the trachea directly through the vocal cords. Appropriate placement confirmed by direct visualization, bilateral chest and epigastric auscultation, misting in the ETT, and ETCO2 detector. ETT secured in place at 25 cm at the teeth and patient connected to ventilator. Sedation/analgesia continued as appropriate. Patient tolerated procedure well without any difficulties and remains in care of bedside nurse and respiratory therapy. CXR will be performed to confirm appropriate placement of ETT.    Medications: Etomidate 20mg IV, Rocuronium 80mg IV  Complications: none  CXR: pending    Juliet Martini MD  Pulmonary and Critical Care Medicine

## 2017-08-22 NOTE — PROGRESS NOTES
"[facility] ICU Progress Note      Admit Date: 8/21/2017  ICU Day: 1    Resident(s): Nikki Villegas  Attending: BRIANA RAMIREZ/ Dr. Martini     Date & Time:   8/22/2017   1:37 PM       Patient ID:    Name:             Grinnell , Alvin     YOB: 1935  Age:                 82 y.o.  male   MRN:               [id number]    HPI:  82 year old male with hx of GBM s/p surgery radiation and chemo, heart block with pacemaker came with SOB and found B/L PE and he developed infiltration in his lungs and intubated on 8/22.     Consultants:  PMA: Dr Martini     Interval Events:    - New infiltration on his lungs.  - RR>30 and Sat dropped down to 80%  - intubated on 8/22  - Central line was inserted on 8/22  - started with zosyn for pneumonia.   - we talked with the wife and family and explained to them the situation and answered all their questions.     Review of Systems   Unable to perform ROS: intubated   Respiratory: Positive for shortness of breath.    Cardiovascular: Positive for leg swelling.       PHYSICAL EXAM  Filed Vitals:    08/22/17 1130 08/22/17 1145 08/22/17 1200 08/22/17 1228   BP:       Pulse:  112 111    Temp:       Resp:  49 22    Height:       Weight:       SpO2: 81% 85% 96% 97%     Body mass index is 28.09 kg/(m^2).  /66 mmHg  Pulse 111  Temp(Src) 37.7 °C (99.8 °F)  Resp 22  Ht 1.6 m (5' 3\")  Wt 71.9 kg (158 lb 8.2 oz)  BMI 28.09 kg/m2  SpO2 97%  O2 therapy: Pulse Oximetry: 97 %, O2 (LPM): 40, FIO2%: 100, O2 Delivery: Ventilator    Physical Exam   Constitutional: He appears distressed.   Neck: No JVD present.   Cardiovascular:   No murmur heard.  tachy   Pulmonary/Chest: He is in respiratory distress. He has no wheezes. He has rales.   Tachypnea   Crackles on the L side.    Abdominal: Soft. He exhibits no distension. There is no tenderness. There is no rebound.   Musculoskeletal: He exhibits edema.   Neurological:   Sedated and intubated    Skin: Skin is warm.       Respiratory:     " Respiration: (!) 22, Pulse Oximetry: 97 %, O2 Daily Delivery Respiratory : Highflow Nasal Cannula    Chest Tube Drains:          Invalid input(s): [id number]    HemoDynamics:  Pulse: (!) 111, Heart Rate (Monitored): (!) 138 NIBP: 142/106 mmHg      Neuro:      Fluids:        Intake/Output Summary (Last 24 hours) at 17 1322  Last data filed at 17 0600   Gross per 24 hour   Intake  370.2 ml   Output    650 ml   Net -279.8 ml       Weight: 71.9 kg (158 lb 8.2 oz)  Body mass index is 28.09 kg/(m^2).    Recent Labs      17   10517   2300   SODIUM  134*  137   POTASSIUM  4.5  4.2   CHLORIDE  102  107   CO2  20  20   BUN  39*  31*   CREATININE  0.81  0.64   CALCIUM  8.9  8.0*       GI/Nutrition:  Recent Labs      17   10517   2300   ALTSGPT  38  30   ASTSGOT  17  18   ALKPHOSPHAT  91  71   TBILIRUBIN  0.6  0.6   GLUCOSE  242*  188*       Heme:  Recent Labs      17   1040  17   0625   RBC  2.99*   --   2.73*  2.58*   HEMOGLOBIN  9.0*   --   8.0*  7.7*   HEMATOCRIT  27.5*   --   25.2*  24.1*   PLATELETCT  61*   --   42*  39*   APTT   --   49.6*   --   136.6*   IRON  48*   --    --    --    TOTIRONBC  174*   --    --    --        Infectious Disease:  Temp  Av.6 °C (99.6 °F)  Min: 36.9 °C (98.4 °F)  Max: 38.3 °C (100.9 °F)  Recent Labs      17   1040  17   1056  17   23017   0625   WBC  5.0   --    --   2.4*  2.0*   NEUTSPOLYS  89.00*   --    --   95.60*  86.60*   LYMPHOCYTES  1.90*   --    --   3.50*  3.60*   MONOCYTES  1.80   --    --   0.90  0.90   EOSINOPHILS  0.00   --    --   0.00  1.80   BASOPHILS  0.00   --    --   0.00  0.90   ASTSGOT   --   17  18   --    --    ALTSGPT   --   38  30   --    --    ALKPHOSPHAT   --   91  71   --    --    TBILIRUBIN   --   0.6  0.6   --    --        Meds:  • piperacillin-tazobactam  3.375 g 3.375 g (17 1037)   • propofol  0-80 mcg/kg/min 15  mcg/kg/min (08/22/17 1236)   • chlorhexidine  15 mL     • lidocaine  1-2 mL     • MD ALERT...Adult ICU Electrolyte Replacement per Pharmacy Protocol       • fentaNYL  25 mcg      Or   • fentaNYL  50 mcg      Or   • fentaNYL  100 mcg     • ipratropium-albuterol  3 mL     • senna-docusate  2 Tab      And   • polyethylene glycol/lytes  1 Packet      And   • magnesium hydroxide  30 mL      And   • bisacodyl  10 mg     • Respiratory Care per Protocol       • NS   150 mL/hr at 08/22/17 0653   • enalaprilat  1.25 mg     • ondansetron  4 mg     • ondansetron  4 mg     • acetaminophen  650 mg     • heparin 1000 units/mL  2,600 Units      And   • heparin   1,050 Units/hr (08/22/17 0713)   • dexamethasone  4 mg     • famotidine  20 mg          Procedures:  Intubation on 8/22  Central line on 8/22    Imaging:  DX-CHEST-PORTABLE (1 VIEW)   Final Result      1.  Endotracheal tube tip projects approximately 3 cm above the steven.      2.  Right internal jugular catheter tip projects over the cavoatrial junction. No pneumothorax is identified.      3.  No significant change in diffuse interstitial and airspace opacities.      DX-CHEST-LIMITED (1 VIEW)   Final Result         1. No significant interval change.      LE VENOUS DUPLEX (Specify in Comments Left, Right Or Bilateral)   Final Result      CT-CTA CHEST PULMONARY ARTERY W/ RECONS   Final Result      1.  Bilateral pulmonary emboli. No CT evidence for right ventricular strain.      2.  Patchy diffuse groundglass density with tiny nodules and cavitary lesions, increased from the prior exam.      3.  Small bilateral pleural effusions      4.  Atherosclerosis      5.  Mediastinal adenopathy, nonspecific.         Comment: Results discussed with Dr. Zheng at 12:40 PM            DX-CHEST-PORTABLE (1 VIEW)   Final Result      Increased diffuse interstitial opacities within both lungs consistent with atelectasis/possible mild edema or pneumonitis.          Problem and Plan:      *  Acute respiratory failure with hypoxia (CMS-HCC) (present on admission)  Assessment & Plan  Type 1 hypoxia.   Due to PE b/l and pneumonia  EF 60%  Crackles on the left side  Intubated on 8/22  Zosyn and heparin   monitor for ARDS???      Pneumonia (present on admission)  Assessment & Plan  - WBC : 5>>>>2 and low grade fever.   - procalcitonin 2.6   - LA: 4.8>>>>1.8  - anion katie: 12 and Hco3: 20   - Chest X ray: new infiltration on the left side.   - zosyn day #1  - follow blood and sputum culture.     Pulmonary embolism (CMS-HCC) (present on admission)  Assessment & Plan  - Sudden SOB and R leg swelling for last 4-5 days.   - tachycardia, low grade fever BP: 120/70 RR>25  - doppler for his legs: DVT on the R leg.   - CT PE: B/L PE  - EKG: paced rythem   - did not receive TpA.  - intubated on 8/22  - on heparin.  - increase O2 requirement last night.   - Echo if needed.     Pancytopenia (CMS-HCC) (present on admission)  Assessment & Plan  Low WBC, Plt and Hb  Last modi of chemo 7/30/17  Fever and infiltration on CXR.   oncology consult if needed.    Zosyn  Follow up and RBC transfusion if Hb less than 7    Brain mass (present on admission)  Assessment & Plan  - GBM  - surgery last April  - s/p chemotherapy last dose was 7/30/17  - s/p radiation last may.   - according to Dr. Ugarte the patient on PO decadron, no further surgical intervention at this time.   - follow up and ask for oncology consult if needed.     Heart block, AV (present on admission)  Assessment & Plan  - pacemaker placed by Dr. Chung 8/3  - Pacer working, no chest pain  -   - tachycardia due to PE.  - STABLE, follow up.     Anemia (present on admission)  Assessment & Plan  - Hb is 9 (baseline), the patient has pancytopenia.   - no bleeding   - hx of blood transfusion after the surgery Hb was 6  - MCV: 92 Plt: 61   - Iron panel was normal.  - likely related to chemotherapy.         DISPO: ICU    CODE STATUS: Full    Quality Measures:  Eliezer  Catheter: yes  DVT Prophylaxis: Heparin   Ulcer Prophylaxis: none  Antibiotics: zosyn day #1  Lines: [facility]

## 2017-08-22 NOTE — ASSESSMENT & PLAN NOTE
Type 1 hypoxia.   Due to PE b/l and pneumonia, ARDS???,   EF 60%   CXR: infiltration b/l.   Intubated on 8/22  Hemoptysis   prolonged periods of desaturation with multiple vent modes and assisted breathing via Ambu-Bag  Zosyn  D/c heparin   lasix drip.   Reassess him for dialysis and discuss with family about the prognosis.   Poor prognosis.

## 2017-08-22 NOTE — FLOWSHEET NOTE
08/22/17 1531   Events/Summary/Plan   Events/Summary/Plan RR increased to 26 and PEEP dropped to 8 per order.

## 2017-08-23 NOTE — PROGRESS NOTES
"[facility] Progress Note      Admit Date: 8/21/2017  ICU Day: 2    Resident(s): Nikki Villegas  Attending: BRIANA RAMIREZ/ Dr. Martini     Date & Time:   8/23/2017   12:25 PM       Patient ID:    Name:             Grinnell , Alvin     YOB: 1935  Age:                 82 y.o.  male   MRN:               [id number]    HPI:  82 year old male with hx of GBM s/p surgery radiation and chemo, heart block with pacemaker came with SOB and found B/L PE and he developed infiltration in his lungs and intubated on 8/22.     Consultants:  PMA: Dr Martini     Interval Events:    - Vent day # 2 intubated on 8/22  - On zosyn for pneumonia.   - Nepogen and plt transfusion.  - Bolus and sodium acetate fluid for JIAN.        Review of Systems   Unable to perform ROS: intubated   Respiratory: Positive for shortness of breath.    Cardiovascular: Positive for leg swelling.       PHYSICAL EXAM  Filed Vitals:    08/23/17 1109 08/23/17 1124 08/23/17 1134 08/23/17 1143   BP: 108/58 103/62     Pulse: 83      Temp: 36.9 °C (98.4 °F) 36.9 °C (98.4 °F)     Resp: 23      Height:       Weight:       SpO2: 98%  99% 98%     Body mass index is 27.7 kg/(m^2).  /62 mmHg  Pulse 83  Temp(Src) 36.9 °C (98.4 °F)  Resp 23  Ht 1.6 m (5' 2.99\")  Wt 70.9 kg (156 lb 4.9 oz)  BMI 27.70 kg/m2  SpO2 98%  O2 therapy: Pulse Oximetry: 98 %, FIO2%: 80, O2 Delivery: Ventilator    Physical Exam   Constitutional: He appears distressed.   Neck: No JVD present.   Cardiovascular:   No murmur heard.  tachy   Pulmonary/Chest: He is in respiratory distress. He has no wheezes. He has rales.   Tachypnea   Crackles on the L side.    Abdominal: Soft. He exhibits no distension. There is no tenderness. There is no rebound.   Musculoskeletal: He exhibits edema.   Neurological:   Sedated and intubated    Skin: Skin is warm.       Respiratory:  Karimi Vent Mode: APVCMV  Respiration: (!) 23, Pulse Oximetry: 98 %    Chest Tube Drains:    Recent Labs      " 08/22/17   1423  08/23/17   0445   ISTATAPH  7.151*  7.352*   ISTATAPCO2  51.6*  29.6   ISTATAPO2  86  61*   ISTATATCO2  20  17*   [id number]  93  90*   ISTATARTHCO3  18.0  16.4*   ISTATARTBE  -10*  -8*   ISTATTEMP  see below  97.7 F   ISTATFIO2  80  80   ISTATSPEC  Arterial  Arterial   ISTATAPHTC   --   7.359*   [id number]   --   59*       HemoDynamics:  Pulse: 83, Heart Rate (Monitored): 83 Blood Pressure : 103/62 mmHg, NIBP: (!) 90/57 mmHg CVP (mm Hg): 2 MM HG    Neuro:      Fluids:        Intake/Output Summary (Last 24 hours) at 08/22/17 1322  Last data filed at 08/22/17 0600   Gross per 24 hour   Intake  370.2 ml   Output    650 ml   Net -279.8 ml       Weight: 70.9 kg (156 lb 4.9 oz)  Body mass index is 27.7 kg/(m^2).    Recent Labs      08/21/17   2300  08/22/17   1630  08/22/17   2347  08/23/17   0500   SODIUM  137  135   --   136   POTASSIUM  4.2  4.4   --   4.3   CHLORIDE  107  107   --   109   CO2  20  18*   --   15*   BUN  31*  35*   --   43*   CREATININE  0.64  1.05   --   1.61*   MAGNESIUM   --    --   1.9  2.2   PHOSPHORUS   --    --    --   4.7*   CALCIUM  8.0*  7.8*   --   7.2*       GI/Nutrition:  Recent Labs      08/21/17   2300  08/22/17   1630  08/23/17   0500   ALTSGPT  30  333*  233*   ASTSGOT  18  240*  89*   ALKPHOSPHAT  71  90  59   TBILIRUBIN  0.6  1.2  1.0   GLUCOSE  188*  265*  219*       Heme:  Recent Labs      08/21/17   1040   08/22/17   0625  08/22/17   1405  08/22/17   1630  08/22/17   2105  08/23/17   0500  08/23/17   0740   RBC  2.99*   < >  2.58*   --   2.93*   --    --   3.02*   HEMOGLOBIN  9.0*   < >  7.7*   --   8.9*   --    --   9.0*   HEMATOCRIT  27.5*   < >  24.1*   --   28.2*   --    --   28.1*   PLATELETCT  61*   < >  39*   --   47*   --    --   18*   APTT   --    < >  136.6*  51.2*   --   >240.0*  144.1*   --    IRON  48*   --    --    --    --    --    --    --    TOTIRONBC  174*   --    --    --    --    --    --    --     < > = values in this interval not displayed.        Infectious Disease:  Temp  Av.8 °C (98.3 °F)  Min: 36.4 °C (97.5 °F)  Max: 37.1 °C (98.7 °F)  Recent Labs      17   2300   17   0625  17   1630  17   0500  17   0740   WBC   --    < >  2.0*  4.9   --   1.1*   NEUTSPOLYS   --    < >  86.60*  84.40*   --   80.70*   LYMPHOCYTES   --    < >  3.60*  3.50*   --   10.50*   MONOCYTES   --    < >  0.90  2.60   --   0.00   EOSINOPHILS   --    < >  1.80  0.00   --   0.90   BASOPHILS   --    < >  0.90  0.00   --   0.00   ASTSGOT  18   --    --   240*  89*   --    ALTSGPT  30   --    --   333*  233*   --    ALKPHOSPHAT  71   --    --   90  59   --    TBILIRUBIN  0.6   --    --   1.2  1.0   --     < > = values in this interval not displayed.       Meds:  • sodium acetate IV infusion       • tbo-filgrastim  5 mcg/kg     • piperacillin-tazobactam  3.375 g Stopped (17 1100)   • propofol  0-80 mcg/kg/min 20 mcg/kg/min (17 0816)   • chlorhexidine  15 mL     • lidocaine  1-2 mL     • MD ALERT...Adult ICU Electrolyte Replacement per Pharmacy Protocol       • fentaNYL  25 mcg      Or   • fentaNYL  50 mcg      Or   • fentaNYL  100 mcg     • ipratropium-albuterol  3 mL     • fentaNYL  50 mcg/hr 50 mcg/hr (17 0718)   • senna-docusate  2 Tab      And   • polyethylene glycol/lytes  1 Packet      And   • magnesium hydroxide  30 mL      And   • bisacodyl  10 mg     • Respiratory Care per Protocol       • enalaprilat  1.25 mg     • ondansetron  4 mg     • ondansetron  4 mg     • acetaminophen  650 mg     • heparin 1000 units/mL  2,600 Units      And   • heparin   650 Units/hr (17 0748)   • dexamethasone  4 mg     • famotidine  20 mg          Procedures:  Intubation on   Central line on     Imaging:  DX-CHEST-PORTABLE (1 VIEW)   Final Result         1. No significant interval change.      CT-HEAD W/O   Final Result         1. Postsurgical change in the right parietal region from tumor resection.      2. New curvilinear high  density area along the margin of the cavity is nonspecific and residual tumor cannot be excluded. Correlate with MRI. No mass effect or midline shift.               DX-CHEST-PORTABLE (1 VIEW)   Final Result      1.  Endotracheal tube tip projects approximately 3 cm above the steven.      2.  Right internal jugular catheter tip projects over the cavoatrial junction. No pneumothorax is identified.      3.  No significant change in diffuse interstitial and airspace opacities.      DX-CHEST-LIMITED (1 VIEW)   Final Result         1. No significant interval change.      LE VENOUS DUPLEX (Specify in Comments Left, Right Or Bilateral)   Final Result      CT-CTA CHEST PULMONARY ARTERY W/ RECONS   Final Result      1.  Bilateral pulmonary emboli. No CT evidence for right ventricular strain.      2.  Patchy diffuse groundglass density with tiny nodules and cavitary lesions, increased from the prior exam.      3.  Small bilateral pleural effusions      4.  Atherosclerosis      5.  Mediastinal adenopathy, nonspecific.         Comment: Results discussed with Dr. Zheng at 12:40 PM            DX-CHEST-PORTABLE (1 VIEW)   Final Result      Increased diffuse interstitial opacities within both lungs consistent with atelectasis/possible mild edema or pneumonitis.          Problem and Plan:      * Acute respiratory failure with hypoxia (CMS-HCC) (present on admission)  Assessment & Plan  Type 1 hypoxia.   Due to PE b/l and pneumonia  EF 60%  Crackles on the left side  Intubated on 8/22  Zosyn and heparin   monitor for ARDS???      Pneumonia (present on admission)  Assessment & Plan  - WBC : 5>>>>2>>1.1 and low grade fever.   - procalcitonin 2.6   - LA: 4.8>>>>1.8  - anion katie: 12 and Hco3: 20   - Chest X ray: new infiltration on the left side.   - zosyn day # 2  - follow blood and sputum culture.   - sodium acetate fluid.  - Neupogen started on 8/23.      Pulmonary embolism (CMS-HCC) (present on admission)  Assessment & Plan  - Sudden  SOB and R leg swelling for last 4-5 days.   - tachycardia, low grade fever BP: 120/70 RR>25  - doppler for his legs: DVT on the R leg.   - CT PE: B/L PE  - EKG: paced rythem   - did not receive TpA.  - intubated on 8/22  - on heparin.  - increase O2 requirement last night.   - Echo if needed.     Pancytopenia (CMS-HCC) (present on admission)  Assessment & Plan  Low WBC, Plt and Hb  Last modi of chemo 7/30/17  Fever and infiltration on CXR.   oncology consult if needed.    Zosyn  Follow up and RBC transfusion if Hb less than 7    Brain mass (present on admission)  Assessment & Plan  - GBM  - surgery last April  - s/p chemotherapy last dose was 7/30/17  - s/p radiation last may.   - according to Dr. Ugarte the patient on PO decadron, no further surgical intervention at this time.   - CT head on 8/23: no bleeding.   - oncology consulted.     Heart block, AV (present on admission)  Assessment & Plan  - pacemaker placed by Dr. Chung 8/3  - Pacer working, no chest pain  -   - tachycardia due to PE.  - STABLE, follow up.     Anemia (present on admission)  Assessment & Plan  - Hb is 9 (baseline), the patient has pancytopenia.   - no bleeding   - hx of blood transfusion after the surgery Hb was 6  - MCV: 92 Plt: 61   - Iron panel was normal.  - likely related to chemotherapy.         DISPO: ICU    CODE STATUS: Full    Quality Measures:  Martins Catheter: yes  DVT Prophylaxis: Heparin   Ulcer Prophylaxis: none  Antibiotics: zosyn day #2  Lines: RIJ - Central line was inserted on 8/22

## 2017-08-23 NOTE — PROGRESS NOTES
1045-Late note:  Patient's oxygen saturation dropped into the 80's after being put on bed pan.  Head of bed up, and oxygen increase to full support through high flow.  Patient became tachypneic and could not catch breath.  [facility] resident and Dr. Anderson called to bedside.

## 2017-08-23 NOTE — PROGRESS NOTES
Critical PTT lab result was 144.1 at 0632. This value is within the defined limits of the Heparin Weight Based Protocol ordered by the physician for this patient. Heparin Weight Based Protocol will be followed for any adjustments, physician Dr. Bhatia was not notified per protocol instructions.

## 2017-08-23 NOTE — PROGRESS NOTES
Dr. Bhatia at bedside to assess patient.  Per MD, okay for pt's breathing rate to be in low 30's/min unless HR increases to >120.  Updated Dr. Bhatia on pt's borderline MAP & CT results.

## 2017-08-23 NOTE — PROGRESS NOTES
Updated Dr. Villegas regarding pt's low urine output (30cc/shift).  Received orders to keep IVF 150ml/hr.

## 2017-08-23 NOTE — DIETARY
"Nutrition Support (cortrak) Assessment - Male    Alvin Grinnell is a 82 y.o. male with admitting DX of Lactic acidosis, Pulmonary embolism  Past Medical History   Diagnosis Date   • Hyperlipidemia    • Hypertension    • Glioblastoma multiforme (CMS-HCC) 17   • History of [place] fever    • Mitral valve prolapse    • Heart block, AV 8/3/2017     Past Surgical History   Procedure Laterality Date   • Craniotomy stealth Right 2017     Procedure: CRANIOTOMY STEALTH - PARIETAL OCCIPITAL;  Surgeon: Lavelle Ugarte M.D.;  Location: SURGERY [facility];  Service:    • Lung needle biopsy       [place]ever   • Tonsillectomy       Allergies:  Bactrim  Height: 160 cm (5' 2.99\")  Weight: 70.9 kg (156 lb 4.9 oz)  Weight to Use in Calculations: 71 kg (156 lb 8.4 oz)  Ideal Body Weight: 56.246 kg (124 lb)  Percent Ideal Body Weight: 126.1  Body mass index is 27.7 kg/(m^2). - overweight     Pertinent Labs: Serum Glucose 219, BUN 43, creat 1.61, AST 89, , K/Mg WNL, Phos 4.7, Corrected calcium 10.6  Pertinent Medications: Decadron, Pepcid, Heparin, Adult ICU electrolyte replacement protocol, Zofran (prn), Zosyn, Propofol (currenlty at 8.2 mL/hr = 216 kcals/day)   Pertinent Fluids: IVF Sodium acetate at 150 mL/hr   Skin: No skin breakdown noted at this time   Last BM: 17    Estimated Needs: MSJ x 1.2 = 1580 kcals, PSU 2003b = 1675 kcals (ve: 13.8, Tmax/24 hours: 37.1C)   Total Calories / day: 1560 - 1990 (Calories / k - 28)  Total Grams Protein / day: 71 - 95  (Grams Protein / k - 1.3)  Total Fluids ml / day: 1778.1 ml         Assessment / Evaluation:   - Pt is on day 2 of admit   - Intubated yesterday   - Nephrology involved d/t pt's worsening creatine and decreased UOP   - While pt is receiving propofol, which is providing an additional 216 kcals per day, will provide specialized TF formula Impact Peptide 1.5 as it provides Omega-3's and is lower in phosphorus.   - Once propofol is " d/c'd, will provide standard TF formula, Fibersource HN and RD will monitor TF tolerance/wt/labs and will adjust as necessary     Plan / Recommendation:   · Once cortrak is placed and verified, initiate nutrition support:   · On propofol: Start Impact Peptide 1.5 at 25 mL/hr and advance per protocol to goal rate of 40 mL/hr.  TF at goal will provide 1440 kcals (+kcals from propofol), 90 gm protein and 740 mL free water per day   · Off propofol: Change TF to Fibersource HN and advance to goal rate of 60 mL/hr as pt is able.  TF at goal will provide 1728 kcals, 78 gm protein and 1165 mL free water per day   · Fluids per MD   · Monitor wt and lab trends   · RD to monitor TF tolerance and meds; will adjust accordingly     RD following

## 2017-08-23 NOTE — PROGRESS NOTES
Pulmonary Critical Care Progress Note        Date of Service:  8/23/2017  Chief Complaint: Sudden onset of shortness of breath    History of Present Illness: 82 y.o. male admitted for acute hypoxic respiratory failure due to bilateral pulmonary embolism with worsening status requiring intubation.     ROS:  Unable to obtain as the patient is sedated on the ventilator    Interval Events:  24 hour interval history reviewed    - CT of brain without bleed   - Oncology on call recommends transfusing platelets to keep > 30K   - Not following with sedation vacaition   - Decreased UOP overnight   - CVP 2-4   - CXR(personally reviewed imaging and report): continued diffuse bilateral infiltrates/opacities with ETT about 2 cm above the steven    Yesterday's Events:   - No overnight events   - A little confused overnight   - No rhythm changes overnight   - UOP 500cc overnight   - Hemodynamically stable   - Desats with simple movements   - HFO2 at 50L at 90%   - Coughing up bloody sputum   - CXR(personally reviewed imaging and report): worsening diffuse bilateral pulmonary infiltrates    PFSH:  No change.    Respiratory:  Karimi Vent Mode: APVCMV, Rate (breaths/min): 26, Vt Target (mL): 380, PEEP/CPAP: 12, FiO2: 90, Static Compliance (ml / cm H2O): 77, Control VTE (exp VT): 527  Pulse Oximetry: 97 %    Exam: coarse throughout, no wheezing and labored breathing  ImagingAvailable data reviewed   Recent Labs      08/22/17   1423  08/23/17   0445   ISTATAPH  7.151*  7.352*   ISTATAPCO2  51.6*  29.6   ISTATAPO2  86  61*   ISTATATCO2  20  17*   [id number]  93  90*   ISTATARTHCO3  18.0  16.4*   ISTATARTBE  -10*  -8*   ISTATTEMP  see below  97.7 F   ISTATFIO2  80  80   ISTATSPEC  Arterial  Arterial   ISTATAPHTC   --   7.359*   [id number]   --   59*       HemoDynamics:  Pulse: 78, Heart Rate (Monitored): 80  NIBP: (!) 90/59 mmHg  CVP (mm Hg): 2 MM HG    Exam: tachycardia  Imaging: Available data reviewed  Recent Labs      08/21/17    1056  08/22/17   1630  08/22/17   2347  08/23/17   0500   TROPONINI   --   0.49*  0.44*  0.30*   BNPBTYPENAT  110*   --    --    --        Neuro:  GCS Total Callery Coma Score: 8       Exam: no focal deficits noted Motor and sensory exam grossly intact  Imaging: Available data reviewed    Fluids:  Intake/Output       08/21/17 0700 - 08/22/17 0659 08/22/17 0700 - 08/23/17 0659 08/23/17 0700 - 08/24/17 0659      0700-1859 4977-6155 Total 0700-1859 9931-3348 Total 0700-1859 1858-5053 Total       Intake    P.O.  --  240 240  200  -- 200  --  -- --    P.O. -- 240 240 200 -- 200 -- -- --    I.V.  --  130.2 130.2  --  2414.5 2414.5  --  -- --    Propofol Volume -- -- -- -- 174.3 174.3 -- -- --    Heparin Volume -- 130.2 130.2 -- 440.3 440.3 -- -- --    IV Volume (NS infusion) -- -- -- -- 1500 1500 -- -- --    IV Piggyback Volume (IV Piggyback) -- -- -- -- 300 300 -- -- --    Total Intake -- 370.2 370.2 200 2414.5 2614.5 -- -- --       Output    Urine  150  500 650  475  30 505  --  -- --    Number of Times Voided 3 x 10 x 13 x 7 x -- 7 x -- -- --    Number of Times Incontinent of Urine -- -- -- 2 x -- 2 x -- -- --    Void (ml) 150 500 650 475 30 505 -- -- --    Stool  --  -- --  --  -- --  --  -- --    Number of Times Stooled 0 x 2 x 2 x 0 x -- 0 x -- -- --    Total Output 150 500 650 475 30 505 -- -- --       Net I/O     -150 -129.8 -279.8 -275 2384.5 2109.5 -- -- --        Weight: 70.9 kg (156 lb 4.9 oz)  Recent Labs      08/21/17   2300  08/22/17   1630  08/22/17   2347  08/23/17   0500   SODIUM  137  135   --   136   POTASSIUM  4.2  4.4   --   4.3   CHLORIDE  107  107   --   109   CO2  20  18*   --   15*   BUN  31*  35*   --   43*   CREATININE  0.64  1.05   --   1.61*   MAGNESIUM   --    --   1.9  2.2   PHOSPHORUS   --    --    --   4.7*   CALCIUM  8.0*  7.8*   --   7.2*       GI/Nutrition:  Exam: abdomen is soft and non-tender, normal active bowel sounds  Imaging: Available data reviewed  tube feed Tolerated  Liver  Function  Recent Labs      17   23017   1630  17   0500   ALTSGPT  30  333*  233*   ASTSGOT  18  240*  89*   ALKPHOSPHAT  71  90  59   TBILIRUBIN  0.6  1.2  1.0   GLUCOSE  188*  265*  219*       Heme:  Recent Labs      17   1040   17   0052  17   0625  17   1405  17   1630  17   2105  17   0500   RBC  2.99*   --   2.73*  2.58*   --   2.93*   --    --    HEMOGLOBIN  9.0*   --   8.0*  7.7*   --   8.9*   --    --    HEMATOCRIT  27.5*   --   25.2*  24.1*   --   28.2*   --    --    PLATELETCT  61*   --   42*  39*   --   47*   --    --    APTT   --    < >   --   136.6*  51.2*   --   >240.0*  144.1*   IRON  48*   --    --    --    --    --    --    --    TOTIRONBC  174*   --    --    --    --    --    --    --     < > = values in this interval not displayed.       Infectious Disease:  Temp  Av.9 °C (98.4 °F)  Min: 36.4 °C (97.5 °F)  Max: 37.7 °C (99.8 °F)  Micro: reviewed  Recent Labs      17   2300  17   00517   0617   1630  17   0500   WBC   --   2.4*  2.0*  4.9   --    NEUTSPOLYS   --   95.60*  86.60*  84.40*   --    LYMPHOCYTES   --   3.50*  3.60*  3.50*   --    MONOCYTES   --   0.90  0.90  2.60   --    EOSINOPHILS   --   0.00  1.80  0.00   --    BASOPHILS   --   0.00  0.90  0.00   --    ASTSGOT  18   --    --   240*  89*   ALTSGPT  30   --    --   333*  233*   ALKPHOSPHAT  71   --    --   90  59   TBILIRUBIN  0.6   --    --   1.2  1.0     Current Facility-Administered Medications   Medication Dose Frequency Provider Last Rate Last Dose   • piperacillin-tazobactam (ZOSYN) 3.375 g in  mL IVPB  3.375 g Q6HRS Sanjay Bhatia M.D.   Stopped at 17   • propofol (DIPRIVAN) injection  0-80 mcg/kg/min Continuous Juliet Martini M.D. 7.8 mL/hr at 17 18 mcg/kg/min at 17   • chlorhexidine (PERIDEX) 0.12 % solution 15 mL  15 mL BID Juliet Martini M.D.   15 mL at 17   •  lidocaine (XYLOCAINE) 1%  injection  1-2 mL Q30 MIN PRN Juliet Martini M.D.       • MD ALERT...Adult ICU Electrolyte Replacement per Pharmacy Protocol   pharmacy to dose Juliet Martini M.D.       • fentaNYL (SUBLIMAZE) injection 25 mcg  25 mcg Q HOUR PRN Juliet Martini M.D.   25 mcg at 08/22/17 1421    Or   • fentaNYL (SUBLIMAZE) injection 50 mcg  50 mcg Q HOUR PRN Juliet Martini M.D.        Or   • fentaNYL (SUBLIMAZE) injection 100 mcg  100 mcg Q HOUR PRN Juliet Martini M.D.       • ipratropium-albuterol (DUONEB) nebulizer solution 3 mL  3 mL Q2HRS PRN (RT) Juliet Martini M.D.   3 mL at 08/22/17 1346   • fentaNYL (SUBLIMAZE) 50 mcg/mL in 50mL (Continuous Infusion)  50 mcg/hr Continuous Jeremy M Gonda, M.D. 1 mL/hr at 08/23/17 0718 50 mcg/hr at 08/23/17 0718   • senna-docusate (PERICOLACE or SENOKOT S) 8.6-50 MG per tablet 2 Tab  2 Tab BID Jason Villegas M.D.   Stopped at 08/22/17 2100    And   • polyethylene glycol/lytes (MIRALAX) PACKET 1 Packet  1 Packet QDAY PRN Jason Villegas M.D.        And   • magnesium hydroxide (MILK OF MAGNESIA) suspension 30 mL  30 mL QDAY PRN Jason Villegas M.D.        And   • bisacodyl (DULCOLAX) suppository 10 mg  10 mg QDAY PRN Jason Villegas M.D.       • Respiratory Care per Protocol   Continuous RT Jason Villegas M.D.       • NS infusion   Continuous Jason Villegas M.D. 83 mL/hr at 08/23/17 0650     • enalaprilat (VASOTEC) injection 1.25 mg  1.25 mg Q6HRS PRN Jason Villegas M.D.       • ondansetron (ZOFRAN) syringe/vial injection 4 mg  4 mg Q4HRS PRN Jason Villegas M.D.       • ondansetron (ZOFRAN ODT) dispertab 4 mg  4 mg Q4HRS PRN Jason Villegas M.D.       • acetaminophen (TYLENOL) tablet 650 mg  650 mg Q6HRS PRN Jason Villegas M.D.       • heparin 1000 units/mL injection 2,600 Units  2,600 Units PRN Tho Barahona, PHARMD   2,600 Units at 08/22/17 1525    And   • heparin infusion 25,000 units in 500 ml 0.45%  nacl   Continuous Tho Barahona, PHARMD   Stopped at 08/23/17 0637   • dexamethasone (DECADRON) injection 4 mg  4 mg Q6HRS Jason Villegas M.D.   4 mg at 08/23/17 0502   • famotidine (PEPCID) injection 20 mg  20 mg BID Jason Villegas M.D.   20 mg at 08/22/17 2027     Last reviewed on 8/21/2017 11:37 AM by Addie Celaya [facility]    Quality  Measures:  Radiology images reviewed, Labs reviewed and Medications reviewed  Martins catheter: Critically Ill - Requiring Accurate Measurement of Urinary Output      DVT Prophylaxis: Heparin  DVT prophylaxis - mechanical: SCDs  Ulcer prophylaxis: Not indicated  Antibiotics: Treating active infection/contamination beyond 24 hours perioperative coverage      Problems/Plan:  Acute Hypoxic Respiratory Failure due to PE   - intubated 8/22   - Continue RT/O2 protocols   - Will wean PEEP and FiO2 as tolerated   - may need forced diuresis eventually  Acute Bilateral PE   - Heparin drip with eventual transition to xarelto   - No thrombolytics given as there is no RV strain on CTA of chest as well as high bleed risk due to recent craniotomy for GBM   - Cont to monitor hemodynamics closely and may require vasopressor therapies   - doppler u/s of lower extremities with bilateral DVT  Acute Kidney Injury   - nephro consulted   - monitor UOP   - avoid nephrotoxins   - renal u/s pending  Acute lactic acidosis   - continue to monitor and likely due to above processes  GBM s/p resection with XRT and chemo   - will repeat CT head   - last chemo/XRT 7/31   - prognosis poor and pt too sick to receive any further chemo  Complete AV block   - s/p pacer placement on 8/3  Acute Pancytopenia   - concern for sepsis   - septic protocols initiated   - cont zosyn   - follow cultures   - may need vasopressors   - monitor closely  Thrombocytopenia   - query related to chemo vs sepsis/consumption   - CT head negative   - Platelet goals > 30 through transfusions  Hyperglycemia   - ISS  Moderate  Protein Calorie Malnutrition   - Cortrak placed   - enteral feedings  Prognosis   - poor given patients multiple underlying co-morbidities prior to his PE diagnosis   - Had lengthy discussion with patient's daughter and wife about patient's worsening condition and poor prognosis.  According to the patient's wife, he would like full treatment and has stated [facility]    Discussed patient condition and risk of morbidity and/or mortality with Family, RN, RT, Pharmacy, Dietary, [facility] resident, Code status disscussed, Charge nurse / hot rounds and Patient.    The patient remains critically ill.  Critical care time = 47 minutes in directly providing and coordinating critical care and extensive data review.  No time overlap and excludes procedures.

## 2017-08-23 NOTE — PROGRESS NOTES
Pt's troponin 0.49.  Paged and updated Dr. Bhatia with [facility].  Received orders to trend troponins q6h.  Next one to be drawn at 2230.  Per MD, pause propofol and start fentanyl gtt due to pt's low BP and tachypnea.

## 2017-08-23 NOTE — CARE PLAN
Problem: Ventilation Defect:  Goal: Ability to achieve and maintain unassisted ventilation or tolerate decreased levels of ventilator support  Outcome: PROGRESSING AS EXPECTED  Adult Ventilation Update    Total Vent Days: 2      Patient Lines/Drains/Airways Status    Active Airway      Name: Placement date: Placement time: Site: Days:     Airway Group ET Tube Oral 8.0 08/22/17  1153  Oral  1                 Plateau Pressure (Q Shift): 16 (08/23/17 0203)  Static Compliance (ml / cm H2O): 77 (08/23/17 0434)    Sputum/Suction   Cough: Productive (08/23/17 0400)  Sputum Amount: Moderate (08/23/17 0400)  Sputum Color: Brown (dry blood) (08/23/17 0400)  Sputum Consistency: Thin;Thick (08/23/17 0400)    Events/Summary/Plan: PEEP increased to 12 per MD order, FiO2 increased to 90% post ABG. Pt stable on vent at this time. Will continue to monitor. (08/23/17 0434)

## 2017-08-23 NOTE — CARE PLAN
Problem: Venous Thromboembolism (VTW)/Deep Vein Thrombosis (DVT) Prevention:  Goal: Patient will participate in Venous Thrombosis (VTE)/Deep Vein Thrombosis (DVT)Prevention Measures  DVTs to lower extremities bilat; SCDs contraindicated.  Pt on heparin gtt    Problem: Skin Integrity  Goal: Risk for impaired skin integrity will decrease  Fragile skin; q2h turns, moisturizer and barrier cream prn

## 2017-08-23 NOTE — PROGRESS NOTES
Pt transported to and from CT at approximately 2300 on monitor with ACLS RN, CCT, and RRT.  Pt tolerated transport without difficulty.

## 2017-08-23 NOTE — PROGRESS NOTES
Critical PTT lab result was >240 at 2205. This value is within the defined limits of the Heparin Weight Based Protocol ordered by the physician for this patient. Heparin Weight Based Protocol will be followed for any adjustments, physician Dr. Dominguez not notified per protocol instructions.

## 2017-08-23 NOTE — PROGRESS NOTES
Late entry:    Dr. Bhatia at bedside to assess pt at 2100.  Informed him that propofol was restarted along with the fentanyl gtt because pt was fighting the vent.  Also informed Dr. Bhatia that pt has LE edema; R > L.  He was also updated that the pt had very low urine output; <30cc for this entire shift.  No new orders received at this time.

## 2017-08-23 NOTE — CONSULTS
DATE OF SERVICE:  08/23/2017    INPATIENT ONCOLOGY CONSULTATION    REASON FOR CONSULT:  History of GBM.    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  At that time, an MRI of the brain   showed a 4.5 cm necrotic mass in the right parietal occipital white matter.    Additionally, there was a 3.7 cm mass in the left thalamus.  On 2/7/2017, he   was brought to the operating room by Dr. Ugarte and underwent stealth-guided   resection of the right parietooccipital tumor.  This was completely resected.    No resection was done of the thalamus tumor.    After recovery, he was referred to Dr. Cruz of radiation oncology.  The   plan at that point was to consider combined chemoradiation.  [facility]  [facility] was [facility] to Dr. David, who [facility].  Under the care of Dr. David and Dr. Cruz, he was treated with   combined radiation and chemo with Temodar chemotherapy.  The radiation was   given over a 3-week attenuated course from May 17th through June 21st.  The   timeframe was longer because he ended up being [facility] with   pneumonia and was even in the ICU for a period of time.  He eventually   completed all of his therapy.    The next step of the plan was to put him on the 5-day maintenance Temodar,   which is given 5 days in a row on a monthly basis.  He started his first cycle   of this on 7/26/2017.  He received his last dose on July 30th.    Since that time, he has ended [facility] in the [facility] with another   pneumonia.  He was in very long and did recover.  He has been in a skilled   rehab center since that recovery.  On August 21st, at the rehab center, he was   noted to be in respiratory distress and was  satting 77% on room air.  A rapid   response was called and EMS was activated.  He was brought to the [facility] ER.    He underwent a CT angiogram, which showed bilateral pulmonary emboli.  The CT   scan also showed patchy diffuse ground glass opacities with tiny nodules and   cavitary lesions, which may be or slightly increased from the prior scan.  He   had some mediastinal lymphadenopathy with a right paratracheal lymph node   measuring 2.3 cm (down from 2.5 cm on the last scan).  He was admitted to the   hospital and started on heparin drip.  Because of the opacities in the lungs   and because of hypotension, he was put on Zosyn with sepsis protocol.  He   became acutely ill and required intubation.  He remains on a ventilator to   date.  He did have bilateral lower extremity ultrasounds done of the legs,   which showed extensive DVTs of both the right and left leg.  He did undergo   repeat CT scan of the head on August 22nd, which showed postsurgical changes   in the right parietal area with some persistent edema.  There was a new   curvilinear high density opacity along the margin of the cavity, which was   nonspecific, but could be related to radiation change versus tumor progression   or recurrence.    At this point, he continues with the intubation, antibiotics, and blood   pressure support.  He is showing some signs of acute renal failure.  He has   had trouble with cytopenias.  His platelets on admission were already low at   61,000.  Each day over the course of his admission, they have gone down   gradually getting down to a level of 18,000 today.  He has also been   neutropenic over the admission with an ANC that has been dropping.  Today it   is down to 0.95.  Because of his cytopenias, his underlying brain cancer, and   his overall acute severe critical illness, I have been asked to consult to   help with prognosis.    PAST MEDICAL HISTORY:  1.  GBM.  2.  Hypertension.  3.  Hyperlipidemia.  4.  Pacemaker  implantation related to heart block.  5.  History of valley fever in 1999.    PAST SURGICAL HISTORY:  1.  Pacemaker implantation.  2.  GBM resection in April 2017.  3.  Tonsils and adenoidectomy.    ALLERGIES:  BACTRIM.    CURRENT MEDICATIONS:  Tylenol, Decadron 4 mg IV every 6 hours, Vasotec p.r.n.,  Famotidine 20 mg b.i.d., heparin drip per protocol, DuoNeb p.r.n., Zofran   p.r.n., Zosyn 3.375 g IV every 6 hours, Colace p.r.n.    FAMILY HISTORY:  He has no pertinent family history of cancer.    SOCIAL HISTORY:  He is .  He lives in [place], [place].  He has 3   children.  He is retired.  He is a [facility] patient and served in the    previously.    REVIEW OF SYSTEMS:  The review of systems is unobtainable as he is intubated   and sedated.    PHYSICAL EXAMINATION:  VITAL SIGNS:  His T-max is 98.7, pulse of 80, blood pressure 90/57,   respirations 26 on a ventilator with FIO2 of 80%, satting 96%.  GENERAL:  Sedated, acutely ill-appearing, frail gentleman.  Unresponsive due   to sedation.  HEENT:  NCAT.  Sclerae are anicteric.  Conjunctivae clear.  Oropharynx is   clear.  He has an ET tube in place.  NECK:  Supple, nontender, no elevated JVP, no carotid bruits, no thyromegaly.  CHEST:  Clear to auscultation and percussion bilaterally.  No wheeze, rales,   or rhonchi.  CARDIOVASCULAR:  Regular rate and rhythm.  No murmurs, gallops or rubs.    Normal S1, S2.  ABDOMEN:  Soft, nontender, nondistended.  No hepatosplenomegaly.  No guarding,   rebound, no masses.  LYMPH NODES:  No cervical, supraclavicular, infraclavicular or axillary   lymphadenopathy.  EXTREMITIES:  He has 2+ bilateral lower extremity edema.  He has 1+ edema of   the hands and lower arms.  NEUROLOGIC:  Unobtainable exam given his sedation and intubation.  SKIN:  He has no major ulcerations.  He has scattered bruising.  No rashes or   sores.    LABORATORY DATA:  White blood cell count 1.1, hemoglobin 9.0, hematocrit   28.1%, platelets 18 with 81%  neutrophils, 6% bands, 11% lymphocytes, 0%   monocytes, with an ANC of 0.95.  Sodium 136, potassium 4.3, chloride 109, CO2   of 15, BUN 43, creatinine 1.61, glucose 219, calcium 7.2.  AST 89, ,   alkaline phosphatase 59, bilirubin 1.0, albumin 2.1, protein 4.5, phosphorus   4.7, magnesium 2.2.    ASSESSMENT AND PLAN:  The patient is a nice 83-year-old man with a history of   hypertension, hyperlipidemia, heart block with pacemaker implantation, and a   past history of valley fever with persistent nodular lung lesions, who was   diagnosed with a multifocal glioblastoma multiforme with 2 foci in the brain   as described above in April 2017.  He underwent gross total resection of the   right parietooccipital lesion.  He was then treated with combined Temodar   chemotherapy and radiation with an attenuated course of radiation over 3   weeks, finishing on 6/21/2017.  He then had a month off and then went on   maintenance Temodar therapy, which is a 5-day therapy of Temodar given   monthly.  He has received 1 cycle of maintenance Temodar therapy, which   finished on July 30th.  He is now admitted with extensive pulmonary emboli,   respiratory failure, pneumonia, sepsis, hypotension and ventilatory support.    He is severely acutely ill.  He has signs of some worsening renal failure as   well as liver failure.  He has pancytopenia.  I have been asked to consult on   the case.    In terms of his pancytopenia, this is related to his chemotherapy.  We can see   a drop in the blood counts after Temodar, which usually occurs 3-4 weeks   after the Temodar dose.  The timing would fit with this.  He already had a low   platelet count on admission.  Also, I think his cytopenias are related to   sepsis and critical illness.    In terms of his neutropenia, I will start him on Neupogen 480 mcg   subcutaneously daily to try to boost his white count.  He will continue on   this medication until his ANC is over 1.5.  If the ANC  gets over 1.5, then we   would hold off on this medication.  Hopefully, this will help boost his immune   system and give him a greater chance of getting over the infection.    In terms of his thrombocytopenia, he is on full-dose anticoagulation.  I would   recommend transfusing him to keep his platelets at least over 30,000.  He is   certainly at high risk for bleeding given the thrombocytopenia and the   anticoagulation, which I discussed with his family today.  There is no   evidence of heparin-induced thrombocytopenia.  There is no timing for this.  I   do not have any worries or concerns for this syndrome.  His thrombocytopenia   is related to chemo as well as his sepsis.  I think it is safe to transfuse as   needed.  Over time, his counts may start coming back up.  If he continues to   have persistently low counts, one must consider whether it is related to   antibiotics as well.  He is on Zosyn, which sometimes can cause cytopenias.    If there is concern that this is causing cytopenias, I would recommend   infectious disease consult and they can further tailor the antibiotics.    We did discuss overall his prognosis.  His family was not under the impression   that this was an incurable disease.  I did tell them that glioblastoma   multiforme is an incurable cancer.  He had complete resection of the dominant   lesion, but was not able to have resection of the second lesion.  Even if we   could completely resect both lesions, these cancers always come back.  They   can never be completely eradicated.  He has been undergoing standard treatment   with chemo and radiation followed by maintenance Temodar chemotherapy.    Unfortunately, given his age, I am not optimistic that he will tolerate this   therapy.  He is already showing signs of severe toxicity related to his   treatments.  All that can be done now was support him through the side effects   of his last treatment.  If he can recover from this  hospitalization, they   will have to discuss with her primary oncologist whether they continue any   further treatment.  In my estimation, he probably would not be a candidate for   further chemotherapy given the severe side effects.    I explained to the family it was important to understand that he does have an   incurable disease.  The average survival with glioblastoma multiforme is about   9-12 months.  This may become important to them as this hospitalization   unfolds.  Hopefully, he turns the corner and gets better and recovers,   however, there is a high chance that he does not make it through this   hospitalization.  There may be some tuff decisions in the future in terms of   further care and the overall goals of care given his poor long-term prognosis.    At this point, I will sign off on the case.  There are no further active   oncology issues.  He has low blood counts, but these could be managed by the   critical care team.  He will be on Neupogen for the low white count until they   recover.  He will receive platelet transfusions as needed.  I will be   available for questions, but no other active oncology issues currently.       ____________________________________     MD ELIZABETH TRISTAN / NTS    DD:  08/23/2017 10:41:10  DT:  08/23/2017 12:32:57    D#:  [id number]  Job#:  [id number]

## 2017-08-23 NOTE — PROGRESS NOTES
Updated Dr. Bhatia regarding pt's Mg 1.9 and troponin trending down to 0.44. Received orders for 1g magnesium IVPB

## 2017-08-23 NOTE — CONSULTS
"[facility] -  CONSULTATION NOTE               Author: Bala James M.D. Date & Time: 8/23/2017  10:13 AM       REASON FOR CONSULTATION:   - JIAN, acidosis, and oligoanuria    CHIEF COMPLAINT:   -  \"I'm having trouble breathing\"    HISTORY OF PRESENT ILLNESS:    81 yo WM PMH HTN, HLD, third degree heart block, and GBM Grade IV, who presents to ED from rehab facility.  He had an acute onset of SOB/dyspnea with hypoxia and light headedness that started earlier in the AM on 8/21/17.  This was associated with some bouts of syncope but no LOC was noted and thus he was transferred to ED for further evaluation.  On arrival to ED he had significantly swollen RLE along with tachycardia and in the setting of his comorbidities, PE was high on differential and thus a stat CTA obtained and confirmed massive bilateral PEs.  He had some hemorrhage around the site of his GBM with some ring enhancing lesion and thus had not been cleared for anticoagulation for that reason in past.  On arrival he was having O2 sat 74% on 5L face mask, that eventually bumped up to 91%.  His BP although initially hypertensive also took a nosedive and he was persistently hypotensive with SBP as low as 80s.  No prior hx of DVT/PE to his or family's knowledge.  His daughter and wife were at bedside today and wondering about his prognosis from renal standpoint.  His SCr went from 0.6 to 1.1 to 1.6 in about 24-48 hrs and his UOP has dropped to oligoanuric levels which prompted the Nephrology consultation.  He is currently intubated and thus other ROS unable to be obtained.    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/17) s/p Chemo, resection, radiation  - MVP  - Third degree heart block  - Coccidioidomycosis    PAST SURGICAL HISTORY:   - Craniotomy  - " "Tonsillectomy  - PPM  - Lung biopsy    FAMILY HISTORY:   - Reviewed and non contributory to current illness    SOCIAL HISTORY:   - No tobacco  - No EtOH  - No illicits  - Lives in [place], [place] with his wife  - Retired, worked as a  for most of his life    HOME MEDICATIONS:   - Reviewed and documented in chart    ALLERGIES:  Bactrim    PHYSICAL EXAM:  VS:  /66 mmHg  Pulse 80  Temp(Src) 36.8 °C (98.2 °F)  Resp 26  Ht 1.6 m (5' 3\")  Wt 70.9 kg (156 lb 4.9 oz)  BMI 27.70 kg/m2  SpO2 96%  GENERAL:  WDWN WM NAD  HEENT:  No scleral icterus, prior craniotomy scar, +ETT  NECK:  Supple; No tracheal deviation  CV:  RRR, no m/r/g  LUNGS:  CTAB, no W/R  ABDOMEN:  SNTND, +BS  EXTREMETIES:  No c/c; +BLE edema R > L  SKIN:  Warm and dry  NEURO:  UTO due to Intubated and sedated  PSYCH:  UTO due to intubated and sedated    LABS:  Reviewed and documented in chart    IMPRESSION:  - JIAN    * Etiology likely 2/2 ATN from ischemic nephritis and BOSSMAN  - Acute respiratory failure with hypoxia  - Oligoanuria  - Non anion gap metabolic acidosis  - Bilateral PEs    * Etiology likely 2/2 malignancy and RLE DVT  - HTN  - HLD  - GBM Grade IV    * S/P resection, chemo, radiation    ASSESSMENT:  - GFR: BCr ~ 0.6-0.8; Currently at 1.6  - Urine: oligoanuric; < 30 mL over night shift  - BP: Goal < 140/90  - Volume: euvolemic  - Acid/Base: non anion gap metabolic acidosis likely due to JIAN  - Electrolytes: stable  - Anemia: Goal Hgb 10-11  - MBD: Ca normal, PO4 unknown  - HD Access: None    PLAN:  - No compelling indication for RRT  - Daily evaluation for RRT needs  - Maintain MAP > 65  - If UOP/Cr does not improve by AM, will likely need to initiate RRT at that time  - Discussed this with the family at bedside in detail regarding his overall prognosis and they are ok with proceeding with RRT if/when needed  - Consider sodium acetate or similar solution as IVF to help with acidosis in mean time  - Dose all meds per eGFR < " 15    Critical Care Time > 30 min in arranging care for this patient

## 2017-08-24 PROBLEM — N17.9 AKI (ACUTE KIDNEY INJURY) (HCC): Status: ACTIVE | Noted: 2017-01-01

## 2017-08-24 NOTE — PROGRESS NOTES
Dr. Bhatia at bedside to assess pt.  MD aware of pt's new R eye hematoma, <30cc/shift urine output, MAP <65, and weeping edema. Per MD, continue heparin gtt at this time.  STAT CBC sent per MD order.

## 2017-08-24 NOTE — PROGRESS NOTES
TOF was assessed 15min after vecuronium gtt was initiated.  No twitches on 10/10.  Per pharmacist Parvez, if desirable TOF is not reached, titrate down the paralytic gtt by 0.2mcg/kg/min.  Per Pharmacist Dirk, reassess TOF 1h after titrating gtt.

## 2017-08-24 NOTE — PROGRESS NOTES
[facility] -  PROGRESS NOTE               Author: Bala James M.D. Date & Time: 8/24/2017  10:27 AM     HPI:  83 yo WM PMH HTN, HLD, third degree heart block, and GBM Grade IV, who presents to ED from rehab facility.  He had an acute onset of SOB/dyspnea with hypoxia and light headedness that started earlier in the AM on 8/21/17.  This was associated with some bouts of syncope but no LOC was noted and thus he was transferred to ED for further evaluation.  On arrival to ED he had significantly swollen RLE along with tachycardia and in the setting of his comorbidities, PE was high on differential and thus a stat CTA obtained and confirmed massive bilateral PEs.  He had some hemorrhage around the site of his GBM with some ring enhancing lesion and thus had not been cleared for anticoagulation for that reason in past.  On arrival he was having O2 sat 74% on 5L face mask, that eventually bumped up to 91%.  His BP although initially hypertensive also took a nosedive and he was persistently hypotensive with SBP as low as 80s.  No prior hx of DVT/PE to his or family's knowledge.  His daughter and wife were at bedside today and wondering about his prognosis from renal standpoint.  His SCr went from 0.6 to 1.1 to 1.6 in about 24-48 hrs and his UOP has dropped to oligoanuric levels which prompted the Nephrology consultation.  He is currently intubated and thus other ROS unable to be obtained.      DAILY NEPHROLOGY SUMMARY:  8/23/17 - Consult done  8/24/17 - Resp. Status worsened overnight, now on maximal tolerable vent settings; Hgb dropped to 3 s/p protamine, pRBCs, and Plt      Review of Systems   Unable to perform ROS: intubated         Physical Exam   Constitutional: He appears toxic. He is sedated and intubated.   HENT:   Head: Normocephalic and atraumatic.   Eyes: Conjunctivae are normal. No scleral icterus.   Neck: Neck supple. No tracheal deviation present.   Cardiovascular: Normal rate  and regular rhythm.    No murmur heard.  Pulmonary/Chest: Effort normal. Tachypnea noted. He is intubated.   Abdominal: Soft. Bowel sounds are normal. There is no tenderness.   Musculoskeletal: He exhibits edema. He exhibits no tenderness.   Neurological: He displays no atrophy. He exhibits normal muscle tone.   Skin: Skin is warm and dry.   Psychiatric:   Intubated and Sedated   Nursing note and vitals reviewed.        PAST FAMILY HISTORY: Reviewed and Unchanged  SOCIAL HISTORY: Reviewed and Unchanged  CURRENT MEDICATIONS: Reviewed  LABORATORY RESULTS: Reviewed  IMAGING STUDIES: Reviewed      Fluids:  In: 19365 [I.V.:7294; Blood:2729; Other:60; Enteral:415]  Out: 25       IMPRESSION:  - JIAN    * Etiology likely 2/2 ATN from ischemic nephritis and BOSSMAN  - Acute respiratory failure with hypoxia    * Likely multifactorial from PE/volume  - Oligoanuria  - Anion gap metabolic acidosis  - Bilateral PEs    * Etiology likely 2/2 malignancy and RLE DVT  - HTN  - HLD  - GBM Grade IV    * S/P resection, chemo, radiation    ASSESSMENT:  - GFR: BCr ~ 0.6-0.8; Continues to climb  - Urine: oligoanuric; < 10 mL over night shift  - BP: Goal < 140/90  - Volume: hypervolemic  - Acid/Base: anion gap metabolic acidosis likely due to JIAN  - Electrolytes: stable  - Anemia: Goal Hgb 10-11  - MBD: Ca normal, PO4 unknown  - HD Access: None    PLAN:  - Primary team to help obtain temporary dialysis access today  - Attempt 3.5 hour iHD today with 2L UF goal as tolerated  - If does not tolerate intermittent will attempt SLED instead  - Daily evaluation for RRT needs  - Maintain MAP > 65  - Discussed this with the family at bedside in detail regarding his overall prognosis and they are ok with proceeding with RRT  - Dose all meds per eGFR < 15    Critical Care Time > 30 min in arranging care for this patient

## 2017-08-24 NOTE — PROGRESS NOTES
denver from Lab called with critical result of [id number] at 0012. Critical lab result read back to mark.

## 2017-08-24 NOTE — CARE PLAN
Problem: Ventilation Defect:  Goal: Ability to achieve and maintain unassisted ventilation or tolerate decreased levels of ventilator support  Intervention: Support and monitor invasive and noninvasive mechanical ventilation  Adult Ventilation Update    Total Vent Days: 3      Patient Lines/Drains/Airways Status    Active Airway      Name: Placement date: Placement time: Site: Days:     Airway Group ET Tube Oral 8.0 08/22/17  1153  Oral  2                 Plateau Pressure (Q Shift): 16 (08/23/17 0203)  Static Compliance (ml / cm H2O): 14 (08/24/17 0518)    Sputum/Suction   Cough: Non Productive (08/24/17 0154)  Sputum Amount: Moderate (08/23/17 2159)  Sputum Color: Bloody;Clear (08/23/17 2159)  Sputum Consistency: Thick;Thin (08/23/17 2159)    Events/Summary/Plan: Pt unstabe over night, experiencing prolonged periods of desaturation with multiple vent modes and assisted breathing via Ambu-Bag. Flolan initiated at 0154. Pt remains unstable and hypoxic despite all intervention. MD Gonda aware of pt status. No new orders at this time. Will continue to monitor.

## 2017-08-24 NOTE — PROGRESS NOTES
0315-Pt has been on flolan for approximately 1h with o2 sats not reaching above 83%.  Paged and updated Dr. Gonda.  Received orders for 50 mEq of sodium acetate, and to attempt recruitment with ambubag, and let pt sit on P-high of 32 on APRV for 15min.    0340- paged and updated Dr. Gonda after the above interventions.  Pt currrently at 79/80% o2 sat.  MD to put in orders for paralytics.  Per MD, RRT to switch pt back over to pressure controlled settings from APRV.    0400 - paralytic gtt & lasix gtt started.  Pt on BIS monitor.

## 2017-08-24 NOTE — PROGRESS NOTES
"[facility] Progress Note      Admit Date: 8/21/2017  ICU Day: 3    Resident(s): Nikki Villegas  Attending: BRIANA RAMIREZ/ Dr. Martini     Date & Time:   8/24/2017   1:48 PM       Patient ID:    Name:             Grinnell , Alvin     YOB: 1935  Age:                 82 y.o.  male   MRN:               [id number]    HPI:  82 year old male with hx of GBM s/p surgery radiation and chemo, heart block with pacemaker came with SOB and found B/L PE and he developed infiltration in his lungs and intubated on 8/22.     Consultants:  PMA: Dr Martini     Interval Events:    - Vent day # 3 intubated on 8/22  - desat yesterday change the vent set and paralysis the patient and PEEP 20 to keep sat>85%  - discussed the poor prognosis with the wife and daughter.   - According to the Advance Directive from [facility] the patient said NO for long term vent.   - Hb dropped to 5.5 and received RBC and plt.  - Heparin was d/c due to low Hb and protamine was given.   - We will start with dialysis if the family agree that.   - On zosyn for pneumonia.        Review of Systems   Unable to perform ROS: intubated   Respiratory: Positive for shortness of breath.    Cardiovascular: Positive for leg swelling.       PHYSICAL EXAM  Filed Vitals:    08/24/17 1245 08/24/17 1300 08/24/17 1315 08/24/17 1330   BP:       Pulse: 100 100 99 100   Temp:       Resp: 30 30 30 30   Height:       Weight:       SpO2: 91% 93% 93% 91%     Body mass index is 27.7 kg/(m^2).  /71 mmHg  Pulse 100  Temp(Src) 37.2 °C (98.9 °F)  Resp 30  Ht 1.6 m (5' 2.99\")  Wt 70.9 kg (156 lb 4.9 oz)  BMI 27.70 kg/m2  SpO2 91%  O2 therapy: Pulse Oximetry: 91 %, FIO2%: 100, O2 Delivery: Ventilator    Physical Exam   Constitutional: He appears distressed.   Neck: JVD present.   Cardiovascular:   No murmur heard.  tachy   Pulmonary/Chest: He is in respiratory distress. He has no wheezes. He has rales.   Tachypnea   Crackles on the L side.    Abdominal: Soft. He " exhibits no distension. There is no tenderness. There is no rebound.   Musculoskeletal: He exhibits edema.   Neurological:   Sedated and intubated    Skin: Skin is warm.       Respiratory:  Karimi Vent Mode: APVCMV  Respiration: (!) 30, Pulse Oximetry: 91 %    Chest Tube Drains:    Recent Labs      08/23/17   0445  08/24/17   0310  08/24/17   0507   ISTATAPH  7.352*  7.177*  7.142*   ISTATAPCO2  29.6  51.8*  62.4*   ISTATAPO2  61*  44*  50*   ISTATATCO2  17*  21  23   [id number]  90*  68*  72*   ISTATARTHCO3  16.4*  19.2  21.3   ISTATARTBE  -8*  -9*  -8*   ISTATTEMP  97.7 F  100.0 F  100.0 F   ISTATFIO2  80  100  100   ISTATSPEC  Arterial  Arterial  Arterial   ISTATAPHTC  7.359*  7.166*  7.132*   [id number]  59*  47*  53*       HemoDynamics:  Pulse: 100, Heart Rate (Monitored): (!) 101 Blood Pressure : 124/71 mmHg, NIBP: 109/76 mmHg CVP (mm Hg): (!) 20 MM HG    Neuro:      Fluids:    Date 08/24/17 0700 - 08/25/17 0659   Shift 3303-1241 0257-3276 4859-7180 24 Hour Total   I  N  T  A  K  E   I.V. 1202.2   1202.2      Vecuronium  Volume 90.5   90.5      Propofol Volume 15.3   15.3      Norepinephrine Volume 0.4   0.4      Heparin Volume 0   0      IV Volume (NS ran with blood) 27.5   27.5      IV Volume (Fentanyl gtt) 8.5   8.5      IV Volume (Lasix) 60   60      IV Volume (Sodium Acetate) 900   900      IV Piggyback Volume (IV Piggyback) 100   100    Other 90   90      Medications (P.O./ Enteral Liquids) 90   90    Enteral 230   230      Enteral Volume 170   170      Free Water / Tube Flush 60   60    Shift Total 1522.2   1522.2   O  U  T  P  U  T   Shift Total       NET 1522.2   1522.2        Intake/Output Summary (Last 24 hours) at 08/22/17 1322  Last data filed at 08/22/17 0600   Gross per 24 hour   Intake  370.2 ml   Output    650 ml   Net -279.8 ml          Body mass index is 27.7 kg/(m^2).    Recent Labs      08/22/17   2347  08/23/17   0500  08/23/17   1805  08/24/17   0557   SODIUM   --   136  138  136    POTASSIUM   --   4.3  4.2  4.1   CHLORIDE   --   109  109  105   CO2   --   15*  16*  18*   BUN   --   43*  47*  53*   CREATININE   --   1.61*  1.96*  2.37*   MAGNESIUM  1.9  2.2   --   2.0   PHOSPHORUS   --   4.7*   --   6.2*   CALCIUM   --   7.2*  6.8*  6.8*       GI/Nutrition:  Recent Labs      17   1630  17   0500  17   1805  17   0557   ALTSGPT  333*  233*   --   159*   ASTSGOT  240*  89*   --   74*   ALKPHOSPHAT  90  59   --   117*   TBILIRUBIN  1.2  1.0   --   3.4*   GLUCOSE  265*  219*  140*  246*       Heme:  Recent Labs      17   21017   0500   17   1500   17   2308  17   0557  17   1220   RBC   --    --    < >   --    < >  3.38*  4.10*  4.13*   HEMOGLOBIN   --    --    < >   --    < >  9.6*  11.2*  11.3*   HEMATOCRIT   --    --    < >   --    < >  29.6*  33.8*  33.7*   PLATELETCT   --    --    < >   --    < >  36*  51*  25*   APTT  >240.0*  144.1*   --   72.9*   --    --    --    --     < > = values in this interval not displayed.       Infectious Disease:  Temp  Av.3 °C (99.1 °F)  Min: 36.7 °C (98 °F)  Max: 37.8 °C (100.1 °F)  Recent Labs      17   0500   17   1930  17   2308  17   0557  17   1220   WBC  4.9   --    < >  1.2*  4.1*  1.5*  1.1*   NEUTSPOLYS  84.40*   --    < >  72.00  55.50  46.80   --    LYMPHOCYTES  3.50*   --    < >  4.00*  22.70  11.00*   --    MONOCYTES  2.60   --    < >  4.00  0.90  2.80   --    EOSINOPHILS  0.00   --    < >  0.00  0.90  1.80   --    BASOPHILS  0.00   --    < >  0.00  0.00  0.00   --    ASTSGOT  240*  89*   --    --    --   74*   --    ALTSGPT  333*  233*   --    --    --   159*   --    ALKPHOSPHAT  90  59   --    --    --   117*   --    TBILIRUBIN  1.2  1.0   --    --    --   3.4*   --     < > = values in this interval not displayed.       Meds:  • epoprostenol (FLOLAN) 1.5 mg syringe (For INHALATION)  0.01-0.05 mcg/kg/min 0.032 mcg/kg/min (17  0912)   • artificial tear ointment  1 Application     • fentaNYL   50 mcg/hr (08/24/17 1102)   • vecuronium infusion  0-1.7 mcg/kg/min 0.4 mcg/kg/min (08/24/17 0715)   • vecuronium  0.1 mg/kg     • furosemide(LASIX) infusion  1-10 mg/hr 10 mg/hr (08/24/17 0707)   • sodium acetate IV infusion   150 mL/hr at 08/24/17 1056   • NORepinephrine (LEVOPHED) infusion  0-30 mcg/min 7 mcg/min (08/24/17 1231)   • piperacillin-tazobactam  3.375 g Stopped (08/24/17 1126)   • propofol  0-80 mcg/kg/min 15 mcg/kg/min (08/24/17 1131)   • chlorhexidine  15 mL     • lidocaine  1-2 mL     • fentaNYL  25 mcg      Or   • fentaNYL  50 mcg      Or   • fentaNYL  100 mcg     • ipratropium-albuterol  3 mL     • senna-docusate  2 Tab      And   • polyethylene glycol/lytes  1 Packet      And   • magnesium hydroxide  30 mL      And   • bisacodyl  10 mg     • Respiratory Care per Protocol       • enalaprilat  1.25 mg     • ondansetron  4 mg     • ondansetron  4 mg     • acetaminophen  650 mg     • dexamethasone  4 mg     • famotidine  20 mg          Procedures:  Intubation on 8/22  Central line on 8/22    Imaging:  DX-CHEST-PORTABLE (1 VIEW)   Final Result         No significant interval change.            DX-CHEST-LIMITED (1 VIEW)   Final Result         1. Interval placement of feeding tube. No other significant interval change.         DX-ABDOMEN FOR TUBE PLACEMENT   Final Result      Feeding tube tip overlies the second portion of the duodenum.      DX-CHEST-PORTABLE (1 VIEW)   Final Result         1. No significant interval change.      CT-HEAD W/O   Final Result         1. Postsurgical change in the right parietal region from tumor resection.      2. New curvilinear high density area along the margin of the cavity is nonspecific and residual tumor cannot be excluded. Correlate with MRI. No mass effect or midline shift.               DX-CHEST-PORTABLE (1 VIEW)   Final Result      1.  Endotracheal tube tip projects approximately 3 cm above the  steven.      2.  Right internal jugular catheter tip projects over the cavoatrial junction. No pneumothorax is identified.      3.  No significant change in diffuse interstitial and airspace opacities.      DX-CHEST-LIMITED (1 VIEW)   Final Result         1. No significant interval change.      LE VENOUS DUPLEX (Specify in Comments Left, Right Or Bilateral)   Final Result      CT-CTA CHEST PULMONARY ARTERY W/ RECONS   Final Result      1.  Bilateral pulmonary emboli. No CT evidence for right ventricular strain.      2.  Patchy diffuse groundglass density with tiny nodules and cavitary lesions, increased from the prior exam.      3.  Small bilateral pleural effusions      4.  Atherosclerosis      5.  Mediastinal adenopathy, nonspecific.         Comment: Results discussed with Dr. Zheng at 12:40 PM            DX-CHEST-PORTABLE (1 VIEW)   Final Result      Increased diffuse interstitial opacities within both lungs consistent with atelectasis/possible mild edema or pneumonitis.      CT-HEAD W/O    (Results Pending)   CT-ABDOMEN-PELVIS W/O    (Results Pending)   US-LIVER AND BILIARY TREE    (Results Pending)       Problem and Plan:      * Acute respiratory failure with hypoxia (CMS-HCC) (present on admission)  Assessment & Plan  Type 1 hypoxia.   Due to PE b/l and pneumonia, ARDS???,   EF 60%   CXR: infiltration b/l.   Intubated on 8/22  Hemoptysis   prolonged periods of desaturation with multiple vent modes and assisted breathing via Ambu-Bag  Zosyn  D/c heparin   lasix drip.   Reassess him for dialysis and discuss with family about the prognosis.   Poor prognosis.         Pneumonia (present on admission)  Assessment & Plan  - WBC : 5>>>>2>>1.1 and low grade fever.   - procalcitonin 2.6   - LA: 4.8>>>>1.8  - anion katie: 12 and Hco3: 20   - Chest X ray: new infiltration on the left side.   - zosyn day # 2  - follow blood and sputum culture.   - sodium acetate fluid.  - Neupogen started on 8/23.      Pulmonary embolism  (CMS-HCC) (present on admission)  Assessment & Plan  - Sudden SOB and R leg swelling for last 4-5 days.   - tachycardia, low grade fever BP: 120/70 RR>25  - doppler for his legs: DVT on the R leg.   - CT PE: B/L PE  - EKG: paced rythem   - did not receive TpA.  - intubated on 8/22  - on heparin.  - increase O2 requirement last night.   - Echo if needed.     Pancytopenia (CMS-HCC) (present on admission)  Assessment & Plan  Low WBC, Plt and Hb  Last modi of chemo 7/30/17  Fever and infiltration on CXR.   Could be related top chemotherapy or part of multi organ failure.   oncology consulted.  Zosyn  Follow up and RBC transfusion if Hb less than 7  Neupogen started on 8/23 to keep ANC>1500    JIAN (acute kidney injury) (CMS-[facility])  Assessment & Plan  Cr: 0.9 his baseline  Increased to Cr: 2.3  Part of multiorgan failure  Lasix drip, bad UOP.   Dialysis if needed and the family ok with that.  Nephrologist on board, we appreciate Dr James consult.     Brain mass (present on admission)  Assessment & Plan  - GBM  - surgery last April  - s/p chemotherapy last dose was 7/30/17  - s/p radiation last may.   - according to Dr. Ugarte the patient on PO decadron, no further surgical intervention at this time.   - CT head on 8/23: no bleeding.   - oncology consulted.     Heart block, AV (present on admission)  Assessment & Plan  - pacemaker placed by Dr. Chung 8/3  - Pacer working, no chest pain  -   - tachycardia due to PE.  - STABLE, follow up.     Anemia (present on admission)  Assessment & Plan  - Hb is 9 (baseline), the patient has pancytopenia.   - no obvious bleeding.   - hx of blood transfusion after the surgery last April Hb was 6  - MCV: 92 Plt: 61   - Iron panel was normal.  - Was given 4 units RBC  - The patient is not stable to get CT head and abd.   - Follow up and transfusion if Hb less than 7 and keep Plt>50  - d/c heparin and he was given protamine.         DISPO: ICU    CODE STATUS: Full    Quality  Measures:  Maritns Catheter: yes  DVT Prophylaxis: SCDS, bleeding??  Ulcer Prophylaxis: famotidine IV  Antibiotics: zosyn day #3  Lines: RIJ - Central line was inserted on 8/22

## 2017-08-24 NOTE — ASSESSMENT & PLAN NOTE
Cr: 0.9 his baseline  Increased to Cr: 2.3  Part of multiorgan failure  Lasix drip, bad UOP.   Dialysis if needed and the family ok with that.  Nephrologist on board, we appreciate Dr James consult.

## 2017-08-24 NOTE — PROGRESS NOTES
Tech from Lab called with critical result of WBC 1.5 at 0624. Critical lab result read back to Tech.  Dr. Villegas notified of critical lab result at 0625.  Critical lab result read back by Dr. Villegas.

## 2017-08-24 NOTE — CARE PLAN
Problem: Communication  Goal: The ability to communicate needs accurately and effectively will improve  Outcome: PROGRESSING SLOWER THAN EXPECTED  Pt paralyzed with medication during night.  Pt unable to communicate.     Problem: Skin Integrity  Goal: Risk for impaired skin integrity will decrease  Outcome: PROGRESSING SLOWER THAN EXPECTED  Edema and weeping present. Q2hr turn and repositioning.

## 2017-08-24 NOTE — PROCEDURES
Procedure Note    Date: 8/24/2017  Time: 1530    Procedure: Temporary HD catheter placement  Site: Right internal jugular vein    Indication: Acute Kidney Injury  Consent: Informed consent obtained from patient or designated decision maker after explaining the benefits/risks of the procedure including but not limited to bleeding, infection, nerve or other deep structure injury, pneumothorax/hemothorax, arrythmia, or death. Patient or surrogate expressed understanding and agreement and signed consent which can be found in the patient's chart.    Procedure: After obtaining consent, a time-out was performed. Appropriate site confirmed with ultrasound and patient positioned, prepped, and draped in sterile fashion. All those present wearing cap and mask and those physically participating remained adhering to sterile fashion with cap, mask, gloves, and gown. 3 mL of local anesthetic injected (1% lidocaine without epinephrine) achieving appropriate comfort level for patient. Vein localized and accessed using continuous ultrasound guidance and a 12 Fr 16cm temporary HD catheter placed using Seldinger technique. Able to aspirate dark, non-pulsatile blood through each lumen and sterile saline flushed easily before capping. Line secured and dressed in sterile fashion. Patient tolerated procedure well without any difficulties and remains in care of bedside nurse. CXR will be performed to confirm appropriate placement for internal jugular or subclavian CVLs.    EBL: minimal  Complications: none  CXR: noted proper placement    Juliet Martini MD  Pulmonary and Critical Care Medicine

## 2017-08-24 NOTE — CARE PLAN
Problem: Oxygenation:  Goal: Maintain adequate oxygenation dependent on patient condition  Outcome: PROGRESSING SLOWER THAN EXPECTED    Problem: Ventilation Defect:  Goal: Ability to achieve and maintain unassisted ventilation or tolerate decreased levels of ventilator support  Outcome: PROGRESSING AS EXPECTED  Adult Ventilation Update    Total Vent Days: 3      Patient Lines/Drains/Airways Status    Active Airway      Name: Placement date: Placement time: Site: Days:     Airway Group ET Tube Oral 8.0 08/22/17  1153  Oral  3                 Events/Summary/Plan: No vent changes made at this time, will continue to montior

## 2017-08-24 NOTE — PROCEDURES
Procedure Note    Date: 8/24/2017  Time: 1500    Procedure: Central Venous Line placement  Site: Left internal jugular vein    Indication: Cardiogenic shock and Pulmonary embolus  Consent: Informed consent obtained from patient or designated decision maker after explaining the benefits/risks of the procedure including but not limited to bleeding, infection, nerve or other deep structure injury, pneumothorax/hemothorax, arrythmia, or death. Patient or surrogate expressed understanding and agreement and signed consent which can be found in the patient's chart.    Procedure: After obtaining consent, a time-out was performed. Appropriate site confirmed with ultrasound and patient positioned, prepped, and draped in sterile fashion. All those present wearing cap and mask and those physically participating remained adhering to sterile fashion with cap, mask, gloves, and gown. 3 mL of local anesthetic injected (1% lidocaine without epinephrine) achieving appropriate comfort level for patient. Vein localized and accessed using continuous ultrasound guidance and a 7 Fr 18cm triple lumen catheter placed using Seldinger technique. Able to aspirate dark, non-pulsatile blood through each lumen and sterile saline flushed easily before capping. Line secured and dressed in sterile fashion. Patient tolerated procedure well without any difficulties and remains in care of bedside nurse. CXR will be performed to confirm appropriate placement for internal jugular or subclavian CVLs.    EBL: minimal  Complications: none  CXR: proper position without PTX    Juliet Martini MD  Pulmonary and Critical Care Medicine

## 2017-08-24 NOTE — PROGRESS NOTES
Pt satting 86% on 100% O2.  Paged and updated Dr. Gonda.  Received orders for a TEG with platelet mapping & to increase PEEP to 14 on vent.  Message relayed to RRT.

## 2017-08-24 NOTE — PROGRESS NOTES
Critical lab called at 0715 - calcium 6.8 by Tori.  Primary RN, Ramin, notified in person.    Mathew Damon, RN, BSN, CCRN

## 2017-08-24 NOTE — PROGRESS NOTES
Tech from Lab called with critical result of Wbc 1.2, hgb 5.5, hct 17.7, plt 27 at 2047. Critical lab result read back to Tech.  Dr. Bhatia notified of critical lab result at 2117. Critical lab result read back by Dr. Bhatia.

## 2017-08-24 NOTE — PROGRESS NOTES
Nicole from Lab called with critical result of Calcium 6.8 at 1851. Critical lab result read back to Nicole.  Corrected calcium 8.32.  Pt also had critical labs, informed to this RN via message taken by another RN.  WBC 1.3, Hgb 3.7, Hct 11.6, Plt 33.  Dr. Bhatia notified of all critical lab result at 1920.  Critical lab result read back by Dr. Bhatia.  MD also informed that pt's MAP <65, pt third spacing, and satting 91% on 100%.  MD to put orders in.

## 2017-08-24 NOTE — DISCHARGE PLANNING
Discussed pt during AM Rounds. Treating physician reports the family has informed him that the pt has an AD that [facility]. TC to [facility]. She confirms AD and faxed a copy. Pt appears to be a DNR. AD names the pt's spouse Silva as his designee. [facility] Informed her and faxed her a copy. Notified treatment team.

## 2017-08-24 NOTE — PROGRESS NOTES
Pulmonary Critical Care Progress Note        Date of Service:  8/24/2017  Chief Complaint: Sudden onset of shortness of breath    History of Present Illness: 82 y.o. male admitted for acute hypoxic respiratory failure due to bilateral pulmonary embolism with worsening status requiring intubation.     ROS:  Unable to obtain as the patient is sedated on the ventilator    Interval Events:  24 hour interval history reviewed    - Pt desatted overnight--> APRV, Flolan, and paralyzed-->CMV with PEEP on 20   - Train of 4 with 0 twitches   - Heparin reversed with Protamine as the pt's hb dropped to 5--> s/p 4 units pRBCs   - Too unstable for CT head eval   - Abd is now firm   - UOP only 5cc overnight   - Lasix drip at 10mg   - Nephro to do HD today   - Vent day #3   - CXR(personally reviewed imaging and report): worsening bilateral diffuse pulmonary opacities/infiltrates    Yesterday's Events:   - CT of brain without bleed   - Oncology on call recommends transfusing platelets to keep > 30K   - Not following with sedation vacaition   - Decreased UOP overnight   - CVP 2-4   - CXR(personally reviewed imaging and report): continued diffuse bilateral infiltrates/opacities with ETT about 2 cm above the steven    PFSH:  No change.    Respiratory:  Karimi Vent Mode: APVCMV, Rate (breaths/min): 30, Vt Target (mL): 350, PEEP/CPAP: 20, FiO2: 100, P Support: 10, Static Compliance (ml / cm H2O): 14, Control VTE (exp VT): 351  Pulse Oximetry: (!) 86 %    Exam: coarse throughout, no wheezing and labored breathing  ImagingAvailable data reviewed   Recent Labs      08/22/17   1423  08/23/17   0445  08/24/17   0310   ISTATAPH  7.151*  7.352*  7.177*   ISTATAPCO2  51.6*  29.6  51.8*   ISTATAPO2  86  61*  44*   ISTATATCO2  20  17*  21   [id number]  93  90*  68*   ISTATARTHCO3  18.0  16.4*  19.2   ISTATARTBE  -10*  -8*  -9*   ISTATTEMP  see below  97.7 F  100.0 F   ISTATFIO2  80  80  100   ISTATSPEC  Arterial  Arterial  Arterial   ISTATAPHTC    --   7.359*  7.166*   [id number]   --   59*  47*       HemoDynamics:  Pulse: (!) 103, Heart Rate (Monitored): (!) 106  Blood Pressure : 124/71 mmHg, NIBP: (!) 95/56 mmHg  CVP (mm Hg): (!) 9 MM HG    Exam: tachycardia  Imaging: Available data reviewed  Recent Labs      08/21/17   1056  08/22/17   1630  08/22/17   2347  08/23/17   0500   TROPONINI   --   0.49*  0.44*  0.30*   BNPBTYPENAT  110*   --    --    --        Neuro:  GCS Total Stacia Coma Score: 9       Exam: no focal deficits noted Motor and sensory exam grossly intact  Imaging: Available data reviewed    Fluids:  Intake/Output       08/22/17 0700 - 08/23/17 0659 08/23/17 0700 - 08/24/17 0659 08/24/17 0700 - 08/25/17 0659      3368-6086 1560-3504 Total 3076-6664 9102-5689 Total 8038-9801 7702-3534 Total       Intake    P.O.  200  -- 200  --  -- --  --  -- --    P.O. 200 -- 200 -- -- -- -- -- --    I.V.  --  2414.5 2414.5  2262.1  -- 2262.1  --  -- --    Propofol Volume -- 174.3 174.3 119 -- 119 -- -- --    Heparin Volume -- 440.3 440.3 143.1 -- 143.1 -- -- --    IV Volume (NS infusion) -- 1500 1500 1800 -- 1800 -- -- --    IV Piggyback Volume (IV Piggyback) -- 300 300 200 -- 200 -- -- --    Blood  --  -- --  229  2500 2729  --  -- --    Volume (RELEASE RED BLOOD CELLS) -- -- -- -- 350 350 -- -- --    Volume (RELEASE RED BLOOD CELLS) -- -- -- -- 350 350 -- -- --    Volume (RELEASE RED BLOOD CELLS) -- -- -- -- 350 350 -- -- --    Volume (RELEASE RED BLOOD CELLS) -- -- -- -- 350 350 -- -- --    Volume (RELEASE PLATELET PHERESIS) -- -- -- -- 690 690 -- -- --    Volume (RELEASE FRESH FROZEN PLASMA) -- -- -- -- 210 210 -- -- --    Volume (RELEASE CRYOPRECIPITATE) -- -- -- -- 200 200 -- -- --    Volume (RELEASE PLATELET PHERESIS) -- -- -- 229 -- 229 -- -- --    Enteral  --  -- --  25  -- 25  --  -- --    Enteral Volume -- -- -- 25 -- 25 -- -- --    Total Intake 200 2414.5 2614.5 2516.1 2500 5016.1 -- -- --       Output    Urine  475  30 505  20  -- 20  --  -- --     Number of Times Voided 7 x -- 7 x -- -- -- -- -- --    Number of Times Incontinent of Urine 2 x -- 2 x -- -- -- -- -- --    Indwelling Cathether -- -- -- 20 -- 20 -- -- --    Void (ml) 475 30 505 -- -- -- -- -- --    Stool  --  -- --  --  -- --  --  -- --    Number of Times Stooled 0 x -- 0 x -- -- -- -- -- --    Total Output 475 30 505 20 -- 20 -- -- --       Net I/O     -275 2384.5 2109.5 2496.1 2500 4996.1 -- -- --           Recent Labs      17   1630  17   2347  17   0500  17   1805   SODIUM  135   --   136  138   POTASSIUM  4.4   --   4.3  4.2   CHLORIDE  107   --   109  109   CO2  18*   --   15*  16*   BUN  35*   --   43*  47*   CREATININE  1.05   --   1.61*  1.96*   MAGNESIUM   --   1.9  2.2   --    PHOSPHORUS   --    --   4.7*   --    CALCIUM  7.8*   --   7.2*  6.8*       GI/Nutrition:  Exam: abdomen is soft and non-tender, normal active bowel sounds  Imaging: Available data reviewed  tube feed Tolerated  Liver Function  Recent Labs      17   2300  17   1630  17   0500  17   1805   ALTSGPT  30  333*  233*   --    ASTSGOT  18  240*  89*   --    ALKPHOSPHAT  71  90  59   --    TBILIRUBIN  0.6  1.2  1.0   --    GLUCOSE  188*  265*  219*  140*       Heme:  Recent Labs      17   1040   17   2105  17   0500   17   1500  17   1805  17   1930  17   2308   RBC  2.99*   < >   --    --    < >   --   1.23*  1.88*  3.38*   HEMOGLOBIN  9.0*   < >   --    --    < >   --   3.7*  5.5*  9.6*   HEMATOCRIT  27.5*   < >   --    --    < >   --   11.6*  17.7*  29.6*   PLATELETCT  61*   < >   --    --    < >   --   33*  27*  36*   APTT   --    < >  >240.0*  144.1*   --   72.9*   --    --    --    IRON  48*   --    --    --    --    --    --    --    --    TOTIRONBC  174*   --    --    --    --    --    --    --    --     < > = values in this interval not displayed.       Infectious Disease:  Temp  Av.2 °C (99 °F)  Min: 36.6 °C (97.8  °F)  Max: 37.8 °C (100.1 °F)  Micro: reviewed  Recent Labs      08/21/17   2300   08/22/17   1630  08/23/17   0500   08/23/17   1805  08/23/17   1930  08/23/17   2308   WBC   --    < >  4.9   --    < >  1.3*  1.2*  4.1*   NEUTSPOLYS   --    < >  84.40*   --    < >  48.00  72.00  55.50   LYMPHOCYTES   --    < >  3.50*   --    < >  12.00*  4.00*  22.70   MONOCYTES   --    < >  2.60   --    < >  0.00  4.00  0.90   EOSINOPHILS   --    < >  0.00   --    < >  4.00  0.00  0.90   BASOPHILS   --    < >  0.00   --    < >  0.00  0.00  0.00   ASTSGOT  18   --   240*  89*   --    --    --    --    ALTSGPT  30   --   333*  233*   --    --    --    --    ALKPHOSPHAT  71   --   90  59   --    --    --    --    TBILIRUBIN  0.6   --   1.2  1.0   --    --    --    --     < > = values in this interval not displayed.     Current Facility-Administered Medications   Medication Dose Frequency Provider Last Rate Last Dose   • epoprostenol (FLOLAN) 1.5 mg in glycine diluent for flolan 50 mL for Inhalation  0.01-0.05 mcg/kg/min Continuous Jeremy M Gonda, M.D. 4.5 mL/hr at 08/24/17 0154 0.032 mcg/kg/min at 08/24/17 0154   • artificial tear ointment (REFRESH,LACRI-LUBE) 1 Application  1 Application Q8HRS Jeremy M Gonda, M.D.       • fentaNYL (SUBLIMAZE) 50 mcg/mL in 50mL   Continuous Jeremy M Gonda, M.D. 1.5 mL/hr at 08/24/17 0400 75 mcg/hr at 08/24/17 0400   • vecuronium (NORCURON) 60 mg in D5W 500 mL Infusion  0-1.7 mcg/kg/min Continuous Jeremy M Gonda, M.D. 28.4 mL/hr at 08/24/17 0454 0.8 mcg/kg/min at 08/24/17 0454   • vecuronium (NORCURON) injection 7.09 mg  0.1 mg/kg Q2HRS PRN Jeremy M Gonda, M.D.       • furosemide (LASIX) 100 mg in  mL infusion  1-10 mg/hr Continuous Jeremy M Gonda, M.D. 5 mL/hr at 08/24/17 0418 5 mg/hr at 08/24/17 0418   • sodium acetate 150 mEq in sterile water for inj(pf) 1,000 mL infusion   Continuous Bala James M.D. 150 mL/hr at 08/24/17 0327     • tbo-filgrastim (GRANIX) injection 480 mcg  5 mcg/kg  DAILY Nathanael Christie M.D.   480 mcg at 08/23/17 1224   • norepinephrine (LEVOPHED) 8 mg in  mL Infusion  0-30 mcg/min Continuous Jeremy M Gonda, M.D. 9.4 mL/hr at 08/24/17 0421 5 mcg/min at 08/24/17 0421   • piperacillin-tazobactam (ZOSYN) 3.375 g in  mL IVPB  3.375 g Q6HRS Sanjay Bhatia M.D.   Stopped at 08/24/17 0402   • propofol (DIPRIVAN) injection  0-80 mcg/kg/min Continuous Juliet Martini M.D.   Stopped at 08/24/17 0420   • chlorhexidine (PERIDEX) 0.12 % solution 15 mL  15 mL BID Juliet Martini M.D.   15 mL at 08/23/17 2208   • lidocaine (XYLOCAINE) 1%  injection  1-2 mL Q30 MIN PRN Juliet Martini M.D.       • MD ALERT...Adult ICU Electrolyte Replacement per Pharmacy Protocol   pharmacy to dose Juliet Martini M.D.       • fentaNYL (SUBLIMAZE) injection 25 mcg  25 mcg Q HOUR PRN Juliet Martini M.D.   25 mcg at 08/22/17 1421    Or   • fentaNYL (SUBLIMAZE) injection 50 mcg  50 mcg Q HOUR PRN Juliet Martini M.D.        Or   • fentaNYL (SUBLIMAZE) injection 100 mcg  100 mcg Q HOUR PRN Juliet Martini M.D.       • ipratropium-albuterol (DUONEB) nebulizer solution 3 mL  3 mL Q2HRS PRN (RT) Juliet Martini M.D.   3 mL at 08/22/17 1346   • senna-docusate (PERICOLACE or SENOKOT S) 8.6-50 MG per tablet 2 Tab  2 Tab BID Jason Villegas M.D.   2 Tab at 08/23/17 2208    And   • polyethylene glycol/lytes (MIRALAX) PACKET 1 Packet  1 Packet QDAY PRN Jason Villegas M.D.        And   • magnesium hydroxide (MILK OF MAGNESIA) suspension 30 mL  30 mL QDAY PRN Salamiman Algharbi, M.D.        And   • bisacodyl (DULCOLAX) suppository 10 mg  10 mg QDAY PRN Jason Villegas M.D.       • Respiratory Care per Protocol   Continuous RT Jason Villegas M.D.       • enalaprilat (VASOTEC) injection 1.25 mg  1.25 mg Q6HRS PRN Jason Villegas M.D.       • ondansetron (ZOFRAN) syringe/vial injection 4 mg  4 mg Q4HRS PRN Jason Villegas M.D.       • ondansetron (ZOFRAN ODT) dispertab 4 mg   4 mg Q4HRS PRN Jason Villegas M.D.       • acetaminophen (TYLENOL) tablet 650 mg  650 mg Q6HRS PRN Jason Villegas M.D.       • heparin 1000 units/mL injection 2,600 Units  2,600 Units PRN Tho Barahona, PHARMD   2,600 Units at 08/22/17 1525    And   • heparin infusion 25,000 units in 500 ml 0.45% nacl   Continuous Tho Barahona, PHARMD   Stopped at 08/23/17 2033   • dexamethasone (DECADRON) injection 4 mg  4 mg Q6HRS Jason Villegas M.D.   4 mg at 08/23/17 2314   • famotidine (PEPCID) injection 20 mg  20 mg BID Jason Villegas M.D.   20 mg at 08/23/17 2208     Last reviewed on 8/21/2017 11:37 AM by Addie Celaya [facility]    Quality  Measures:  Radiology images reviewed, Labs reviewed and Medications reviewed  Martins catheter: Critically Ill - Requiring Accurate Measurement of Urinary Output      DVT Prophylaxis: Heparin  DVT prophylaxis - mechanical: SCDs  Ulcer prophylaxis: Not indicated  Antibiotics: Treating active infection/contamination beyond 24 hours perioperative coverage      Problems/Plan:  Acute Hypoxic Respiratory Failure due to PE   - intubated 8/22   - Continue RT/O2 protocols   - due to worsening respiratory status-->APRV, but did not tolerate    * Requiring high PEEP low tidal volume ventilation    * Continue Flolan    * Continue Paralytics  Acute Bilateral PE   - Stopped heparin due to low Hb   - No thrombolytics given as there is no RV strain on CTA of chest as well as high bleed risk due to recent craniotomy for GBM   - Cont to monitor hemodynamics closely and cont levophed for MAP goal > 65   - doppler u/s of lower extremities with bilateral DVT  Acute Kidney Injury   - nephro consulted   - temporary HD cath placed   - HD per nephro  Normocytic Anemia   - query bone marrow suppression from recent chemo   - s/p 4 units pRBCs  Acute lactic acidosis   - continue to monitor and likely due to above processes  GBM s/p resection with XRT and chemo   - will repeat CT head   -  last chemo/XRT 7/31   - prognosis poor and pt too sick to receive any further chemo  Complete AV block   - s/p pacer placement on 8/3  Acute Pancytopenia   - concern for sepsis   - septic protocols initiated   - cont zosyn   - follow cultures   - may need vasopressors   - monitor closely  Thrombocytopenia   - query related to chemo vs sepsis/consumption   - CT head negative on 8/22   - Platelet goals > 30 through transfusions  Hyperglycemia   - ISS  Moderate Protein Calorie Malnutrition   - Cortrak placed   - enteral feedings  Prognosis   - grim given patients multiple underlying co-morbidities prior to his PE diagnosis   - Continue to have ongoing discussions with wife and daughter about poor prognosis and after review of the patient's Advanced Directive which does state plainly that he is a DNR and did not want prolonged care such as dialysis or live saving measures, the wife is adamant that he remain FULL code as well as FULL treat despite the grim prognosis and the possibility of death associated with placing a new central line and HD catheter as well as dialysis.    Discussed patient condition and risk of morbidity and/or mortality with Family, RN, RT, Pharmacy, Dietary, [facility] resident, Code status disscussed, Charge nurse / hot rounds and Patient.    The patient remains critically ill.  Critical care time = 132 minutes in directly providing and coordinating critical care and extensive data review.  No time overlap and excludes procedures.  [id number], [id number], [id number]

## 2017-08-24 NOTE — PROGRESS NOTES
Pulmonary/Critical Care Medicine   Progress Note    Date of service: 8/23/2017  Time: 2130    Called to bedside by RN and [facility] resident to assess patient given worsening clinical condition. Patient had a significant drop in Hgb from 9 to 3.7 since 0740 this morning without obvious source of bleed. Has been on heparin gtt for PE and platelet count has been low. Heparin gtt was discontinued and pRBCs ordered as well as repeat CBC to confirm. Repeat Hgb came back at 5.5. Protamine sulfate given at that point as well as CT A/P ordered. No stool recently to evaluate for GIB and none seen on NGT. Platelet transfusion ordered as well with goal level to 50 in setting of active bleed. Abd exam mildly distended and lung exam with persistent coarse rhonchi. The other component of worsening condition was patient becoming more hypoxic despite PEEP of 12 and FiO2 100%. Performed recruitment maneuver and increased PEEP sequentially to 16. Given 50 mg of rocuronium. CXR performed showing unchanged diffuse B infiltrates and tubes and lines stable without PTX. Will consider transitioning to APRV in 30 min or so if minimal to no improvement. SaO2 increased to 91% after above measures taken. BP did start to drop slightly and will start patient on levophed gtt to keep MAP>65. Will given IV lasix between units of pRBC so as to not worsen pulmonary edema component.    I spent 95 min in reviewing the patient's condition, physical examination, laboratory and imaging data, prior documentation, in discussion with RN, RT, family, [facility] resident, and in formulating an assessment/plan.    Critical Care time: 95 min. No time overlap, procedures not included in time.  [id number], [id number]

## 2017-08-24 NOTE — CARE PLAN
Problem: Bowel/Gastric:  Goal: Normal bowel function is maintained or improved  Senna administered per MAR    Problem: Respiratory:  Goal: Respiratory status will improve  APRV on vent

## 2017-08-24 NOTE — PROGRESS NOTES
Patient's BP in 80's -90's MAP 65 or greater, O2 saturations 87%-90% patient FIO2 increased to 100%. Dr. Villegas notified.

## 2017-08-24 NOTE — PROGRESS NOTES
Notified that patient did not tolerate transition to transport ventilator resulting in rapid de-recruitment and hypoxia (SaO2 80%). Will have to hold off on CT scans at this point until more stable. Will continue to trend CBC with transfusions prn and monitor for any rectal bleeding. Noted abnormal TEG with prolonged R time and low angle and ordered FFP and cryoprecipitate accordingly. Will also transition patient to APRV and start on flolan. May need ongoing paralysis if worsens. Family notified.

## 2017-08-24 NOTE — PROGRESS NOTES
Informed Dr. Gonda that pt is currently satting 85-86% after vecuronium gtt.  Received orders to increase PEEP to 20, and no need to call MD if sats are 85% or greater.

## 2017-08-24 NOTE — PROGRESS NOTES
RRT currently bagging pt and pt still satting <90%.  Stat paged Dr. Gonda.  Awaiting call back.  Phone call made to Silva, spouse of pt.  Updated her on pt's current status.  Silva and daughter Cydney to come to bedside.  Family would still like all interventions done.

## 2017-08-24 NOTE — PROGRESS NOTES
Patient developed right eye swelling and hematoma.  Patient is PERRL. Dr. Martini at bedside.  Orders to put ice pack on eye.

## 2017-08-24 NOTE — DISCHARGE PLANNING
Met bedside with pt's spouse and daughter. The pt's family expressed concerns over the pt's hospital bill. EM tp PFA and requested that they meet with the family. Confirmed in a follow-up visit with the family that PFA made contact with them.

## 2017-08-24 NOTE — PROGRESS NOTES
Late entry:    Dr. Gonda and Dr. Bhatia with [facility] to bedside at approximately 2130 to assess pt.  Pt was paralyzed with a one-time dose of 50mg rocuronium at approximately 2140.  Pt started on levophed at 2200.  STAT CXR ordered and carried out.  Orders were received for CT scan of abdomen, pelvis, and head.  ACLS RN & RRT attempted the trip at 0100, however, pt quickly de-satted to to 87% once removed from vent and moved to transport vent.  Paged and updated Dr. Gonda on pt's declining status.  CT cancelled by MD until further notice.  Dr. Bhatia to bedside to assess pt at approximately 0115.  Pt also received 4units of PRBCs, 1 unit of platelets, 1 unit of FFP, and currently awaiting on cryo from blood blank.  Spouse at bedside.  Family has been updated on POC.

## 2017-08-24 NOTE — CONSULTS
"Reason for PC Consult: Advance Care Planning    Consulted by:Dr. Martini    Assessment:  General: 82 year old male admitted 8/21/17 for SOB. Found to have bilateral PEs, pneumonia, pancytopenia, .JIAN, brain mass, AV heart block, and anemia. PMH significant for GBM s/p surgery and radiation at the [facility]. Patient sedated and on mechanical ventilator.       Dyspnea: Vent day # 3. 91% oxygenation with 100% FiO2 via mechanical ventilator.   Last BM: 08/23/17.    Pain: Unable to determine; sedated. No signs.  Depression: Unable to determine.    Spiritual:  Is Mandaeism or spirituality important for coping with this illness? Yes; message left with  Suhail requesting spiritual care visit.  Has a  or spiritual provider visit been requested? Yes    Palliative Performance Scale: 10%    Advance Directive: Advance Directive obtained from the [facility]. Patient marked \"No\" for life-sustaining treatments in most circumstances but did leave discretion for his DPOA-HC.  DPOA: Wife Sandra Grinnell [phone] cell/[phone] home.  POLST: None.     Code Status: Full.     Outcome:  Care conference with Dr. Martini, patient's wife Silva, daughter Cydney, and myself. Patient's other daughter Cadence will be arriving this evening from OR. Third daughter Azeb lives in [facility] and is unable to come.     Introduced myself and discussed role of palliative care. Reviewed patient's AD in detail with Silva. Dr. Martini discussed current medical issues, prognosis, and ethical concern with following patient's wishes outlined on his AD.     Discussed goals of care. Silva explained that the patient expressed \"wanting everything if there is a chance.\" Silva explained that she does not feel the patient is at the point in which his AD outlines. I expressed that we has his medical team feel that he may be at a point in which he would not want continued aggressive care based on his AD wishes and " "encouraged Silva to think about the patient's quality of life. She stated \"Oh don't give me that quality of life crap\" with a raised voice and tense body language.    Dr. Martini discussed previous conversations with patient prior to intubation in which he directed the medical team to look to his AD for guidance. Silva stated, \"That doesn't sound like him at all. It's too many words.\" Silva expressed that she wants to continue full aggressive care including HD with catheter placement. Dr. Martini discussed the risk benefits and provided updates regarding cardiac risk. Silva confirmed she wants all resuscitation measures in place. All questions answered. Encouraged family to reach out with any questions/needs.     Updated: Bedside RN.     Plan: Full code/full treat.     Thank you for allowing Palliative Care to participate in this patient's care. Please feel free to call [phone] with any questions or concerns.  "

## 2017-08-25 ENCOUNTER — APPOINTMENT (OUTPATIENT)
Dept: RADIOLOGY | Facility: MEDICAL CENTER | Age: 82
DRG: 208 | End: 2017-08-25
Attending: INTERNAL MEDICINE
Payer: MEDICARE

## 2017-08-25 LAB
EKG IMPRESSION: NORMAL
HBV SURFACE AB SERPL IA-ACNC: <3.1 MIU/ML (ref 0–10)

## 2017-08-25 NOTE — PROGRESS NOTES
Pt HR 170s, sustained for 15 min. maxed on levo. Awaiting vaso from pharmacy, stat call placed for medication to be tubed. Notified Dr. Martini. New orders received. Pt HR back to 115 w/ frequent PVCs.

## 2017-08-25 NOTE — PROCEDURES
Procedure Note     Date: 8/24/2017    Time: 2100    Procedure: Needle and finger thoracostomy    Site: Right chest     Pre-operative diagnosis: Pneumothorax in setting of cardiac arrest    Post-operative diagnosis: Pneumothorax    Consent: Emergency, implied consent     Procedure: Patient positioned, prepped, and draped in as sterile fashion. After placing a 14g angiocath into the mid-clavicular line 2nd rib space (above the rib) x2 on the right side, observed air escape and patient remained pulseless although easier to ventilate using BVM, a 2 cm incision with an 11# blade scalpel made in the right mid-axillary line in the 4-5th intercostal space. Incision widened using blunt dissection/finger and pleural space entered directly over rib #5.. Air and serosanguineous fluid evacuated from chest cavity via incision but unfortunately patient remained pulseless. Patient continued to receive ACLS (see code note) for another few minutes but all attempts were unsuccessful at regaining spontaneous circulation and patient was declared dead at 2126.    EBL: Minimal    Complications: None    CXR: Not available    Jeremy Gonda, MD   Critical Care Medicine

## 2017-08-25 NOTE — PROGRESS NOTES
Pt  at .  Dr. Gonda and Dr. Bhatia with [facility] at bedside at time of death.  Family notified by MD.

## 2017-08-25 NOTE — PROGRESS NOTES
Pt maxed on levophed, vasopressin, and phenylephrine.  Pt's oxygen saturation in the 60's.  Pt's spouse Silva called and updated.  She was asked by this RN to come to the bedside.

## 2017-08-25 NOTE — PALLIATIVE CARE
Spiritual Care Note           Patient's Name: Alvin Grinnell   MRN: [id number]    Age and Gender: 82 y.o. male   YOB: 1935   Place of Residence: [place], [place]   Family/Friends/Others Present: wife, Silva  daughters, Cydney and Shakira  son-in-law   Unit: [facility] ICU   Room (and Bed): [place] [address]   Medical Diagnosis/Procedure: acute respiratory failure with hypoxia   pneumonia   pulmonary embolism   pancytopenia   acute kidney injury  brain mass   heart block, AV   anemia    Sikhism Affiliation: Anabaptist   Code Status: Full Code    Date of Admission: 2017    Length of Stay: 3 days   Date of Visit: 2017   Length of Visit: 45 minutes       Situation/Reason for Visit:  Patient followed by palliative care.  Also received call from  that family was requesting a .    Background:  See above diagnoses.  In addition patient was coding when the  called and was still coding when I arrived.  The patient ultimately .    Summary of Interaction/Conversation with Patient and/or Family/Friends/Others:  Sat with and prayed with family while patient was coding.  After patient , was present with and prayed with family while being with patient's body in room.    Assessment of Cultural/Social/Emotional/Spiritual Issues:  grief    Interventions:  Compassionate presence, reflective listening, emotional support, prayer.    Outcomes:  Spiritual comfort, emotional release, progress with grief.    Consultations/Referrals:  none    Requests/Recommendations:  none    Continuing Care:  Upon request.      Contact Information:  Chaplain MARK Burgess  [phone]   [email or web address]

## 2017-08-25 NOTE — PROGRESS NOTES
Pulmonary/Critical Care Medicine   Progress Note    Date of service: 8/24/2017  Time: 2035    Called emergently to bedside by nurse after loss of patient's pulses and overhead call of code blue. Patient had been undergoing CRRT for approximately 1 hour at the time of being unable to find blood pressure noninvasively followed by loss of pulse. Patient became asystolic and a code blue was called. Arrived to find patient undergoing good quality CPR by nursing staff pulseless and asystolic. He received several doses of epinephrine with eventual development of fine ventricular fibrillation that was successfully defibrillated into a junctional escape rhythm with pulses. Maintained an adequate pulse for the next several minutes while on maximum dose of norepinephrine, Tray-Synephrine, vasopressin and an arterial catheter was placed at that time as the noninvasive cuff was still unable to get an adequate blood pressure. Initial blood pressure through the arterial catheter was 110/56 and heart rate was in the 70s to 80s. EKG performed showing possible atrial flutter with frequent ectopy and an incomplete bundle branch block versus ventricular escape rhythm. No ST elevation noted. Labs were drawn and the stat chest x-ray was performed which revealed a moderate sized right pneumothorax. At the time of the x-ray patient again became pulseless and bradycardic degrading into PEA. ACLS was again started in addition to needle decompression of right chest ×2 followed by a scalpel and finger decompression into the right chest with adequate evacuation of extrapleural air but unfortunately no return of spontaneous circulation. Additional dosages of epinephrine were given in conjunction with good quality CPR. After several minutes of this that were unsuccessful patient was declared dead at 2126. Family had observed part of the code but at the time of his death were in the waiting room. I communicated with them my condolences and they  expressed gratitude for all that had been done over the last several days. Social work and  were at their side for assistance.    I spent 143 min in reviewing the patient's condition, physical examination, laboratory and imaging data, prior documentation, in discussion with RN, RT, pharmacy, social work, , family, [employer] [facility] resident, and in formulating an assessment/plan.    Critical Care time: 143 minutes min. No time overlap, procedures not included in time.  [id number]

## 2017-08-25 NOTE — PROGRESS NOTES
Wife Silva and daughter Cdyney at bedside.  Pt's extremities cool and mottled.  Unable to obtain oxygenation saturation %.  ICU monitor unable to read a BP.  Multiple RNs attempted a manual BP without success.  Femoral pulse was found with doppler.  Page put out to Dr. Gonda, awaiting call back at the moment.

## 2017-08-25 NOTE — PROGRESS NOTES
[facility] Progress Note    SLEDD ordered today per Dr. James x 12 hours. Treatment initiated at 1953, ended at 2055.     Upon treatment initiation difficulty obtaining BP readings. Pt was maxed on levophed, vasopressin, and phenylephrine.  Pt's oxygen saturation in the 60's. Dr. James updated, ordered to continue treatment and terminate if HR >120 or O2 sat's dropped below 60%.     Dr. Denney updated at 2036 with orders to initiate Albumin 25% 12.5 grams IV x 10 doses over 12 hours.    At 2055, blood returned and treatment terminated due to code blue being initiated; Dr. Denney updated.     Net UF -500 mL (positive fluid intake).     Post tx, CVC flushed with saline then locked with heparin 1000 units/mL per designated amount in each wing then clamped and capped. Aspirate heparin prior to next CVC use.    ICU RN and Dr. Gonda updated.

## 2017-08-25 NOTE — DISCHARGE SUMMARY
CHIEF COMPLAINT ON ADMISSION  Chief Complaint   Patient presents with   • Shortness of Breath     [facility]o [facility]       CODE STATUS  Prior      The patient  at 2017 at 6    HPI & HOSPITAL COURSE:      81 y.o. male with a past medical history of hyperlipidemia, hypertension, GBM S/P radiation and chemotherapy CAP back in  and heart block s/p pacemaker last August was brought from rehab center because of sudden SOB,  CT chest showed B/L pulmonary embolism the patient was stable no hypotention and sat 95% on 4-5 L NC (TpA was not indicated) no chest pain or SOB the patient was admitted to the ICU and heparin drip was intiated, next day the patient developed severe SOB and he required more O2 by mask, later he was intubated for severe acute respiratory failure with infiltration B/L on CXR, the patient had multiorgan failure, pancytopenia( was given plt and Neupogen), acute kidney failure (HD was started), and hypoxia even on vent and with PEEP 20, later his BP dropped down norepi was started,  And then he developed asystole, code blue was called and after  CPR the patient  at  on 2017.         Total time of the discharge process exceeds 45 minutes

## 2017-08-25 NOTE — DISCHARGE PLANNING
Medical Social Work     Referral: Code Blue    SW responded to a code blue. Pt wife and daughter were moved to the waiting room when pt coded. SW provided emotional support for the family. Family requested SW call the christian to come for the pt. SW called the on call christian. [place] arrived and is with the family. Family was updated by the ICU MD Dr. Gonda that the pt . SW provided the Helpful information for this difficult time information with the nursing operations phone number and emotional support. SW will remain available for family support.

## 2017-08-25 NOTE — PROGRESS NOTES
Osvaldo from Lab called with critical result of platelets 13 at 1815. Critical lab result read back to Osvaldo.   Dr. Martini notified of critical lab result at 1820.  Critical lab result read back by Dr. Martini.

## 2017-08-25 NOTE — PROGRESS NOTES
Dialysis ordered by Dr James for 3hrs on 3k acid bath. Treatment initiated at 1719, and ended at 1735 d/t pt  in SVT in 170s, HOTN. Dr James notified, ordered to stop HD and start 12h SLEDD with no UF. Blood returned.  Pt is +290ml. Report given to JULEE Salmeron RN

## 2017-08-25 NOTE — PROCEDURES
Procedure Note    Date: 8/24/2017  Time: 2045    Procedure: Arterial Line placement  Site: Right femoral artery    Indication: Shock requiring continuous BP monitoring, frequent ABG monitoring, unable to get noninvasive blood pressure results  Consent: Emergent/implied    Procedure: After obtaining consent, a time-out was performed. Patient positioned, prepped, and draped in sterile fashion. 0 mL of local anesthetic injected (1% lidocaine without epinephrine) achieving appropriate comfort level for patient. Artery was located using realtime US/physical exam. A 18 gauge catheter was placed using Seldinger technique. Appropriate arterial blood visualized from the catheter and the arterial waveform confirmed on the monitor. Line secured and dressed in place. Patient tolerated procedure well without any difficulties and left in care of bedside nurse.     EBL: minimal  Complications: None    Jeremy Gonda, MD  Critical Care Medicine

## 2017-08-26 LAB
BACTERIA BLD CULT: NORMAL
BACTERIA BLD CULT: NORMAL
SIGNIFICANT IND 70042: NORMAL
SIGNIFICANT IND 70042: NORMAL
SITE SITE: NORMAL
SITE SITE: NORMAL
SOURCE SOURCE: NORMAL
SOURCE SOURCE: NORMAL

## 2017-08-29 NOTE — DISCHARGE SUMMARY
Rehab Discharge Note    Admission Diagnosis:   Active Hospital Problems    Diagnosis   • *GBM (glioblastoma multiforme) (CMS-HCC)   • Vasogenic cerebral edema (CMS-HCC)   • CAP (community acquired pneumonia)   • Heart block, AV   • Cognitive and behavioral changes   • Impaired mobility and ADLs   • Debility   • Bradycardia   • HTN (hypertension)       Discharge Diagnosis:  Active Hospital Problems    Diagnosis   • *GBM (glioblastoma multiforme) (CMS-HCC)   • Vasogenic cerebral edema (CMS-HCC)   • CAP (community acquired pneumonia)   • Heart block, AV   • Cognitive and behavioral changes   • Impaired mobility and ADLs   • Debility   • Bradycardia   • HTN (hypertension)       Hospital Course  The patient is a 81 y.o. male with a past medical history of hyperlipidemia, hypertension, GBM S/P radiation and chemotherapy CAP back in June ; now admitted for acute inpatient rehabilitation with severe functional debility to AV block and has had a pacemaker placed.  On admission the patient and medical record report that the patient was totally independent in ADLS and mobility prior to the initial diagnosis of of the GM. He had an extensive debulking procedure performed underwent radiation treatment and approximately 10 days ago completed  A course of Temodar.  The patient's wife reported that he has  been getting n weaker. While the acute chart reported that he had a single  fall. The wife reports that had two separate falls with the last one occuring when sitting at the edge of the bed just falling over.       He was transferred over from [facility] in [place] for pneumonia, complete heart block, brain mass with vasogenic edema on 8/2/2017.ss. He stated that he had been feeling lightheaded for the past 1 week. At the outlying facility chest x-ray revealed bibasilar opacities, CT had revealed extensive vasogenic edema and right parietal, frontal, occipital, temporal lobes and attenuation  to right cisterns with 2  mm midline shift appeared to be increased from previous study. EKG at the outlying facility also revealed a possible 3rd degree heart block. ON 8/3/2017 the patient underwent placement of a dual chamber pacemaker. The patient has significant debility. And despite being on decadron has substantial deficits in cognition, and performance of ADLS and mobility        Patient was evaluated by Rehab Medicine physician and Physical Therapy and Occupational Therapy and determined to be appropriate for acute inpatient rehab and was transferred to [facility] on 2017    He received PT, OT and SLP while at our facility. I have to say he exceeded all my expectations regards to his recovery given his diagnosis.. We felt he improved to the point where he would be able to go home however his wife was very reluctant and we have planned to sense a skilled facility very much against my advice. Another issue is because of his surgery for his GBM we were asked not to anticoagulate the patient. While I rarely do it because of the possibility of a hypercoagulable state I ordered SCDs for the patient.. However he refused to wear them    On the morning of 2017 I was called to the patient's room because of shortness of breath and a possible change in mental status. I noted a very swollen right leg in a significant increase in O2 requirements.on the top of mydifferential diagnosis was a pulmonary emboli with this significant change in FiO2 requirements,  Tachypnea and tachycardia. We decided to send the patient to the emergency room for evaluation by ambulance. The patient's wife initially was very resistant to this but she did eventually acquiesce. Prior to this we addressed possibly changing the patient's code status but the patient's wife is insisted on leaving him a full code. He was transported to the emergency room  For evaluation and care    Functional Status at Discharge  Eatin - Standby  Prompting/Supervision or Set-up  Eating Description:  Increased time, Supervision for safety, Set-up of equipment or meal/tube feeding  Groomin - Modified Independent  Grooming Description:   (seated at sink  shave and oral care )  Bathin - Standby Prompting/Supervision or Set-up  Bathing Description:  Grab bar, Tub bench, Hand held shower (setup / SBA to complete bathing tasks )  Upper Body Dressin - Standby Prompting/Supervision or Set-up  Upper Body Dressing Description:   (setup  doff button up shirt  don pull over shirt )  Lower Body Dressin - Minimal Assistance  Lower Body Dressing Description:  4 - Minimal Assistance  Discharge Location : [facility]  Patient Discharging with Assist of: Other (See Comments) ([facility] medical transport)  Level of Supervision Required: Other (See Comments) (per chart review, pt d/c acute, unable to determine)  Recommended Equipment for Discharge: Other (See Comments) (per chart review, pt d/c acute, unable to determine)  Recommended Services Upon Discharge: Other (See Comments) (per chart review, pt d/c acute, unable to determine)  Long Term Goals Met: 0  Long Term Goals Not Met: 3  Reason(s) for Goals Not Met: per chart review, pt d/c acute, did not complete ELOS  Criteria for Termination of Services: Patient Transferred to Acute Care for Medical Purposes  Walk:  2 - Max Assistance  Distance Walked:  Walks  feet  Walk Description:  Extra time, Assist device/equipment, Safety concerns, Supervision for safety, Walker (AMB x 110 feet with 4WW and CGA)  Wheelchair:  0 - Not tested,medical condition  Distance Propelled:  Propels  feet   Wheelchair Description:  Extra time, Safety concerns, Verbal cueing, Supervision for safety (110' x 1 using B LE alt UE(min use of L UE 2/2 pace prec), cues for L side attention as pt has decreased L peripheral vision s)  Stairs 0 - Not tested,medical condition  Stairs DescriptionHand rails, Oxygen, Extra time,  "Verbal cueing, Supervision for safety, Safety concerns, Ascends/descends 4 to 11 steps (ascended/descended 10(4\" stairs) B HR cues for turning away from 02 line, CGA on stairs step-to pattern)     Comprehension Mode:  Both  Comprehension:  4 - Minimal Assistance  Comprehension Description:  Verbal cues, Glasses  Expression Mode:  Both  Expression:  5 - Stand-by Prompting/Supervision or Set-up  Expression Description:  Verbal cueing  Social Interaction:  5 - Stand-by Prompting/Supervision or Set-up  Social Interaction Description:  Verbal cues, Increased time  Problem Solving:  3 - Moderate Assistance  Problem Solving Description:  Verbal cueing, Therapy schedule, Bed/chair alarm, Increased time  Memory:  3 - Moderate Assistance  Memory Description:  Verbal cueing, Therapy schedule, Bed/chair alarm       Discharge Medication:  Per emergency room    Equipment:  No equipment ordered    Follow-up:  In the emergency room  For evaluation for what we presume is a likely pulmonary embolus    Condition on Discharge:  Critical condition    Pulse oximetry 93 on 6 liters via mask.  Was 83 % when I first saw patient  Pulse 115 irregular RR 24     Cardiac: Tachycardic paced rhythm   Lungs: clear to auscultation bilaterally; he is on supplemental oxygen via nasal canula he is tachypnea Abdomen: soft; NT, ND, BS present.   Extremities: minimal edema noted left new pitting edema on right up to mid calf  Neuro: No change though it was reported that the patient was confused earlier       "

## 2017-08-29 NOTE — PROGRESS NOTES
DATE OF SERVICE:  08/20/2017    SUBJECTIVE:  Patient without complaints.    OBJECTIVE:  VITAL SIGNS:  Stable.  He is afebrile.  HEENT:  Normocephalic.  The incision is approximated and dry, same thing with   pacemaker.  Mouth:  A little bit better for mucous membranes today.  NECK:  No lymphadenopathy.  LUNGS:  Clear.  HEART:  Regular.  ABDOMEN:  Positive bowel sounds.  GENITOURINARY:  Deferred.  RECTAL:  Deferred.  EXTREMITIES:  No clubbing, cyanosis.    ASSESSMENT AND PLAN:  1.  Glioblastoma multiforme.  Continue comprehensive rehabilitative program as   well as family care and education.  2.  Skin precautions, safety precautions dietary, and pain, _____ no   complaints, stable at this time, and continue medical management.       ____________________________________     M MD OLIVA MATTHEW / NAYELY    DD:  08/28/2017 18:00:42  DT:  08/28/2017 23:32:41    D#:  [id number]  Job#:  [id number]

## 2017-08-29 NOTE — PROGRESS NOTES
DATE OF SERVICE:  08/19/2017    SUBJECTIVE:  Patient without complaints.    OBJECTIVE:  VITAL SIGNS:  Stable.  He is afebrile.  HEENT:  Normocephalic.  Incision is closed and intact.  Eyes are not injected.    Mouth, dry mucous membranes.  NECK:  No lymphadenopathy.  LUNGS:  Coarse breath sounds with poor inspiratory effort.  HEART:  Regular rate.  ABDOMEN:  Positive bowel sounds, soft, nontender.  GENITOURINARY AND RECTAL:  Deferred.  EXTREMITIES:  No clubbing or cyanosis.    ASSESSMENT AND PLAN:  Glioblastoma multiforme.  Continue comprehensive   rehabilitative program, pacemaker, incision looks good and the patient seems   to be participating with therapies well.  We will continue comprehensive   rehabilitative program as well as medical management.  Continue to monitor his   input orally with dietary monitoring.       ____________________________________     M MD OLIVA MATTHEW / NAYELY    DD:  08/28/2017 17:59:44  DT:  08/28/2017 23:26:00    D#:  [id number]  Job#:  [id number]

## 2017-08-29 NOTE — DOCUMENTATION QUERY
DOCUMENTATION QUERY     PROVIDERS: Please select “Cosign w/ note”to reply to query.     To better represent the severity of illness of your patient, please review the following information and exercise your independent professional judgment in responding to this query.      Sepsis is documented in the Progress Notes starting on 8/22 and Consult 8/23 (Dr. Christie).  Based upon the clinical findings, risk factors, and treatment, can you clarify if the patient had sepsis?     Please select which applies:      ·         Sepsis present on admission   • Sepsis developed after admission    • Sepsis was ruled out  ·         Other explanation of clinical findings (please document)  ·         Unable to determine      The medical record reflects the following:   Clinical Findings ED: /66 mmHg  Pulse 102  Temp(Src) 38.2 °C (100.8 °F)  Resp 18  Wt 71.668 kg (158 lb)  SpO2 96%    H&P:  /66 mmHg  Pulse 79  Temp(Src) 38.1 °C (100.5 °F)  Resp 28  Wt 71.668 kg (158 lb)  SpO2 75%  O2 therapy: Pulse Oximetry: (!) 75 %, O2 (LPM): 5, O2 Delivery: Nasal Cannula, lactic acidosis, WBC 5, platelet count 61    Consult 8/24:  His T-max is 98.7, pulse of 80, blood pressure 90/57,   respirations 26 on a ventilator with FIO2 of 80%, satting 96%.   Treatment Zosyn, cultures    Risk Factors PE, GBM, respiratory failure, ATN, cardiogenic shock   Location within medical record Progress Notes, Consult, H&P      Thank you,   Yanna Hall, CCS, CCS-P  [email or web address]

## 2019-06-05 NOTE — OR NURSING
Patient transported to [place] [address] with transport and this RN on monitor. Bedside handoff completed with Osvaldo RN. Dressing verified and marked at bedside. Neuro assessment completed at bedside with receiving RN.   16-May-2019 10:37

## 2021-11-18 NOTE — FLOWSHEET NOTE
08/10/17 0910   Events/Summary/Plan   Events/Summary/Plan Pt is on 2Lpm NC home use, pt stable no distress or sob noted will monitor.   Non-Invasive Resp Device Site Inspection Completed Intact   Location NC b/l ears   Interdisciplinary Plan of Care-Goals (Indications)   Obstructive Ventilatory Defect or Pulmonary Disease without Obvious Obstruction History / Diagnosis   Education   Education Yes - Pt. / Family has been Instructed in use of Home Oxygen   Respiratory WDL   Respiratory (WDL) X   Chest Exam   Work Of Breathing / Effort Mild   Respiration 17   Pulse 69   Breath Sounds   RUL Breath Sounds Clear   RML Breath Sounds Clear   RLL Breath Sounds Diminished   RIANA Breath Sounds Clear   LLL Breath Sounds Diminished   Secretions   Cough Non Productive   Oximetry   #Pulse Oximetry (Single Determination) Yes   Oxygen   Home O2 Use Prior To Admission? Yes   Home O2 LPM Flow 2 LPM   Home O2 Delivery Method Silicone Nasal Cannula   Home O2 Frequency of Use Continuous   Pulse Oximetry 95 %   O2 (LPM) 2   O2 Daily Delivery Respiratory  Silicone Nasal Cannula      18-Nov-2021 17:51

## 2022-03-09 NOTE — CARE PLAN
Problem: Knowledge Deficit  Goal: Knowledge of disease process/condition, treatment plan, diagnostic tests, and medications will improve  Outcome: PROGRESSING AS EXPECTED  Patient is well versed in the treatment plan. Patient understands reason for admission, surgery and plan of care going forward. Continued assessment for information needs.     Problem: Pain Management  Goal: Pain level will decrease to patient’s comfort goal  Outcome: PROGRESSING AS EXPECTED  Patient has morphine PCA that is controlling pain well. Patient had 6mL given over the course of 5 hours. Pain assessment with each neuro check (1 hr).          Moderate: Comprehensive teaching/Verbalized Understanding

## 2024-03-19 NOTE — ASSESSMENT & PLAN NOTE
- pacemaker placed by Dr. Chung 8/3  - Pacer working, no chest pain  -   - tachycardia due to PE.  - STABLE, follow up.    AFTER YOUR ENDOSCOPY    Date of Procedure:  3/19/2024    You had the following procedure(s) performed today:  Colonoscopy    Exam Results:  The physician removed 10 polyp(s), which will be sent to the lab for testing. and Lab results will be called or mailed to you within 7-10 business days. Please follow up as directed in your final lab report   Postoperative Diagnosis/ Impression:  Sub-optimal prep   Polyps (multiple)  Diverticulosis  Hemorrhoids     Recommendations:  Diet - high fiber  Meds - resume prior   Avoid NSAIDs for 5 days  Await pathology   Recommend repeat colonoscopy with Dr Mccabe in 6 months due to sub-optimal right colon prep and multiple flat polyps in the right colon and for surveillance of polypectomy sites.   Recommend 2 weeks of low residue diet with daily laxatives followed by 2 days extended prep prior to procedure    Diet Instructions:  You may resume your regular diet today. It is recommended to avoid heavy, greasy, and spicy foods today. and Your doctor also advises that you increase your fiber intake (fruits, vegetables, whole grains, etc.) If necessary, you may also add a fiber supplement, such as Metamucil or Citrucel.    Medications:  You may resume your medications as prescribed.    Activity for Today:    DO NOT DRIVE.  Do not operate any other heavy machinery or equipment.  Avoid activities that require coordination (climbing ladders, using a knife/cooking equipment, etc.)  Do not make important financial or legal decisions  Do not return to work.  Do not drink any alcohol.  You may resume your activity tomorrow.    It is normal to have.....    Colonoscopy / Sigmoidoscopy   Mild abdominal distention and/or cramping are normal after these procedures, but should pass within an hour or two with the passage of air.  Mild nausea and/or vomiting       When to call Dr. Solis at [phone]    For Colonoscopy / Sigmoidoscopy   If you have unusual belly pain that is NOT relieved with passing  air  If you have rectal bleeding more than blood streaking on the toilet tissue  If you have moderate nausea and/or vomiting         Go to the Emergency Room if you experience any of the following:  Severe pain  Difficulty breathing or swallowing  Persistent vomiting  Vomiting blood, either brown (coffee ground in consistency) or red  Significant rectal bleeding  Chills or fever over 101 degrees F        Want to Say “Thank You” to a Nurse?  The PENNY Award® was created in memory of SANDI Garcia by his family to say thank you to bedside nurses who provide an outstanding level of care.  Submit a nomination using any method below.     OR    [email or web address]

## 2025-01-03 NOTE — FLOWSHEET NOTE
08/08/17 1315   Type of Assessment   Assessment Yes   Patient History   Pulmonary Diagnosis PNA, complete heart block, pacemaker   Surgical Procedures 4/7/17 Craniotomy   Home O2 Yes   Oxygen liter flow 2   Oxygen at night only No   Nocturnal CPAP No   Home Treatments/Frequency No   COPD Risk Screening   Do you have a history of COPD? No   COPD Population Screener   During the past 4 weeks, how much did you feel short of breath? 0   Do you ever cough up any mucus or phlegm? 0   In the past 12 months, you do less than you used to because of your breathing problems 0   Have you smoked at least 100 cigarettes in your entire life? 0   How old are you? 2   COPD Screening Score 2   Smoking History   Have you Ever Smoked Never   Level Of Consciousness   Level of Consciousness Alert   Respiratory WDL   Respiratory (WDL) X   Chest Exam   Work Of Breathing / Effort Mild   Respiration 17   Pulse 61   Breath Sounds   RUL Breath Sounds Clear   RML Breath Sounds Clear   RLL Breath Sounds Clear;Diminished   RIANA Breath Sounds Clear   LLL Breath Sounds Clear;Diminished   Secretions   Cough Non Productive   Oximetry   #Pulse Oximetry (Single Determination) Yes   Oxygen   Home O2 Use Prior To Admission? Yes   Home O2 LPM Flow 2 LPM   Home O2 Delivery Method Silicone Nasal Cannula   Home O2 Frequency of Use Continuous   Pulse Oximetry 98 %   O2 (LPM) 2   O2 Daily Delivery Respiratory  Silicone Nasal Cannula   Protocol Pathways   Protocol Pathways Yes   O2 Protocol   O2 Protocol Indications Room Air SpO2 Less Than 90%   O2 Protocol Goals/Outcome Normoxemia on Oxygen, SpO2 greater than 90%      Copied from CRM #[id number]. Topic: MW Messaging - MW Patient Request  >> Rafael 3, 2025  4:17 PM Lani TAYLOR wrote:  David Farooq called requesting to send a general message to clinician.   Verified issue is NOT regarding a symptom(s) requiring routine or emergent triage. Verified another message template type and CRM does not apply.    Selected 'Wrap Up CRM' and created new Telephone Encounter after clicking 'Convert to Clinical Call'. Selected appropriate Reason for Call.  Sent Pt message template and routed as routine priority per Clinician KB page to appropriate clinician pool. Readback full message.    Patient is aware of self pay policy, states that he does not have $200 but would still like to establish care on 1/10, was advised it is at the clinics discretion if can be seen. Please contact and advise.

## 2025-03-12 NOTE — CARE PLAN
Problem: Bowel/Gastric:  Goal: Normal bowel function is maintained or improved  Intervention: Educate patient and significant other/support system about diet, fluid intake, medications and activity to promote bowel function  Pt continent of bowel and bladder. Bowel pattern remains regular.           No no

## 2025-06-17 NOTE — FACE TO FACE
Face to Face Supporting Documentation - Home Health    The encounter with this patient was in whole or in part the primary reason for home health admission.    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 Leaving home requires a considerable and taxing effort. There is a normal inability to leave the home.    Community Physician to provide follow up care: No primary care provider on file.     Optional Interventions? No      I certify the face to face encounter for this home health care referral meets the CMS requirements and the encounter/clinical assessment with the patient was, in whole, or in part, for the medical condition(s) listed above, which is the primary reason for home health care. Based on my clinical findings: the service(s) are medically necessary, support the need for home health care, and the homebound criteria are met.  I certify that this patient has had a face to face encounter by myself.  Shalom Alcantar M.D. - [place]: [phone]     Patient is scheduled for a Upper Endoscopy (EGD) on 6/30/25 with Dr. STIVEN Holman  Referral for procedure from  last procedure report - Pt due for follow up procedure

## (undated) DEVICE — GLOVE BIOGEL PI ORTHO SZ 6 1/2 SURGICAL PF LF (40PR/BX)

## (undated) DEVICE — SPHERE NAVIGATION STEALTH (5EA/TY 12TY/PK)

## (undated) DEVICE — SUTURE 3-0 VICRYL PLUS SH - 8X 18 INCH (12/BX)

## (undated) DEVICE — GOWN SURGEONS LARGE - (32/CA)

## (undated) DEVICE — DISSECT TOOL MIDAS F2/8TA23

## (undated) DEVICE — PATTIES SURG X-RAYCOTTONOID - 1 X 3 IN (200/CA)

## (undated) DEVICE — BANDAGE ROLL STERILE BULKEE 4.5 IN X 4 YD (100EA/CA)

## (undated) DEVICE — DRAPE MAYO STAND - (30/CA)

## (undated) DEVICE — PROTECTOR ULNA NERVE - (36PR/CA)

## (undated) DEVICE — GLOVE BIOGEL PI INDICATOR SZ 7.0 SURGICAL PF LF - (50/BX 4BX/CA)

## (undated) DEVICE — NEEDLE NON SAFETY HYPO 22 GA X 1 1/2 IN (100/BX)

## (undated) DEVICE — ELECTRODE 850 FOAM ADHESIVE - HYDROGEL RADIOTRNSPRNT (50/PK)

## (undated) DEVICE — GLOVE BIOGEL SZ 6 PF LATEX - (50EA/BX 4BX/CA)

## (undated) DEVICE — DETERGENT RENUZYME PLUS 10 OZ PACKET (50/BX)

## (undated) DEVICE — TUBING CLEARLINK DUO-VENT - C-FLO (48EA/CA)

## (undated) DEVICE — HEMOSTAT SURG ABSORBABLE - 4 X 8 IN SURGICEL (24EA/CA)

## (undated) DEVICE — PATTIES SURG NEURO X-RAY 3X3 - 10/PK 20PK/CA

## (undated) DEVICE — DRESSING TRANSPARENT FILM TEGADERM 4 X 4.75" (50EA/BX)"

## (undated) DEVICE — COVER PROBE STERILE CONE (12EA/CA)

## (undated) DEVICE — PACK CRANI - (1EA/CA)

## (undated) DEVICE — NEPTUNE 4 PORT MANIFOLD - (20/PK)

## (undated) DEVICE — DRESSING XEROFORM 1X8 - (50/BX 4BX/CA)

## (undated) DEVICE — PATTIES SURG X-RAYCOTTONOID - 1/2 X 3 IN (200/CA)

## (undated) DEVICE — BLADE CLIPPER FITS 2501 CLIPPER (BLUE)  (20EA/CA)

## (undated) DEVICE — GLOVE BIOGEL ECLIPSE PF LATEX SIZE 7.5

## (undated) DEVICE — DRAPE STRLE REG TOWEL 18X24 - (10/BX 4BX/CA)"

## (undated) DEVICE — BOVIE BLADE COATED - (50/PK)

## (undated) DEVICE — SUTURE 4-0 NUROLON CR/8 TF - (12/BX) ETHICON

## (undated) DEVICE — GLOVE BIOGEL ECLIPSE PF LATEX SIZE 8.0  (50PR/BX)

## (undated) DEVICE — MIDAS LUBRICATOR/DIFFUSER PACK - 4/CS

## (undated) DEVICE — STAPLER SKIN DISP - (6/BX 10BX/CA) VISISTAT

## (undated) DEVICE — SUTURE GENERAL

## (undated) DEVICE — GLOVE BIOGEL ECLIPSE  PF LATEX SIZE 6.5 (50PR/BX)

## (undated) DEVICE — GLOVE BIOGEL SZ 6.5 SURGICAL PF LTX (50PR/BX 4BX/CA)

## (undated) DEVICE — Device

## (undated) DEVICE — LACTATED RINGERS INJ 1000 ML - (14EA/CA 60CA/PF)

## (undated) DEVICE — ELECTRODE DUAL RETURN W/ CORD - (50/PK)

## (undated) DEVICE — MASK ANESTHESIA ADULT  - (100/CA)

## (undated) DEVICE — WATER IRRIG. STER. 1500 ML - (9/CA)

## (undated) DEVICE — GLOVE BIOGEL INDICATOR SZ 7SURGICAL PF LTX - (50/BX 4BX/CA)

## (undated) DEVICE — SCALP CLIP RANEY 20-1037 (10EA/PK 20PK/CA)

## (undated) DEVICE — PERFORATER DISP TIP DGR-0

## (undated) DEVICE — CANISTER SUCTION 3000ML MECHANICAL FILTER AUTO SHUTOFF MEDI-VAC NONSTERILE LF DISP  (40EA/CA)

## (undated) DEVICE — LACTATED RINGERS INJ. 500 ML - (24EA/CA)

## (undated) DEVICE — SENSOR SPO2 NEO LNCS ADHESIVE (20/BX) SEE USER NOTES

## (undated) DEVICE — CHLORAPREP 26 ML APPLICATOR - ORANGE TINT(25/CA)

## (undated) DEVICE — PIN HEAD MAYFIELD DISP. (3EA/PK 12PK/BX)

## (undated) DEVICE — SET LEADWIRE 5 LEAD BEDSIDE DISPOSABLE ECG (1SET OF 5/EA)

## (undated) DEVICE — BALL COTTON NEURO 3/4 INCH - (5/PK50PK/CA)

## (undated) DEVICE — TUBING C&T SET FLYING LEADS DRAIN TUBING (10EA/BX)

## (undated) DEVICE — SET EXTENSION WITH 2 PORTS (48EA/CA) ***PART #2C8610 IS A SUBSTITUTE*****

## (undated) DEVICE — GLOVE BIOGEL SZ 7.5 SURGICAL PF LTX - (50PR/BX 4BX/CA)

## (undated) DEVICE — SUTURE 2-0 VICRYL PLUS CT-1 - 8 X 18 INCH(12/BX)

## (undated) DEVICE — GOWN WARMING STANDARD FLEX - (30/CA)

## (undated) DEVICE — DRESSING PETROLEUM GAUZE 5 X 9" (50EA/BX 4BX/CA)"

## (undated) DEVICE — DRESSING NON ADHERENT 3 X 4 - STERILE (100/BX 12BX/CA)

## (undated) DEVICE — KIT SURGIFLO W/OUT THROMBIN - (6EA/CA)

## (undated) DEVICE — GOWN SURGEONS X-LARGE - DISP. (30/CA)

## (undated) DEVICE — BALL COTTON NEURO 1 INCH - (5/PK50PK/CA)

## (undated) DEVICE — SPONGE GAUZESTER 4 X 4 4PLY - (128PK/CA)

## (undated) DEVICE — DRAPE MEDI-SLUSH STERILE  - (40/CA)

## (undated) DEVICE — KIT ROOM DECONTAMINATION

## (undated) DEVICE — TOWELS CLOTH SURGICAL - (4/PK 20PK/CA)

## (undated) DEVICE — DEVICE MONOPOLAR RF PEAK PLASMABLADE 3.0S

## (undated) DEVICE — SUCTION INSTRUMENT YANKAUER BULBOUS TIP W/O VENT (50EA/CA)

## (undated) DEVICE — KIT ANESTHESIA W/CIRCUIT & 3/LT BAG W/FILTER (20EA/CA)

## (undated) DEVICE — GRAFT DURAGEN PLUS 3 X 3 IN (7.5 X 7.5 CM): Type: IMPLANTABLE DEVICE | Status: NON-FUNCTIONAL

## (undated) DEVICE — SURGIFOAM (SIZE 100) - (6EA/CA)

## (undated) DEVICE — SODIUM CHL IRRIGATION 0.9% 1000ML (12EA/CA)

## (undated) DEVICE — LEAD SET 6 DISP. EKG NIHON KOHDEN (100EA/CA) [9859].

## (undated) DEVICE — CORDS BIPOLAR COAGULATION - 12FT STERILE DISP. (10EA/BX)

## (undated) DEVICE — PATTIES SURG NEURO X-RAY 1/2X1/2 (10EA/PK 20PK/CS)

## (undated) DEVICE — TIP STRAIGHT SONOPET (5EA/PK)

## (undated) DEVICE — GLOVE BIOGEL ECLIPSE PF LATEX SIZE 9.0